# Patient Record
Sex: MALE | Race: WHITE | NOT HISPANIC OR LATINO | Employment: OTHER | URBAN - METROPOLITAN AREA
[De-identification: names, ages, dates, MRNs, and addresses within clinical notes are randomized per-mention and may not be internally consistent; named-entity substitution may affect disease eponyms.]

---

## 2019-01-08 ENCOUNTER — HOSPITAL ENCOUNTER (OUTPATIENT)
Facility: HOSPITAL | Age: 59
Discharge: HOME | End: 2019-01-09
Attending: EMERGENCY MEDICINE | Admitting: INTERNAL MEDICINE
Payer: COMMERCIAL

## 2019-01-08 DIAGNOSIS — R07.9 CHEST PAIN: ICD-10-CM

## 2019-01-08 DIAGNOSIS — E11.65 UNCONTROLLED TYPE 2 DIABETES MELLITUS WITH HYPERGLYCEMIA: Primary | ICD-10-CM

## 2019-01-08 PROBLEM — I20.89 ANGINA EFFORT: Status: ACTIVE | Noted: 2019-01-08

## 2019-01-08 PROBLEM — I25.10 CORONARY ARTERY DISEASE: Status: ACTIVE | Noted: 2019-01-08

## 2019-01-08 PROBLEM — Z95.5 HISTORY OF HEART ARTERY STENT: Status: ACTIVE | Noted: 2019-01-08

## 2019-01-08 PROBLEM — E87.1 HYPONATREMIA: Status: ACTIVE | Noted: 2019-01-08

## 2019-01-08 PROBLEM — I16.0 HYPERTENSIVE URGENCY: Status: ACTIVE | Noted: 2019-01-08

## 2019-01-08 PROBLEM — I15.2 HYPERTENSION ASSOCIATED WITH DIABETES: Status: ACTIVE | Noted: 2019-01-08

## 2019-01-08 PROBLEM — E11.59 HYPERTENSION ASSOCIATED WITH DIABETES: Status: ACTIVE | Noted: 2019-01-08

## 2019-01-08 LAB
ALBUMIN SERPL BCP-MCNC: 4.2 G/DL
ALP SERPL-CCNC: 97 U/L
ALT SERPL W/O P-5'-P-CCNC: 28 U/L
ANION GAP SERPL CALC-SCNC: 12 MMOL/L
AST SERPL-CCNC: 23 U/L
BASOPHILS # BLD AUTO: 0 K/UL
BASOPHILS NFR BLD: 0.5 %
BILIRUB SERPL-MCNC: 0.7 MG/DL
BNP SERPL-MCNC: 15 PG/ML
BUN SERPL-MCNC: 11 MG/DL
CALCIUM SERPL-MCNC: 10 MG/DL
CHLORIDE SERPL-SCNC: 98 MMOL/L
CO2 SERPL-SCNC: 24 MMOL/L
CREAT SERPL-MCNC: 1 MG/DL
D DIMER PPP IA.FEU-MCNC: <0.19 MG/L FEU
DIFFERENTIAL METHOD: ABNORMAL
EOSINOPHIL # BLD AUTO: 0.1 K/UL
EOSINOPHIL NFR BLD: 1.6 %
ERYTHROCYTE [DISTWIDTH] IN BLOOD BY AUTOMATED COUNT: 13 %
EST. GFR  (AFRICAN AMERICAN): >60 ML/MIN/1.73 M^2
EST. GFR  (NON AFRICAN AMERICAN): >60 ML/MIN/1.73 M^2
GLUCOSE SERPL-MCNC: 311 MG/DL
HCT VFR BLD AUTO: 41.4 %
HGB BLD-MCNC: 14.1 G/DL
LYMPHOCYTES # BLD AUTO: 2.3 K/UL
LYMPHOCYTES NFR BLD: 28.1 %
MAGNESIUM SERPL-MCNC: 1.9 MG/DL
MCH RBC QN AUTO: 29.1 PG
MCHC RBC AUTO-ENTMCNC: 34.2 G/DL
MCV RBC AUTO: 85 FL
MONOCYTES # BLD AUTO: 0.7 K/UL
MONOCYTES NFR BLD: 8.9 %
NEUTROPHILS # BLD AUTO: 5 K/UL
NEUTROPHILS NFR BLD: 60.9 %
PLATELET # BLD AUTO: 302 K/UL
PMV BLD AUTO: 8.4 FL
POCT GLUCOSE: 258 MG/DL (ref 70–110)
POCT GLUCOSE: 260 MG/DL (ref 70–110)
POTASSIUM SERPL-SCNC: 4.2 MMOL/L
PROT SERPL-MCNC: 7.6 G/DL
RBC # BLD AUTO: 4.86 M/UL
SODIUM SERPL-SCNC: 134 MMOL/L
TROPONIN I SERPL DL<=0.01 NG/ML-MCNC: 0.01 NG/ML
TROPONIN I SERPL DL<=0.01 NG/ML-MCNC: <0.006 NG/ML
TSH SERPL DL<=0.005 MIU/L-ACNC: 0.76 UIU/ML
WBC # BLD AUTO: 8.1 K/UL

## 2019-01-08 PROCEDURE — 94761 N-INVAS EAR/PLS OXIMETRY MLT: CPT

## 2019-01-08 PROCEDURE — G0378 HOSPITAL OBSERVATION PER HR: HCPCS

## 2019-01-08 PROCEDURE — 85379 FIBRIN DEGRADATION QUANT: CPT

## 2019-01-08 PROCEDURE — 63600175 PHARM REV CODE 636 W HCPCS: Performed by: NURSE PRACTITIONER

## 2019-01-08 PROCEDURE — 83880 ASSAY OF NATRIURETIC PEPTIDE: CPT

## 2019-01-08 PROCEDURE — 96372 THER/PROPH/DIAG INJ SC/IM: CPT | Performed by: EMERGENCY MEDICINE

## 2019-01-08 PROCEDURE — 85025 COMPLETE CBC W/AUTO DIFF WBC: CPT

## 2019-01-08 PROCEDURE — 25000003 PHARM REV CODE 250: Performed by: NURSE PRACTITIONER

## 2019-01-08 PROCEDURE — 83036 HEMOGLOBIN GLYCOSYLATED A1C: CPT

## 2019-01-08 PROCEDURE — 36415 COLL VENOUS BLD VENIPUNCTURE: CPT

## 2019-01-08 PROCEDURE — 84484 ASSAY OF TROPONIN QUANT: CPT | Mod: 91

## 2019-01-08 PROCEDURE — 83735 ASSAY OF MAGNESIUM: CPT

## 2019-01-08 PROCEDURE — 84443 ASSAY THYROID STIM HORMONE: CPT

## 2019-01-08 PROCEDURE — 80053 COMPREHEN METABOLIC PANEL: CPT

## 2019-01-08 PROCEDURE — 99285 EMERGENCY DEPT VISIT HI MDM: CPT | Mod: 25

## 2019-01-08 PROCEDURE — 93005 ELECTROCARDIOGRAM TRACING: CPT

## 2019-01-08 PROCEDURE — A4216 STERILE WATER/SALINE, 10 ML: HCPCS | Performed by: EMERGENCY MEDICINE

## 2019-01-08 PROCEDURE — 25000003 PHARM REV CODE 250: Performed by: EMERGENCY MEDICINE

## 2019-01-08 RX ORDER — ASPIRIN 81 MG/1
81 TABLET ORAL DAILY
Status: DISCONTINUED | OUTPATIENT
Start: 2019-01-09 | End: 2019-01-09 | Stop reason: HOSPADM

## 2019-01-08 RX ORDER — ONDANSETRON 2 MG/ML
4 INJECTION INTRAMUSCULAR; INTRAVENOUS EVERY 4 HOURS PRN
Status: DISCONTINUED | OUTPATIENT
Start: 2019-01-08 | End: 2019-01-09 | Stop reason: HOSPADM

## 2019-01-08 RX ORDER — GLUCAGON 1 MG
1 KIT INJECTION
Status: DISCONTINUED | OUTPATIENT
Start: 2019-01-08 | End: 2019-01-09 | Stop reason: HOSPADM

## 2019-01-08 RX ORDER — SODIUM CHLORIDE 0.9 % (FLUSH) 0.9 %
3 SYRINGE (ML) INJECTION EVERY 8 HOURS
Status: DISCONTINUED | OUTPATIENT
Start: 2019-01-08 | End: 2019-01-09 | Stop reason: HOSPADM

## 2019-01-08 RX ORDER — ASPIRIN 325 MG
325 TABLET ORAL
Status: DISPENSED | OUTPATIENT
Start: 2019-01-08 | End: 2019-01-08

## 2019-01-08 RX ORDER — ACETAMINOPHEN 500 MG
1000 TABLET ORAL EVERY 8 HOURS PRN
Status: DISCONTINUED | OUTPATIENT
Start: 2019-01-08 | End: 2019-01-09 | Stop reason: HOSPADM

## 2019-01-08 RX ORDER — LISINOPRIL 20 MG/1
20 TABLET ORAL DAILY
Status: ON HOLD | COMMUNITY
End: 2019-01-09 | Stop reason: SDUPTHER

## 2019-01-08 RX ORDER — IBUPROFEN 200 MG
16 TABLET ORAL
Status: DISCONTINUED | OUTPATIENT
Start: 2019-01-08 | End: 2019-01-09 | Stop reason: HOSPADM

## 2019-01-08 RX ORDER — SODIUM CHLORIDE 0.9 % (FLUSH) 0.9 %
5 SYRINGE (ML) INJECTION
Status: DISCONTINUED | OUTPATIENT
Start: 2019-01-08 | End: 2019-01-09 | Stop reason: HOSPADM

## 2019-01-08 RX ORDER — NITROGLYCERIN 0.4 MG/1
0.4 TABLET SUBLINGUAL EVERY 5 MIN PRN
Status: DISCONTINUED | OUTPATIENT
Start: 2019-01-08 | End: 2019-01-09 | Stop reason: HOSPADM

## 2019-01-08 RX ORDER — RAMELTEON 8 MG/1
8 TABLET ORAL NIGHTLY PRN
Status: DISCONTINUED | OUTPATIENT
Start: 2019-01-08 | End: 2019-01-09 | Stop reason: HOSPADM

## 2019-01-08 RX ORDER — PANTOPRAZOLE SODIUM 40 MG/1
40 TABLET, DELAYED RELEASE ORAL DAILY
Status: DISCONTINUED | OUTPATIENT
Start: 2019-01-09 | End: 2019-01-09 | Stop reason: HOSPADM

## 2019-01-08 RX ORDER — LISINOPRIL 10 MG/1
20 TABLET ORAL DAILY
Status: DISCONTINUED | OUTPATIENT
Start: 2019-01-08 | End: 2019-01-09 | Stop reason: HOSPADM

## 2019-01-08 RX ORDER — INSULIN ASPART 100 [IU]/ML
0-5 INJECTION, SOLUTION INTRAVENOUS; SUBCUTANEOUS
Status: DISCONTINUED | OUTPATIENT
Start: 2019-01-08 | End: 2019-01-09 | Stop reason: HOSPADM

## 2019-01-08 RX ORDER — IBUPROFEN 200 MG
24 TABLET ORAL
Status: DISCONTINUED | OUTPATIENT
Start: 2019-01-08 | End: 2019-01-09 | Stop reason: HOSPADM

## 2019-01-08 RX ORDER — KETOROLAC TROMETHAMINE 30 MG/ML
15 INJECTION, SOLUTION INTRAMUSCULAR; INTRAVENOUS ONCE
Status: DISCONTINUED | OUTPATIENT
Start: 2019-01-08 | End: 2019-01-09 | Stop reason: HOSPADM

## 2019-01-08 RX ADMIN — LISINOPRIL 20 MG: 10 TABLET ORAL at 05:01

## 2019-01-08 RX ADMIN — INSULIN ASPART 2 UNITS: 100 INJECTION, SOLUTION INTRAVENOUS; SUBCUTANEOUS at 05:01

## 2019-01-08 RX ADMIN — INSULIN ASPART 1 UNITS: 100 INJECTION, SOLUTION INTRAVENOUS; SUBCUTANEOUS at 08:01

## 2019-01-08 RX ADMIN — Medication 3 ML: at 08:01

## 2019-01-08 RX ADMIN — Medication 3 ML: at 03:01

## 2019-01-08 NOTE — ASSESSMENT & PLAN NOTE
With prior coronary stent 2012  Telemetry  Patient reports his only home medication is lisinopril- No asa, statin, or DM medications noted  Cardiology consulted  Add daily baby asa in am  Continue home lisinopril  Check lipid panel

## 2019-01-08 NOTE — HPI
"This  is a 59 yo  male with PMHx of CAD with prior coronary stent placement of the  LAD in 2012, HTN, and NIDDM who presents to the ED with complaints of chest pain onset 2 months. Patient states that when he tries to "do anything physical" he has mid sternal chest pain/ pressure, weakness, and must sit down in order not to pass out for the past couple of months. He complains of shortness of breath with activity. He describes his chest discomfort as pressure right now. He states he felt these symptoms previously prior to his stent placement 7 years ago. Patient states that he does not have any money or job and unable to afford medication for diabetes. Denies the use of blood thinners secondary to side effects that he describes as a right sided headache. Patient denies smoking, drinking, or using any illicit drugs. He complains of chronic thirst and urinary frequency secondary to diabetes and prostate issues. Patient denies any nausea, vomiting, diarrhea, abdominal pain, pain or swelling of the legs, abnormal sweating, dizziness, numbness, cough, congestion, blood in his urine, blood in his stool, or fever. Patient was placed in the hospital for further evaluation and treatment.       "

## 2019-01-08 NOTE — ASSESSMENT & PLAN NOTE
Telemetry  Full dose asa x 1  Cardiology consulted  Trend troponins  Check lipid, HGA1C,  and TSH levels also

## 2019-01-08 NOTE — ASSESSMENT & PLAN NOTE
Dm diet  Accuchecks with correctional SSI  Check HGA1c  Consider metformin if does not need angiogram

## 2019-01-08 NOTE — H&P
"[facility] Medicine  History & Physical    Patient Name: Price Noguera  MRN: [id number]  Admission Date: 1/8/2019  Attending Physician: Shira Linares MD   Primary Care Provider: Primary Doctor No    Patient information was obtained from patient and ER records.     Subjective:     Principal Problem:Chest pain    Chief Complaint:   Chief Complaint   Patient presents with    Chest Pain     C/o left sided chest pressure x 1 week. States pain increases with activity. + SOB.         HPI: This  is a 59 yo  male with PMHx of CAD with prior coronary stent placement of the  LAD in 2012, HTN, and NIDDM who presents to the ED with complaints of chest pain onset 2 months. Patient states that when he tries to "do anything physical" he has mid sternal chest pain/ pressure, weakness, and must sit down in order not to pass out for the past couple of months. He complains of shortness of breath with activity. He describes his chest discomfort as pressure right now. He states he felt these symptoms previously prior to his stent placement 7 years ago. Patient states that he does not have any money or job and unable to afford medication for diabetes. Denies the use of blood thinners secondary to side effects that he describes as a right sided headache. Patient denies smoking, drinking, or using any illicit drugs. He complains of chronic thirst and urinary frequency secondary to diabetes and prostate issues. Patient denies any nausea, vomiting, diarrhea, abdominal pain, pain or swelling of the legs, abnormal sweating, dizziness, numbness, cough, congestion, blood in his urine, blood in his stool, or fever.  Patient was placed in the hospital for further evaluation and treatment.   Patient reports prior "Head injury 3 years ago " with memory issues every since and no work up. Will check Ct scan of head.     Past Medical History:   Diagnosis Date    Coronary artery disease     Diabetes mellitus     History " of heart artery stent     Hypertension        Past Surgical History:   Procedure Laterality Date    BACK SURGERY      COLONOSCOPY  04/07/2014    TUMOR REMOVAL  05/16/2016       Review of patient's allergies indicates:  No Known Allergies    No current facility-administered medications on file prior to encounter.      Current Outpatient Medications on File Prior to Encounter   Medication Sig    lisinopril (PRINIVIL,ZESTRIL) 20 MG tablet Take 20 mg by mouth once daily.     Family History     Problem Relation (Age of Onset)    Cancer Father, Paternal Grandmother        Tobacco Use    Smoking status: Never Smoker    Smokeless tobacco: Never Used   Substance and Sexual Activity    Alcohol use: No     Frequency: Never    Drug use: No    Sexual activity: Not Currently     Review of Systems   Constitutional: Positive for activity change and fatigue. Negative for appetite change, chills, diaphoresis and fever.   HENT: Negative for ear discharge, ear pain and facial swelling.    Eyes: Negative for pain and redness.   Respiratory: Positive for shortness of breath. Negative for cough.         SOB with exertion   Cardiovascular: Positive for chest pain. Negative for palpitations and leg swelling.   Gastrointestinal: Negative for abdominal distention, abdominal pain, blood in stool, constipation, diarrhea, nausea and vomiting.   Endocrine: Negative for polydipsia and polyphagia.   Genitourinary: Negative for difficulty urinating, dysuria, flank pain and hematuria.   Musculoskeletal: Negative for neck pain and neck stiffness.   Skin: Negative for color change.   Allergic/Immunologic: Negative for food allergies.   Neurological: Positive for weakness. Negative for seizures, syncope, facial asymmetry and speech difficulty.        Reports period of weakness at times during CP occurrences    Hematological: Does not bruise/bleed easily.   Psychiatric/Behavioral: Negative for agitation, behavioral problems, confusion and  "suicidal ideas. The patient is nervous/anxious.         Reports memory issues since a prior "head injury about 3 years ago"     Objective:     Vital Signs (Most Recent):  Temp: 97.2 °F (36.2 °C) (01/08/19 1443)  Pulse: 66 (01/08/19 1443)  Resp: 18 (01/08/19 1443)  BP: (!) 150/87 (01/08/19 1443)  SpO2: 100 % (01/08/19 1559) Vital Signs (24h Range):  Temp:  [97.2 °F (36.2 °C)-98.2 °F (36.8 °C)] 97.2 °F (36.2 °C)  Pulse:  [59-89] 66  Resp:  [18] 18  SpO2:  [98 %-100 %] 100 %  BP: (135-178)/() 150/87     Weight: 88.5 kg (195 lb)  Body mass index is 28.8 kg/m².    Physical Exam   Constitutional: He is oriented to person, place, and time. He appears well-developed and well-nourished. No distress.   HENT:   Head: Normocephalic and atraumatic.   Eyes: Conjunctivae and EOM are normal. Pupils are equal, round, and reactive to light. Right eye exhibits no discharge. Left eye exhibits no discharge.   Neck: Normal range of motion. Neck supple. No JVD present.   Cardiovascular: Normal rate, regular rhythm, normal heart sounds and intact distal pulses.   No murmur heard.  Pulmonary/Chest: Effort normal and breath sounds normal. No respiratory distress. He has no wheezes.   Abdominal: Soft. Bowel sounds are normal. He exhibits no distension. There is no tenderness. There is no guarding.   Genitourinary:   Genitourinary Comments: Not examined   Musculoskeletal: Normal range of motion. He exhibits no edema.   Neurological: He is alert and oriented to person, place, and time. No cranial nerve deficit.   Skin: Skin is warm and dry. Capillary refill takes less than 2 seconds. He is not diaphoretic.   Psychiatric: He has a normal mood and affect. His behavior is normal. Judgment and thought content normal.         CRANIAL NERVES     CN III, IV, VI   Pupils are equal, round, and reactive to light.  Extraocular motions are normal.        Significant Labs: Reviewed    Significant Imaging: Reviewed    Assessment/Plan:     * Chest pain "    As above  Also add PPI and one dose IV toradol       Hyponatremia    Monitor       Hypertensive urgency    Resolved       Angina effort    Telemetry  Full dose asa x 1  Cardiology consulted  Trend troponins  Check lipid, HGA1C,  and TSH levels also       Hypertension associated with diabetes    Continue home lisinopril       Coronary artery disease    With prior coronary stent 2012  Telemetry  Patient reports his only home medication is lisinopril- No asa, statin, or DM medications noted  Cardiology consulted  Add daily baby asa in am  Continue home lisinopril  Check lipid panel       Uncontrolled type 2 diabetes mellitus with hyperglycemia    Dm diet  Accuchecks with correctional SSI  Check HGA1c  Consider metformin if does not need angiogram         VTE Risk Mitigation (From admission, onward)        Ordered     IP VTE LOW RISK PATIENT  Once      01/08/19 1457     Place STEPHANY hose  Until discontinued      01/08/19 1457     Place sequential compression device  Until discontinued      01/08/19 1457          SEAN Grant  Department of Hospital Medicine   [facility]    Time spent seeing patient( greater than 1/2 spent in direct contact) : 74 minutes

## 2019-01-08 NOTE — PLAN OF CARE
Problem: Adult Inpatient Plan of Care  Goal: Plan of Care Review  Outcome: Ongoing (interventions implemented as appropriate)   01/08/19 7452   Plan of Care Review   Plan of Care Reviewed With patient   Pt denies pain at this time. NAD noted. POC reviewed w pt and they verbalized understanding. Tele-SR     Pt free from falls, Pt ambulates to the bathroom. Bed in low position, side rails up x 2. Call light in reach, bed alarm on and wheels locked. Will continue to monitor.

## 2019-01-08 NOTE — ED PROVIDER NOTES
"Encounter Date: 1/8/2019    SCRIBE #1 NOTE: I, Janelle Miller, am scribing for, and in the presence of, Dr. Adame.       History     Chief Complaint   Patient presents with    Chest Pain     C/o left sided chest pressure x 1 week. States pain increases with activity. + SOB.        Time seen by provider: 10:56 AM on 01/08/2019    Price Noguera is a 58 y.o. male with CAD s/p stent in LAD, HTN, NIDDM who presents to the ED with chest pain onset 2 months. Patient states that when he tries to "do anything physical" he has chest pain, weakness, and must sit in order to not pass out for the past couple of months. Complains of shortness of breath with activity. He describes his chest discomfort as pressure right now. He states he felt these symptoms previously prior to his stent placement 7 years ago. Patient states that he does not have any money or job and unable to afford medication for diabetes. Denies the use of blood thinners secondary to side effects that he describes as a right sided headache. Patient denies smoking, drinking, or using any illicit drugs. He complains of chronic thirst and urinary frequency secondary to diabetes and prostate issues. Patient denies any nausea, vomiting, diarrhea, abdominal pain, pain or swelling of the legs, abnormal sweating, dizziness, numbness, cough, congestion, blood in his urine, blood in his stool, or fever.       The history is provided by the patient. No  was used.     Review of patient's allergies indicates:  No Known Allergies  Past Medical History:   Diagnosis Date    Coronary artery disease     Diabetes mellitus     History of heart artery stent     Hypertension      Past Surgical History:   Procedure Laterality Date    BACK SURGERY       No family history on file.  Social History     Tobacco Use    Smoking status: Never Smoker   Substance Use Topics    Alcohol use: No     Frequency: Never    Drug use: Not on file     Review of Systems "   Constitutional: Negative for chills and fever.   HENT: Negative for congestion, drooling, sore throat and trouble swallowing.    Eyes: Negative for photophobia and visual disturbance.   Respiratory: Positive for shortness of breath (on exertion). Negative for cough and wheezing.    Cardiovascular: Positive for chest pain. Negative for palpitations and leg swelling.   Gastrointestinal: Negative for abdominal pain, diarrhea, nausea and vomiting.   Endocrine: Positive for polydipsia and polyuria.   Genitourinary: Negative for difficulty urinating, dysuria and hematuria.   Musculoskeletal: Negative for back pain and neck pain.   Skin: Negative for rash.   Neurological: Positive for weakness (generalized during episodes). Negative for dizziness, syncope and numbness.        +near syncope   Hematological: Does not bruise/bleed easily.   Psychiatric/Behavioral: Negative for confusion.       Physical Exam     Initial Vitals [01/08/19 1034]   BP Pulse Resp Temp SpO2   135/73 89 18 98.2 °F (36.8 °C) 99 %      MAP       --         Physical Exam    Nursing note and vitals reviewed.  Constitutional: He appears well-developed and well-nourished. No distress.   HENT:   Head: Normocephalic and atraumatic.   Mouth/Throat: Mucous membranes are normal.   Eyes: Conjunctivae and EOM are normal. Pupils are equal, round, and reactive to light.   Neck: Normal range of motion. Neck supple. No thyromegaly present.   Cardiovascular: Normal rate, regular rhythm, normal heart sounds and intact distal pulses. Exam reveals no gallop and no friction rub.    No murmur heard.  Pulmonary/Chest: Breath sounds normal. No respiratory distress. He has no wheezes. He has no rhonchi. He has no rales.   Abdominal: Soft. Bowel sounds are normal. He exhibits no distension. There is no tenderness. There is no rebound and no guarding.   Musculoskeletal: Normal range of motion. He exhibits no edema or tenderness.   No peripheral edema.    Neurological: He is  alert and oriented to person, place, and time.   Skin: Skin is warm and dry.   Psychiatric: He has a normal mood and affect.         ED Course   Procedures  Labs Reviewed   CBC W/ AUTO DIFFERENTIAL   COMPREHENSIVE METABOLIC PANEL   TROPONIN I   B-TYPE NATRIURETIC PEPTIDE     EKG Readings: (Independently Interpreted)   Rhythm: Normal Sinus Rhythm. Heart Rate: 77. Conduction: RBBB. ST Segments: Normal ST Segments.   No evidence of ST segment elevation or depression.        Imaging Results    None          Medical Decision Making:   History:   Old Medical Records: I decided to obtain old medical records.  Clinical Tests:   Lab Tests: Ordered and Reviewed  Radiological Study: Ordered and Reviewed  Medical Tests: Ordered and Reviewed  Patient will be admitted given his risk factors, inability to afford his meds, and recent history of exertional chest discomfort.  He may have anxiety causing some of his symptoms however I am unable to get follow-up and feel that he needs further evaluation with cardiac markers and potential stress testing to rule out recurrent ischemia            Scribe Attestation:   Scribe #1: I performed the above scribed service and the documentation accurately describes the services I performed. I attest to the accuracy of the note.    I, Dr. Jacoby Adame personally performed the services described in this documentation. All medical record entries made by the scribe were at my direction and in my presence.  I have reviewed the chart and agree that the record reflects my personal performance and is accurate and complete. Jacoby Adame MD.  6:37 AM 01/13/2019    DISCLAIMER: This note was prepared with Dragon NaturallySpeaking voice recognition transcription software. Garbled syntax, mangled pronouns, and other bizarre constructions may be attributed to that software system            Clinical Impression:   The encounter diagnosis was Chest pain.                             Jacoby Adame  MD  01/13/19 0637

## 2019-01-08 NOTE — SUBJECTIVE & OBJECTIVE
Past Medical History:   Diagnosis Date    Coronary artery disease     Diabetes mellitus     History of heart artery stent     Hypertension        Past Surgical History:   Procedure Laterality Date    BACK SURGERY      COLONOSCOPY  04/07/2014    TUMOR REMOVAL  05/16/2016       Review of patient's allergies indicates:  No Known Allergies    No current facility-administered medications on file prior to encounter.      Current Outpatient Medications on File Prior to Encounter   Medication Sig    lisinopril (PRINIVIL,ZESTRIL) 20 MG tablet Take 20 mg by mouth once daily.     Family History     Problem Relation (Age of Onset)    Cancer Father, Paternal Grandmother        Tobacco Use    Smoking status: Never Smoker    Smokeless tobacco: Never Used   Substance and Sexual Activity    Alcohol use: No     Frequency: Never    Drug use: No    Sexual activity: Not Currently     Review of Systems   Constitutional: Positive for activity change and fatigue. Negative for appetite change, chills, diaphoresis and fever.   HENT: Negative for ear discharge, ear pain and facial swelling.    Eyes: Negative for pain and redness.   Respiratory: Positive for shortness of breath. Negative for cough.         SOB with exertion   Cardiovascular: Positive for chest pain. Negative for palpitations and leg swelling.   Gastrointestinal: Negative for abdominal distention, abdominal pain, blood in stool, constipation, diarrhea, nausea and vomiting.   Endocrine: Negative for polydipsia and polyphagia.   Genitourinary: Negative for difficulty urinating, dysuria, flank pain and hematuria.   Musculoskeletal: Negative for neck pain and neck stiffness.   Skin: Negative for color change.   Allergic/Immunologic: Negative for food allergies.   Neurological: Positive for weakness. Negative for seizures, syncope, facial asymmetry and speech difficulty.        Reports period of weakness at times during CP occurrences    Hematological: Does not  "bruise/bleed easily.   Psychiatric/Behavioral: Negative for agitation, behavioral problems, confusion and suicidal ideas. The patient is nervous/anxious.         Reports memory issues since a prior "head injury about 3 years ago"     Objective:     Vital Signs (Most Recent):  Temp: 97.2 °F (36.2 °C) (01/08/19 1443)  Pulse: 66 (01/08/19 1443)  Resp: 18 (01/08/19 1443)  BP: (!) 150/87 (01/08/19 1443)  SpO2: 100 % (01/08/19 1559) Vital Signs (24h Range):  Temp:  [97.2 °F (36.2 °C)-98.2 °F (36.8 °C)] 97.2 °F (36.2 °C)  Pulse:  [59-89] 66  Resp:  [18] 18  SpO2:  [98 %-100 %] 100 %  BP: (135-178)/() 150/87     Weight: 88.5 kg (195 lb)  Body mass index is 28.8 kg/m².    Physical Exam   Constitutional: He is oriented to person, place, and time. He appears well-developed and well-nourished. No distress.   HENT:   Head: Normocephalic and atraumatic.   Eyes: Conjunctivae and EOM are normal. Pupils are equal, round, and reactive to light. Right eye exhibits no discharge. Left eye exhibits no discharge.   Neck: Normal range of motion. Neck supple. No JVD present.   Cardiovascular: Normal rate, regular rhythm, normal heart sounds and intact distal pulses.   No murmur heard.  Pulmonary/Chest: Effort normal and breath sounds normal. No respiratory distress. He has no wheezes.   Abdominal: Soft. Bowel sounds are normal. He exhibits no distension. There is no tenderness. There is no guarding.   Genitourinary:   Genitourinary Comments: Not examined   Musculoskeletal: Normal range of motion. He exhibits no edema.   Neurological: He is alert and oriented to person, place, and time. No cranial nerve deficit.   Skin: Skin is warm and dry. Capillary refill takes less than 2 seconds. He is not diaphoretic.   Psychiatric: He has a normal mood and affect. His behavior is normal. Judgment and thought content normal.         CRANIAL NERVES     CN III, IV, VI   Pupils are equal, round, and reactive to light.  Extraocular motions are " normal.        Significant Labs: Reviewed    Significant Imaging: Reviewed

## 2019-01-08 NOTE — ED NOTES
"Denies pain c/o @ present - "feels better with the O2 on". Will continue to monitor (currently no SOB/N&V or diaphoresis)  "

## 2019-01-09 VITALS
DIASTOLIC BLOOD PRESSURE: 79 MMHG | SYSTOLIC BLOOD PRESSURE: 129 MMHG | HEIGHT: 69 IN | WEIGHT: 195 LBS | BODY MASS INDEX: 28.88 KG/M2 | HEART RATE: 62 BPM | TEMPERATURE: 97 F | RESPIRATION RATE: 16 BRPM | OXYGEN SATURATION: 96 %

## 2019-01-09 PROBLEM — E78.5 DYSLIPIDEMIA: Status: ACTIVE | Noted: 2019-01-09

## 2019-01-09 PROBLEM — E78.1 HYPERTRIGLYCERIDEMIA: Status: ACTIVE | Noted: 2019-01-09

## 2019-01-09 PROBLEM — I16.0 HYPERTENSIVE URGENCY: Status: RESOLVED | Noted: 2019-01-08 | Resolved: 2019-01-09

## 2019-01-09 LAB
CHOLEST SERPL-MCNC: 286 MG/DL
CHOLEST/HDLC SERPL: 7.2 {RATIO}
ESTIMATED AVG GLUCOSE: 272 MG/DL
HBA1C MFR BLD HPLC: 11.1 %
HDLC SERPL-MCNC: 40 MG/DL
HDLC SERPL: 14 %
LDLC SERPL CALC-MCNC: 192.8 MG/DL
NONHDLC SERPL-MCNC: 246 MG/DL
POCT GLUCOSE: 202 MG/DL (ref 70–110)
POCT GLUCOSE: 228 MG/DL (ref 70–110)
POCT GLUCOSE: 271 MG/DL (ref 70–110)
TRIGL SERPL-MCNC: 266 MG/DL
TROPONIN I SERPL DL<=0.01 NG/ML-MCNC: <0.006 NG/ML

## 2019-01-09 PROCEDURE — G0378 HOSPITAL OBSERVATION PER HR: HCPCS

## 2019-01-09 PROCEDURE — 25000003 PHARM REV CODE 250: Performed by: EMERGENCY MEDICINE

## 2019-01-09 PROCEDURE — 96372 THER/PROPH/DIAG INJ SC/IM: CPT | Performed by: EMERGENCY MEDICINE

## 2019-01-09 PROCEDURE — A4216 STERILE WATER/SALINE, 10 ML: HCPCS | Performed by: EMERGENCY MEDICINE

## 2019-01-09 PROCEDURE — 90471 IMMUNIZATION ADMIN: CPT | Performed by: INTERNAL MEDICINE

## 2019-01-09 PROCEDURE — 94761 N-INVAS EAR/PLS OXIMETRY MLT: CPT

## 2019-01-09 PROCEDURE — 80061 LIPID PANEL: CPT

## 2019-01-09 PROCEDURE — 90670 PCV13 VACCINE IM: CPT | Performed by: INTERNAL MEDICINE

## 2019-01-09 PROCEDURE — 25000003 PHARM REV CODE 250: Performed by: NURSE PRACTITIONER

## 2019-01-09 PROCEDURE — 63600175 PHARM REV CODE 636 W HCPCS: Performed by: INTERNAL MEDICINE

## 2019-01-09 PROCEDURE — 36415 COLL VENOUS BLD VENIPUNCTURE: CPT

## 2019-01-09 PROCEDURE — 84484 ASSAY OF TROPONIN QUANT: CPT

## 2019-01-09 RX ORDER — ASPIRIN 81 MG/1
81 TABLET ORAL DAILY
Refills: 0 | COMMUNITY
Start: 2019-01-10 | End: 2020-02-05

## 2019-01-09 RX ORDER — LISINOPRIL 20 MG/1
10 TABLET ORAL DAILY
Qty: 15 TABLET | Refills: 0 | Status: SHIPPED | OUTPATIENT
Start: 2019-01-09 | End: 2019-01-18 | Stop reason: SDUPTHER

## 2019-01-09 RX ORDER — METFORMIN HYDROCHLORIDE 850 MG/1
850 TABLET ORAL 2 TIMES DAILY WITH MEALS
Qty: 60 TABLET | Refills: 0 | Status: SHIPPED | OUTPATIENT
Start: 2019-01-09 | End: 2019-01-18 | Stop reason: SDUPTHER

## 2019-01-09 RX ORDER — ATORVASTATIN CALCIUM 20 MG/1
20 TABLET, FILM COATED ORAL DAILY
Qty: 30 TABLET | Refills: 0 | Status: SHIPPED | OUTPATIENT
Start: 2019-01-09 | End: 2019-01-18 | Stop reason: SDUPTHER

## 2019-01-09 RX ORDER — GLUCOSAM/CHONDRO/HERB 149/HYAL 750-100 MG
1 TABLET ORAL 2 TIMES DAILY WITH MEALS
Qty: 180 CAPSULE | Refills: 3 | Status: ON HOLD | COMMUNITY
Start: 2019-01-09 | End: 2020-12-13

## 2019-01-09 RX ORDER — LANCETS 28 GAUGE
1 EACH MISCELLANEOUS
Qty: 125 EACH | Refills: 0 | Status: SHIPPED | OUTPATIENT
Start: 2019-01-09 | End: 2019-01-18 | Stop reason: SDUPTHER

## 2019-01-09 RX ORDER — METFORMIN HYDROCHLORIDE 850 MG/1
850 TABLET ORAL 2 TIMES DAILY WITH MEALS
Status: DISCONTINUED | OUTPATIENT
Start: 2019-01-09 | End: 2019-01-09 | Stop reason: HOSPADM

## 2019-01-09 RX ORDER — PANTOPRAZOLE SODIUM 40 MG/1
40 TABLET, DELAYED RELEASE ORAL DAILY
Qty: 14 TABLET | Refills: 0 | Status: SHIPPED | OUTPATIENT
Start: 2019-01-10 | End: 2019-01-18 | Stop reason: SDUPTHER

## 2019-01-09 RX ORDER — GLUCOSAM/CHONDRO/HERB 149/HYAL 750-100 MG
1 TABLET ORAL 2 TIMES DAILY WITH MEALS
Status: DISCONTINUED | OUTPATIENT
Start: 2019-01-09 | End: 2019-01-09 | Stop reason: HOSPADM

## 2019-01-09 RX ORDER — ATORVASTATIN CALCIUM 20 MG/1
20 TABLET, FILM COATED ORAL DAILY
Status: DISCONTINUED | OUTPATIENT
Start: 2019-01-09 | End: 2019-01-09 | Stop reason: HOSPADM

## 2019-01-09 RX ORDER — ACETAMINOPHEN 500 MG
1000 TABLET ORAL EVERY 8 HOURS PRN
Refills: 0 | COMMUNITY
Start: 2019-01-09 | End: 2023-07-05

## 2019-01-09 RX ADMIN — ATORVASTATIN CALCIUM 20 MG: 20 TABLET, FILM COATED ORAL at 12:01

## 2019-01-09 RX ADMIN — METFORMIN HYDROCHLORIDE 850 MG: 850 TABLET ORAL at 05:01

## 2019-01-09 RX ADMIN — PANTOPRAZOLE SODIUM 40 MG: 40 TABLET, DELAYED RELEASE ORAL at 08:01

## 2019-01-09 RX ADMIN — OMEGA-3 FATTY ACIDS CAP 1000 MG 1 CAPSULE: 1000 CAP at 05:01

## 2019-01-09 RX ADMIN — INSULIN ASPART 3 UNITS: 100 INJECTION, SOLUTION INTRAVENOUS; SUBCUTANEOUS at 06:01

## 2019-01-09 RX ADMIN — ASPIRIN 81 MG: 81 TABLET, COATED ORAL at 08:01

## 2019-01-09 RX ADMIN — Medication 3 ML: at 05:01

## 2019-01-09 RX ADMIN — PNEUMOCOCCAL 13-VALENT CONJUGATE VACCINE 0.5 ML: 2.2; 2.2; 2.2; 2.2; 2.2; 4.4; 2.2; 2.2; 2.2; 2.2; 2.2; 2.2; 2.2 INJECTION, SUSPENSION INTRAMUSCULAR at 05:01

## 2019-01-09 RX ADMIN — ACETAMINOPHEN 1000 MG: 500 TABLET ORAL at 05:01

## 2019-01-09 NOTE — PLAN OF CARE
Notified JAI Serna of stress test results. Patient is cleared for discharge from cardiology standpoint.

## 2019-01-09 NOTE — PROGRESS NOTES
Walking nuclear treadmill stress test completed.  Vss no problems noted.  Pt transported to [facility] via wheelchair.

## 2019-01-09 NOTE — HOSPITAL COURSE
The patient was monitored closely during hospitalization and kept on continuous telemetry monitoring. Cardiology was consulted. The patient  remained in SR without any signs or symptoms of arrhythmias. The patient's troponin remained negative. The patient  was ordered for an Exercise NM  stress test.  He was also noted to have dyslipidemia and was placed on fish oils and statin. His HGA1C was 11.1. He was started on metformin and ordered for a consult with the DM educator and the dietician. He was educated on the importance of medication compliance. He also reported a history of memory issues since a reported prior head injury 3 years ago. A Ct scan of head without contrast was done and showed no acute intracranial abnormality.  There was no hemorrhage, mass/mass effect, acute edema or ischemia. There was minimal nonspecific white matter disease. The patient's exercise NM stress test was ccintigraphically negative for ischemia or infarct. The patient's overall condition remained stable with no further complaints of chest pain. The patient was discharged to home after cleared by Cardiology. The patient was to follow up with PCP for further DM medication titration.

## 2019-01-09 NOTE — PLAN OF CARE
Hospital follow ups scheduled and added to the AVS.  Updated pt's nurse Radha.     01/09/19 1242   Discharge Reassessment   Assessment Type Discharge Planning Reassessment   Discharge Plan A Home

## 2019-01-09 NOTE — CONSULTS
"[facility]  Cardiology  Consult Note    Patient Name: Price Noguera  MRN: [id number]  Admission Date: 1/8/2019  Hospital Length of Stay: 0 days  Code Status: Full Code   Attending Provider: Dr Linares  Consulting Provider: SEAN Hill  Primary Care Physician: Primary Doctor No  Principal Problem:Chest pain    Patient information was obtained from patient, past medical records and ER records.     Inpatient consult to Cardiology  Consult performed by: SEAN Orellana  Consult ordered by: SEAN Rust        For cardiology to render an opinion in regard to CP in a pt with known CAD.    Subjective:     Chief Complaint:  CP    HPI:  Mr Price Noguera is a 57 y/o  male pt who presented to the ED with CP. He states that he had an MI in [place], [place] in 2012, he was told it was "a  maker that supplied the back bottom portion of his heart". After this event he took Plavix and ASA for only 3 months. He says that it gave him severe headaches.  He also reports a head injury that prevented him from working so he had no money for any of his medications accept his ACE. 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   Also had bad insurance until January but his family is now helping him and he has better insurance now.  The chest pain onset was over the past 2 months. Patient states that when he tries to "do anything physical" he has mid sternal chest pain/ pressure, weakness, and must sit down in order not to pass out. He has a garden and must take frequent breaks because of the chest discomfort for the past couple of months. He complains of shortness of breath with activity. He describes his chest discomfort as pressure right now. He states he felt these symptoms previously prior to his stent placement 7 years ago.  Risk factors include: CAD, Hyperlipidemia, HTN, DM.  He has had very little work up or follow up with cardiology since that time.    ASCVD score 21.8%    Past Medical History: "   Diagnosis Date    Coronary artery disease     Diabetes mellitus     History of heart artery stent     Hypertension        Past Surgical History:   Procedure Laterality Date    BACK SURGERY      COLONOSCOPY  04/07/2014    TUMOR REMOVAL  05/16/2016       Review of patient's allergies indicates:  No Known Allergies    No current facility-administered medications on file prior to encounter.      Current Outpatient Medications on File Prior to Encounter   Medication Sig    lisinopril (PRINIVIL,ZESTRIL) 20 MG tablet Take 20 mg by mouth once daily.     Family History     Problem Relation (Age of Onset)    Cancer Father, Paternal Grandmother        Tobacco Use    Smoking status: Never Smoker    Smokeless tobacco: Never Used   Substance and Sexual Activity    Alcohol use: No     Frequency: Never    Drug use: No    Sexual activity: Not Currently     Review of Systems   Constitution: Positive for weakness.   HENT: Negative.    Eyes: Negative.    Cardiovascular: Positive for chest pain.   Respiratory: Positive for shortness of breath.    Skin: Negative.    Musculoskeletal: Negative.    Gastrointestinal: Negative.    Genitourinary: Negative.    Psychiatric/Behavioral: Negative.      Objective:     Vital Signs (Most Recent):  Temp: 97.6 °F (36.4 °C) (01/09/19 0800)  Pulse: 64 (01/09/19 0805)  Resp: 18 (01/09/19 0805)  BP: 111/73 (01/09/19 0800)  SpO2: 97 % (01/09/19 0805) Vital Signs (24h Range):  Temp:  [97.2 °F (36.2 °C)-98.1 °F (36.7 °C)] 97.6 °F (36.4 °C)  Pulse:  [54-76] 64  Resp:  [16-20] 18  SpO2:  [95 %-100 %] 97 %  BP: ()/() 111/73     Weight: 88.5 kg (195 lb)  Body mass index is 28.8 kg/m².    SpO2: 97 %  O2 Device (Oxygen Therapy): room air      Intake/Output Summary (Last 24 hours) at 1/9/2019 1051  Last data filed at 1/9/2019 0600  Gross per 24 hour   Intake 360 ml   Output --   Net 360 ml       Lines/Drains/Airways     Peripheral Intravenous Line                 Peripheral IV - Single  Lumen 01/08/19 1110 Left Forearm less than 1 day              Physical Exam   Constitutional: He is oriented to person, place, and time. He appears well-developed and well-nourished.   HENT:   Head: Normocephalic.   Eyes: Conjunctivae are normal. Pupils are equal, round, and reactive to light.   Neck: Normal range of motion. Neck supple. No JVD present. No thyromegaly present.   Cardiovascular: Normal rate, regular rhythm, normal heart sounds and intact distal pulses.   No murmur heard.  Pulmonary/Chest: Effort normal and breath sounds normal.   Abdominal: Soft. Bowel sounds are normal.   Musculoskeletal: Normal range of motion. He exhibits no edema.   Neurological: He is alert and oriented to person, place, and time.   Skin: Skin is warm and dry.   Psychiatric: He has a normal mood and affect. His behavior is normal. Thought content normal.     Significant Labs:   BMP:   Recent Labs   Lab 01/08/19  1118 01/08/19  1904   *  --    *  --    K 4.2  --    CL 98  --    CO2 24  --    BUN 11  --    CREATININE 1.0  --    CALCIUM 10.0  --    MG  --  1.9   , CMP   Recent Labs   Lab 01/08/19  1118   *   K 4.2   CL 98   CO2 24   *   BUN 11   CREATININE 1.0   CALCIUM 10.0   PROT 7.6   ALBUMIN 4.2   BILITOT 0.7   ALKPHOS 97   AST 23   ALT 28   ANIONGAP 12   ESTGFRAFRICA >60   EGFRNONAA >60   , CBC   Recent Labs   Lab 01/08/19  1118   WBC 8.10   HGB 14.1   HCT 41.4      , Lipid Panel   Recent Labs   Lab 01/09/19  0513   CHOL 286*   HDL 40   LDLCALC 192.8*   TRIG 266*   CHOLHDL 14.0*    and Troponin   Recent Labs   Lab 01/08/19  1512 01/08/19  1904 01/09/19  0001   TROPONINI 0.008 <0.006 <0.006   BNP0 - 99 pg/mL15   TSH0.400 - 4.000 uIU/mL0.755   D-Dimer<0.50 mg/L FEU<0.19   Hemoglobin A1C4.0 - 5.6 %11.1 Abnormally high    Magnesium1.6 - 2.6 mg/dL1.9     Significant Imaging:    Stress test - pending    EKG  NSR, RBBB    Cxray  Lungs are clear.Normal cardiomediastinal silhouette.Normal pulmonary  vascular distribution.No pleural effusion or pneumothorax.No acute osseous abnormality.     CT head  Intracranial contents: There is no acute intracranial abnormality.  Brain volume, ventricular size and position are normal.  There is no hemorrhage or mass/mass effect.  There is no acute edema or ischemia.  The gray-white interface is preserved without obvious acute infarction.  There is very mild nonspecific and age indeterminate periventricular white matter decreased attenuation.  These findings likely reflect sequelae of minimal chronic small vessel disease.  There is no dense vessel.  There is no abnormal extra-axial fluid collection.  The basilar cisterns are open.  The cerebellar tonsils are in normal position at the level of the foramen magnum.  The sellar structures are normal.  Extracranial contents, calvarium, soft tissues: The calvarium is normal.  The included paranasal sinuses and mastoid air cells are clear.   1.  There is no acute intracranial abnormality.  There is no hemorrhage, mass/mass effect, acute edema or ischemia. There is minimal nonspecific white matter disease.       Assessment and Plan:     Chest pain     Rule out ACS, EKG's unremarkable and troponin's are negative.  Telemtry   Monitor for pain  NTG PRN  Lexiscan stress test  May require evidence based medication after stress test.     Hyponatremia     Monitor           Angina effort     Telemetry  Full dose asa x 1  Troponins negative      Hypertension associated with diabetes     Continue home lisinopril       Coronary artery disease     Prior coronary stent 2012  Telemetry  Patient reports his only home medication is lisinopril- No asa, statin, or DM medications noted  Asa in am  Continue home lisinopril       Uncontrolled type 2 diabetes mellitus with hyperglycemia     Dm cardiac diet  Accuchecks with correctional SSI  Check HGA1c  Consider metformin if does not need angiogram   Non compliant    Active Diagnoses:    Diagnosis Date  Noted POA    PRINCIPAL PROBLEM:  Chest pain [R07.9] 01/08/2019 Yes    Hypertriglyceridemia [E78.1] 01/09/2019 Yes    Dyslipidemia [E78.5] 01/09/2019 Yes    Uncontrolled type 2 diabetes mellitus with hyperglycemia [E11.65] 01/08/2019 Yes    Coronary artery disease [I25.10] 01/08/2019 Yes    History of heart artery stent [Z95.5] 01/08/2019 Not Applicable    Hypertension associated with diabetes [E11.59, I10] 01/08/2019 Yes    Angina effort [I20.8] 01/08/2019 Yes    Hypertensive urgency [I16.0] 01/08/2019 Yes    Hyponatremia [E87.1] 01/08/2019 Yes      Problems Resolved During this Admission:       VTE Risk Mitigation (From admission, onward)        Ordered     IP VTE LOW RISK PATIENT  Once      01/08/19 1457     Place STEPHANY hose  Until discontinued      01/08/19 1457     Place sequential compression device  Until discontinued      01/08/19 1457        Thank you for your consult.     MD Daphne Gonzalez, ZACHARYP  Cardiology   [facility]

## 2019-01-09 NOTE — PLAN OF CARE
Met with pt to complete his assessment.  Provided pt with the blue discharge folder and left the folder in there room.  Pt, who lives alone, has a straight cane, is independent with his self care and denies having any home health services.  Pt has a home in [place] (address is on his face sheet) and also at [address] [place] [address] [place] [address].  Pt denies having a PCP and states he would like one in this area.  He verified his insurance as BC/BS.  Pt's discharge disposition is home.  Pt is requesting a PCP in the area.       01/09/19 1005   Discharge Assessment   Assessment Type Discharge Planning Assessment   Confirmed/corrected address and phone number on facesheet? Yes  (Pt's correct address is [address] [place] [address].   Admissions was updated. )   Assessment information obtained from? Patient   Communicated expected length of stay with patient/caregiver no   Prior to hospitilization cognitive status: Alert/Oriented   Prior to hospitalization functional status: Independent;Assistive Equipment   Current cognitive status: Alert/Oriented   Current Functional Status: Independent;Assistive Equipment   Lives With alone   Able to Return to Prior Arrangements yes   Who are your caregiver(s) and their phone number(s)? (mother Breanna Noguera, [phone])   Patient's perception of discharge disposition home or selfcare   Readmission Within the Last 30 Days no previous admission in last 30 days   Patient currently being followed by outpatient case management? No   Patient currently receives any other outside agency services? No   Equipment Currently Used at Home cane, straight   Do you have any problems affording any of your prescribed medications? No  (pharmacy is [facility] )   Is the patient taking medications as prescribed? yes   Does the patient have transportation home? Yes   Transportation Anticipated family or friend will provide   Does the patient receive services at the [facility]mad[facility] Clinic? No   Discharge Plan A Home    Patient/Family in Agreement with Plan yes

## 2019-01-09 NOTE — PLAN OF CARE
01/09/19 0805   PRE-TX-O2-ETCO2   O2 Device (Oxygen Therapy) room air   SpO2 97 %   Pulse Oximetry Type Intermittent   $ Pulse Oximetry - Multiple Charge Pulse Oximetry - Multiple   Pulse 64   Resp 18

## 2019-01-09 NOTE — PLAN OF CARE
Problem: Adult Inpatient Plan of Care  Goal: Plan of Care Review  Outcome: Ongoing (interventions implemented as appropriate)  POC reviewed with pt with verbalized understanding. Free from falls, safety maintained, VSS. Blood sugars being Call light in reach, bed locked/lowest position. Srx2. Will continue to monitor.

## 2019-01-09 NOTE — ASSESSMENT & PLAN NOTE
Dm diet  Accuchecks with correctional SSI  HGA1c 11.1  Consider metformin if does not need angiogram

## 2019-01-10 PROBLEM — I20.89 ANGINA EFFORT: Status: RESOLVED | Noted: 2019-01-08 | Resolved: 2019-01-10

## 2019-01-10 PROBLEM — R07.9 CHEST PAIN: Status: RESOLVED | Noted: 2019-01-08 | Resolved: 2019-01-10

## 2019-01-10 LAB
CV STRESS BASE HR: 72
DIASTOLIC BLOOD PRESSURE: 83
OHS CV CPX 1 MINUTE RECOVERY HEART RATE: 130 BPM
OHS CV CPX 85 PERCENT MAX PREDICTED HEART RATE MALE: 137
OHS CV CPX ESTIMATED METS: 6
OHS CV CPX MAX PREDICTED HEART RATE: 161
OHS CV CPX PATIENT IS FEMALE: 0
OHS CV CPX PATIENT IS MALE: 1
OHS CV CPX PEAK DIASTOLIC BLOOD PRESSURE: 87 MMHG
OHS CV CPX PEAK HEAR RATE: 144
OHS CV CPX PEAK RATE PRESSURE PRODUCT: NORMAL
OHS CV CPX PEAK SYSTOLIC BLOOD PRESSURE: 164
OHS CV CPX PERCENT MAX PREDICTED HEART RATE ACHIEVED: 89
OHS CV CPX PERCENT TARGET HEART RATE ACHIEVED: 105.11
OHS CV CPX RATE PRESSURE PRODUCT PRESENTING: 9360
OHS CV CPX TARGET HEART RATE: 137
STRESS ANGINA INDEX: 0
STRESS ECHO POST EXERCISE DUR MIN: 3 MIN
STRESS ECHO POST EXERCISE DUR SEC: 30
SYSTOLIC BLOOD PRESSURE: 130

## 2019-01-10 NOTE — DISCHARGE SUMMARY
"[facility] Medicine  Discharge Summary    Patient Name: Price Noguera  MRN: [id number]  Admission Date: 1/8/2019  Hospital Length of Stay: 0 days  Discharge Date and Time:  01/10/2019 10:12 AM  Attending Physician: Tatum att. providers found   Discharging Provider: SEAN Grant  Primary Care Provider: Primary Doctor Tatum    HPI:   This  is a 57 yo  male with PMHx of CAD with prior coronary stent placement of the  LAD in 2012, HTN, and NIDDM who presents to the ED with complaints of chest pain onset 2 months. Patient states that when he tries to "do anything physical" he has mid sternal chest pain/ pressure, weakness, and must sit down in order not to pass out for the past couple of months. He complains of shortness of breath with activity. He describes his chest discomfort as pressure right now. He states he felt these symptoms previously prior to his stent placement 7 years ago. Patient states that he does not have any money or job and unable to afford medication for diabetes. Denies the use of blood thinners secondary to side effects that he describes as a right sided headache. Patient denies smoking, drinking, or using any illicit drugs. He complains of chronic thirst and urinary frequency secondary to diabetes and prostate issues. Patient denies any nausea, vomiting, diarrhea, abdominal pain, pain or swelling of the legs, abnormal sweating, dizziness, numbness, cough, congestion, blood in his urine, blood in his stool, or fever.  Patient was placed in the hospital for further evaluation and treatment.       * No surgery found *      Hospital Course:   The patient was monitored closely during hospitalization and kept on continuous telemetry monitoring. Cardiology was consulted. The patient  remained in SR without any signs or symptoms of arrhythmias. The patient's troponin remained negative. The patient  was ordered for an Exercise NM  stress test. His Ddimer, TSH, and BNP were all " Wnl.  He was also noted to have dyslipidemia and was placed on fish oils and statin. His HGA1C was 11.1. He was started on metformin and ordered for a consult with the DM educator and the dietician. He was educated on the importance of medication compliance. He also reported a history of memory issues since a reported prior head injury 3 years ago. A Ct scan of head without contrast was done and showed no acute intracranial abnormality.  There was no hemorrhage, mass/mass effect, acute edema or ischemia. There was minimal nonspecific white matter disease. The patient's exercise NM stress test was ccintigraphically negative for ischemia or infarct. The EF was 66%. The patient's overall condition remained stable with no further complaints of chest pain. The patient was discharged to home after cleared by Cardiology. The patient was to follow up with PCP for further DM medication titration.      Consults:   Consults (From admission, onward)        Status Ordering Provider     Inpatient consult to Cardiology  Once     Provider:  SEAN Orellana    Completed ANTHONY ROSAS     Inpatient consult to Social Work/Case Management         Completed DHAVAL RICE     Inpatient consult to Social Work/Case Management         Completed ANTHONY ROSAS        Final Active Diagnoses:    Diagnosis Date Noted POA    Hypertriglyceridemia [E78.1] 01/09/2019 Yes    Dyslipidemia [E78.5] 01/09/2019 Yes    Uncontrolled type 2 diabetes mellitus with hyperglycemia [E11.65] 01/08/2019 Yes    Coronary artery disease [I25.10] 01/08/2019 Yes    History of heart artery stent [Z95.5] 01/08/2019 Not Applicable    Hypertension associated with diabetes [E11.59, I10] 01/08/2019 Yes    Hyponatremia [E87.1] 01/08/2019 Yes      Problems Resolved During this Admission:    Diagnosis Date Noted Date Resolved POA    PRINCIPAL PROBLEM:  Chest pain [R07.9] 01/08/2019 01/10/2019 Yes    Angina effort [I20.8] 01/08/2019 01/10/2019 Yes     "Hypertensive urgency [I16.0] 01/08/2019 01/09/2019 Yes     Discharged Condition: stable    Disposition: Home or Self Care    Follow Up:  Follow-up Information     Nino Ross MD On 1/23/2019.    Specialty:  Gastroenterology  Why:  Dysphagia - 1-23-19 @ 3:30 p.m.   Contact information:  [address] [facility]  [address]  [place] [address]  [phone]             Johnny Souza MD On 1/22/2019.    Specialties:  Cardiovascular Disease, Cardiology  Why:  1-22-19 @ 10:30 a.m. - bring ID and all medications to the appointment.  Take blood pressure and pulse and keep log for follow up   Contact information:  [address]  [address]  [facility]  [place] [address]  [phone]             Minal Gong NP On 1/18/2019.    Specialty:  Family Medicine  Why:  1-18-19 @ 10 a.m. Take blood sugar before meals and at bedtime and keep log for follow up for further DM medication titration  Contact information:  [address] [facility] [address]  [phone]             Chanel Roberson MD On 2/12/2019.    Specialty:  Family Medicine  Why:  NEW PCP appointment 2-12-19 @ 1:20 p.m.   Contact information:  [address] [facility] [place] [facility] [address]  [phone]                 Patient Instructions:      BLOOD GLUCOSE MONITOR FOR HOME USE   Order Comments: DM 2 uncontrolled  HGA1C 11.1     Order Specific Question Answer Comments   Height: 5' 9" (1.753 m)    Weight: 88.5 kg (195 lb)    Does patient have medical equipment at home? cane, straight    Length of need (1-99 months): 99      Diet diabetic   Order Comments: 1800 ada diet     Notify your health care provider if you experience any of the following:  severe uncontrolled pain     Notify your health care provider if you experience any of the following:  difficulty breathing or increased cough     Notify your health care provider if you experience any of the following:   Order Comments: Any decline in condition     Activity as tolerated     Significant Diagnostic Studies: " "    Ddimer < 0.19    BNP 15    TSH 0.755    CT Head Without Contrast- 1.  There is no acute intracranial abnormality.  There is no hemorrhage, mass/mass effect, acute edema or ischemia. There is minimal nonspecific white matter disease.    X-Ray Chest AP Portable- Lungs are clear.Normal cardiomediastinal silhouette.Normal pulmonary vascular distribution.No pleural effusion or pneumothorax.No acute osseous abnormality.     STRESS TEST ONLY, EXERCISE   Conclusion     · The EKG portion of this study is abnormal but not diagnostic due to baseline changes.  · There were no arrhythmias during stress.  · Gilbert score 3.5, indicates intermediate risk.  · Overall, the patient's exercise capacity was below average.  · Please refer to perfusion study for overall conclusion.      Vitals     Height Weight BMI (Calculated) BSA (Calculated - sq m) BP   5' 9" (1.753 m) 88.5 kg (195 lb) 28.9 2.07 sq meters 129/79     Stress Findings     ECG Baseline electrocardiogram reveals normal sinus rhythm and complete RBBB at a rate of 72 bpm. Comments:   ST Segments comment: Anterior ST T change indicates ischemia..   Anterior ST T change indicates ischemia.   Stress Findings The patient exercised for 3 minutes and 30 seconds on a Robert protocol, corresponding to a functional capacity of 6 METS, achieving a peak heart rate of 144 bpm, which is 89% of the age predicted maximum heart rate. The patient achieved 105.11% of the 137 bpm target heart rate. The test was stopped secondary to shortness of breath.   The patient reported SOB (non-anginal) during the stress test. Onset of symptoms occurred during stressThe patient experienced no angina during the stress test.   Blood pressure demonstrated a normal response to stressor. Overall, the patient's exercise capacity was below average.   ECG Peak ECG was abnormal but not diagnostic. ST depression with down slope in the anterior leads was noted during stress (V2, V3 and V4). Difficult to determine " ST depression degree due to no significant ST segment.     The electrocardiogram revealed sinus tachycardia at peak stress.There were no arrhythmias during stress.   There were no arrhythmias during recovery.   Stress Measurements     Exercise Data   Exercise duration (min) 3 min      Exercise duration (sec) 30           Reviewed By SEAN Rodas on 1/10/2019 09:08   Signed     Electronically signed by Johnny Souza MD on 1/10/19 at 0827 CST       NM Myocardial Perfusion Spect Multi Exer     Details     Reading Physician Reading Date Result Priority   Rodolfo Urban MD 1/9/2019       Narrative     EXAMINATION:  NM MYOCARDIAL PERFUSION SPECT MULTI STUDY    CLINICAL HISTORY:  Chest pain, acute coronary syndrome suspect;    TECHNIQUE:  SPECT images were acquired after the injection of 12.6 mCi of Tc-99m Myoview at rest and 26.8 mCi during a cardiac stress. The clinical stress and ECG portion of the study is to be read separately.    COMPARISON:  None.    FINDINGS:  The quality of the study is good.    Stress SPECT images demonstrate homogenous distribution of the tracer throughout the left ventricle. On the resting images, there is matched homogenous distribution of the tracer throughout the left ventricle.    The gated post-stress images reveal normal wall motion and normal wall thickening with an estimated LVEF of 66% %. The LV cavity is not dilated with an end-diastolic volume of 69 ml and an end-systolic volume of 27 ml.  Normal TID ratio.      Impression       1.  Scintigraphically negative for ischemia or infarct.  2. Normal left ventricular function with ejection fraction 66%.      Electronically signed by: Rodolfo Urban  Date: 01/09/2019  Time: 17:10           Labs:      Recent Labs   Lab 01/08/19  1118   *   K 4.2   CL 98   CO2 24   *   BUN 11   CREATININE 1.0   CALCIUM 10.0   PROT 7.6   ALBUMIN 4.2   BILITOT 0.7   ALKPHOS 97   AST 23   ALT 28   ANIONGAP 12   ESTGFRAFRICA >60    EGFRNONAA >60      Recent Labs   Lab 01/08/19  1118   WBC 8.10   HGB 14.1   HCT 41.4         Lab Results   Component Value Date    CHOL 286 (H) 01/09/2019    HDL 40 01/09/2019    LDLCALC 192.8 (H) 01/09/2019    TRIG 266 (H) 01/09/2019    CHOLHDL 14.0 (L) 01/09/2019      Recent Labs   Lab 01/09/19  0001   TROPONINI <0.006      Recent Labs   Lab 01/08/19  1904   HGBA1C 11.1*       Pending Diagnostic Studies:     None         Medications:  Reconciled Home Medications:      Medication List      START taking these medications    acetaminophen 500 MG tablet  Commonly known as:  TYLENOL  Take 2 tablets (1,000 mg total) by mouth every 8 (eight) hours as needed.     aspirin 81 MG EC tablet  Commonly known as:  ECOTRIN  Take 1 tablet (81 mg total) by mouth once daily.     atorvastatin 20 MG tablet  Commonly known as:  LIPITOR  Take 1 tablet (20 mg total) by mouth once daily.     blood sugar diagnostic Strp  1 each by Misc.(Non-Drug; Combo Route) route 4 (four) times daily before meals and nightly.     lancets 28 gauge Misc  1 lancet by Misc.(Non-Drug; Combo Route) route 4 (four) times daily before meals and nightly.     metFORMIN 850 MG tablet  Commonly known as:  GLUCOPHAGE  Take 1 tablet (850 mg total) by mouth 2 (two) times daily with meals.     omega 3-dha-epa-fish oil 1,000 mg (120 mg-180 mg) Cap  Take 1 capsule by mouth 2 (two) times daily with meals.     pantoprazole 40 MG tablet  Commonly known as:  PROTONIX  Take 1 tablet (40 mg total) by mouth once daily. for 14 days        CHANGE how you take these medications    lisinopril 20 MG tablet  Commonly known as:  PRINIVIL,ZESTRIL  Take 0.5 tablets (10 mg total) by mouth once daily.  What changed:  how much to take            Indwelling Lines/Drains at time of discharge:   Lines/Drains/Airways          None        Time spent on the discharge of patient: 59 minutes  Patient was seen and examined on the date of discharge and determined to be suitable for  discharge.       Romina Regalado, ZACHARYP  Department of Hospital Medicine  [facility]

## 2019-01-18 ENCOUNTER — TELEPHONE (OUTPATIENT)
Dept: FAMILY MEDICINE | Facility: CLINIC | Age: 59
End: 2019-01-18

## 2019-01-18 ENCOUNTER — CLINICAL SUPPORT (OUTPATIENT)
Dept: DIABETES | Facility: CLINIC | Age: 59
End: 2019-01-18
Payer: COMMERCIAL

## 2019-01-18 ENCOUNTER — OFFICE VISIT (OUTPATIENT)
Dept: FAMILY MEDICINE | Facility: CLINIC | Age: 59
End: 2019-01-18
Payer: COMMERCIAL

## 2019-01-18 VITALS
SYSTOLIC BLOOD PRESSURE: 142 MMHG | OXYGEN SATURATION: 95 % | HEART RATE: 64 BPM | WEIGHT: 186.06 LBS | DIASTOLIC BLOOD PRESSURE: 84 MMHG | BODY MASS INDEX: 27.56 KG/M2 | HEIGHT: 69 IN | TEMPERATURE: 98 F | RESPIRATION RATE: 12 BRPM

## 2019-01-18 DIAGNOSIS — E11.65 UNCONTROLLED TYPE 2 DIABETES MELLITUS WITH HYPERGLYCEMIA: ICD-10-CM

## 2019-01-18 DIAGNOSIS — K21.9 GASTROESOPHAGEAL REFLUX DISEASE WITHOUT ESOPHAGITIS: ICD-10-CM

## 2019-01-18 DIAGNOSIS — E11.69 HYPERLIPIDEMIA ASSOCIATED WITH TYPE 2 DIABETES MELLITUS: ICD-10-CM

## 2019-01-18 DIAGNOSIS — I25.10 CORONARY ARTERY DISEASE, ANGINA PRESENCE UNSPECIFIED, UNSPECIFIED VESSEL OR LESION TYPE, UNSPECIFIED WHETHER NATIVE OR TRANSPLANTED HEART: ICD-10-CM

## 2019-01-18 DIAGNOSIS — E11.65 UNCONTROLLED TYPE 2 DIABETES MELLITUS WITH HYPERGLYCEMIA: Primary | ICD-10-CM

## 2019-01-18 DIAGNOSIS — I15.2 HYPERTENSION ASSOCIATED WITH DIABETES: ICD-10-CM

## 2019-01-18 DIAGNOSIS — K58.0 IRRITABLE BOWEL SYNDROME WITH DIARRHEA: ICD-10-CM

## 2019-01-18 DIAGNOSIS — E11.59 HYPERTENSION ASSOCIATED WITH DIABETES: ICD-10-CM

## 2019-01-18 DIAGNOSIS — E78.5 HYPERLIPIDEMIA ASSOCIATED WITH TYPE 2 DIABETES MELLITUS: ICD-10-CM

## 2019-01-18 PROCEDURE — 99999 PR PBB SHADOW E&M-EST. PATIENT-LVL V: ICD-10-PCS | Mod: PBBFAC,,, | Performed by: NURSE PRACTITIONER

## 2019-01-18 PROCEDURE — 3077F SYST BP >= 140 MM HG: CPT | Mod: CPTII,S$GLB,, | Performed by: NURSE PRACTITIONER

## 2019-01-18 PROCEDURE — 3008F PR BODY MASS INDEX (BMI) DOCUMENTED: ICD-10-PCS | Mod: CPTII,S$GLB,, | Performed by: NURSE PRACTITIONER

## 2019-01-18 PROCEDURE — 3008F BODY MASS INDEX DOCD: CPT | Mod: CPTII,S$GLB,, | Performed by: NURSE PRACTITIONER

## 2019-01-18 PROCEDURE — 99999 PR PBB SHADOW E&M-EST. PATIENT-LVL II: ICD-10-PCS | Mod: PBBFAC,,,

## 2019-01-18 PROCEDURE — 99999 PR PBB SHADOW E&M-EST. PATIENT-LVL V: CPT | Mod: PBBFAC,,, | Performed by: NURSE PRACTITIONER

## 2019-01-18 PROCEDURE — 3079F DIAST BP 80-89 MM HG: CPT | Mod: CPTII,S$GLB,, | Performed by: NURSE PRACTITIONER

## 2019-01-18 PROCEDURE — G0108 DIAB MANAGE TRN  PER INDIV: HCPCS | Mod: S$GLB,,, | Performed by: DIETITIAN, REGISTERED

## 2019-01-18 PROCEDURE — 3077F PR MOST RECENT SYSTOLIC BLOOD PRESSURE >= 140 MM HG: ICD-10-PCS | Mod: CPTII,S$GLB,, | Performed by: NURSE PRACTITIONER

## 2019-01-18 PROCEDURE — 99204 PR OFFICE/OUTPT VISIT, NEW, LEVL IV, 45-59 MIN: ICD-10-PCS | Mod: S$GLB,,, | Performed by: NURSE PRACTITIONER

## 2019-01-18 PROCEDURE — 3079F PR MOST RECENT DIASTOLIC BLOOD PRESSURE 80-89 MM HG: ICD-10-PCS | Mod: CPTII,S$GLB,, | Performed by: NURSE PRACTITIONER

## 2019-01-18 PROCEDURE — 99999 PR PBB SHADOW E&M-EST. PATIENT-LVL II: CPT | Mod: PBBFAC,,,

## 2019-01-18 PROCEDURE — 3046F HEMOGLOBIN A1C LEVEL >9.0%: CPT | Mod: CPTII,S$GLB,, | Performed by: NURSE PRACTITIONER

## 2019-01-18 PROCEDURE — G0108 PR DIAB MANAGE TRN  PER INDIV: ICD-10-PCS | Mod: S$GLB,,, | Performed by: DIETITIAN, REGISTERED

## 2019-01-18 PROCEDURE — 99204 OFFICE O/P NEW MOD 45 MIN: CPT | Mod: S$GLB,,, | Performed by: NURSE PRACTITIONER

## 2019-01-18 PROCEDURE — 3046F PR MOST RECENT HEMOGLOBIN A1C LEVEL > 9.0%: ICD-10-PCS | Mod: CPTII,S$GLB,, | Performed by: NURSE PRACTITIONER

## 2019-01-18 RX ORDER — GABAPENTIN 300 MG/1
300 CAPSULE ORAL 3 TIMES DAILY
Qty: 90 CAPSULE | Refills: 11 | Status: SHIPPED | OUTPATIENT
Start: 2019-01-18 | End: 2020-01-18

## 2019-01-18 RX ORDER — PEN NEEDLE, DIABETIC 30 GX3/16"
1 NEEDLE, DISPOSABLE MISCELLANEOUS DAILY
Qty: 200 EACH | Refills: 3 | Status: SHIPPED | OUTPATIENT
Start: 2019-01-18 | End: 2019-05-20 | Stop reason: SDUPTHER

## 2019-01-18 RX ORDER — LANCETS 28 GAUGE
1 EACH MISCELLANEOUS
Qty: 400 EACH | Refills: 5 | Status: SHIPPED | OUTPATIENT
Start: 2019-01-18 | End: 2021-06-17 | Stop reason: SDUPTHER

## 2019-01-18 RX ORDER — ATORVASTATIN CALCIUM 20 MG/1
20 TABLET, FILM COATED ORAL DAILY
Qty: 90 TABLET | Refills: 3 | Status: SHIPPED | OUTPATIENT
Start: 2019-01-18 | End: 2019-01-18

## 2019-01-18 RX ORDER — PANTOPRAZOLE SODIUM 40 MG/1
40 TABLET, DELAYED RELEASE ORAL DAILY
Qty: 90 TABLET | Refills: 0 | Status: SHIPPED | OUTPATIENT
Start: 2019-01-18 | End: 2019-01-18

## 2019-01-18 RX ORDER — INSULIN PUMP SYRINGE, 3 ML
EACH MISCELLANEOUS
Qty: 1 EACH | Refills: 0 | Status: SHIPPED | OUTPATIENT
Start: 2019-01-18 | End: 2019-01-18

## 2019-01-18 RX ORDER — METFORMIN HYDROCHLORIDE 850 MG/1
850 TABLET ORAL 2 TIMES DAILY WITH MEALS
Qty: 180 TABLET | Refills: 3 | Status: SHIPPED | OUTPATIENT
Start: 2019-01-18 | End: 2020-01-16

## 2019-01-18 RX ORDER — LANCETS
1 EACH MISCELLANEOUS 2 TIMES DAILY
Qty: 200 EACH | Refills: 4 | Status: ON HOLD | OUTPATIENT
Start: 2019-01-18 | End: 2020-12-15 | Stop reason: HOSPADM

## 2019-01-18 RX ORDER — ATORVASTATIN CALCIUM 20 MG/1
20 TABLET, FILM COATED ORAL DAILY
Qty: 90 TABLET | Refills: 3 | Status: SHIPPED | OUTPATIENT
Start: 2019-01-18 | End: 2020-01-31 | Stop reason: SDUPTHER

## 2019-01-18 RX ORDER — GABAPENTIN 300 MG/1
300 CAPSULE ORAL 3 TIMES DAILY
Qty: 90 CAPSULE | Refills: 11 | Status: SHIPPED | OUTPATIENT
Start: 2019-01-18 | End: 2019-01-18

## 2019-01-18 RX ORDER — LANCETS 28 GAUGE
1 EACH MISCELLANEOUS
Qty: 125 EACH | Refills: 0 | Status: SHIPPED | OUTPATIENT
Start: 2019-01-18 | End: 2019-01-18

## 2019-01-18 RX ORDER — METFORMIN HYDROCHLORIDE 850 MG/1
850 TABLET ORAL 2 TIMES DAILY WITH MEALS
Qty: 180 TABLET | Refills: 3 | Status: SHIPPED | OUTPATIENT
Start: 2019-01-18 | End: 2019-01-18

## 2019-01-18 RX ORDER — INSULIN DEGLUDEC 100 U/ML
10 INJECTION, SOLUTION SUBCUTANEOUS DAILY
Qty: 1 SYRINGE | Refills: 11 | Status: SHIPPED | OUTPATIENT
Start: 2019-01-18 | End: 2019-02-04

## 2019-01-18 RX ORDER — PANTOPRAZOLE SODIUM 40 MG/1
40 TABLET, DELAYED RELEASE ORAL DAILY
Qty: 90 TABLET | Refills: 0 | Status: SHIPPED | OUTPATIENT
Start: 2019-01-18 | End: 2019-05-02 | Stop reason: SDUPTHER

## 2019-01-18 RX ORDER — LISINOPRIL 20 MG/1
10 TABLET ORAL DAILY
Qty: 45 TABLET | Refills: 3 | Status: SHIPPED | OUTPATIENT
Start: 2019-01-18 | End: 2019-02-04 | Stop reason: SDUPTHER

## 2019-01-18 RX ORDER — LISINOPRIL 20 MG/1
10 TABLET ORAL DAILY
Qty: 45 TABLET | Refills: 3 | Status: SHIPPED | OUTPATIENT
Start: 2019-01-18 | End: 2019-01-18

## 2019-01-18 NOTE — PATIENT INSTRUCTIONS
Understanding Carbohydrates, Fats, and Protein  Food is a source of fuel and nourishment for your body. Its also a source of pleasure. Having diabetes doesnt mean you have to eat special foods or give up desserts. Instead, your dietitian can show you how to plan meals to suit your body. To start, learn how different foods affect blood sugar.  Carbohydrates  Carbohydrates are the main source of fuel for the body. Carbohydrates raise blood sugar. Many people think carbohydrates are only found in pasta or bread. But carbohydrates are actually in many kinds of foods:  · Sugars occur naturally in foods such as fruit, milk, honey, and molasses. Sugars can also be added to many foods, from cereals and yogurt to candy and desserts. Sugars raise blood sugar.  · Starches are found in bread, cereals, pasta, and dried beans. Theyre also found in corn, peas, potatoes, yam, acorn squash, and butternut squash. Starches also raise blood sugar.   · Fiber is found in foods such as vegetables, fruits, beans, and whole grains. Unlike other carbs, fiber isnt digested or absorbed. So it doesnt raise blood sugar. In fact, fiber can help keep blood sugar from rising too fast. It also helps keep blood cholesterol at a healthy level.  Did you know?  Even though carbohydrates raise blood sugar, its best to have some in every meal. They are an important part of a healthy diet.   Fat  Fat is an energy source that can be stored until needed. Fat does not raise blood sugar. However, it can raise blood cholesterol, increasing the risk of heart disease. Fat is also high in calories, which can cause weight gain. Not all types of fat are the same.  More Healthy:  · Monounsaturated fats are mostly found in vegetable oils, such as olive, canola, and peanut oils. They are also found in avocados and some nuts. Monounsaturated fats are healthy for your heart. Thats because they lower LDL (unhealthy) cholesterol.  · Polyunsaturated fats are mostly  found in vegetable oils, such as corn, safflower, and soybean oils. They are also found in some seeds, nuts, and fish. Polyunsaturated fats lower LDL (unhealthy) cholesterol. So, choosing them instead of saturated fats is healthy for your heart. Certain unsaturated fats can help lower triglycerides.   Less Healthy:  · Saturated fats are found in animal products, such as meat, poultry, whole milk, lard, and butter. Saturated fats raise LDL cholesterol and are not healthy for your heart.  · Hydrogenated oils and trans fats are formed when vegetable oils are processed into solid fats. They are found in many processed foods. Hydrogenated oils and trans fats raise LDL cholesterol and lower HDL (healthy) cholesterol. They are not healthy for your heart.  Protein  Protein helps the body build and repair muscle and other tissue. Protein has little or no effect on blood sugar. However, many foods that contain protein also contain saturated fat. By choosing low-fat protein sources, you can get the benefits of protein without the extra fat:  · Plant protein is found in dry beans and peas, nuts, and soy products, such as tofu and soymilk. These sources tend to be cholesterol-free and low in saturated fat.  · Animal protein is found in fish, poultry, meat, cheese, milk, and eggs. These contain cholesterol and can be high in saturated fat. Aim for lean, lower-fat choices.  Date Last Reviewed: 3/1/2016  © 5296-0968 [employer]. [address] [place] [address]. All rights reserved. This information is not intended as a substitute for professional medical care. Always follow your healthcare professional's instructions.        Diet: Diabetes  Food is an important tool that you can use to control diabetes and stay healthy. Eating well-balanced meals in the correct amounts will help you control your blood glucose levels and prevent low blood sugar reactions. It will also help you reduce the health risks of  diabetes. There is no one specific diet that is right for everyone with diabetes. But there are general guidelines to follow. A registered dietitian (RD) will create a tailored diet approach thats just right for you. He or she will also help you plan healthy meals and snacks. If you have any questions, call your dietitian for advice.     Guidelines for success  Talk with your healthcare provider before starting a diabetes diet or weight loss program. If you haven't talked with a dietitian yet, ask your provider for a referral. The following guidelines can help you succeed:  · Select foods from the 6 food groups below. Your dietitian will help you find food choices within each group. He or she will also show you serving sizes and how many servings you can have at each meal.  ¨ Grains, beans, and starchy vegetables  ¨ Vegetables  ¨ Fruit  ¨ Milk or yogurt  ¨ Meat, poultry, fish, or tofu  ¨ Healthy fats  · Check your blood sugar levels as directed by your provider. Take any medicine as prescribed by your provider.  · Learn to read food labels and pick the right portion sizes.  · Eat only the amount of food in your meal plan. Eat about the same amount of food at regular times each day. Dont skip meals. Eat meals 4 to 5 hours apart, with snacks in between.  · Limit alcohol. It raises blood sugar levels. Drink water or calorie-free diet drinks that use safe sweeteners.  · Eat less fat to help lower your risk of heart disease. Use nonfat or low-fat dairy products and lean meats. Avoid fried foods. Use cooking oils that are unsaturated, such as olive, canola, or peanut oil.  · Talk with your dietitian about safe sugar substitutes.  · Avoid added salt. It can contribute to high blood pressure, which can cause heart disease. People with diabetes already have a risk of high blood pressure and heart disease.  · Stay at a healthy weight. If you need to lose weight, cut down on your portion sizes. But dont skip meals. Exercise  is an important part of any weight management program. Talk with your provider about an exercise program thats right for you.  · For more information about the best diet plan for you, talk with a registered dietitian (RD). To find an RD in your area, contact:  ¨ Academy of Nutrition and Dietetics www.eatright.org  ¨ The American Diabetes Association [phone] www.diabetes.org  Date Last Reviewed: 8/1/2016 © 2000-2017 [employer]. [address] [place] [address]. All rights reserved. This information is not intended as a substitute for professional medical care. Always follow your healthcare professional's instructions.        Eating Out When You Have Diabetes  Eating right is an important part of keeping your blood sugar in your target range. You just need to make healthy choices.    Tips for restaurant meals  When you eat away from home try these tips:  · Try to schedule your dining-out meal at your normal meal time. Make a reservation if possible, so you don't have to wait to eat. If you can't make a reservation, try to arrive at the restaurant at a less-busy time to cut down your wait time. Eat a small fruit or starch snack at your regular mealtime if your restaurant meal is going to be later than usual.   · Call ahead to see if the restaurant can meet your dietary needs if you've never been there before. Or you can go online to see the menu ahead of time.  · Carry some crackers with you in case the restaurant needs you to wait until you can be served.  · Ask how foods are prepared before you order.  · Instead of fried, sautéed, or breaded foods, choose ones that are broiled, steamed, grilled, or baked.  · Ask for sauces, gravies, and dressings on the side.  · Only eat an amount that fits your meal plan. Remember: You can take home the leftovers.  · Save dessert for special occasions. Then choose a small dessert or share one with a friend or family member.  Make healthy choices  Fast  food  · Garden salad with light dressing on the side  · Baked potato with vegetables or herbs  · Broiled, roasted, or grilled chicken sandwich  · Sliced turkey or lean roast beef sandwich  Mexican  · Chicken enchilada, without cheese or sour cream   · Small burrito with whole beans and chicken  · Whole beans (not refried) and rice  · Chicken or fish fajitas  Steakhouse  · Grilled or broiled lean cuts of beef  · Baked potato with vegetables or herbs  · Broiled or baked chicken. Dont eat the skin.  · Steamed vegetables  Asian  · Steamed dumplings or potstickers  · Broiled, boiled, or steamed meats or fish  · Sushi or sashimi  · Steamed rice or boiled noodles. One serving is equal to 1/3 cup.  Date Last Reviewed: 6/1/2016  © 1135-7962 [employer]. [address] [place] [address]. All rights reserved. This information is not intended as a substitute for professional medical care. Always follow your healthcare professional's instructions.        Healthy Meals for Diabetes     A healthcare provider will help you develop a meal plan that fits your needs.   Ask your healthcare team to help you make a meal plan that fits your needs. Your meal plan tells you when to eat your meals and snacks, what kinds of foods to eat, and how much of each food to eat. You dont have to give up all the foods you like. But you do need to follow some guidelines.  Choose healthy carbohydrates  Starches, sugars, and fiber are all types of carbohydrates. Fiber can help lower your cholesterol and triglycerides. Fiber is also healthy for your heart. You should have 20 to 35 grams of total fiber each day. Fiber-rich foods include:  · Whole-grain breads and cereals  · Bulgur wheat  · Brown rice     · Whole-wheat pasta  · Fruits and vegetables  · Dry beans, and peas   Keep track of the amount of carbohydrates you eat. This can help you keep the right balance of physical activity and medicine. The amount of carbohydrates needed  will vary for each person. It depends on many things such as your health, the medicines you take, and how active you are. Your healthcare team will help you figure out the right amount of carbohydrates for you. You may start with around 45 to 60 grams of carbohydrates per meal, depending on your situation.   Here are some examples of foods containing about 15 grams of carbohydrates (1 serving of carbohydrates):  · 1/2 cup of canned or frozen fruit  · A small piece of fresh fruit (4 ounces)  · 1 slice of bread  · 1/2 cup of oatmeal  · 1/3 cup of rice  · 4 to 6 crackers  · 1/2 English muffin  · 1/2 cup of black beans  · 1/4 of a large baked potato (3 ounces)  · 2/3 cup of plain fat-free yogurt  · 1 cup of soup  · 1/2 cup of casserole  · 6 chicken nuggets  · 2-inch-square brownie or cake without frosting  · 2 small cookies  · 1/2 cup of ice cream or sherbet  Choose healthy protein foods  Eating protein that is low in fat can help you control your weight. It also helps keep your heart healthy. Low-fat protein foods include:  · Fish  · Plant proteins, such as dry beans and peas, nuts, and soy products like tofu and soymilk  · Lean meat with all visible fat removed  · Poultry with the skin removed  · Low-fat or nonfat milk, cheese, and yogurt  Limit unhealthy fats and sugar  Saturated and trans fats are unhealthy for your heart. They raise LDL (bad) cholesterol. Fat is also high in calories, so it can make you gain weight. To cut down on unhealthy fats and sugar, limit these foods:  · Butter or margarine  · Palm and palm kernel oils and coconut oil  · Cream  · Cheese  · Miller  · Lunch meats     · Ice cream  · Sweet bakery goods such as pies, muffins, and donuts  · Jams and jellies  · Candy bars  · Regular sodas   How much to eat  The amount of food you eat affects your blood sugar. It also affects your weight. Your healthcare team will tell you how much of each type of food you should eat.  · Use measuring cups and spoons  and a food scale to measure serving sizes.  · Learn what a correct serving size looks like on your plate. This will help when you are away from home and cant measure your servings.  · Eat only the number of servings given on your meal plan for each food. Dont take seconds.  · Learn to read food labels. Be sure to look at serving size, total carbohydrates, fiber, calories, sugar, and saturated and trans fats. Look for healthier alternatives to foods that have added sugar.  · Plan ahead for parties so you can still have a good time without going overboard with unhealthy food choices. Set a good example yourself by bringing a healthy dish to pot lucks.   Choose healthy snacks  When it comes to snacks, we usually think about foods with added sugar and fats. But there are many other options for healthier snack choices. Here are a few snack ideas to choose from:  Snacks with less than 5 grams of carbohydrates  · 1 piece of string cheese  · 3 celery sticks plus 1 tablespoon of peanut butter  · 5 cherry tomatoes plus 1 tablespoon of ranch dressing  · 1 hard-boiled egg  · 1/4 cup of fresh blueberries  ·  5 baby carrots  · 1 cup of light popcorn  · 1/2 cup of sugar-free gelatin  · 15 almonds  Snacks with about 10 to 20 grams of carbohydrates  · 1/3 cup of hummus plus 1 cup of fresh cut nonstarchy vegetables (carrots, green peppers, broccoli, celery, or a combination)  · 1/2 cup of fresh or canned fruit plus 1/4 cup of cottage cheese  · 1/2 cup of tuna salad with 4 crackers  · 2 rice cakes and a tablespoon of peanut butter  · 1 small apple or orange  · 3 cups light popcorn  · 1/2 of a turkey sandwich (1 slice of whole-wheat bread, 2 ounces of turkey, and mustard)  Portion sizes are important to controlling your blood sugar and staying at a healthy weight. Stock up on healthy snack items so you always have them on hand.  When to eat  Your meal plan will likely include breakfast, lunch, dinner, and some snacks.  · Try to eat  your meals and snacks at about the same times each day.  · Eat all your meals and snacks. Skipping a meal or snack can make your blood sugar drop too low. It can also cause you to eat too much at the next meal or snack. Then your blood sugar could get too high.  Date Last Reviewed: 7/1/2016  © 2576-0317 [employer]. [address] [place] [address], [place] [address]. All rights reserved. This information is not intended as a substitute for professional medical care. Always follow your healthcare professional's instructions.               increase Tresiba by 3 units every two-three days until fasting AM blood glucose is below 150

## 2019-01-18 NOTE — PROGRESS NOTES
Subjective:       Patient ID: Price Noguera is a 58 y.o. male.    Chief Complaint: Establish Care    Mr. Noguera presents today to establish care after being admitted to the hospital for hyperglycemia, uncontrolled diabetes and chest pain on 1/8/2019. Prior to admission, was not taking medication for diabetes control. All labs completed at hospital. Has history of cardiac stent in 2012. Troponins in hospital stable. Chest pain is with exertion only. It makes him feel like he is going to pass out. Has appointment with cardiology 1/22. GI follow up for GERD 1/23.     Discharged from the hospital but did not receive prescription for glucose monitor, strips of lancets. Has been taking lisinopril as prescribed, but did not take this morning. He is experiencing diarrhea, could be SE of metformin, but he also has IBS.       Patient Active Problem List   Diagnosis    Uncontrolled type 2 diabetes mellitus with hyperglycemia    Coronary artery disease    History of heart artery stent    Hypertension associated with diabetes    Hyponatremia    Hypertriglyceridemia    Hyperlipidemia associated with type 2 diabetes mellitus    Irritable bowel syndrome with diarrhea    Gastroesophageal reflux disease without esophagitis     Transitional Care Note    Family and/or Caretaker present at visit?  No.  Diagnostic tests reviewed/disposition: No diagnosic tests pending after this hospitalization.  Disease/illness education: diabetes, hypertension  Home health/community services discussion/referrals: Patient does not have home health established from hospital visit.  They do not need home health.  If needed, we will set up home health for the patient.   Establishment or re-establishment of referral orders for community resources: referred to case management.   Discussion with other health care providers: No discussion with other health care providers necessary.       Review of Systems   Constitutional: Negative for appetite change,  chills, fatigue and fever.   Respiratory: Negative for wheezing.    Cardiovascular: Negative for chest pain.   Gastrointestinal: Positive for diarrhea. Negative for abdominal pain, constipation, nausea and vomiting.   Endocrine: Negative for polydipsia, polyphagia and polyuria.   Musculoskeletal: Positive for arthralgias.   Neurological: Positive for numbness (neuropathy BLE). Negative for dizziness, syncope, weakness and light-headedness.       Objective:      Physical Exam   Constitutional: He is oriented to person, place, and time. Vital signs are normal.   Neck: No thyromegaly present.   Cardiovascular: Normal rate, regular rhythm and normal heart sounds.   No murmur heard.  Pulmonary/Chest: Effort normal and breath sounds normal. He has no wheezes.   Abdominal: Soft. There is no tenderness.   Musculoskeletal:   Ambulating with cane     Lymphadenopathy:     He has no cervical adenopathy.   Neurological: He is alert and oriented to person, place, and time.   Skin: Skin is warm and dry.   Psychiatric: He has a normal mood and affect.   Vitals reviewed.      Assessment:       1. Uncontrolled type 2 diabetes mellitus with hyperglycemia    2. Coronary artery disease, angina presence unspecified, unspecified vessel or lesion type, unspecified whether native or transplanted heart    3. Hyperlipidemia associated with type 2 diabetes mellitus    4. Hypertension associated with diabetes    5. Gastroesophageal reflux disease without esophagitis    6. BMI 27.0-27.9,adult    7. Irritable bowel syndrome with diarrhea        Plan:         Price was seen today for establish care.    Diagnoses and all orders for this visit:    Uncontrolled type 2 diabetes mellitus with hyperglycemia  -     Ambulatory consult to Diabetic Education  -     Ambulatory referral to Endocrinology  -     Ambulatory referral to Outpatient Case Management  -     blood sugar diagnostic Strp; 1 each by Misc.(Non-Drug; Combo Route) route 4 (four) times daily  "before meals and nightly.  -     lancets 28 gauge Misc; 1 lancet by Misc.(Non-Drug; Combo Route) route 4 (four) times daily before meals and nightly.  -     metFORMIN (GLUCOPHAGE) 850 MG tablet; Take 1 tablet (850 mg total) by mouth 2 (two) times daily with meals.  -     insulin degludec (TRESIBA FLEXTOUCH U-100) 100 unit/mL (3 mL) InPn; Inject 10 Units into the skin once daily.  -     pen needle, diabetic (PEN NEEDLE) 32 gauge x 5/32" Ndle; Inject 1 each into the skin once daily. Generic  -     gabapentin (NEURONTIN) 300 MG capsule; Take 1 capsule (300 mg total) by mouth 3 (three) times daily. 1st week take 1 tab bedtime; 2nd week twice daily; 3rd week three times daily  -Provided with home glucose log  -detailed instructions on blood glucose monitoring; provided with kit   -nurse appointment Monday for instruction on how to use supplies and insulin pen  -Patient to increase Tresiba by 3 units every 2-3 days until fasting AM blood glucose is below 150   -continue metformin at current dose, see if GI S/E improve   - 1 week F/U    Coronary artery disease, angina presence unspecified, unspecified vessel or lesion type, unspecified whether native or transplanted heart  Continue current regimen  F/U with cardiology as scheduled  See emergency treatment for chest pain    Hyperlipidemia associated with type 2 diabetes mellitus  -     atorvastatin (LIPITOR) 20 MG tablet; Take 1 tablet (20 mg total) by mouth once daily.  Continue current regimen  I recommend a heart healthy diet rich in fiber, fresh vegetables and fruit and low in saturated fats (fried foods, red meat, etc.).  I also recommend regular exercise including a minimum of 150 minutes of cardio exercise per week and 2-30 minute workouts of strength training like light weights, yoga, pilates, etc.  You should work toward a body mass index of < 25.      Hypertension associated with diabetes  -     lisinopril (PRINIVIL,ZESTRIL) 20 MG tablet; Take 0.5 tablets (10 mg " "total) by mouth once daily.  -elevated today, but did not take medications due to GI upset  -provided with BP log, has monitor at home, encouraged daily monitoring and medication compliance   -notify clinic if consistently about 140/90  -F/U 1 week  I counseled the patient on HTN education, management and recommendations.  I recommended weight loss toward a BMI < 25, avoidance of salt and the DASH diet, regular cardio exercise a minimum of 150 minutes per week and medications if indicated. The goal is < 140/90 unless otherwise specified.    Gastroesophageal reflux disease without esophagitis  -     pantoprazole (PROTONIX) 40 MG tablet; Take 1 tablet (40 mg total) by mouth once daily.]  -F/U GI as scheduled   Counseled patient on prevention of reflux with changes in diet and behavior.  I recommended avoidance of greasy and spicy foods, caffeine and eating within 3 hours of bedtime.  I counseled the patient to avoid eating large meals and instead eating more frequent small meals.  I also recommended weight loss and elevation of the head of the bed by 6 inches.  If symptoms persist after these changes medication may be needed to control GERD.    BMI 27.0-27.9,adult  Counseled patient on his ideal body weight, health consequences of being obese and current recommendations including weekly exercise and a heart healthy diet.  Current BMI is:Estimated body mass index is 27.48 kg/m² as calculated from the following:    Height as of this encounter: 5' 9" (1.753 m).    Weight as of this encounter: 84.4 kg (186 lb 1.1 oz)..  Patient is aware that ideal BMI < 25 or Weight in (lb) to have BMI = 25: 168.9.            Patient readiness: acceptance and barriers:economic    During the course of the visit the patient was educated and counseled about the following:     Diabetes:  Discussed general issues about diabetes pathophysiology and management.  Educational material distributed.  Addressed ADA diet.  Started insulin: " tresiba.  Continued metformin; see  medication orders.  Hypertension:   Medication: continue lisinopril.  Dietary sodium restriction.  Regular aerobic exercise.    Goals: Diabetes: Maintain Hemoglobin A1C below 7 and Hypertension: Reduce Blood Pressure    Did patient meet goals/outcomes: No    The following self management tools provided: blood pressure log  blood glucose log    Patient Instructions (the written plan) was given to the patient/family.     Time spent with patient: 55 minutes    Barriers to medications present (no )    Adverse reactions to current medications (no)    Over the counter medications reviewed (Yes)

## 2019-01-23 ENCOUNTER — OFFICE VISIT (OUTPATIENT)
Dept: GASTROENTEROLOGY | Facility: CLINIC | Age: 59
End: 2019-01-23
Payer: COMMERCIAL

## 2019-01-23 VITALS
BODY MASS INDEX: 28.05 KG/M2 | DIASTOLIC BLOOD PRESSURE: 85 MMHG | WEIGHT: 189.38 LBS | HEART RATE: 68 BPM | SYSTOLIC BLOOD PRESSURE: 146 MMHG | HEIGHT: 69 IN

## 2019-01-23 VITALS — WEIGHT: 186 LBS | BODY MASS INDEX: 27.55 KG/M2 | HEIGHT: 69 IN

## 2019-01-23 DIAGNOSIS — E11.59 HYPERTENSION ASSOCIATED WITH DIABETES: ICD-10-CM

## 2019-01-23 DIAGNOSIS — I25.10 CORONARY ARTERY DISEASE, ANGINA PRESENCE UNSPECIFIED, UNSPECIFIED VESSEL OR LESION TYPE, UNSPECIFIED WHETHER NATIVE OR TRANSPLANTED HEART: Primary | ICD-10-CM

## 2019-01-23 DIAGNOSIS — Z86.010 HISTORY OF COLON POLYPS: ICD-10-CM

## 2019-01-23 DIAGNOSIS — R13.10 DYSPHAGIA, UNSPECIFIED TYPE: ICD-10-CM

## 2019-01-23 DIAGNOSIS — K21.9 GASTROESOPHAGEAL REFLUX DISEASE WITHOUT ESOPHAGITIS: ICD-10-CM

## 2019-01-23 DIAGNOSIS — I15.2 HYPERTENSION ASSOCIATED WITH DIABETES: ICD-10-CM

## 2019-01-23 DIAGNOSIS — Z95.5 HISTORY OF HEART ARTERY STENT: ICD-10-CM

## 2019-01-23 PROCEDURE — 99245 OFF/OP CONSLTJ NEW/EST HI 55: CPT | Mod: S$GLB,,, | Performed by: INTERNAL MEDICINE

## 2019-01-23 PROCEDURE — 99245 PR OFFICE CONSULTATION,LEVEL V: ICD-10-PCS | Mod: S$GLB,,, | Performed by: INTERNAL MEDICINE

## 2019-01-23 PROCEDURE — 99999 PR PBB SHADOW E&M-EST. PATIENT-LVL III: ICD-10-PCS | Mod: PBBFAC,,, | Performed by: INTERNAL MEDICINE

## 2019-01-23 PROCEDURE — 99999 PR PBB SHADOW E&M-EST. PATIENT-LVL III: CPT | Mod: PBBFAC,,, | Performed by: INTERNAL MEDICINE

## 2019-01-23 NOTE — PROGRESS NOTES
Subjective:       Patient ID: Price Noguera is a 58 y.o. male.    This patient is new to me.  Referring provider:  Deep for dysphagia.      Chief Complaint: Dysphagia and Gastroesophageal Reflux    Patient seen for dysphagia, occurring with solid foods, frequency often, alleviated/exacerbated by nothing in particular, associated signs/symptoms including chest pain which prompted and ER visit, as well as choking and GERD/heartburn symptoms, onset about one year ago but recently worsening.  He has never had EGD.  He states that he had colonoscopy in [place] about 4 years ago and that he had polyps removed.  No GI bleeding or change in bowel habits.  No abdominal pain.  No unintended weight loss.  Denies tobacco or EtOH.  No IVDU.  He has CAD with stent placement in 2012 and is no longer on Plavix.  He has poorly controlled DM2.  He had cardiac workup at the time of ER visit per independently reviewed notes from Dr. Adame with negative ECG for acute ischemic changes.  He also had exercise perfusion stress test which showed no evidence of ischemia or infarct and good LV function.  He does admit to some nighttime reflux which is intermittent.          Review of Systems   Constitutional: Negative for chills, fatigue and fever.   HENT: Positive for trouble swallowing.    Respiratory: Negative for cough, shortness of breath and wheezing.    Cardiovascular: Positive for chest pain. Negative for palpitations.   Gastrointestinal: Positive for abdominal pain. Negative for constipation, diarrhea, nausea and vomiting.   Musculoskeletal: Negative for arthralgias and myalgias.   Skin: Negative for color change and rash.   Neurological: Negative for dizziness, weakness and numbness.   Psychiatric/Behavioral: Negative for confusion. The patient is not nervous/anxious.    All other systems reviewed and are negative.      Objective:         Vitals:    01/23/19 1448   BP: (!) 146/85   Pulse: 68   Weight: 85.9 kg (189 lb 6 oz)  "  Height: 5' 9" (1.753 m)         Physical Exam   Constitutional: He is oriented to person, place, and time. He appears well-developed and well-nourished.   HENT:   Head: Normocephalic and atraumatic.   Mouth/Throat: Oropharynx is clear and moist.   Eyes: Conjunctivae are normal. Pupils are equal, round, and reactive to light. No scleral icterus.   Neck: Normal range of motion. Neck supple. No thyromegaly present.   Cardiovascular: Normal rate and regular rhythm.   No murmur heard.  Pulmonary/Chest: Effort normal and breath sounds normal. He has no wheezes.   Abdominal: Soft. Bowel sounds are normal. He exhibits no distension. There is no tenderness.   Musculoskeletal: He exhibits no edema.   Lymphadenopathy:     He has no cervical adenopathy.   Neurological: He is alert and oriented to person, place, and time.   Skin: Skin is warm and dry. No rash noted. No erythema.   Psychiatric: He has a normal mood and affect. His behavior is normal.   Vitals reviewed.        Lab Results   Component Value Date    WBC 8.10 01/08/2019    HGB 14.1 01/08/2019    HCT 41.4 01/08/2019    MCV 85 01/08/2019     01/08/2019       CMP  Sodium   Date Value Ref [place] Status   01/08/2019 134 (L) 136 - 145 mmol/L Final     Potassium   Date Value Ref [place] Status   01/08/2019 4.2 3.5 - 5.1 mmol/L Final     Chloride   Date Value Ref [place] Status   01/08/2019 98 95 - 110 mmol/L Final     CO2   Date Value Ref [place] Status   01/08/2019 24 23 - 29 mmol/L Final     Glucose   Date Value Ref [place] Status   01/08/2019 311 (H) 70 - 110 mg/dL Final     BUN, Bld   Date Value Ref [place] Status   01/08/2019 11 6 - 20 mg/dL Final     Creatinine   Date Value Ref [place] Status   01/08/2019 1.0 0.5 - 1.4 mg/dL Final     Calcium   Date Value Ref [place] Status   01/08/2019 10.0 8.7 - 10.5 mg/dL Final     Total Protein   Date Value Ref [place] Status   01/08/2019 7.6 6.0 - 8.4 g/dL Final     Albumin   Date Value Ref [place] Status   01/08/2019 4.2 3.5 - 5.2 g/dL " Final     Total Bilirubin   Date Value Ref [place] Status   01/08/2019 0.7 0.1 - 1.0 mg/dL Final     Comment:     For infants and newborns, interpretation of results should be based  on gestational age, weight and in agreement with clinical  observations.  Premature Infant recommended reference ranges:  Up to 24 hours.............<8.0 mg/dL  Up to 48 hours............<12.0 mg/dL  3-5 days..................<15.0 mg/dL  6-29 days.................<15.0 mg/dL       Alkaline Phosphatase   Date Value Ref [place] Status   01/08/2019 97 55 - 135 U/L Final     AST   Date Value Ref [place] Status   01/08/2019 23 10 - 40 U/L Final     ALT   Date Value Ref [place] Status   01/08/2019 28 10 - 44 U/L Final     Anion Gap   Date Value Ref [place] Status   01/08/2019 12 8 - 16 mmol/L Final     eGFR if    Date Value Ref [place] Status   01/08/2019 >60 >60 mL/min/1.73 m^2 Final     eGFR if non    Date Value Ref [place] Status   01/08/2019 >60 >60 mL/min/1.73 m^2 Final     Comment:     Calculation used to obtain the estimated glomerular filtration  rate (eGFR) is the CKD-EPI equation.          Old records from [place] reviewed and are as summarized above in the HPI.      Cardiac perfusion study and ECG were independently visualized and reviewed by me and showed no ischemia or infarct.    Patient is high risk for procedure and sedation given history of coronary disease and poorly controlled DM2.    MDM:  New problem, further workup.  Data as above.  High risk.  Endoscopy planned.        Assessment:       1. Coronary artery disease, angina presence unspecified, unspecified vessel or lesion type, unspecified whether native or transplanted heart    2. History of heart artery stent    3. Hypertension associated with diabetes    4. Gastroesophageal reflux disease without esophagitis    5. Dysphagia, unspecified type    6. History of colon polyps        Plan:       1.  Antireflux precautions including avoidance of late night  eating and lying down soon after eating.     2.  Avoid NSAIDs  3.  Keep follow up with cardiology  4.  EGD to further evaluate  5.  Obtain and review past colonoscopy records  6.  Further recommendations to follow after above.  7.  Communication will be sent to the referring provider, Deep regarding my assessment and plan on this patient via EPIC.

## 2019-01-23 NOTE — LETTER
January 23, 2019        Jacoby Adame MD  [address] [place] [address] [address] [place] [address]             [facility] G[facility]enter[facility]  [address]  [place] [address]  Phone: [phone]   Patient: Price Noguera   MR Number: [id number]   YOB: 1960   Date of Visit: 1/23/2019       Dear Dr. Adame:    Thank you for referring Price Noguera to me for evaluation. Below are the relevant portions of my assessment and plan of care.        1. Coronary artery disease, angina presence unspecified, unspecified vessel or lesion type, unspecified whether native or transplanted heart    2. History of heart artery stent    3. Hypertension associated with diabetes    4. Gastroesophageal reflux disease without esophagitis    5. Dysphagia, unspecified type    6. History of colon polyps          1.  Antireflux precautions including avoidance of late night eating and lying down soon after eating.    If you have questions, please do not hesitate to call me. I look forward to following Price along with you.    Sincerely,      Nino Ross MD           CC  No Recipients

## 2019-01-23 NOTE — H&P (VIEW-ONLY)
Subjective:       Patient ID: Price Noguera is a 58 y.o. male.    This patient is new to me.  Referring provider:  Deep for dysphagia.      Chief Complaint: Dysphagia and Gastroesophageal Reflux    Patient seen for dysphagia, occurring with solid foods, frequency often, alleviated/exacerbated by nothing in particular, associated signs/symptoms including chest pain which prompted and ER visit, as well as choking and GERD/heartburn symptoms, onset about one year ago but recently worsening.  He has never had EGD.  He states that he had colonoscopy in [place] about 4 years ago and that he had polyps removed.  No GI bleeding or change in bowel habits.  No abdominal pain.  No unintended weight loss.  Denies tobacco or EtOH.  No IVDU.  He has CAD with stent placement in 2012 and is no longer on Plavix.  He has poorly controlled DM2.  He had cardiac workup at the time of ER visit per independently reviewed notes from Dr. Adame with negative ECG for acute ischemic changes.  He also had exercise perfusion stress test which showed no evidence of ischemia or infarct and good LV function.  He does admit to some nighttime reflux which is intermittent.          Review of Systems   Constitutional: Negative for chills, fatigue and fever.   HENT: Positive for trouble swallowing.    Respiratory: Negative for cough, shortness of breath and wheezing.    Cardiovascular: Positive for chest pain. Negative for palpitations.   Gastrointestinal: Positive for abdominal pain. Negative for constipation, diarrhea, nausea and vomiting.   Musculoskeletal: Negative for arthralgias and myalgias.   Skin: Negative for color change and rash.   Neurological: Negative for dizziness, weakness and numbness.   Psychiatric/Behavioral: Negative for confusion. The patient is not nervous/anxious.    All other systems reviewed and are negative.      Objective:         Vitals:    01/23/19 1448   BP: (!) 146/85   Pulse: 68   Weight: 85.9 kg (189 lb 6 oz)  "  Height: 5' 9" (1.753 m)         Physical Exam   Constitutional: He is oriented to person, place, and time. He appears well-developed and well-nourished.   HENT:   Head: Normocephalic and atraumatic.   Mouth/Throat: Oropharynx is clear and moist.   Eyes: Conjunctivae are normal. Pupils are equal, round, and reactive to light. No scleral icterus.   Neck: Normal range of motion. Neck supple. No thyromegaly present.   Cardiovascular: Normal rate and regular rhythm.   No murmur heard.  Pulmonary/Chest: Effort normal and breath sounds normal. He has no wheezes.   Abdominal: Soft. Bowel sounds are normal. He exhibits no distension. There is no tenderness.   Musculoskeletal: He exhibits no edema.   Lymphadenopathy:     He has no cervical adenopathy.   Neurological: He is alert and oriented to person, place, and time.   Skin: Skin is warm and dry. No rash noted. No erythema.   Psychiatric: He has a normal mood and affect. His behavior is normal.   Vitals reviewed.        Lab Results   Component Value Date    WBC 8.10 01/08/2019    HGB 14.1 01/08/2019    HCT 41.4 01/08/2019    MCV 85 01/08/2019     01/08/2019       CMP  Sodium   Date Value Ref [place] Status   01/08/2019 134 (L) 136 - 145 mmol/L Final     Potassium   Date Value Ref [place] Status   01/08/2019 4.2 3.5 - 5.1 mmol/L Final     Chloride   Date Value Ref [place] Status   01/08/2019 98 95 - 110 mmol/L Final     CO2   Date Value Ref [place] Status   01/08/2019 24 23 - 29 mmol/L Final     Glucose   Date Value Ref [place] Status   01/08/2019 311 (H) 70 - 110 mg/dL Final     BUN, Bld   Date Value Ref [place] Status   01/08/2019 11 6 - 20 mg/dL Final     Creatinine   Date Value Ref [place] Status   01/08/2019 1.0 0.5 - 1.4 mg/dL Final     Calcium   Date Value Ref [place] Status   01/08/2019 10.0 8.7 - 10.5 mg/dL Final     Total Protein   Date Value Ref [place] Status   01/08/2019 7.6 6.0 - 8.4 g/dL Final     Albumin   Date Value Ref [place] Status   01/08/2019 4.2 3.5 - 5.2 g/dL " Final     Total Bilirubin   Date Value Ref [place] Status   01/08/2019 0.7 0.1 - 1.0 mg/dL Final     Comment:     For infants and newborns, interpretation of results should be based  on gestational age, weight and in agreement with clinical  observations.  Premature Infant recommended reference ranges:  Up to 24 hours.............<8.0 mg/dL  Up to 48 hours............<12.0 mg/dL  3-5 days..................<15.0 mg/dL  6-29 days.................<15.0 mg/dL       Alkaline Phosphatase   Date Value Ref [place] Status   01/08/2019 97 55 - 135 U/L Final     AST   Date Value Ref [place] Status   01/08/2019 23 10 - 40 U/L Final     ALT   Date Value Ref [place] Status   01/08/2019 28 10 - 44 U/L Final     Anion Gap   Date Value Ref [place] Status   01/08/2019 12 8 - 16 mmol/L Final     eGFR if    Date Value Ref [place] Status   01/08/2019 >60 >60 mL/min/1.73 m^2 Final     eGFR if non    Date Value Ref [place] Status   01/08/2019 >60 >60 mL/min/1.73 m^2 Final     Comment:     Calculation used to obtain the estimated glomerular filtration  rate (eGFR) is the CKD-EPI equation.          Old records from [place] reviewed and are as summarized above in the HPI.      Cardiac perfusion study and ECG were independently visualized and reviewed by me and showed no ischemia or infarct.    Patient is high risk for procedure and sedation given history of coronary disease and poorly controlled DM2.    MDM:  New problem, further workup.  Data as above.  High risk.  Endoscopy planned.        Assessment:       1. Coronary artery disease, angina presence unspecified, unspecified vessel or lesion type, unspecified whether native or transplanted heart    2. History of heart artery stent    3. Hypertension associated with diabetes    4. Gastroesophageal reflux disease without esophagitis    5. Dysphagia, unspecified type    6. History of colon polyps        Plan:       1.  Antireflux precautions including avoidance of late night  eating and lying down soon after eating.     2.  Avoid NSAIDs  3.  Keep follow up with cardiology  4.  EGD to further evaluate  5.  Obtain and review past colonoscopy records  6.  Further recommendations to follow after above.  7.  Communication will be sent to the referring provider, Deep regarding my assessment and plan on this patient via EPIC.

## 2019-01-24 NOTE — PROGRESS NOTES
Diabetes Education  Author: Harriet Freitas RD, CDE  Date: 1/24/2019    Diabetes Care Management Summary  Diabetes Education Record Assessment/Progress: Initial  Current Diabetes Risk Level: Moderate     Last A1c:   Lab Results   Component Value Date    HGBA1C 11.1 (H) 01/08/2019     Last visit with Diabetes Educator: : 01/18/2019      Diabetes Type  Diabetes Type : Type II    Diabetes History  Diabetes Diagnosis: 0-1 year(Newly dx)  Current Treatment: Insulin, Oral Medication(850mg Metformin with meals  Patient starting 10 units Tresiba)  Reviewed Problem List with Patient: No    Health Maintenance was reviewed today with patient. Discussed with patient importance of routine eye exams, foot exams/foot care, blood work (i.e.: A1c, microalbumin, and lipid), dental visits, yearly flu vaccine, and pneumonia vaccine as indicated by PCP. Patient verbalized understanding.     Health Maintenance Topics with due status: Not Due       Topic Last Completion Date    Hemoglobin A1c 01/08/2019    Lipid Panel 01/09/2019    High Dose Statin 01/23/2019     Health Maintenance Due   Topic Date Due    Hepatitis C Screening  1960    Foot Exam  09/28/1969    Eye Exam  09/28/1969    TETANUS VACCINE  09/28/1977    Pneumococcal Vaccine (Medium Risk) (1 of 1 - PPSV23) 09/28/1978    Colonoscopy  09/28/2009    Influenza Vaccine  08/01/2018       Nutrition  Meal Planning: 3 meals per day, water  What type of sweetener do you use?: Sweet N Low  What type of beverages do you drink?: diet soda/tea, water  Meal Plan 24 Hour Recall - Breakfast: (Toast with egg and water or tea with sweet n low)  Meal Plan 24 Hour Recall - Lunch: (Ham [place] with Veggies and water )  Meal Plan 24 Hour Recall - Dinner: (Sausage oma with egg and wheat toast with water)  Meal Plan 24 Hour Recall - Snack: (Small bag of potato chips)    Monitoring   Monitoring: (Not monitoring at present.  Will send script to pharmacy for meter and supplies)  Blood  Glucose Logs: No  Do you use a personal continuous glucose monitor?: No  In the last month, how often have you had a low blood sugar reaction?: never  Can you tell when your blood sugar is too high?: no    Exercise   Exercise Type: none(Has neuropathy in legs and has difficulty walking)    Current Diabetes Treatment   Current Treatment: Insulin, Oral Medication(850mg Metformin with meals  Patient starting 10 units Tresiba)    Social History  Preferred Learning Method: Face to Face  Primary Support: Self  Educational Level: Some College  Smoking Status: Never a Smoker  Alcohol Use: Never      Barriers to Change  Barriers to Change: None  Learning Challenges : None    Readiness to Learn   Readiness to Learn : Eager    Cultural Influences  Cultural Influences: None    Diabetes Education Assessment/Progress  Diabetes Disease Process (diabetes disease process and treatment options): Discussion  Nutrition (Incorporating nutritional management into one's lifestyle): Discussion, Instructed, Demonstrates Understanding/Competency (verbalizes/demonstrates), Comprehends Key Points, Written Materials Provided  Physical Activity (incorporating physical activity into one's lifestyle): Discussion  Medications (states correct name, dose, onset, peak, duration, side effects & timing of meds): Discussion, Instructed, Demonstrates Understanding/Competency(verbalizes/demonstrates), Comprehends Key Points, Written Materials Provided  Monitoring (monitoring blood glucose/other parameters & using results): Discussion, Instructed, Demonstrates Understanding/Competency (verbalizes/demonstrates), Comprehends Key Points, Written Materials Provided(Sent script to pharmacy for meter and supplies)  Acute Complications (preventing, detecting, and treating acute complications): Discussion  Chronic Complications (preventing, detecting, and treating chronic complications): Discussion  Clinical (diabetes, other pertinent medical history, and relevant  comorbidities reviewed during visit): Discussion  Cognitive (knowledge of self-management skills, functional health literacy): Discussion  Psychosocial (emotional response to diabetes): Discussion  Diabetes Distress and Support Systems: Discussion  Behavioral (readiness for change, lifestyle practices, self-care behaviors): Discussion    Goals  Patient has selected/evaluated goals during today's session: Yes, selected  Healthy Eating: Set  Start Date: 01/18/19  Target Date: 04/18/19  Physical Activity: In Progress  Monitoring: Set  Start Date: 01/18/19  Target Date: 04/18/19  Medications: In Progress  Problem Solving: In Progress  Healthy Coping: In Progress  Reducing Risks: In Progress    Diabetes Care Plan/Intervention  Education Plan/Intervention: Individual Follow-Up DSMT    Diabetes Meal Plan  Restrictions: Restricted Carbohydrate, Low Fat  Calories: 1600  Carbohydrate Per Meal: 45-60g  Carbohydrate Per Snack : 7-15g    Today's Self-Management Care Plan was developed with the patient's input and is based on barriers identified during today's assessment.    The long and short-term goals in the care plan were written with the patient/caregiver's input. The patient has agreed to work toward these goals to improve his overall diabetes control.      The patient received a copy of today's self-management plan and verbalized understanding of the care plan, goals, and all of today's instructions.      The patient was encouraged to communicate with his physician and care team regarding his condition(s) and treatment.  I provided the patient with my contact information today and encouraged him to contact me via phone or patient portal as needed.     Education Units of Time   Time Spent: 60 min

## 2019-01-29 ENCOUNTER — PATIENT OUTREACH (OUTPATIENT)
Dept: ADMINISTRATIVE | Facility: HOSPITAL | Age: 59
End: 2019-01-29

## 2019-01-29 NOTE — LETTER
"January 29, 2019    Price Noguera  [address] [place] [id number]             [facility]  [address]  [facility] [address]  Phone: [phone] Dear Enoch Sallas, [facility] is committed to your overall health.  To help you get the most out of each of your visits, we will review your information to make sure you are up to date on all of your recommended tests and/or procedures.      As a new patient to Dr. ARY DURAN, we may not have your complete medical records.  She has found that your chart shows you may be due for a:    YEARLY DIABETIC EYE EXAM & FOOT EXAM  HEP. C SCREENING  COLORECTAL CANCER SCREENING    If you have already had your screening, or you have made an appointment for your screening, congratulations!  You're on the road to good health. If you haven't signed up for a colorectal screening please accept this invitation to be screened.      According to the American Cancer Society, colon cancer is the third most common cancer for people in the [place].  A simple screening test "Fit Kit" - done in your own home - can help find colon cancer at an early stage when it can be treated, even before any signs or symptoms develop. THIS IS A YEARLY TEST.    Testing for blood in your stool (feces or bowel movement) is the first step. If you have an upcoming visit with your Primary Care Physician you can  a Fit Kit during your visit or you can  a Fit Kit at your Primary Care Clinic prior to your visit.    The Fit Kit includes:    · Instructions on how to perform the test  · (1) Sheet of tissue paper  · (1) Small Absorption pad  · (1) Bottle to hold the sample and a small probe to help you take the sample  · (1) Small plastic bio-hazard bag  · (1) Postage-paid return envelope    Please do your test (the instructions will tell you how) and then return your sample in the postage-paid return envelope within 24 hours of collection.     If your test results are negative, " "you won't need testing again for another year.  If results show you need more testing, we will call you with next steps.    Regular colorectal cancer screening is one of the most powerful weapons for preventing colon cancer.  The website https://www.cancer.org/cancer/colon-rectal-cancer.html can answer many of your questions about this cancer and its prevention.  Just search for "colorectal cancer".  If you have any questions please call [facility] [phone] for assistance.    If you have had any of the above done at another facility, please bring the records or information with you so that your record at [facility] will be complete.      If you are currently taking medication, please bring it with you to your appointment for review.    Also, if you have any type of Advanced Directives, please bring them with you to your office visit so we may scan them into your chart.        Vidya Fam LPN Clinical Care Coordinator  [facility] [address] [place] [address]  Phone [phone]  Fax [phone]         "

## 2019-02-04 ENCOUNTER — OFFICE VISIT (OUTPATIENT)
Dept: FAMILY MEDICINE | Facility: CLINIC | Age: 59
End: 2019-02-04
Payer: COMMERCIAL

## 2019-02-04 ENCOUNTER — LAB VISIT (OUTPATIENT)
Dept: LAB | Facility: HOSPITAL | Age: 59
End: 2019-02-04
Attending: NURSE PRACTITIONER
Payer: COMMERCIAL

## 2019-02-04 VITALS
WEIGHT: 188.06 LBS | HEART RATE: 72 BPM | OXYGEN SATURATION: 96 % | BODY MASS INDEX: 27.77 KG/M2 | TEMPERATURE: 98 F | DIASTOLIC BLOOD PRESSURE: 85 MMHG | SYSTOLIC BLOOD PRESSURE: 140 MMHG

## 2019-02-04 DIAGNOSIS — Z11.59 NEED FOR HEPATITIS C SCREENING TEST: ICD-10-CM

## 2019-02-04 DIAGNOSIS — E11.65 UNCONTROLLED TYPE 2 DIABETES MELLITUS WITH HYPERGLYCEMIA: Primary | ICD-10-CM

## 2019-02-04 DIAGNOSIS — I15.2 HYPERTENSION ASSOCIATED WITH DIABETES: ICD-10-CM

## 2019-02-04 DIAGNOSIS — Z95.5 HISTORY OF HEART ARTERY STENT: ICD-10-CM

## 2019-02-04 DIAGNOSIS — R07.9 CHEST PAIN, UNSPECIFIED TYPE: ICD-10-CM

## 2019-02-04 DIAGNOSIS — Z23 NEEDS FLU SHOT: ICD-10-CM

## 2019-02-04 DIAGNOSIS — E11.42 DIABETIC PERIPHERAL NEUROPATHY ASSOCIATED WITH TYPE 2 DIABETES MELLITUS: ICD-10-CM

## 2019-02-04 DIAGNOSIS — I25.10 CORONARY ARTERY DISEASE, ANGINA PRESENCE UNSPECIFIED, UNSPECIFIED VESSEL OR LESION TYPE, UNSPECIFIED WHETHER NATIVE OR TRANSPLANTED HEART: ICD-10-CM

## 2019-02-04 DIAGNOSIS — E11.59 HYPERTENSION ASSOCIATED WITH DIABETES: ICD-10-CM

## 2019-02-04 PROCEDURE — 90471 FLU VACCINE (QUAD) GREATER THAN OR EQUAL TO 3YO PRESERVATIVE FREE IM: ICD-10-PCS | Mod: S$GLB,,, | Performed by: NURSE PRACTITIONER

## 2019-02-04 PROCEDURE — 90686 FLU VACCINE (QUAD) GREATER THAN OR EQUAL TO 3YO PRESERVATIVE FREE IM: ICD-10-PCS | Mod: S$GLB,,, | Performed by: NURSE PRACTITIONER

## 2019-02-04 PROCEDURE — 3077F PR MOST RECENT SYSTOLIC BLOOD PRESSURE >= 140 MM HG: ICD-10-PCS | Mod: CPTII,S$GLB,, | Performed by: NURSE PRACTITIONER

## 2019-02-04 PROCEDURE — 99999 PR PBB SHADOW E&M-EST. PATIENT-LVL IV: ICD-10-PCS | Mod: PBBFAC,,, | Performed by: NURSE PRACTITIONER

## 2019-02-04 PROCEDURE — 3008F PR BODY MASS INDEX (BMI) DOCUMENTED: ICD-10-PCS | Mod: CPTII,S$GLB,, | Performed by: NURSE PRACTITIONER

## 2019-02-04 PROCEDURE — 3079F PR MOST RECENT DIASTOLIC BLOOD PRESSURE 80-89 MM HG: ICD-10-PCS | Mod: CPTII,S$GLB,, | Performed by: NURSE PRACTITIONER

## 2019-02-04 PROCEDURE — 93000 EKG 12-LEAD: ICD-10-PCS | Mod: S$GLB,,, | Performed by: INTERNAL MEDICINE

## 2019-02-04 PROCEDURE — 86803 HEPATITIS C AB TEST: CPT

## 2019-02-04 PROCEDURE — 99214 PR OFFICE/OUTPT VISIT, EST, LEVL IV, 30-39 MIN: ICD-10-PCS | Mod: 25,S$GLB,, | Performed by: NURSE PRACTITIONER

## 2019-02-04 PROCEDURE — 3008F BODY MASS INDEX DOCD: CPT | Mod: CPTII,S$GLB,, | Performed by: NURSE PRACTITIONER

## 2019-02-04 PROCEDURE — 99999 PR PBB SHADOW E&M-EST. PATIENT-LVL IV: CPT | Mod: PBBFAC,,, | Performed by: NURSE PRACTITIONER

## 2019-02-04 PROCEDURE — 3046F PR MOST RECENT HEMOGLOBIN A1C LEVEL > 9.0%: ICD-10-PCS | Mod: CPTII,S$GLB,, | Performed by: NURSE PRACTITIONER

## 2019-02-04 PROCEDURE — 3046F HEMOGLOBIN A1C LEVEL >9.0%: CPT | Mod: CPTII,S$GLB,, | Performed by: NURSE PRACTITIONER

## 2019-02-04 PROCEDURE — 36415 COLL VENOUS BLD VENIPUNCTURE: CPT | Mod: PO

## 2019-02-04 PROCEDURE — 99214 OFFICE O/P EST MOD 30 MIN: CPT | Mod: 25,S$GLB,, | Performed by: NURSE PRACTITIONER

## 2019-02-04 PROCEDURE — 90686 IIV4 VACC NO PRSV 0.5 ML IM: CPT | Mod: S$GLB,,, | Performed by: NURSE PRACTITIONER

## 2019-02-04 PROCEDURE — 93000 ELECTROCARDIOGRAM COMPLETE: CPT | Mod: S$GLB,,, | Performed by: INTERNAL MEDICINE

## 2019-02-04 PROCEDURE — 3077F SYST BP >= 140 MM HG: CPT | Mod: CPTII,S$GLB,, | Performed by: NURSE PRACTITIONER

## 2019-02-04 PROCEDURE — 3079F DIAST BP 80-89 MM HG: CPT | Mod: CPTII,S$GLB,, | Performed by: NURSE PRACTITIONER

## 2019-02-04 PROCEDURE — 90471 IMMUNIZATION ADMIN: CPT | Mod: S$GLB,,, | Performed by: NURSE PRACTITIONER

## 2019-02-04 RX ORDER — INSULIN PUMP SYRINGE, 3 ML
EACH MISCELLANEOUS
Qty: 1 EACH | Refills: 0 | Status: SHIPPED | OUTPATIENT
Start: 2019-02-04 | End: 2021-06-17 | Stop reason: SDUPTHER

## 2019-02-04 RX ORDER — LISINOPRIL 20 MG/1
20 TABLET ORAL DAILY
Qty: 90 TABLET | Refills: 3 | Status: SHIPPED | OUTPATIENT
Start: 2019-02-04 | End: 2020-01-13

## 2019-02-04 RX ORDER — INSULIN DEGLUDEC 100 U/ML
13 INJECTION, SOLUTION SUBCUTANEOUS DAILY
Qty: 1 SYRINGE | Refills: 11
Start: 2019-02-04 | End: 2020-05-01 | Stop reason: SDUPTHER

## 2019-02-04 NOTE — PATIENT INSTRUCTIONS
-Patient to increase Tresiba to 13 units until seen by endocrine   Take 1 lisinopril tablet daily (20 mg)   Take diabetic medications in AM; check blood glucose in the morning, before eating (fasting blood sugar).

## 2019-02-04 NOTE — PROGRESS NOTES
"Subjective:       Patient ID: Price Noguera is a 58 y.o. male.    Chief Complaint: Follow-up (diabetes , pt need machine to make lancets , and also requesting flu shot )    Mr. Noguera presents today for a F/U for diabetes and HTN. At his last visit, he was started on Tresiba 10 units. Instructed to increase 3 units every 2-3 days until BG less than 150. Has been taking 10 units daily; he was afraid to give himself too much. GI upset has improved with metformin. Has been monitoring BG periodically, mostly in the morning before breakfast. Lowest 157, highest 222 after breakfast. Saw diabetic education, has been keeping food log. Neuropathy improved with gabapentin, tolerating well. Pain is greater after days when he is more active. Has been able to ambulate without his cane some. Appointment with endocrinology 2/6. Has not taken BP medications today; does monitor at home. Saw GI; they would like to schedule an EGD, but the patient will have to wait until he has family assistance. He did not see cardiology as scheduled, was unaware of the appointment. He is slowly doing more activities each day; often gets chest pain with exertion, stress or anxiety. It is intermittent. He enjoys gardening, but has been unable to do so because the chest pain becomes so severe he will "black out". 2/10 chest pain at appointment today due to stress over the cost of dental exam. No associated symptoms. Due for foot exam, eye exam, flu shot and hep C screening.       Patient Active Problem List   Diagnosis    Uncontrolled type 2 diabetes mellitus with hyperglycemia    Coronary artery disease    History of heart artery stent    Hypertension associated with diabetes    Hyponatremia    Hypertriglyceridemia    Hyperlipidemia associated with type 2 diabetes mellitus    Irritable bowel syndrome with diarrhea    Gastroesophageal reflux disease without esophagitis     Review of Systems   Constitutional: Negative for activity change, " chills, fatigue and fever.   Respiratory: Positive for chest tightness (with anxiety). Negative for shortness of breath and wheezing.    Cardiovascular: Positive for chest pain (associated with anxiety ). Negative for palpitations.   Gastrointestinal: Negative for diarrhea, nausea and vomiting.   Neurological: Negative for dizziness, weakness and light-headedness.        Peripheral neuropathy     Psychiatric/Behavioral: The patient is not nervous/anxious.            Objective:      Physical Exam   Constitutional: He is oriented to person, place, and time. Vital signs are normal.   Neck: No thyromegaly present.   Cardiovascular: Normal rate, regular rhythm and normal heart sounds.   No murmur heard.  Pulses:       Dorsalis pedis pulses are 2+ on the right side, and 2+ on the left side.        Posterior tibial pulses are 2+ on the right side, and 2+ on the left side.   Pulmonary/Chest: Effort normal and breath sounds normal. He has no wheezes.   Abdominal: Soft. There is no tenderness.   Musculoskeletal:   Ambulating with cane     Feet:   Right Foot:   Protective Sensation: 10 sites tested. 9 sites sensed.   Skin Integrity: Negative for skin breakdown, erythema or dry skin.   Left Foot:   Protective Sensation: 10 sites tested. 10 sites sensed.   Skin Integrity: Negative for skin breakdown, erythema or dry skin.   Lymphadenopathy:     He has no cervical adenopathy.   Neurological: He is alert and oriented to person, place, and time.   Skin: Skin is warm and dry.   Psychiatric: He has a normal mood and affect.   Vitals reviewed.      Assessment:       1. Uncontrolled type 2 diabetes mellitus with hyperglycemia    2. Hypertension associated with diabetes    3. Coronary artery disease, angina presence unspecified, unspecified vessel or lesion type, unspecified whether native or transplanted heart    4. History of heart artery stent    5. Chest pain, unspecified type    6. Diabetic peripheral neuropathy associated with  type 2 diabetes mellitus    7. Need for hepatitis C screening test    8. Needs flu shot        Plan:       Price was seen today for follow-up.    Diagnoses and all orders for this visit:    Uncontrolled type 2 diabetes mellitus with hyperglycemia  -     blood-glucose meter kit; Use as instructed.  -     blood sugar diagnostic Strp; 1 each by Misc.(Non-Drug; Combo Route) route 4 (four) times daily before meals and nightly.  -     Ambulatory referral to Optometry  -     insulin degludec (TRESIBA FLEXTOUCH U-100) 100 unit/mL (3 mL) InPn; Inject 13 Units into the skin once daily.  Increase Tresiba to 13 units daily until seen by endocrinology   Continue metformin   Encouraged monitoring 4x daily before meals     Hypertension associated with diabetes  -     lisinopril (PRINIVIL,ZESTRIL) 20 MG tablet; Take 1 tablet (20 mg total) by mouth once daily.  -     Ambulatory referral to Cardiology  Increase lisinopril to 20 mg daily  Continue home monitoring  Recheck BP at 1 month F/U    Coronary artery disease, angina presence unspecified, unspecified vessel or lesion type, unspecified whether native or transplanted heart  -     Ambulatory referral to Cardiology    History of heart artery stent  -     Ambulatory referral to Cardiology    Chest pain, unspecified type  -     Ambulatory referral to Cardiology  -     IN OFFICE EKG 12-LEAD (to [place])  RBBB on EKG    Diabetic peripheral neuropathy associated with type 2 diabetes mellitus  Improved  Continue current regimen    Need for hepatitis C screening test  -     Hepatitis C antibody; Future    Needs flu shot  -     Influenza - Quadrivalent (3 years & older) (PF)        Patient readiness: acceptance and barriers:none    During the course of the visit the patient was educated and counseled about the following:     Diabetes:  Discussed general issues about diabetes pathophysiology and management.  Increased dose of insulin: Tresiba to 13 units.  Hypertension:   Medication: increase  to lisinopril 20mg.  Dietary sodium restriction.  Regular aerobic exercise.    Goals: Diabetes: Maintain Hemoglobin A1C below 7 and Hypertension: Reduce Blood Pressure    Did patient meet goals/outcomes: No    The following self management tools provided: blood pressure log  blood glucose log    Patient Instructions (the written plan) was given to the patient/family.     Time spent with patient: 45 minutes    Barriers to medications present (no )    Adverse reactions to current medications (no)    Over the counter medications reviewed (Yes)      F/U 1 month with Minal Gong NP

## 2019-02-05 ENCOUNTER — TELEPHONE (OUTPATIENT)
Dept: GASTROENTEROLOGY | Facility: CLINIC | Age: 59
End: 2019-02-05

## 2019-02-05 ENCOUNTER — TELEPHONE (OUTPATIENT)
Dept: FAMILY MEDICINE | Facility: CLINIC | Age: 59
End: 2019-02-05

## 2019-02-05 LAB — HCV AB SERPL QL IA: NEGATIVE

## 2019-02-05 NOTE — TELEPHONE ENCOUNTER
Attempted to make an appointment for patient with [facility] [place] per Minal duval. Was told by the  tat the patient no showed an appointment with Dr. Warner on 1/22/19. Tried to reach patient regarding his no show (calls are restricted) so I couldn't get through, waiting on call back from patient.

## 2019-02-05 NOTE — TELEPHONE ENCOUNTER
----- Message from Ragini Garcia sent at 2/5/2019  2:05 PM CST -----  Contact: self  Patient [phone] is scheduled for a procedure this Thursday 02 07 19 that he needs to reschedule as he does not have a ride/wants to reschedule for about two weeks out/please call

## 2019-02-06 ENCOUNTER — OFFICE VISIT (OUTPATIENT)
Dept: ENDOCRINOLOGY | Facility: CLINIC | Age: 59
End: 2019-02-06
Payer: COMMERCIAL

## 2019-02-06 ENCOUNTER — TELEPHONE (OUTPATIENT)
Dept: FAMILY MEDICINE | Facility: CLINIC | Age: 59
End: 2019-02-06

## 2019-02-06 ENCOUNTER — LAB VISIT (OUTPATIENT)
Dept: LAB | Facility: HOSPITAL | Age: 59
End: 2019-02-06
Attending: PHYSICIAN ASSISTANT
Payer: COMMERCIAL

## 2019-02-06 VITALS
TEMPERATURE: 98 F | DIASTOLIC BLOOD PRESSURE: 80 MMHG | RESPIRATION RATE: 17 BRPM | HEART RATE: 71 BPM | SYSTOLIC BLOOD PRESSURE: 135 MMHG | BODY MASS INDEX: 27.9 KG/M2 | WEIGHT: 188.38 LBS | HEIGHT: 69 IN

## 2019-02-06 DIAGNOSIS — E78.5 HYPERLIPIDEMIA ASSOCIATED WITH TYPE 2 DIABETES MELLITUS: ICD-10-CM

## 2019-02-06 DIAGNOSIS — B35.1 ONYCHOMYCOSIS: ICD-10-CM

## 2019-02-06 DIAGNOSIS — I15.2 HYPERTENSION ASSOCIATED WITH DIABETES: ICD-10-CM

## 2019-02-06 DIAGNOSIS — E11.59 HYPERTENSION ASSOCIATED WITH DIABETES: ICD-10-CM

## 2019-02-06 DIAGNOSIS — E11.65 UNCONTROLLED TYPE 2 DIABETES MELLITUS WITH HYPERGLYCEMIA: ICD-10-CM

## 2019-02-06 DIAGNOSIS — E11.65 UNCONTROLLED TYPE 2 DIABETES MELLITUS WITH HYPERGLYCEMIA: Primary | ICD-10-CM

## 2019-02-06 DIAGNOSIS — E11.69 HYPERLIPIDEMIA ASSOCIATED WITH TYPE 2 DIABETES MELLITUS: ICD-10-CM

## 2019-02-06 LAB
ALBUMIN/CREAT UR: 5 UG/MG
CREAT UR-MCNC: 60 MG/DL
MICROALBUMIN UR DL<=1MG/L-MCNC: 3 UG/ML

## 2019-02-06 PROCEDURE — 3079F DIAST BP 80-89 MM HG: CPT | Mod: CPTII,S$GLB,, | Performed by: PHYSICIAN ASSISTANT

## 2019-02-06 PROCEDURE — 3046F HEMOGLOBIN A1C LEVEL >9.0%: CPT | Mod: CPTII,S$GLB,, | Performed by: PHYSICIAN ASSISTANT

## 2019-02-06 PROCEDURE — 99204 OFFICE O/P NEW MOD 45 MIN: CPT | Mod: S$GLB,,, | Performed by: PHYSICIAN ASSISTANT

## 2019-02-06 PROCEDURE — 82043 UR ALBUMIN QUANTITATIVE: CPT

## 2019-02-06 PROCEDURE — 3008F PR BODY MASS INDEX (BMI) DOCUMENTED: ICD-10-PCS | Mod: CPTII,S$GLB,, | Performed by: PHYSICIAN ASSISTANT

## 2019-02-06 PROCEDURE — 99204 PR OFFICE/OUTPT VISIT, NEW, LEVL IV, 45-59 MIN: ICD-10-PCS | Mod: S$GLB,,, | Performed by: PHYSICIAN ASSISTANT

## 2019-02-06 PROCEDURE — 3075F SYST BP GE 130 - 139MM HG: CPT | Mod: CPTII,S$GLB,, | Performed by: PHYSICIAN ASSISTANT

## 2019-02-06 PROCEDURE — 99999 PR PBB SHADOW E&M-EST. PATIENT-LVL III: ICD-10-PCS | Mod: PBBFAC,,, | Performed by: PHYSICIAN ASSISTANT

## 2019-02-06 PROCEDURE — 3079F PR MOST RECENT DIASTOLIC BLOOD PRESSURE 80-89 MM HG: ICD-10-PCS | Mod: CPTII,S$GLB,, | Performed by: PHYSICIAN ASSISTANT

## 2019-02-06 PROCEDURE — 99999 PR PBB SHADOW E&M-EST. PATIENT-LVL III: CPT | Mod: PBBFAC,,, | Performed by: PHYSICIAN ASSISTANT

## 2019-02-06 PROCEDURE — 3046F PR MOST RECENT HEMOGLOBIN A1C LEVEL > 9.0%: ICD-10-PCS | Mod: CPTII,S$GLB,, | Performed by: PHYSICIAN ASSISTANT

## 2019-02-06 PROCEDURE — 3008F BODY MASS INDEX DOCD: CPT | Mod: CPTII,S$GLB,, | Performed by: PHYSICIAN ASSISTANT

## 2019-02-06 PROCEDURE — 3075F PR MOST RECENT SYSTOLIC BLOOD PRESS GE 130-139MM HG: ICD-10-PCS | Mod: CPTII,S$GLB,, | Performed by: PHYSICIAN ASSISTANT

## 2019-02-06 RX ORDER — CICLOPIROX OLAMINE 7.7 MG/100ML
SUSPENSION TOPICAL 2 TIMES DAILY
Qty: 6.6 ML | Refills: 1 | Status: ON HOLD | OUTPATIENT
Start: 2019-02-06 | End: 2020-12-13

## 2019-02-06 NOTE — TELEPHONE ENCOUNTER
----- Message from Minal Gong NP sent at 2/5/2019  5:23 PM CST -----  Please let patient know hepatitis C screening is negative

## 2019-02-06 NOTE — PROGRESS NOTES
"CC: DM    HPI: Price Noguera was diagnosed with Type 2 DM in 1/19. His dad's family has DM. No hospitalizations for DM. No fhx of thyroid disease.     New to endocrine.    CURRENT DIABETIC MEDS: Tresiba 13 units q am, Metformin 850 mg BID    BG readings are checked 1x/day and readings are below:        Hypoglycemia: none.   Type of Glucose Meter: Accucheck Avia    Physical Activity: He works in his garden.     Dietary Habits:   BF-sausage, egg, wheat toast  LH-meat, salad  DN-hot dog  Snacks on carrot sticks and pb. Avoids sugary beverages.     Last Eye Exam: Apt on March 8, 2019  Last Podiatry Exam: n/a    Last DM Education Attended: 1/19    REVIEW OF SYSTEMS  General: no weakness or fatigue.   Eyes: no intermittent blurry vision or visual disturbances.   Cardiac: + occasional chest pain with activity (seeing cardiology), no palpitations.   Respiratory: no cough or dyspnea.   GI: no abdominal pain or nausea.   Skin: + onychomycosis on fingernails, no rashes or itching.   Neuro: no numbness or tingling.   Endocrine: no polyuria, polydipsia, polyphagia.   Remainder ROS negative    Vital Signs  /80 (BP Location: Left arm, Patient Position: Sitting, BP Method: Medium (Automatic))   Pulse 71   Temp 98.2 °F (36.8 °C) (Oral)   Resp 17   Ht 5' 9" (1.753 m)   Wt 85.5 kg (188 lb 6.1 oz)   BMI 27.82 kg/m²     Personally reviewed labs below:   Hemoglobin A1C   Date Value Ref Range Status   01/08/2019 11.1 (H) 4.0 - 5.6 % Final     Comment:     ADA Screening Guidelines:  5.7-6.4%  Consistent with prediabetes  >or=6.5%  Consistent with diabetes  High levels of fetal hemoglobin interfere with the HbA1C  assay. Heterozygous hemoglobin variants (HbS, HgC, etc)do  not significantly interfere with this assay.   However, presence of multiple variants may affect accuracy.         Chemistry        Component Value Date/Time     (L) 01/08/2019 1118    K 4.2 01/08/2019 1118    CL 98 01/08/2019 1118    CO2 24 01/08/2019 " 1118    BUN 11 01/08/2019 1118    CREATININE 1.0 01/08/2019 1118     (H) 01/08/2019 1118        Component Value Date/Time    CALCIUM 10.0 01/08/2019 1118    ALKPHOS 97 01/08/2019 1118    AST 23 01/08/2019 1118    ALT 28 01/08/2019 1118    BILITOT 0.7 01/08/2019 1118    ESTGFRAFRICA >60 01/08/2019 1118    EGFRNONAA >60 01/08/2019 1118        Lab Results   Component Value Date    CHOL 286 (H) 01/09/2019     Lab Results   Component Value Date    HDL 40 01/09/2019     Lab Results   Component Value Date    LDLCALC 192.8 (H) 01/09/2019     Lab Results   Component Value Date    TRIG 266 (H) 01/09/2019     Lab Results   Component Value Date    CHOLHDL 14.0 (L) 01/09/2019     Lab Results   Component Value Date    TSH 0.755 01/08/2019       No results found for: MICALBCREAT    No results found for: [id number]    PHYSICAL EXAMINATION  Constitutional: middle aged male, appears well, no distress  Neck: Supple, trachea midline.   Respiratory: even and unlabored, CTA without wheezes.  Cardiovascular: RRR; no carotid bruits or murmurs.   Lymph: DP pulses  2+ bilaterally; no edema.   Skin: + onychomycosis on bl thumb nails, warm and dry; no injection site reactions, no acanthosis nigracans observed.  Neuro: patient alert and cooperative; CN 2-12 grossly intact  Foot Exam: no sores or macerations noted.     Protective Sensation (w/ 10 gram monofilament):  Right: Intact  Left: Intact    Visual Inspection:  Normal -  Bilateral and Nails Intact - without Evidence of Foot Deformity- Bilateral    Pedal Pulses:   Right: Present  Left: Present    Posterior tibialis:   Right:Present  Left: Present     Vibratory Sensation  Right:Decreased  Left:Decreased    Assessment/Plan    1. Uncontrolled type 2 diabetes mellitus with hyperglycemia  ciclopirox (LOPROX) 0.77 % Susp    empagliflozin (JARDIANCE) 10 mg Tab    Microalbumin/creatinine urine ratio   2. Hypertension associated with diabetes     3. Hyperlipidemia associated with type 2  diabetes mellitus     4. Onychomycosis  ciclopirox (LOPROX) 0.77 % Susp     S4HJ-MRn are elevated. Start jardiance 10 mg daily. Discussed side effects. Increase dose to 25 mg daily in one month. Continue Tresiba and Metformin.   XCB-xeqbqr-bmwhjnzw ACEi  ENI-gsxjjk-mwlstxjh statin and ASA  Onychomycosis-start penlac twice daily to affected areas    FOLLOW UP  1 mth

## 2019-02-06 NOTE — LETTER
February 6, 2019      Minal Gong, NP  [address] [address]           [facility] - Endo/Diabetes  [address]  [facility] [address]  Phone: [phone]          Patient: Price Noguera   MR Number: [id number]   YOB: 1960   Date of Visit: 2/6/2019       Dear Minal Gong:    Thank you for referring Price Noguera to me for evaluation. Attached you will find relevant portions of my assessment and plan of care.    If you have questions, please do not hesitate to call me. I look forward to following Price Noguera along with you.    Sincerely,    MARBIN [place]osure  CC:  No Recipients    If you would like to receive this communication electronically, please contact [email or web address] or [phone] to request more information on [employer] access.    For providers and/or their staff who would like to refer a patient to [facility], please contact us through our one-stop-shop provider referral line, [facility], at [phone].    If you feel you have received this communication in error or would no longer like to receive these types of communications, please e-mail [email or web address]

## 2019-02-12 ENCOUNTER — OFFICE VISIT (OUTPATIENT)
Dept: FAMILY MEDICINE | Facility: CLINIC | Age: 59
End: 2019-02-12
Payer: COMMERCIAL

## 2019-02-12 VITALS
HEIGHT: 69 IN | BODY MASS INDEX: 27.92 KG/M2 | WEIGHT: 188.5 LBS | OXYGEN SATURATION: 97 % | TEMPERATURE: 98 F | RESPIRATION RATE: 14 BRPM

## 2019-02-12 DIAGNOSIS — M25.511 CHRONIC RIGHT SHOULDER PAIN: ICD-10-CM

## 2019-02-12 DIAGNOSIS — E11.69 HYPERLIPIDEMIA ASSOCIATED WITH TYPE 2 DIABETES MELLITUS: ICD-10-CM

## 2019-02-12 DIAGNOSIS — E11.9 DIABETIC EYE EXAM: ICD-10-CM

## 2019-02-12 DIAGNOSIS — I25.10 CORONARY ARTERY DISEASE, ANGINA PRESENCE UNSPECIFIED, UNSPECIFIED VESSEL OR LESION TYPE, UNSPECIFIED WHETHER NATIVE OR TRANSPLANTED HEART: ICD-10-CM

## 2019-02-12 DIAGNOSIS — G89.29 CHRONIC RIGHT SHOULDER PAIN: ICD-10-CM

## 2019-02-12 DIAGNOSIS — Z01.00 DIABETIC EYE EXAM: ICD-10-CM

## 2019-02-12 DIAGNOSIS — E78.5 HYPERLIPIDEMIA ASSOCIATED WITH TYPE 2 DIABETES MELLITUS: ICD-10-CM

## 2019-02-12 DIAGNOSIS — I15.2 HYPERTENSION ASSOCIATED WITH DIABETES: Primary | ICD-10-CM

## 2019-02-12 DIAGNOSIS — G25.0 BENIGN ESSENTIAL TREMOR: ICD-10-CM

## 2019-02-12 DIAGNOSIS — Z11.59 NEED FOR HEPATITIS C SCREENING TEST: ICD-10-CM

## 2019-02-12 DIAGNOSIS — E11.59 HYPERTENSION ASSOCIATED WITH DIABETES: Primary | ICD-10-CM

## 2019-02-12 DIAGNOSIS — R07.9 CHEST PAIN, UNSPECIFIED TYPE: ICD-10-CM

## 2019-02-12 DIAGNOSIS — E11.65 UNCONTROLLED TYPE 2 DIABETES MELLITUS WITH HYPERGLYCEMIA: ICD-10-CM

## 2019-02-12 PROBLEM — E78.1 HYPERTRIGLYCERIDEMIA: Status: RESOLVED | Noted: 2019-01-09 | Resolved: 2019-02-12

## 2019-02-12 PROCEDURE — 99999 PR PBB SHADOW E&M-EST. PATIENT-LVL IV: CPT | Mod: PBBFAC,,, | Performed by: FAMILY MEDICINE

## 2019-02-12 PROCEDURE — 99214 OFFICE O/P EST MOD 30 MIN: CPT | Mod: S$GLB,,, | Performed by: FAMILY MEDICINE

## 2019-02-12 PROCEDURE — 99214 PR OFFICE/OUTPT VISIT, EST, LEVL IV, 30-39 MIN: ICD-10-PCS | Mod: S$GLB,,, | Performed by: FAMILY MEDICINE

## 2019-02-12 PROCEDURE — 99999 PR PBB SHADOW E&M-EST. PATIENT-LVL IV: ICD-10-PCS | Mod: PBBFAC,,, | Performed by: FAMILY MEDICINE

## 2019-02-12 PROCEDURE — 3046F HEMOGLOBIN A1C LEVEL >9.0%: CPT | Mod: CPTII,S$GLB,, | Performed by: FAMILY MEDICINE

## 2019-02-12 PROCEDURE — 3008F PR BODY MASS INDEX (BMI) DOCUMENTED: ICD-10-PCS | Mod: CPTII,S$GLB,, | Performed by: FAMILY MEDICINE

## 2019-02-12 PROCEDURE — 3046F PR MOST RECENT HEMOGLOBIN A1C LEVEL > 9.0%: ICD-10-PCS | Mod: CPTII,S$GLB,, | Performed by: FAMILY MEDICINE

## 2019-02-12 PROCEDURE — 3008F BODY MASS INDEX DOCD: CPT | Mod: CPTII,S$GLB,, | Performed by: FAMILY MEDICINE

## 2019-02-12 NOTE — PATIENT INSTRUCTIONS
Diabetes (General Information)  Diabetes is a long-term health problem. It means your body does not make enough insulin. Or it may mean that your body cannot use the insulin it makes. Insulin is a hormone in your body. It lets blood sugar (glucose) reach the cells in your body. All of your cells need glucose for fuel.  When you have diabetes, the glucose in your blood builds up because it cannot get into the cells. This buildup is called high blood sugar (hyperglycemia).  Your blood sugar level depends on several things. It depends on what kind of food you eat and how much of it you eat. It also depends on how much exercise you get, and how much insulin you have in your body. Eating too much of the wrong kinds of food or not taking diabetes medicine on time can cause high blood sugar. Infections can cause high blood sugar even if you are taking medicines correctly.  These things can also cause low blood sugar:  · Missing meals  · Not eating enough food  · Taking too much diabetes medicine  Diabetes can cause serious problems over time if you do not get treated. These problems include heart disease, stroke, kidney failure, and blindness. They also include nerve pain or loss of feeling in your legs and feet, and gangrene of the feet. By keeping your blood sugar under control you can prevent or delay these problems.  Normal blood sugar levels are 80 to 100 before a meal and less than 180 in the 1 to 2 hours after a meal.  Home care  Follow these guidelines when caring for yourself at home:  · Follow the diet your healthcare provider gives you. Take insulin or other diabetes medicine exactly as told to.  · Watch your blood sugar as you are told to. Keep a log of your results. This will help your provider change your medicines to keep your blood sugar under control.  · Try to reach your ideal weight. You may be able to cut back on or not have to take diabetes medicine if you eat the right foods and get exercise.  · Do  not smoke. Smoking worsens the effects of diabetes on your circulation. You are much more likely to have a heart attack if you have diabetes and you smoke.  · Take good care of your feet. If you have lost feeling in your feet, you may not see an injury or infection. Check your feet and between your toes at least once a week.  · Wear a medical alert bracelet or necklace, or carry a card in your wallet that says you have diabetes. This will help healthcare providers give you the right care if you get very ill and cannot tell them that you have diabetes.  Sick day plan  If you get a cold, the flu, or a bacterial or viral infection, take these steps:  · Look at your diabetes sick plan and call your healthcare provider as you were told to. You may need to call your provider right away if:  ¨ Your blood sugar is above 240 while taking your diabetes medicine  ¨ Your urine ketone levels are above normal or high  ¨ You have been vomiting more than 6 hours  ¨ You have trouble breathing or your breath ha s a fruity smell  ¨ You have a high fever  ¨ You have a fever for several days and you are not getting better  ¨ You get light-headed and are sleepier than usual  · Keep taking your diabetes pills (oral medicine) even if you have been vomiting and are feeling sick. Call your provider right away because you may need insulin to lower your blood sugar until you recover from your illness.  · Keep taking your insulin even if you have been vomiting and are feeling sick. Call your provider right away to ask if you need to change your insulin dose. This will depend on your blood sugar results.  · Check your blood sugar every 2 to 4 hours, or at least 4 times a day.  · Check your ketones often. If you are vomiting and having diarrhea, watch them more often.  · Do not skip meals. Try to eat small meals on a regular schedule. Do this even if you do not feel like eating.  · Drink water or other liquids that do not have caffeine or  calories. This will keep you from getting dehydrated. If you are nauseated or vomiting, takes small sips every 5 minutes. To prevent dehydration try to drink a cup (8 ounces) of fluids every hour while you are awake.  General care  Always bring a source of fast-acting sugar with you in case you have symptoms of low blood sugar (below 70). At the first sign of low blood sugar, eat or drink 15 to 20 grams of fast-acting sugar to raise your blood sugar. Examples are:  · 3 to 4 glucose tablets. You can buy these at most [facility].  · 4 ounces (1/2 cup) of regular (not diet) soft drinks  · 4 ounces (1/2 cup) of any fruit juice  · 8 ounces (1 cup) of milk  · 5 to 6 pieces of hard candy  · 1 tablespoon of honey  Check your blood sugar 15 minutes after treating yourself. If it is still below 70, take 15 to 20 more grams of fast-acting sugar. Test again in 15 minutes. If it returns to normal (70 or above), eat a snack or meal to keep your blood sugar in a safe range. If it stays low, call your doctor or go to an emergency room.  Follow-up care  Follow-up with your healthcare provider, or as advised. For more information about diabetes, visit the American Diabetes Association website at www.diabetes.org or call 203-664-0496.  When to seek medical advice  Call your healthcare provider right away if you have any of these symptoms of high blood sugar:  · Frequent urination  · Dizziness  · Drowsiness  · Thirst  · Headache  · Nausea or vomiting  · Abdominal pain  · Eyesight changes  · Fast breathing  · Confusion or loss of consciousness  Also call your provider right away if you have any of these signs of low blood sugar:  · Fatigue  · Headache  · Shakes  · Excess sweating  · Hunger  · Feeling anxious or restless  · Eyesight changes  · Drowsiness  · Weakness  · Confusion or loss of consciousness  Call 911  Call for emergency help right away if any of these occur:  · Chest pain or shortness of breath  · Dizziness or  fainting  · Weakness of an arm or leg or one side of the face  · Trouble speaking or seeing   Date Last Reviewed: 6/1/2016 © 2000-2017 [employer], [employer]. [address] [place] [address], [place] [address]. All rights reserved. This information is not intended as a substitute for professional medical care. Always follow your healthcare professional's instructions.          Established High Blood Pressure    High blood pressure (hypertension) is a chronic disease. Often, healthcare providers dont know what causes it. But it can be caused by certain health conditions and medicines.  If you have high blood pressure, you may not have any symptoms. If you do have symptoms, they may include headache, dizziness, changes in your vision, chest pain, and shortness of breath. But even without symptoms, high blood pressure thats not treated raises your risk for heart attack and stroke. High blood pressure is a serious health risk and shouldnt be ignored.  A blood pressure reading is made up of two numbers: a higher number over a lower number. The top number is the systolic pressure. The bottom number is the diastolic pressure. A normal blood pressure is a systolic pressure of  less than 120 over a diastolic pressure of less than 80. You will see your blood pressure readings written together. For example, a person with a systolic pressure of 188 and a diastolic pressure of 78 will have 118/78 written in the medical record.  High blood pressure is when either the top number is 140 or higher, or the bottom number is 90 or higher. This must be the result when taking your blood pressure a number of times. The blood pressures between normal and high are called prehypertension.  Home care  If you have high blood pressure, you should do what is listed below to lower your blood pressure. If you are taking medicines for high blood pressure, these methods may reduce or end your need for medicines in the future.  · Begin a weight-loss  program if you are overweight.  · Cut back on how much salt you get in your diet. Heres how to do this:  ¨ Dont eat foods that have a lot of salt. These include olives, pickles, smoked meats, and salted potato chips.  ¨ Dont add salt to your food at the table.  ¨ Use only small amounts of salt when cooking.  · Start an exercise program. Talk with your healthcare provider about the type of exercise program that would be best for you. It doesn't have to be hard. Even brisk walking for 20 minutes 3 times a week is a good form of exercise.  · Dont take medicines that stimulate the heart. This includes many over-the-counter cold and sinus decongestant pills and sprays, as well as diet pills. Check the warnings about hypertension on the label. Before buying any over-the-counter medicines or supplements, always ask the pharmacist about the product's potential interaction with your high blood pressure and your high blood pressure medicines.  · Stimulants such as amphetamine or cocaine could be deadly for someone with high blood pressure. Never take these.  · Limit how much caffeine you get in your diet. Switch to caffeine-free products.  · Stop smoking. If you are a long-time smoker, this can be hard. Talk to your healthcare provider about medicines and nicotine replacement options to help you. Also, enroll in a stop-smoking program to make it more likely that you will quit for good.  · Learn how to handle stress. This is an important part of any program to lower blood pressure. Learn about relaxation methods like meditation, yoga, or biofeedback.  · If your provider prescribed medicines, take them exactly as directed. Missing doses may cause your blood pressure get out of control.  · If you miss a dose or doses, check with your healthcare provider or pharmacist about what to do.  · Consider buying an automatic blood pressure machine. Ask your provider for a recommendation. You can get one of these at most  pharmacies.     The American Heart Association recommends the following guidelines for home blood pressure monitoring:  · Don't smoke or drink coffee for 30 minutes before taking your blood pressure.  · Go to the bathroom before the test.  · Relax for 5 minutes before taking the measurement.  · Sit with your back supported (don't sit on a couch or soft chair); keep your feet on the floor uncrossed. Place your arm on a solid flat surface (like a table) with the upper part of the arm at heart level. Place the middle of the cuff directly above the eye of the elbow. Check the monitor's instruction manual for an illustration.  · Take multiple readings. When you measure, take 2 to 3 readings one minute apart and record all of the results.  · Take your blood pressure at the same time every day, or as your healthcare provider recommends.  · Record the date, time, and blood pressure reading.  · Take the record with you to your next medical appointment. If your blood pressure monitor has a built-in memory, simply take the monitor with you to your next appointment.  · Call your provider if you have several high readings. Don't be frightened by a single high blood pressure reading, but if you get several high readings, check in with your healthcare provider.  · Note: When blood pressure reaches a systolic (top number) of 180 or higher OR diastolic (bottom number) of 110 or higher, seek emergency medical treatment.  Follow-up care  You will need to see your healthcare provider regularly. This is to check your blood pressure and to make changes to your medicines. Make a follow-up appointment as directed. Bring the record of your home blood pressure readings to the appointment.  When to seek medical advice  Call your healthcare provider right away if any of these occur:  · Blood pressure reaches a systolic (upper number) of 180 or higher OR a diastolic (bottom number) of 110 or higher  · Chest pain or shortness of breath  · Severe  headache  · Throbbing or rushing sound in the ears  · Nosebleed  · Sudden severe pain in your belly (abdomen)  · Extreme drowsiness, confusion, or fainting  · Dizziness or spinning sensation (vertigo)  · Weakness of an arm or leg or one side of the face  · You have problems speaking or seeing   Date Last Reviewed: 12/1/2016  © 9106-3699 [employer]. [address] [place] [address], [place] [address]. All rights reserved. This information is not intended as a substitute for professional medical care. Always follow your healthcare professional's instructions.

## 2019-02-12 NOTE — PROGRESS NOTES
"Subjective:       Patient ID: Price Noguera is a 58 y.o. male.    Chief Complaint: Establish Care    HPI  Review of Systems   Constitutional: Negative for fatigue and unexpected weight change.   Respiratory: Negative for chest tightness and shortness of breath.    Cardiovascular: Positive for chest pain (with exertion-stable). Negative for palpitations and leg swelling.   Gastrointestinal: Negative for abdominal pain.   Musculoskeletal: Negative for arthralgias.   Neurological: Negative for dizziness, syncope, light-headedness and headaches.       Patient Active Problem List   Diagnosis    Uncontrolled type 2 diabetes mellitus with hyperglycemia    Coronary artery disease    History of heart artery stent    Hypertension associated with diabetes    Hyponatremia    Hyperlipidemia associated with type 2 diabetes mellitus    Irritable bowel syndrome with diarrhea    Gastroesophageal reflux disease without esophagitis    Onychomycosis     Patient is here for a chronic conditions follow up.    Cad s/p stent in  maker 2012. [facility] group upcoming appt next week  1/8/19 admitted for CP and felt like he is going to black out whenever he does anything physical. Harrison neg, exercise NM test neg for ischemia, ef 66%.  A1c 11.1 . CP is predictable with minimal exertion    GI Dr. Ross EGD scheduled 2/21/19 to complete CP work up. On PPI daily.  LAst colonoscopy 5 years ago-h/o colon polyps    ENdocrine RON Crockett . BS < 130 fasting am.  Last eye exam 3 years ago    C/o tremor in right hand primarily with using pen or eating-annoying and embarrassing but not enough to need meds    C/o right shoulder pain x 1 year since he felt a "pop" while using a 6 lb hammer.  Has pain with lifting anything. Does not hurt at rest or pressure. Denies UE radiation, weakness or paresthesias    Objective:      Physical Exam   Constitutional: He is oriented to person, place, and time. He appears well-developed and well-nourished. "   Cardiovascular: Normal rate, regular rhythm and normal heart sounds.   Pulmonary/Chest: Effort normal and breath sounds normal.   Musculoskeletal: He exhibits no edema.        Right shoulder: He exhibits pain. He exhibits normal range of motion, no tenderness, no bony tenderness, no swelling, no effusion, no crepitus, no deformity, no laceration, no spasm, normal pulse and normal strength.        Arms:  Neurological: He is alert and oriented to person, place, and time.   Skin: Skin is warm and dry.   Psychiatric: He has a normal mood and affect.   Nursing note and vitals reviewed.      Assessment:       1. Hypertension associated with diabetes    2. Need for hepatitis C screening test    3. Diabetic eye exam    4. Hyperlipidemia associated with type 2 diabetes mellitus    5. Uncontrolled type 2 diabetes mellitus with hyperglycemia    6. Chronic right shoulder pain    7. Coronary artery disease, angina presence unspecified, unspecified vessel or lesion type, unspecified whether native or transplanted heart    8. Chest pain, unspecified type        Plan:       1. Hypertension associated with diabetes  Controlled on current medications.  Continue current medications.      2. Need for hepatitis C screening test  Screen and treat as indicated:    - Hepatitis C antibody; Future    3. Diabetic eye exam  Refer   - Ambulatory referral to Optometry; Future    4. Hyperlipidemia associated with type 2 diabetes mellitus  Stable condition.  Continue current medications.  Will adjust based on lab findings or if condition changes.    - Lipid panel; Future    5. Uncontrolled type 2 diabetes mellitus with hyperglycemia  Improving control.  Cont current meds and f/u endocrine  - CBC auto differential; Future  - Comprehensive metabolic panel; Future  - Hemoglobin A1c; Future    6. Chronic right shoulder pain  Refer for eval and treat  - Ambulatory referral to Orthopedics    7. Coronary artery disease, angina presence unspecified,  unspecified vessel or lesion type, unspecified whether native or transplanted heart  Cont card monitoring    8. Chest pain, unspecified type  Typical features with normal stress test NM.  F/u card and GI for full work up.  CP precautions. Cont asa 81 mg a day    9. Benign essential tremor  Reassurance. Does not want meds at this time        Time spent with patient: 20 minutes    Patient with be reevaluated in 3 months or sooner prn    Greater than 50% of this visit was spent counseling as described in above documentation:Yes

## 2019-02-19 DIAGNOSIS — M25.511 RIGHT SHOULDER PAIN, UNSPECIFIED CHRONICITY: Primary | ICD-10-CM

## 2019-02-20 ENCOUNTER — OFFICE VISIT (OUTPATIENT)
Dept: ORTHOPEDICS | Facility: CLINIC | Age: 59
End: 2019-02-20
Payer: COMMERCIAL

## 2019-02-20 ENCOUNTER — HOSPITAL ENCOUNTER (OUTPATIENT)
Dept: RADIOLOGY | Facility: HOSPITAL | Age: 59
Discharge: HOME | End: 2019-02-20
Attending: ORTHOPAEDIC SURGERY
Payer: COMMERCIAL

## 2019-02-20 VITALS
BODY MASS INDEX: 27.85 KG/M2 | WEIGHT: 188 LBS | DIASTOLIC BLOOD PRESSURE: 70 MMHG | HEART RATE: 71 BPM | SYSTOLIC BLOOD PRESSURE: 136 MMHG | HEIGHT: 69 IN

## 2019-02-20 DIAGNOSIS — M25.511 RIGHT SHOULDER PAIN, UNSPECIFIED CHRONICITY: ICD-10-CM

## 2019-02-20 DIAGNOSIS — G89.29 CHRONIC RIGHT SHOULDER PAIN: Primary | ICD-10-CM

## 2019-02-20 DIAGNOSIS — M25.511 CHRONIC RIGHT SHOULDER PAIN: Primary | ICD-10-CM

## 2019-02-20 PROCEDURE — 73030 X-RAY EXAM OF SHOULDER: CPT | Mod: 26,RT,, | Performed by: RADIOLOGY

## 2019-02-20 PROCEDURE — 3078F PR MOST RECENT DIASTOLIC BLOOD PRESSURE < 80 MM HG: ICD-10-PCS | Mod: CPTII,S$GLB,, | Performed by: ORTHOPAEDIC SURGERY

## 2019-02-20 PROCEDURE — 3008F BODY MASS INDEX DOCD: CPT | Mod: CPTII,S$GLB,, | Performed by: ORTHOPAEDIC SURGERY

## 2019-02-20 PROCEDURE — 3075F PR MOST RECENT SYSTOLIC BLOOD PRESS GE 130-139MM HG: ICD-10-PCS | Mod: CPTII,S$GLB,, | Performed by: ORTHOPAEDIC SURGERY

## 2019-02-20 PROCEDURE — 3008F PR BODY MASS INDEX (BMI) DOCUMENTED: ICD-10-PCS | Mod: CPTII,S$GLB,, | Performed by: ORTHOPAEDIC SURGERY

## 2019-02-20 PROCEDURE — 99999 PR PBB SHADOW E&M-EST. PATIENT-LVL III: ICD-10-PCS | Mod: PBBFAC,,, | Performed by: ORTHOPAEDIC SURGERY

## 2019-02-20 PROCEDURE — 99203 OFFICE O/P NEW LOW 30 MIN: CPT | Mod: S$GLB,,, | Performed by: ORTHOPAEDIC SURGERY

## 2019-02-20 PROCEDURE — 3078F DIAST BP <80 MM HG: CPT | Mod: CPTII,S$GLB,, | Performed by: ORTHOPAEDIC SURGERY

## 2019-02-20 PROCEDURE — 99203 PR OFFICE/OUTPT VISIT, NEW, LEVL III, 30-44 MIN: ICD-10-PCS | Mod: S$GLB,,, | Performed by: ORTHOPAEDIC SURGERY

## 2019-02-20 PROCEDURE — 3075F SYST BP GE 130 - 139MM HG: CPT | Mod: CPTII,S$GLB,, | Performed by: ORTHOPAEDIC SURGERY

## 2019-02-20 PROCEDURE — 73030 X-RAY EXAM OF SHOULDER: CPT | Mod: TC,PN,RT

## 2019-02-20 PROCEDURE — 99999 PR PBB SHADOW E&M-EST. PATIENT-LVL III: CPT | Mod: PBBFAC,,, | Performed by: ORTHOPAEDIC SURGERY

## 2019-02-20 PROCEDURE — 73030 XR SHOULDER TRAUMA 3 VIEW RIGHT: ICD-10-PCS | Mod: 26,RT,, | Performed by: RADIOLOGY

## 2019-02-20 NOTE — PROGRESS NOTES
Past Medical History:   Diagnosis Date    Coronary artery disease     Diabetes mellitus     History of heart artery stent     Hypertension     Myocardial infarction        Past Surgical History:   Procedure Laterality Date    BACK SURGERY      COLONOSCOPY  04/07/2014    CORONARY ANGIOPLASTY WITH STENT PLACEMENT      TUMOR REMOVAL  05/16/2016    Back in skin       Current Outpatient Medications   Medication Sig    acetaminophen (TYLENOL) 500 MG tablet Take 2 tablets (1,000 mg total) by mouth every 8 (eight) hours as needed.    aspirin (ECOTRIN) 81 MG EC tablet Take 1 tablet (81 mg total) by mouth once daily.    atorvastatin (LIPITOR) 20 MG tablet Take 1 tablet (20 mg total) by mouth once daily.    blood sugar diagnostic Strp 2 strips by Misc.(Non-Drug; Combo Route) route once daily.    blood sugar diagnostic Strp 1 each by Misc.(Non-Drug; Combo Route) route 4 (four) times daily before meals and nightly.    blood-glucose meter kit Use as instructed.    ciclopirox (LOPROX) 0.77 % Susp Apply topically 2 (two) times daily.    empagliflozin (JARDIANCE) 10 mg Tab Take one tablet in the morning.    gabapentin (NEURONTIN) 300 MG capsule Take 1 capsule (300 mg total) by mouth 3 (three) times daily. 1st week take 1 tab bedtime; 2nd week twice daily; 3rd week three times daily (Patient taking differently: Take 300 mg by mouth 2 (two) times daily. 1st week take 1 tab bedtime; 2nd week twice daily; 3rd week three times daily)    insulin degludec (TRESIBA FLEXTOUCH U-100) 100 unit/mL (3 mL) InPn Inject 13 Units into the skin once daily.    lancets (ACCU-CHEK FASTCLIX LANCET DRUM) Misc 1 Device by Misc.(Non-Drug; Combo Route) route 2 (two) times daily.    lancets 28 gauge Misc 1 lancet by Misc.(Non-Drug; Combo Route) route 4 (four) times daily before meals and nightly.    lisinopril (PRINIVIL,ZESTRIL) 20 MG tablet Take 1 tablet (20 mg total) by mouth once daily.    metFORMIN (GLUCOPHAGE) 850 MG tablet Take 1  "tablet (850 mg total) by mouth 2 (two) times daily with meals.    omega 3-dha-epa-fish oil 1,000 mg (120 mg-180 mg) Cap Take 1 capsule by mouth 2 (two) times daily with meals.    pantoprazole (PROTONIX) 40 MG tablet Take 1 tablet (40 mg total) by mouth once daily.    pen needle, diabetic (PEN NEEDLE) 32 gauge x 5/32" Ndle Inject 1 each into the skin once daily. Generic     No current facility-administered medications for this visit.        Review of patient's allergies indicates:  No Known Allergies    Family History   Problem Relation Age of Onset    Arthritis Mother     Pacemaker/defibrilator Mother     Bursitis Mother     Chronic back pain Mother     Cancer Father     Hypertension Father     No Known Problems Sister     Congenital heart disease Brother     Cancer Paternal Grandmother     Alcohol abuse Brother     No Known Problems Brother        Social History     Socioeconomic History    Marital status: Single     Spouse name: Not on file    Number of children: Not on file    Years of education: Not on file    Highest education level: Not on file   Social Needs    Financial resource strain: Not on file    Food insecurity - worry: Not on file    Food insecurity - inability: Not on file    Transportation needs - medical: Not on file    Transportation needs - non-medical: Not on file   Occupational History    Not on file   Tobacco Use    Smoking status: Never Smoker    Smokeless tobacco: Never Used   Substance and Sexual Activity    Alcohol use: No     Frequency: Never    Drug use: No    Sexual activity: Not Currently   Other Topics Concern    Not on file   Social History Narrative    Not on file       Chief Complaint:   Chief Complaint   Patient presents with    Right Shoulder - Pain       Consulting Physician: Chanel Roberson MD    History of present illness:    This is a 58 y.o. male who complains of right shoulder pain for over a year after using a hammer extensively. Pain 3/10 " "and worse with use and position. No injury.    Review of Systems:    Constitution: Denies chills, fever, and sweats.  HENT: Denies headaches or blurry vision.  Cardiovascular: Denies chest pain or irregular heart beat.  Respiratory: Denies cough or shortness of breath.  Gastrointestinal: Denies abdominal pain, nausea, or vomiting.  Musculoskeletal:  Denies muscle cramps.  Neurological: Denies dizziness or focal weakness.  Psychiatric/Behavioral: Normal mental status.  Hematologic/Lymphatic: Denies bleeding problem or easy bruising/bleeding.  Skin: Denies rash or suspicious lesions.    Examination:    Vital Signs:    Vitals:    02/20/19 1312   BP: 136/70   Pulse: 71   Weight: 85.3 kg (188 lb)   Height: 5' 9" (1.753 m)   PainSc:   3       Body mass index is 27.76 kg/m².    This a well-developed, well nourished patient in no acute distress.    Alert and oriented x 3 and cooperative to examination.       Physical Exam: Right Shoulder Exam     Skin  Rash:   None  Scars:   None    Inspection/Observation   Swelling:   none  Erythema:   none  Bruising:   none  Wounds:   none  Scapular Winging:  none  Scapular Dyskinesia:  mild  Atrophy:   none  Masses:   none  Lymphadenopathy: None    Tenderness   AC Joint:   Mild  Bicep groove:  Mild    Range of Motion   Active Forward Flexion:  180   Active External Rotation:  >45  Active Internal Rotation:  Low Back    Passive Forward Flexion:  180  Passive External Rotation:  >45  Passive Internal Rotation at 90 degrees: Limited    Muscle Strength   Forward Flexion:  5/5  External Rotation:  5/5  Internal Rotation:  5/5    Tests & Signs   Cross Arm:   negative  Hawkin's test:   positive  Impingement:   positive    Other   Sensation:  normal  Pulse:    2+ radial    SLAP tests  positive      Imaging: X-rays ordered and images interpreted today personally by me of right shoulder show AC DJD.        Assessment: Chronic right shoulder pain        Plan:  Discussed likely labral pathology. Pt " declined MRI arthrogram pending seeing his cardiologist. Will call us back to schedule.      DISCLAIMER: This note may have been dictated using voice recognition software and may contain grammatical errors.     NOTE: Consult report sent to referring provider via [employer] EMR.

## 2019-02-20 NOTE — LETTER
February 20, 2019      Chaenl Roberson MD  [address]  [facility] [address]           [address] [facility] [address]  [facility] [address]  Phone: [phone]          Patient: Price Noguera   MR Number: [id number]   YOB: 1960   Date of Visit: 2/20/2019       Dear Dr. Chanel Roberson:    Thank you for referring Price Noguera to me for evaluation. Attached you will find relevant portions of my assessment and plan of care.    If you have questions, please do not hesitate to call me. I look forward to following Price Noguera along with you.    Sincerely,    Zay Dubose MD    Enclosure  CC:  No Recipients    If you would like to receive this communication electronically, please contact [email or web address] or [phone] to request more information on [employer] Link access.    For providers and/or their staff who would like to refer a patient to [facility], please contact us through our one-stop-shop provider referral line, [facility], at [phone].    If you feel you have received this communication in error or would no longer like to receive these types of communications, please e-mail [email or web address]

## 2019-02-21 ENCOUNTER — ANESTHESIA EVENT (OUTPATIENT)
Dept: ENDOSCOPY | Facility: HOSPITAL | Age: 59
End: 2019-02-21
Payer: COMMERCIAL

## 2019-02-21 ENCOUNTER — ANESTHESIA (OUTPATIENT)
Dept: ENDOSCOPY | Facility: HOSPITAL | Age: 59
End: 2019-02-21
Payer: COMMERCIAL

## 2019-02-21 ENCOUNTER — HOSPITAL ENCOUNTER (OUTPATIENT)
Facility: HOSPITAL | Age: 59
Discharge: HOME | End: 2019-02-21
Attending: INTERNAL MEDICINE | Admitting: INTERNAL MEDICINE
Payer: COMMERCIAL

## 2019-02-21 DIAGNOSIS — R13.10 DYSPHAGIA: ICD-10-CM

## 2019-02-21 DIAGNOSIS — K31.7 GASTRIC POLYPS: ICD-10-CM

## 2019-02-21 DIAGNOSIS — K22.2 SCHATZKI'S RING: Primary | ICD-10-CM

## 2019-02-21 DIAGNOSIS — K29.70 GASTRITIS, PRESENCE OF BLEEDING UNSPECIFIED, UNSPECIFIED CHRONICITY, UNSPECIFIED GASTRITIS TYPE: ICD-10-CM

## 2019-02-21 PROCEDURE — 43248 EGD GUIDE WIRE INSERTION: CPT | Mod: 51,,, | Performed by: INTERNAL MEDICINE

## 2019-02-21 PROCEDURE — 43251 PR EGD, FLEX, W/REMOVAL, TUMOR/POLYP/LESION(S), SNARE: ICD-10-PCS | Mod: ,,, | Performed by: INTERNAL MEDICINE

## 2019-02-21 PROCEDURE — 27201089 HC SNARE, DISP (ANY): Performed by: INTERNAL MEDICINE

## 2019-02-21 PROCEDURE — 25000003 PHARM REV CODE 250: Performed by: INTERNAL MEDICINE

## 2019-02-21 PROCEDURE — D9220A PRA ANESTHESIA: Mod: CRNA,,, | Performed by: NURSE ANESTHETIST, CERTIFIED REGISTERED

## 2019-02-21 PROCEDURE — 37000009 HC ANESTHESIA EA ADD 15 MINS: Performed by: INTERNAL MEDICINE

## 2019-02-21 PROCEDURE — 43251 EGD REMOVE LESION SNARE: CPT | Performed by: INTERNAL MEDICINE

## 2019-02-21 PROCEDURE — 88305 TISSUE EXAM BY PATHOLOGIST: CPT | Performed by: PATHOLOGY

## 2019-02-21 PROCEDURE — 43248 PR EGD, FLEX, W/DILATION OVER GUIDEWIRE: ICD-10-PCS | Mod: 51,,, | Performed by: INTERNAL MEDICINE

## 2019-02-21 PROCEDURE — 43239 EGD BIOPSY SINGLE/MULTIPLE: CPT | Mod: 59,,, | Performed by: INTERNAL MEDICINE

## 2019-02-21 PROCEDURE — 43239 EGD BIOPSY SINGLE/MULTIPLE: CPT | Performed by: INTERNAL MEDICINE

## 2019-02-21 PROCEDURE — 43251 EGD REMOVE LESION SNARE: CPT | Mod: ,,, | Performed by: INTERNAL MEDICINE

## 2019-02-21 PROCEDURE — D9220A PRA ANESTHESIA: ICD-10-PCS | Mod: ANES,,, | Performed by: ANESTHESIOLOGY

## 2019-02-21 PROCEDURE — 43239 PR EGD, FLEX, W/BIOPSY, SGL/MULTI: ICD-10-PCS | Mod: 59,,, | Performed by: INTERNAL MEDICINE

## 2019-02-21 PROCEDURE — 43248 EGD GUIDE WIRE INSERTION: CPT | Performed by: INTERNAL MEDICINE

## 2019-02-21 PROCEDURE — 27201012 HC FORCEPS, HOT/COLD, DISP: Performed by: INTERNAL MEDICINE

## 2019-02-21 PROCEDURE — 63600175 PHARM REV CODE 636 W HCPCS: Performed by: NURSE ANESTHETIST, CERTIFIED REGISTERED

## 2019-02-21 PROCEDURE — 88305 TISSUE SPECIMEN TO PATHOLOGY - SURGERY: ICD-10-PCS | Mod: 26,,, | Performed by: PATHOLOGY

## 2019-02-21 PROCEDURE — D9220A PRA ANESTHESIA: ICD-10-PCS | Mod: CRNA,,, | Performed by: NURSE ANESTHETIST, CERTIFIED REGISTERED

## 2019-02-21 PROCEDURE — 37000008 HC ANESTHESIA 1ST 15 MINUTES: Performed by: INTERNAL MEDICINE

## 2019-02-21 PROCEDURE — D9220A PRA ANESTHESIA: Mod: ANES,,, | Performed by: ANESTHESIOLOGY

## 2019-02-21 RX ORDER — PROPOFOL 10 MG/ML
VIAL (ML) INTRAVENOUS
Status: DISCONTINUED | OUTPATIENT
Start: 2019-02-21 | End: 2019-02-21

## 2019-02-21 RX ORDER — SODIUM CHLORIDE 9 MG/ML
INJECTION, SOLUTION INTRAVENOUS CONTINUOUS
Status: DISCONTINUED | OUTPATIENT
Start: 2019-02-21 | End: 2019-02-21 | Stop reason: HOSPADM

## 2019-02-21 RX ORDER — LIDOCAINE HCL/PF 100 MG/5ML
SYRINGE (ML) INTRAVENOUS
Status: DISCONTINUED | OUTPATIENT
Start: 2019-02-21 | End: 2019-02-21

## 2019-02-21 RX ADMIN — SODIUM CHLORIDE 1000 ML: 0.9 INJECTION, SOLUTION INTRAVENOUS at 07:02

## 2019-02-21 RX ADMIN — PROPOFOL 50 MG: 10 INJECTION, EMULSION INTRAVENOUS at 09:02

## 2019-02-21 RX ADMIN — LIDOCAINE HYDROCHLORIDE 100 MG: 20 INJECTION, SOLUTION INTRAVENOUS at 09:02

## 2019-02-21 RX ADMIN — PROPOFOL 100 MG: 10 INJECTION, EMULSION INTRAVENOUS at 09:02

## 2019-02-21 RX ADMIN — PROPOFOL 30 MG: 10 INJECTION, EMULSION INTRAVENOUS at 09:02

## 2019-02-21 NOTE — ANESTHESIA POSTPROCEDURE EVALUATION
"Anesthesia Post Evaluation    Patient: Price Noguera    Procedure(s) Performed: Procedure(s) (LRB):  EGD (ESOPHAGOGASTRODUODENOSCOPY) (N/A)    Final Anesthesia Type: general  Patient location during evaluation: PACU  Patient participation: Yes- Able to Participate  Level of consciousness: awake and alert  Post-procedure vital signs: reviewed and stable  Pain management: adequate  Airway patency: patent  PONV status at discharge: No PONV  Anesthetic complications: no      Cardiovascular status: blood pressure returned to baseline and hemodynamically stable  Respiratory status: unassisted  Hydration status: euvolemic  Follow-up not needed.        Visit Vitals  /79   Pulse 61   Temp 36.7 °C (98.1 °F) (Temporal)   Resp 20   Ht 5' 9" (1.753 m)   Wt 83.9 kg (185 lb)   SpO2 (!) 94%   BMI 27.32 kg/m²       Pain/Janine Score: Janine Score: 9 (2/21/2019  9:45 AM)        "

## 2019-02-21 NOTE — ANESTHESIA PREPROCEDURE EVALUATION
02/21/2019  Price Noguera is a 58 y.o., male.    Anesthesia Evaluation    I have reviewed the Patient Summary Reports.    I have reviewed the Nursing Notes.      Review of Systems  Anesthesia Hx:  No problems with previous Anesthesia    Cardiovascular:   Hypertension, well controlled Past MI CAD asymptomatic CABG/stent  ECG has been reviewed.    Hepatic/GI:   GERD, well controlled    Endocrine:   Diabetes, well controlled, type 2, using insulin        Physical Exam  General:  Well nourished    Airway/Jaw/Neck:  Airway Findings: Mallampati: II                Anesthesia Plan  Type of Anesthesia, risks & benefits discussed:  Anesthesia Type:  general  Patient's Preference:   Intra-op Monitoring Plan:   Intra-op Monitoring Plan Comments:   Post Op Pain Control Plan:   Post Op Pain Control Plan Comments:   Induction:    Beta Blocker:  Patient is not currently on a Beta-Blocker (No further documentation required).       Informed Consent: Patient understands risks and agrees with Anesthesia plan.  Questions answered. Anesthesia consent signed with patient.  ASA Score: 3     Day of Surgery Review of History & Physical:    H&P update referred to the surgeon.         Ready For Surgery From Anesthesia Perspective.

## 2019-02-21 NOTE — DISCHARGE INSTRUCTIONS
"Discharge Instructions: After Your Surgery/Procedure  Youve just had surgery. During surgery you were given medicine called anesthesia to keep you relaxed and free of pain. After surgery you may have some pain or nausea. This is common. Here are some tips for feeling better and getting well after surgery.     Stay on schedule with your medication.   Going home  Your doctor or nurse will show you how to take care of yourself when you go home. He or she will also answer your questions. Have an adult family member or friend drive you home.      For your safety we recommend these precaution for the first 24 hours after your procedure:  · Do not drive or use heavy equipment.  · Do not make important decisions or sign legal papers.  · Do not drink alcohol.  · Have someone stay with you, if needed. He or she can watch for problems and help keep you safe.  · Your concentration, balance, coordination, and judgement may be impaired for many hours after anesthesia.  Use caution when ambulating or standing up.     · You may feel weak and "washed out" after anesthesia and surgery.      Subtle residual effects of general anesthesia or sedation with regional / local anesthesia can last more than 24 hours.  Rest for the remainder of the day or longer if your Doctor/Surgeon has advised you to do so.  Although you may feel normal within the first 24 hours, your reflexes and mental ability may be impaired without you realizing it.  You may feel dizzy, lightheaded or sleepy for 24 hours or longer.      Be sure to go to all follow-up visits with your doctor. And rest after your surgery for as long as your doctor tells you to.  Coping with pain  If you have pain after surgery, pain medicine will help you feel better. Take it as told, before pain becomes severe. Also, ask your doctor or pharmacist about other ways to control pain. This might be with heat, ice, or relaxation. And follow any other instructions your surgeon or nurse gives " you.  Tips for taking pain medicine  To get the best relief possible, remember these points:  · Pain medicines can upset your stomach. Taking them with a little food may help.  · Most pain relievers taken by mouth need at least 20 to 30 minutes to start to work.  · Taking medicine on a schedule can help you remember to take it. Try to time your medicine so that you can take it before starting an activity. This might be before you get dressed, go for a walk, or sit down for dinner.  · Constipation is a common side effect of pain medicines. Call your doctor before taking any medicines such as laxatives or stool softeners to help ease constipation. Also ask if you should skip any foods. Drinking lots of fluids and eating foods such as fruits and vegetables that are high in fiber can also help. Remember, do not take laxatives unless your surgeon has prescribed them.  · Drinking alcohol and taking pain medicine can cause dizziness and slow your breathing. It can even be deadly. Do not drink alcohol while taking pain medicine.  · Pain medicine can make you react more slowly to things. Do not drive or run machinery while taking pain medicine.  Your health care provider may tell you to take acetaminophen to help ease your pain. Ask him or her how much you are supposed to take each day. Acetaminophen or other pain relievers may interact with your prescription medicines or other over-the-counter (OTC) drugs. Some prescription medicines have acetaminophen and other ingredients. Using both prescription and OTC acetaminophen for pain can cause you to overdose. Read the labels on your OTC medicines with care. This will help you to clearly know the list of ingredients, how much to take, and any warnings. It may also help you not take too much acetaminophen. If you have questions or do not understand the information, ask your pharmacist or health care provider to explain it to you before you take the OTC medicine.  Managing  nausea  Some people have an upset stomach after surgery. This is often because of anesthesia, pain, or pain medicine, or the stress of surgery. These tips will help you handle nausea and eat healthy foods as you get better. If you were on a special food plan before surgery, ask your doctor if you should follow it while you get better. These tips may help:  · Do not push yourself to eat. Your body will tell you when to eat and how much.  · Start off with clear liquids and soup. They are easier to digest.  · Next try semi-solid foods, such as mashed potatoes, applesauce, and gelatin, as you feel ready.  · Slowly move to solid foods. Dont eat fatty, rich, or spicy foods at first.  · Do not force yourself to have 3 large meals a day. Instead eat smaller amounts more often.  · Take pain medicines with a small amount of solid food, such as crackers or toast, to avoid nausea.     Call your surgeon if  · You still have pain an hour after taking medicine. The medicine may not be strong enough.  · You feel too sleepy, dizzy, or groggy. The medicine may be too strong.  · You have side effects like nausea, vomiting, or skin changes, such as rash, itching, or hives.       If you have obstructive sleep apnea  You were given anesthesia medicine during surgery to keep you comfortable and free of pain. After surgery, you may have more apnea spells because of this medicine and other medicines you were given. The spells may last longer than usual.   At home:  · Keep using the continuous positive airway pressure (CPAP) device when you sleep. Unless your health care provider tells you not to, use it when you sleep, day or night. CPAP is a common device used to treat obstructive sleep apnea.  · Talk with your provider before taking any pain medicine, muscle relaxants, or sedatives. Your provider will tell you about the possible dangers of taking these medicines.  © 6540-5917 [employer]. [address] [place] [address]  [place] [address]. All rights reserved. This information is not intended as a substitute for professional medical care. Always follow your healthcare professional's instructions.    Esophageal Dilation     A balloon dilator may be used to widen a stricture in the esophagus.   An esophageal dilation is a procedure used to widen a narrowed section of your esophagus. This is the tube that leads from your throat to your stomach. Narrowing (stricture) of the esophagus can cause problems. These include trouble swallowing. This sheet explains what to expect with esophageal dilation.  Why esophageal dilation is needed  Several problems can be treated with esophageal dilation. They include:  · Peptic stricture. This is caused by reflux esophagitis. With this problem, the esophagus is irritated by acid reflux (heartburn). This occurs when acid from your stomach flows back up into the esophagus. Stomach acid damages the lining of the esophagus. This leads to a buildup of scar tissue. As a result, the esophagus is narrowed.  · Schatzkis ring. This is an abnormal ring of tissue. It forms where the esophagus meets the stomach. It can cause trouble swallowing. It can also cause food to get stuck in the esophagus. The cause of this condition is not known.  · Achalasia. This condition stops food and liquids from moving into your stomach from the esophagus. It affects the lower esophageal sphincter (LES). The LES is a muscular ring that opens (relaxes) when you swallow. With achalasia, the LES does not relax. This condition can also cause problems with peristalsis. This is the normal muscular action of the esophagus that moves food into the stomach.  · Eosinophilic esophagitis. This is a redness and swelling (inflammation) in the esophagus. It is caused by an environmental trigger such as a food allergy. It can lead to pain, trouble swallowing, and strictures.  · Less common causes of stricture. Other causes of stricture include radiation  treatment and cancer.  Before you have esophageal dilation  · Tell your provider about any medicines you take. This includes prescription medicines, over-the-counter medicines, herbs, vitamins, and other supplements. Be sure to mention aspirin or any blood thinners youre taking.  · Let your provider know if you need to take antibiotics before dental procedures. You may need to take them before esophageal dilation as well.  · Tell your provider about any health conditions you have, such as heart or lung disease. Also mention any allergies to medicines.  · Youll need to have an empty stomach for the procedure. Follow your providers instructions for not eating and drinking before the procedure.  · Arrange to have a family member or friend drive you home after the procedure.  During the procedure  · You may be given local anesthesia to numb your throat. Youll also likely be given medicine to relax you. The procedure takes about 15 minutes. It does not cause trouble breathing.  · A tube called an endoscope (scope) is used. This is a narrow tube with a tiny light and camera at the end. The scope is inserted through your mouth and into your esophagus. It lets your provider see inside your esophagus. To help guide your provider, an imaging method called fluoroscopy may also be used. This creates a moving X-ray image on a computer screen.   · Next, special tiny tools are carefully guided through your mouth and down into the esophagus. They widen the stricture and are then removed. Different types of instruments are used. The type used depends on the size and cause of the stricture. Types include:  ¨ Balloon dilator. A tiny empty balloon is put into the stricture using an endoscope. The balloon is slowly filled with air. The air is removed from the balloon when the stricture is widened to the right size. Balloon dilators are used to treat many types of strictures.  ¨ Guided wire dilator. A thin wire is eased down the  esophagus. A small tube thats wider on one end is guided down the wire. It is put into the stricture to stretch it. These dilators are used to treat all kinds of strictures.  ¨ Bougies. These are weighted, cone-shaped tubes. Starting with smaller cones, your provider uses increasingly larger cones until the stricture is stretched the right amount. Bougies are often used to treat strictures that are simple (short, straight, and not very narrow).  After the procedure  · Youll be watched closely until your provider says youre ready to go home. Youll need to have a friend or family member drive you home.  · You may have a sore throat for the rest of the day.  · You may have pain behind your breastbone for a short time afterwards.  · You can start drinking fluids again after the numbness in your throat goes away. You can resume eating the same day or the next day.  Risks and possible complications  Risks and possible complications for esophageal dilation include:  · Infection  · A tear or hole in the esophagus lining, causing bleeding and possibly needing surgery to fix  · Risks of anesthesia  Follow-up  You may need to have the procedure repeated one or more times. This depends on the cause and extent of the narrowing. Repeat procedures can allow the dilation to take place more slowly. This reduces the risks of the procedure.  If your stricture was caused by reflux esophagitis, youll likely need to take medicine to treat that condition. Your provider will tell you more.  When to call your provider  Call your healthcare provider right away if you have any of the following after the procedure:  · Fever of 100.4°F (38.0°C)  · Chest pain  · Trouble swallowing  · Vomiting blood or material that looks like coffee grounds  · Bleeding  · Black, tarry, or bloody stools   Date Last Reviewed: 7/1/2016  © 9694-6467 [employer]. [address] [place] [address], [place] [address]. All rights reserved. This information is  not intended as a substitute for professional medical care. Always follow your healthcare professional's instructions.        Gastritis (Adult)    Gastritis is inflammation and irritation of the stomach lining. It can be present for a short time (acute) or be long lasting (chronic). Gastritis is often caused by infection with bacteria called H pylori. More than a third of people in the [place] have this bacteria in their bodies. In many cases, H pylori causes no problems or symptoms. In some people, though, the infection irritates the stomach lining and causes gastritis. Other causes of stomach irritation include drinking alcohol or taking pain-relieving medicines called NSAIDs (such as aspirin or ibuprofen).   Symptoms of gastritis can include:  · Abdominal pain or bloating  · Loss of appetite  · Nausea or vomiting  · Vomiting blood or having black stools  · Feeling more tired than usual  An inflamed and irritated stomach lining is more likely to develop a sore called an ulcer. To help prevent this, gastritis should be treated.  Home care  If needed, medicines may be prescribed. If you have H pylori infection, treating it will likely relieve your symptoms. Other changes can help reduce stomach irritation and help it heal.  · If you have been prescribed medicines for H pylori infection, take them as directed. Take all of the medicine until it is finished or your healthcare provider tells you to stop, even if you feel better.  · Your healthcare provider may recommend avoiding NSAIDs. If you take daily aspirin for your heart or other medical reasons, do not stop without talking to your healthcare provider first.  · Avoid drinking alcohol.  · Stop smoking. Smoking can irritate the stomach and delay healing. As much as possible, stay away from second hand smoke.  Follow-up care  Follow up with your healthcare provider, or as advised by our staff. Testing may be needed to check for inflammation or an ulcer.  When to seek  medical advice  Call your healthcare provider for any of the following:  · Stomach pain that gets worse or moves to the lower right abdomen (appendix area)  · Chest pain that appears or gets worse, or spreads to the back, neck, shoulder, or arm  · Frequent vomiting (cant keep down liquids)  · Blood in the stool or vomit (red or black in color)  · Feeling weak or dizzy  · Fever of 100.4ºF (38ºC) or higher, or as directed by your healthcare provider  Date Last Reviewed: 6/22/2015 © 2000-2017 [employer]. [address] [place] [address]. All rights reserved. This information is not intended as a substitute for professional medical care. Always follow your healthcare professional's instructions.

## 2019-02-21 NOTE — [employer] PATIENT INSTRUCTIONS
Discharge Summary/Instructions after an Endoscopic Procedure  Patient Name: Price Noguera  Patient MRN: [id number]  Patient YOB: 1960 Thursday, February 21, 2019  Nino Aguiar MD  RESTRICTIONS:  During your procedure today, you received medications for sedation.  These   medications may affect your judgment, balance and coordination.  Therefore,   for 24 hours, you have the following restrictions:   - DO NOT drive a car, operate machinery, make legal/financial decisions,   sign important papers or drink alcohol.    ACTIVITY:  Today: no heavy lifting, straining or running due to procedural   sedation/anesthesia.  The following day: return to full activity including work.  DIET:  Eat and drink normally unless instructed otherwise.     TREATMENT FOR COMMON SIDE EFFECTS:  - Mild abdominal pain, nausea, belching, bloating or excessive gas:  rest,   eat lightly and use a heating pad.  - Sore Throat: treat with throat lozenges and/or gargle with warm salt   water.  - Because air was used during the procedure, expelling large amounts of air   from your rectum or belching is normal.  - If a bowel prep was taken, you may not have a bowel movement for 1-3 days.    This is normal.  SYMPTOMS TO WATCH FOR AND REPORT TO YOUR PHYSICIAN:  1. Abdominal pain or bloating, other than gas cramps.  2. Chest pain.  3. Back pain.  4. Signs of infection such as: chills or fever occurring within 24 hours   after the procedure.  5. Rectal bleeding, which would show as bright red, maroon, or black stools.   (A tablespoon of blood from the rectum is not serious, especially if   hemorrhoids are present.)  6. Vomiting.  7. Weakness or dizziness.  GO DIRECTLY TO THE NEAREST EMERGENCY ROOM IF YOU HAVE ANY OF THE FOLLOWING:      Difficulty breathing              Chills and/or fever over 101 F   Persistent vomiting and/or vomiting blood   Severe abdominal pain   Severe chest pain   Black, tarry stools   Bleeding- more than one  tablespoon   Any other symptom or condition that you feel may need urgent attention  Your doctor recommends these additional instructions:  If any biopsies were taken, your doctors clinic will contact you in 1 to 2   weeks with any results.  - Patient has a contact number available for emergencies.  The signs and   symptoms of potential delayed complications were discussed with the   patient.  Return to normal activities tomorrow.  Written discharge   instructions were provided to the patient.   - Resume previous diet.   - Continue present medications.   - No aspirin, ibuprofen, naproxen, or other non-steroidal anti-inflammatory   drugs.   - Await pathology results.   - Discharge patient to home (ambulatory).   - Follow an antireflux regimen.   - Return to my office after studies are complete.  For questions, problems or results please call your physician - Nino Aguiar MD at Work:  [phone].  [facility], EMERGENCY ROOM PHONE NUMBER: [phone]  IF A COMPLICATION OR EMERGENCY SITUATION ARISES AND YOU ARE UNABLE TO REACH   YOUR PHYSICIAN - GO DIRECTLY TO THE EMERGENCY ROOM.  Nino Aguiar MD  2/21/2019 9:36:35 AM  This report has been verified and signed electronically.  [employer]

## 2019-02-21 NOTE — TRANSFER OF CARE
"Anesthesia Transfer of Care Note    Patient: Price Noguera    Procedure(s) Performed: Procedure(s) (LRB):  EGD (ESOPHAGOGASTRODUODENOSCOPY) (N/A)    Patient location: GI    Anesthesia Type: general    Transport from OR: Transported from OR on room air with adequate spontaneous ventilation    Post pain: adequate analgesia    Post assessment: no apparent anesthetic complications    Post vital signs: stable    Level of consciousness: awake    Nausea/Vomiting: no nausea/vomiting    Complications: none    Transfer of care protocol was followed      Last vitals:   Visit Vitals  /75   Pulse 63   Temp 36.7 °C (98.1 °F) (Temporal)   Resp 16   Ht 5' 9" (1.753 m)   Wt 83.9 kg (185 lb)   SpO2 95%   BMI 27.32 kg/m²     "

## 2019-02-22 VITALS
DIASTOLIC BLOOD PRESSURE: 85 MMHG | RESPIRATION RATE: 20 BRPM | WEIGHT: 185 LBS | TEMPERATURE: 98 F | SYSTOLIC BLOOD PRESSURE: 134 MMHG | OXYGEN SATURATION: 97 % | BODY MASS INDEX: 27.4 KG/M2 | HEIGHT: 69 IN | HEART RATE: 65 BPM

## 2019-03-06 DIAGNOSIS — E11.65 UNCONTROLLED TYPE 2 DIABETES MELLITUS WITH HYPERGLYCEMIA: ICD-10-CM

## 2019-03-06 RX ORDER — EMPAGLIFLOZIN 10 MG/1
TABLET, FILM COATED ORAL
Qty: 30 TABLET | Refills: 0 | OUTPATIENT
Start: 2019-03-06

## 2019-03-08 ENCOUNTER — OFFICE VISIT (OUTPATIENT)
Dept: OPTOMETRY | Facility: CLINIC | Age: 59
End: 2019-03-08
Payer: COMMERCIAL

## 2019-03-08 DIAGNOSIS — H04.123 DRY EYE SYNDROME, BILATERAL: ICD-10-CM

## 2019-03-08 DIAGNOSIS — E11.9 DIABETES MELLITUS WITHOUT OPHTHALMIC MANIFESTATIONS: Primary | ICD-10-CM

## 2019-03-08 DIAGNOSIS — H52.7 REFRACTIVE ERROR: ICD-10-CM

## 2019-03-08 DIAGNOSIS — H25.13 NUCLEAR SCLEROSIS, BILATERAL: ICD-10-CM

## 2019-03-08 PROCEDURE — 99999 PR PBB SHADOW E&M-EST. PATIENT-LVL III: CPT | Mod: PBBFAC,,, | Performed by: OPTOMETRIST

## 2019-03-08 PROCEDURE — 92004 PR EYE EXAM, NEW PATIENT,COMPREHESV: ICD-10-PCS | Mod: S$GLB,,, | Performed by: OPTOMETRIST

## 2019-03-08 PROCEDURE — 92015 PR REFRACTION: ICD-10-PCS | Mod: S$GLB,,, | Performed by: OPTOMETRIST

## 2019-03-08 PROCEDURE — 92004 COMPRE OPH EXAM NEW PT 1/>: CPT | Mod: S$GLB,,, | Performed by: OPTOMETRIST

## 2019-03-08 PROCEDURE — 92015 DETERMINE REFRACTIVE STATE: CPT | Mod: S$GLB,,, | Performed by: OPTOMETRIST

## 2019-03-08 PROCEDURE — 99999 PR PBB SHADOW E&M-EST. PATIENT-LVL III: ICD-10-PCS | Mod: PBBFAC,,, | Performed by: OPTOMETRIST

## 2019-03-08 NOTE — LETTER
March 8, 2019      Minal Gong, NP  [address] [facility] [address]           [facility] - Optometry  [address] [place] [address] Drive [address]  [facility] [address]  Phone: [phone]          Patient: Price Noguera   MR Number: [id number]   YOB: 1960   Date of Visit: 3/8/2019       Dear Minal Gong:    Thank you for referring Price Noguera to me for evaluation. Attached you will find relevant portions of my assessment and plan of care.    If you have questions, please do not hesitate to call me. I look forward to following Price Noguera along with you.    Sincerely,    Morales Mcnair, OD    Enclosure  CC:  No Recipients    If you would like to receive this communication electronically, please contact [email or web address] or [phone] to request more information on [employer] Link access.    For providers and/or their staff who would like to refer a patient to [facility], please contact us through our one-stop-shop provider referral line, [facility], at [phone].    If you feel you have received this communication in error or would no longer like to receive these types of communications, please e-mail [email or web address]

## 2019-03-08 NOTE — PROGRESS NOTES
HPI     Presenting Complaint: Pt here today for yearly diabetic eye exam. DLE: 2   years    Pt states sugar levels are now controlled now that he is on diabetic meds.   Fasting BS in the AM ~120.          Component                Value               Date                       HGBA1C                   11.1 (H)            01/08/2019              Pt states vision has been blurry with current glasses.     Ophthalmic medication / drops: None    (-) No hx of eye disease or eye surgery     (-) headaches  (-) diplopia   (-) flashes / (-) floaters      Last edited by Morales Mcnair, OD on 3/8/2019  2:47 PM. (History)            Assessment /Plan     For exam results, see Encounter Report.    Diabetes mellitus without ophthalmic manifestations  -     Ambulatory referral to Optometry    Nuclear sclerosis, bilateral    Refractive error    Dry eye syndrome, bilateral      DM type 2 w/o ocular retinopathy OU. Discussed possible ocular affects of uncontrolled blood sugar with patient. Recommended continued strong blood sugar control and continued care with PCP. Monitor yearly.     Mild NS OU. Not yet significant. Discussed possible ocular affects of cataracts. Acceptable BCVA OU. Discussed treatment options. Surgery not recommended at this time. Monitor yearly.     Dispensed updated spectacle Rx. Discussed various spectacle lens options, pt would like to try lined bifocal. Discussed adaptation period to new specs.     Mild KEITH OU. Discussed ocular affects of dry eyes. Recommend OTC systane artificial tears two to four times a day in OU. Discussed chronicity of KEITH. RTC if symptoms not alleviated by continued use of artificial tears.       RTC in 1 year for comprehensive eye exam, or sooner prn.

## 2019-03-14 ENCOUNTER — LAB VISIT (OUTPATIENT)
Dept: LAB | Facility: HOSPITAL | Age: 59
End: 2019-03-14
Attending: PHYSICIAN ASSISTANT
Payer: COMMERCIAL

## 2019-03-14 ENCOUNTER — OFFICE VISIT (OUTPATIENT)
Dept: ENDOCRINOLOGY | Facility: CLINIC | Age: 59
End: 2019-03-14
Payer: COMMERCIAL

## 2019-03-14 VITALS
BODY MASS INDEX: 27.41 KG/M2 | HEIGHT: 69 IN | DIASTOLIC BLOOD PRESSURE: 77 MMHG | RESPIRATION RATE: 16 BRPM | WEIGHT: 185.06 LBS | SYSTOLIC BLOOD PRESSURE: 127 MMHG | HEART RATE: 69 BPM

## 2019-03-14 DIAGNOSIS — E11.65 UNCONTROLLED TYPE 2 DIABETES MELLITUS WITH HYPERGLYCEMIA: Primary | ICD-10-CM

## 2019-03-14 DIAGNOSIS — R30.0 DYSURIA: ICD-10-CM

## 2019-03-14 DIAGNOSIS — G62.9 NEUROPATHY: ICD-10-CM

## 2019-03-14 DIAGNOSIS — B35.1 ONYCHOMYCOSIS: ICD-10-CM

## 2019-03-14 DIAGNOSIS — N48.1 BALANITIS: ICD-10-CM

## 2019-03-14 LAB
BACTERIA #/AREA URNS AUTO: NORMAL /HPF
BILIRUB UR QL STRIP: NEGATIVE
CLARITY UR REFRACT.AUTO: CLEAR
COLOR UR AUTO: YELLOW
GLUCOSE UR QL STRIP: ABNORMAL
HGB UR QL STRIP: NEGATIVE
KETONES UR QL STRIP: NEGATIVE
LEUKOCYTE ESTERASE UR QL STRIP: NEGATIVE
MICROSCOPIC COMMENT: NORMAL
NITRITE UR QL STRIP: NEGATIVE
PH UR STRIP: 5 [PH] (ref 5–8)
PROT UR QL STRIP: NEGATIVE
RBC #/AREA URNS AUTO: 0 /HPF (ref 0–4)
SP GR UR STRIP: 1.02 (ref 1–1.03)
URN SPEC COLLECT METH UR: ABNORMAL
WBC #/AREA URNS AUTO: 0 /HPF (ref 0–5)
YEAST UR QL AUTO: NORMAL

## 2019-03-14 PROCEDURE — 99999 PR PBB SHADOW E&M-EST. PATIENT-LVL IV: CPT | Mod: PBBFAC,,, | Performed by: PHYSICIAN ASSISTANT

## 2019-03-14 PROCEDURE — 3078F DIAST BP <80 MM HG: CPT | Mod: CPTII,S$GLB,, | Performed by: PHYSICIAN ASSISTANT

## 2019-03-14 PROCEDURE — 81001 URINALYSIS AUTO W/SCOPE: CPT

## 2019-03-14 PROCEDURE — 3008F PR BODY MASS INDEX (BMI) DOCUMENTED: ICD-10-PCS | Mod: CPTII,S$GLB,, | Performed by: PHYSICIAN ASSISTANT

## 2019-03-14 PROCEDURE — 3046F HEMOGLOBIN A1C LEVEL >9.0%: CPT | Mod: CPTII,S$GLB,, | Performed by: PHYSICIAN ASSISTANT

## 2019-03-14 PROCEDURE — 3008F BODY MASS INDEX DOCD: CPT | Mod: CPTII,S$GLB,, | Performed by: PHYSICIAN ASSISTANT

## 2019-03-14 PROCEDURE — 3078F PR MOST RECENT DIASTOLIC BLOOD PRESSURE < 80 MM HG: ICD-10-PCS | Mod: CPTII,S$GLB,, | Performed by: PHYSICIAN ASSISTANT

## 2019-03-14 PROCEDURE — 3074F PR MOST RECENT SYSTOLIC BLOOD PRESSURE < 130 MM HG: ICD-10-PCS | Mod: CPTII,S$GLB,, | Performed by: PHYSICIAN ASSISTANT

## 2019-03-14 PROCEDURE — 3046F PR MOST RECENT HEMOGLOBIN A1C LEVEL > 9.0%: ICD-10-PCS | Mod: CPTII,S$GLB,, | Performed by: PHYSICIAN ASSISTANT

## 2019-03-14 PROCEDURE — 3074F SYST BP LT 130 MM HG: CPT | Mod: CPTII,S$GLB,, | Performed by: PHYSICIAN ASSISTANT

## 2019-03-14 PROCEDURE — 99214 PR OFFICE/OUTPT VISIT, EST, LEVL IV, 30-39 MIN: ICD-10-PCS | Mod: S$GLB,,, | Performed by: PHYSICIAN ASSISTANT

## 2019-03-14 PROCEDURE — 99999 PR PBB SHADOW E&M-EST. PATIENT-LVL IV: ICD-10-PCS | Mod: PBBFAC,,, | Performed by: PHYSICIAN ASSISTANT

## 2019-03-14 PROCEDURE — 99214 OFFICE O/P EST MOD 30 MIN: CPT | Mod: S$GLB,,, | Performed by: PHYSICIAN ASSISTANT

## 2019-03-14 RX ORDER — FLUCONAZOLE 200 MG/1
TABLET ORAL
Qty: 1 TABLET | Refills: 1 | Status: SHIPPED | OUTPATIENT
Start: 2019-03-14 | End: 2020-12-13

## 2019-03-14 NOTE — PROGRESS NOTES
"CC: DM    HPI: Price Noguera was diagnosed with Type 2 DM in 1/19. His dad's family has DM. No hospitalizations for DM. No fhx of thyroid disease.     Last visit 2/19 jardiance was added to his regimen. Reports burning with urination and itching on the end of his penis since starting jardiance. Last a1c 1/19 was 11% which is when he started metformin and Tresiba. Stating he is having difficulty standing due to neuropathy. Not having any back pain. Did not start penlac yet due to problem with prescription.    CURRENT DIABETIC MEDS: Tresiba 13 units q am, Metformin 850 mg BID, Jardiance 10 mg     No previous medications    BG readings are checked 1x/day and readings are below:    Hypoglycemia: none.   Type of Glucose Meter: Accucheck Avia    Physical Activity: He works in his garden.     Dietary Habits:   BF-sausage, egg, wheat toast  LH-meat, salad  DN-hot dog  Snacks on carrot sticks and pb. Avoids sugary beverages.     Last Eye Exam: Apt on March 8, 2019  Last Podiatry Exam: n/a    Last DM Education Attended: 1/19    REVIEW OF SYSTEMS  General: no weakness or fatigue.   Eyes: no intermittent blurry vision or visual disturbances.   Cardiac: + occasional chest pain with activity (seeing cardiology), no palpitations.   Respiratory: no cough or dyspnea.   GI: + dysphagia, no abdominal pain or nausea.   Skin: + onychomycosis on fingernails, no rashes or itching.   Neuro: + numbness or tingling in legs.   Endocrine: no polyuria, polydipsia, polyphagia.   Remainder ROS negative    Vital Signs  /77 (BP Location: Right arm, Patient Position: Sitting, BP Method: Medium (Automatic))   Pulse 69   Resp 16   Ht 5' 9" (1.753 m)   Wt 84 kg (185 lb 1.2 oz)   BMI 27.33 kg/m²     Personally reviewed labs below:   Hemoglobin A1C   Date Value Ref Range Status   01/08/2019 11.1 (H) 4.0 - 5.6 % Final     Comment:     ADA Screening Guidelines:  5.7-6.4%  Consistent with prediabetes  >or=6.5%  Consistent with diabetes  High " levels of fetal hemoglobin interfere with the HbA1C  assay. Heterozygous hemoglobin variants (HbS, HgC, etc)do  not significantly interfere with this assay.   However, presence of multiple variants may affect accuracy.         Chemistry        Component Value Date/Time     (L) 01/08/2019 1118    K 4.2 01/08/2019 1118    CL 98 01/08/2019 1118    CO2 24 01/08/2019 1118    BUN 11 01/08/2019 1118    CREATININE 1.0 01/08/2019 1118     (H) 01/08/2019 1118        Component Value Date/Time    CALCIUM 10.0 01/08/2019 1118    ALKPHOS 97 01/08/2019 1118    AST 23 01/08/2019 1118    ALT 28 01/08/2019 1118    BILITOT 0.7 01/08/2019 1118    ESTGFRAFRICA >60 01/08/2019 1118    EGFRNONAA >60 01/08/2019 1118        Lab Results   Component Value Date    CHOL 286 (H) 01/09/2019     Lab Results   Component Value Date    HDL 40 01/09/2019     Lab Results   Component Value Date    LDLCALC 192.8 (H) 01/09/2019     Lab Results   Component Value Date    TRIG 266 (H) 01/09/2019     Lab Results   Component Value Date    CHOLHDL 14.0 (L) 01/09/2019     Lab Results   Component Value Date    TSH 0.755 01/08/2019       Lab Results   Component Value Date    MICALBCREAT 5.0 02/06/2019       No results found for: [id number]    Previous exam 2/19  PHYSICAL EXAMINATION  Constitutional: middle aged male, appears well, no distress  Neck: Supple, trachea midline.   Respiratory: even and unlabored, CTA without wheezes.  Cardiovascular: RRR; no carotid bruits or murmurs.   Lymph: DP pulses  2+ bilaterally; no edema.   Skin: + onychomycosis on bl thumb nails, warm and dry; no injection site reactions, no acanthosis nigracans observed.  Neuro: patient alert and cooperative; CN 2-12 grossly intact  Foot Exam: no sores or macerations noted.     Protective Sensation (w/ 10 gram monofilament):  Right: Intact  Left: Intact    Visual Inspection:  Normal -  Bilateral and Nails Intact - without Evidence of Foot Deformity- Bilateral    Pedal Pulses:    Right: Present  Left: Present    Posterior tibialis:   Right:Present  Left: Present     Vibratory Sensation  Right:Decreased  Left:Decreased    Assessment/Plan    1. Uncontrolled type 2 diabetes mellitus with hyperglycemia  Hemoglobin A1c    GlycoMark (TM)    Fructosamine    Renal function panel   2. Dysuria  Urinalysis    Ambulatory Referral to Podiatry    fluconazole (DIFLUCAN) 200 MG Tab   3. Neuropathy  Ambulatory Referral to Podiatry   4. Onychomycosis     5. Balanitis       F7NE-[id number] have decreased from the 180s to ~140. Increase jardiance to 25 mg daily. Will discontinue if pt has another yeast infection. Continue Tresiba and Metformin. BG checks 2x daily.   Neuropathy-referral to podiatry  TJW-mxcrdn-bndyzysu ACEi  OYX-glbzxj-qzoefcwi statin and ASA  Onychomycosis-start penlac twice daily to affected areas  Balanitis-start diflucan 200 mg once. Repeat in seven days if symptoms persist.     FOLLOW UP  3 mths

## 2019-05-02 ENCOUNTER — OFFICE VISIT (OUTPATIENT)
Dept: PODIATRY | Facility: CLINIC | Age: 59
End: 2019-05-02
Payer: COMMERCIAL

## 2019-05-02 VITALS
SYSTOLIC BLOOD PRESSURE: 149 MMHG | HEIGHT: 69 IN | BODY MASS INDEX: 27.43 KG/M2 | HEART RATE: 72 BPM | WEIGHT: 185.19 LBS | DIASTOLIC BLOOD PRESSURE: 82 MMHG

## 2019-05-02 DIAGNOSIS — K21.9 GASTROESOPHAGEAL REFLUX DISEASE WITHOUT ESOPHAGITIS: ICD-10-CM

## 2019-05-02 DIAGNOSIS — M21.6X1 ACQUIRED EQUINUS DEFORMITY OF BOTH FEET: ICD-10-CM

## 2019-05-02 DIAGNOSIS — M21.6X2 ACQUIRED EQUINUS DEFORMITY OF BOTH FEET: ICD-10-CM

## 2019-05-02 DIAGNOSIS — E11.49 TYPE II DIABETES MELLITUS WITH NEUROLOGICAL MANIFESTATIONS: Primary | ICD-10-CM

## 2019-05-02 DIAGNOSIS — M21.42 ACQUIRED PES PLANOVALGUS OF LEFT FOOT: ICD-10-CM

## 2019-05-02 DIAGNOSIS — M21.41 ACQUIRED PES PLANOVALGUS OF RIGHT FOOT: ICD-10-CM

## 2019-05-02 PROCEDURE — 3008F BODY MASS INDEX DOCD: CPT | Mod: CPTII,S$GLB,, | Performed by: PODIATRIST

## 2019-05-02 PROCEDURE — 3046F HEMOGLOBIN A1C LEVEL >9.0%: CPT | Mod: CPTII,S$GLB,, | Performed by: PODIATRIST

## 2019-05-02 PROCEDURE — 3077F PR MOST RECENT SYSTOLIC BLOOD PRESSURE >= 140 MM HG: ICD-10-PCS | Mod: CPTII,S$GLB,, | Performed by: PODIATRIST

## 2019-05-02 PROCEDURE — 3008F PR BODY MASS INDEX (BMI) DOCUMENTED: ICD-10-PCS | Mod: CPTII,S$GLB,, | Performed by: PODIATRIST

## 2019-05-02 PROCEDURE — 3079F DIAST BP 80-89 MM HG: CPT | Mod: CPTII,S$GLB,, | Performed by: PODIATRIST

## 2019-05-02 PROCEDURE — 3046F PR MOST RECENT HEMOGLOBIN A1C LEVEL > 9.0%: ICD-10-PCS | Mod: CPTII,S$GLB,, | Performed by: PODIATRIST

## 2019-05-02 PROCEDURE — 99999 PR PBB SHADOW E&M-EST. PATIENT-LVL III: ICD-10-PCS | Mod: PBBFAC,,, | Performed by: PODIATRIST

## 2019-05-02 PROCEDURE — 99203 OFFICE O/P NEW LOW 30 MIN: CPT | Mod: S$GLB,,, | Performed by: PODIATRIST

## 2019-05-02 PROCEDURE — 3077F SYST BP >= 140 MM HG: CPT | Mod: CPTII,S$GLB,, | Performed by: PODIATRIST

## 2019-05-02 PROCEDURE — 99203 PR OFFICE/OUTPT VISIT, NEW, LEVL III, 30-44 MIN: ICD-10-PCS | Mod: S$GLB,,, | Performed by: PODIATRIST

## 2019-05-02 PROCEDURE — 3079F PR MOST RECENT DIASTOLIC BLOOD PRESSURE 80-89 MM HG: ICD-10-PCS | Mod: CPTII,S$GLB,, | Performed by: PODIATRIST

## 2019-05-02 PROCEDURE — 99999 PR PBB SHADOW E&M-EST. PATIENT-LVL III: CPT | Mod: PBBFAC,,, | Performed by: PODIATRIST

## 2019-05-02 RX ORDER — PANTOPRAZOLE SODIUM 40 MG/1
TABLET, DELAYED RELEASE ORAL
Qty: 90 TABLET | Refills: 0 | Status: SHIPPED | OUTPATIENT
Start: 2019-05-02 | End: 2019-07-29 | Stop reason: SDUPTHER

## 2019-05-02 NOTE — LETTER
May 12, 2019      RADHA Crockett PA-C  [address]  [place] [address]           [place] [facility]  [address] [address]  Phone: [phone]          Patient: Price Noguera   MR Number: [id number]   YOB: 1960   Date of Visit: 5/2/2019       Dear RADHA Crockett:    Thank you for referring Price Noguera to me for evaluation. Attached you will find relevant portions of my assessment and plan of care.    If you have questions, please do not hesitate to call me. I look forward to following Price Noguera along with you.    Sincerely,    Ken Islas, DPANDREW    Enclosure  CC:  No Recipients    If you would like to receive this communication electronically, please contact [email or web address] or [phone] to request more information on [facility] Link access.    For providers and/or their staff who would like to refer a patient to [facility], please contact us through our one-stop-shop provider referral line, [facility] , at [phone].    If you feel you have received this communication in error or would no longer like to receive these types of communications, please e-mail [email or web address]

## 2019-05-02 NOTE — PATIENT INSTRUCTIONS
3. Orthotic (recommend the following brands: Superfeet, Spenco, Powerstep, Sof Sole Fit Series)

## 2019-05-02 NOTE — PROGRESS NOTES
Subjective:      Patient ID: Price Noguera is a 58 y.o. male.    Chief Complaint: Diabetic Foot Exam (PCP-Chanel Roberson 3/14/19)    Price is a 58 y.o. male who presents to the clinic upon referral from Dr. Crockett  for evaluation and treatment of diabetic feet with severe neuropathy. Price has a past medical history of Coronary artery disease, Diabetes mellitus, History of heart artery stent, Hypertension, and Myocardial infarction. Complaints today consist of pain in the bilateral feet with weight bearing, worse the more he is on his feet although also bothers him at times when he is non weight bearing. The pain is burning and occasionally sharp. Has been on 300 mg BID for a few months with minimal improvement.   PCP: Chanel Roberson MD      Date Last Seen by PCP: 3/14/19    Current shoe gear: Casual shoes    Hemoglobin A1C   Date Value Ref Range Status   01/08/2019 11.1 (H) 4.0 - 5.6 % Final     Comment:     ADA Screening Guidelines:  5.7-6.4%  Consistent with prediabetes  >or=6.5%  Consistent with diabetes  High levels of fetal hemoglobin interfere with the HbA1C  assay. Heterozygous hemoglobin variants (HbS, HgC, etc)do  not significantly interfere with this assay.   However, presence of multiple variants may affect accuracy.             Review of Systems   Constitution: Negative for chills and fever.   Cardiovascular: Negative for claudication and leg swelling.   Respiratory: Negative for shortness of breath.    Skin: Positive for nail changes. Negative for itching and rash.   Musculoskeletal: Positive for arthritis. Negative for muscle cramps, muscle weakness and myalgias.   Gastrointestinal: Negative for nausea and vomiting.   Neurological: Positive for paresthesias. Negative for focal weakness, loss of balance and numbness.           Objective:      Physical Exam   Constitutional: He is oriented to person, place, and time. He appears well-developed and well-nourished. No distress.   Cardiovascular:   Pulses:        Dorsalis pedis pulses are 2+ on the right side, and 2+ on the left side.        Posterior tibial pulses are 2+ on the right side, and 2+ on the left side.   < 3 sec capillary refill time to toes 1-5 bilateral. Toes and feet are warm to touch proximally with normal distal cooling b/l. There is some hair growth on the feet and toes b/l. There is no edema b/l. No spider veins or varicosities present b/l.      Musculoskeletal:   Bilateral medial arch collapse with weight bearing and abduction of the forefoot. No pain with palpation at the PT insertion bilateral    Equinus noted b/l ankles with < 10 deg DF noted. MMT 5/5 in DF/PF/Inv/Ev resistance with no reproduction of pain in any direction. Passive range of motion of ankle and pedal joints is painless b/l.     Neurological: He is alert and oriented to person, place, and time. He has normal strength. He displays no atrophy and no tremor. No sensory deficit. He exhibits normal muscle tone.   Negative tinel sign bilateral. Decreased vibratory and protective sensation bilateral   Skin: Skin is warm, dry and intact. No abrasion, no bruising, no burn, no ecchymosis, no laceration, no lesion, no petechiae and no rash noted. He is not diaphoretic. No cyanosis or erythema. No pallor. Nails show no clubbing.   Skin temperature, texture and turgor within normal limits.   Psychiatric: He has a normal mood and affect. His behavior is normal.             Assessment:       Encounter Diagnoses   Name Primary?    Type II diabetes mellitus with neurological manifestations Yes    Acquired pes planovalgus of left foot     Acquired pes planovalgus of right foot     Acquired equinus deformity of both feet          Plan:       Price was seen today for diabetic foot exam.    Diagnoses and all orders for this visit:    Type II diabetes mellitus with neurological manifestations    Acquired pes planovalgus of left foot    Acquired pes planovalgus of right foot    Acquired equinus  deformity of both feet      I counseled the patient on his conditions, their implications and medical management.    Shoe inspection. Diabetic Foot Education. Patient reminded of the importance of good nutrition and blood sugar control to help prevent podiatric complications of diabetes. Patient instructed on proper foot hygeine. We discussed wearing proper shoe gear, daily foot inspections, never walking without protective shoe gear, never putting sharp instruments to feet    Patient will obtain over the counter arch supports and wear them in shoes whenever possible.  Athletic shoes intended for walking or running are usually best.    Patient will stretch the tendo achilles complex three times daily as demonstrated in the office.  Literature was dispensed illustrating proper stretching technique.    As to the neuropathy I recommend taking 600 mg alpha lipoic acid daily with a b-complex vitamin    Follow up with his prescribing doctor about increasing the gabapentin    Return 6 weeks follow up bilateral pain    Ken Islas DPM

## 2019-05-20 DIAGNOSIS — E11.65 UNCONTROLLED TYPE 2 DIABETES MELLITUS WITH HYPERGLYCEMIA: ICD-10-CM

## 2019-05-20 RX ORDER — PEN NEEDLE, DIABETIC 30 GX3/16"
1 NEEDLE, DISPOSABLE MISCELLANEOUS DAILY
Qty: 200 EACH | Refills: 3 | Status: SHIPPED | OUTPATIENT
Start: 2019-05-20 | End: 2020-12-07 | Stop reason: SDUPTHER

## 2019-05-20 NOTE — TELEPHONE ENCOUNTER
----- Message from Frank Cadena sent at 2019  9:03 AM CDT -----  Contact: pt  Type:  RX Refill Request    Who Called:  pt  Refill or New Rx:  refill  RX Name and Strength:  Needles/pins  How is the patient currently taking it? (ex. 1XDay):    Is this a 30 day or 90 day RX:    Preferred Pharmacy with phone number:    [facility]/pharmacy #[id number] - [place], [place] - [address] [facility]  [address]  [place] [address]  Phone: [phone] Fax: [phone]      Local or Mail Order:    Ordering Provider:    Best Call Back Number:  [phone]  Additional Information:  The prescription that the pharmacy has is  and he needs a new prescription in order for the pharmacy to fill it.

## 2019-06-13 ENCOUNTER — OFFICE VISIT (OUTPATIENT)
Dept: PODIATRY | Facility: CLINIC | Age: 59
End: 2019-06-13
Payer: COMMERCIAL

## 2019-06-13 ENCOUNTER — TELEPHONE (OUTPATIENT)
Dept: FAMILY MEDICINE | Facility: CLINIC | Age: 59
End: 2019-06-13

## 2019-06-13 VITALS
WEIGHT: 181.44 LBS | DIASTOLIC BLOOD PRESSURE: 75 MMHG | HEART RATE: 70 BPM | SYSTOLIC BLOOD PRESSURE: 115 MMHG | HEIGHT: 69 IN | BODY MASS INDEX: 26.87 KG/M2

## 2019-06-13 DIAGNOSIS — M54.31 SCIATICA, RIGHT SIDE: ICD-10-CM

## 2019-06-13 DIAGNOSIS — E11.49 TYPE II DIABETES MELLITUS WITH NEUROLOGICAL MANIFESTATIONS: Primary | ICD-10-CM

## 2019-06-13 PROCEDURE — 3008F BODY MASS INDEX DOCD: CPT | Mod: CPTII,S$GLB,, | Performed by: PODIATRIST

## 2019-06-13 PROCEDURE — 99212 OFFICE O/P EST SF 10 MIN: CPT | Mod: S$GLB,,, | Performed by: PODIATRIST

## 2019-06-13 PROCEDURE — 99999 PR PBB SHADOW E&M-EST. PATIENT-LVL III: ICD-10-PCS | Mod: PBBFAC,,, | Performed by: PODIATRIST

## 2019-06-13 PROCEDURE — 3078F DIAST BP <80 MM HG: CPT | Mod: CPTII,S$GLB,, | Performed by: PODIATRIST

## 2019-06-13 PROCEDURE — 3008F PR BODY MASS INDEX (BMI) DOCUMENTED: ICD-10-PCS | Mod: CPTII,S$GLB,, | Performed by: PODIATRIST

## 2019-06-13 PROCEDURE — 99212 PR OFFICE/OUTPT VISIT, EST, LEVL II, 10-19 MIN: ICD-10-PCS | Mod: S$GLB,,, | Performed by: PODIATRIST

## 2019-06-13 PROCEDURE — 3078F PR MOST RECENT DIASTOLIC BLOOD PRESSURE < 80 MM HG: ICD-10-PCS | Mod: CPTII,S$GLB,, | Performed by: PODIATRIST

## 2019-06-13 PROCEDURE — 3074F PR MOST RECENT SYSTOLIC BLOOD PRESSURE < 130 MM HG: ICD-10-PCS | Mod: CPTII,S$GLB,, | Performed by: PODIATRIST

## 2019-06-13 PROCEDURE — 99999 PR PBB SHADOW E&M-EST. PATIENT-LVL III: CPT | Mod: PBBFAC,,, | Performed by: PODIATRIST

## 2019-06-13 PROCEDURE — 3046F PR MOST RECENT HEMOGLOBIN A1C LEVEL > 9.0%: ICD-10-PCS | Mod: CPTII,S$GLB,, | Performed by: PODIATRIST

## 2019-06-13 PROCEDURE — 3074F SYST BP LT 130 MM HG: CPT | Mod: CPTII,S$GLB,, | Performed by: PODIATRIST

## 2019-06-13 PROCEDURE — 3046F HEMOGLOBIN A1C LEVEL >9.0%: CPT | Mod: CPTII,S$GLB,, | Performed by: PODIATRIST

## 2019-06-13 NOTE — TELEPHONE ENCOUNTER
----- Message from Leta Shane sent at 6/13/2019 11:46 AM CDT -----  Good Morning,     Patient would like to know if he can get a script for Lamisil P.O.  For a nail fungus. He advised that he visit had visit with Benja today and he recommended this medication.     He can be contacted [phone].

## 2019-06-13 NOTE — PROGRESS NOTES
Subjective:      Patient ID: Price Noguera is a 58 y.o. male.    Chief Complaint: Follow-up    Price is a 58 y.o. male who presents to the clinic upon referral from Dr. Tatum hendrickson. provider found  for evaluation and treatment of diabetic feet with severe neuropathy. Price has a past medical history of Coronary artery disease, Diabetes mellitus, History of heart artery stent, Hypertension, and Myocardial infarction. Complaints today consist of pain in the bilateral feet with weight bearing, worse the more he is on his feet although also bothers him at times when he is non weight bearing. The pain is burning and occasionally sharp. Has been on 300 mg BID for a few months with minimal improvement.      6/13/19: Patient returns for follow up bilateral foot pain, has gone up on the gabapentin and started on alpha lipoic acid with minimal relief.    PCP: Chanel Roberson MD      Date Last Seen by PCP: 3/14/19    Current shoe gear: Casual shoes    Hemoglobin A1C   Date Value Ref Range Status   01/08/2019 11.1 (H) 4.0 - 5.6 % Final     Comment:     ADA Screening Guidelines:  5.7-6.4%  Consistent with prediabetes  >or=6.5%  Consistent with diabetes  High levels of fetal hemoglobin interfere with the HbA1C  assay. Heterozygous hemoglobin variants (HbS, HgC, etc)do  not significantly interfere with this assay.   However, presence of multiple variants may affect accuracy.             Review of Systems   Constitution: Negative for chills and fever.   Cardiovascular: Negative for claudication and leg swelling.   Respiratory: Negative for shortness of breath.    Skin: Positive for nail changes. Negative for itching and rash.   Musculoskeletal: Positive for arthritis. Negative for muscle cramps, muscle weakness and myalgias.   Gastrointestinal: Negative for nausea and vomiting.   Neurological: Positive for paresthesias. Negative for focal weakness, loss of balance and numbness.           Objective:      Physical Exam   Constitutional:  He is oriented to person, place, and time. He appears well-developed and well-nourished. No distress.   Cardiovascular:   Pulses:       Dorsalis pedis pulses are 2+ on the right side, and 2+ on the left side.        Posterior tibial pulses are 2+ on the right side, and 2+ on the left side.   < 3 sec capillary refill time to toes 1-5 bilateral. Toes and feet are warm to touch proximally with normal distal cooling b/l. There is some hair growth on the feet and toes b/l. There is no edema b/l. No spider veins or varicosities present b/l.      Musculoskeletal:   Bilateral medial arch collapse with weight bearing and abduction of the forefoot. No pain with palpation at the PT insertion bilateral    Equinus noted b/l ankles with < 10 deg DF noted. MMT 5/5 in DF/PF/Inv/Ev resistance with no reproduction of pain in any direction. Passive range of motion of ankle and pedal joints is painless b/l.     Neurological: He is alert and oriented to person, place, and time. He has normal strength. He displays no atrophy and no tremor. No sensory deficit. He exhibits normal muscle tone.   Negative tinel sign bilateral. Decreased vibratory and protective sensation bilateral   Skin: Skin is warm, dry and intact. No abrasion, no bruising, no burn, no ecchymosis, no laceration, no lesion, no petechiae and no rash noted. He is not diaphoretic. No cyanosis or erythema. No pallor. Nails show no clubbing.   Skin temperature, texture and turgor within normal limits.   Psychiatric: He has a normal mood and affect. His behavior is normal.             Assessment:       Encounter Diagnoses   Name Primary?    Type II diabetes mellitus with neurological manifestations Yes    Sciatica, right side          Plan:       Price was seen today for follow-up.    Diagnoses and all orders for this visit:    Type II diabetes mellitus with neurological manifestations  -     Ambulatory referral to Pediatric Pain Management    Sciatica, right side  -      Ambulatory referral to Pediatric Pain Management      I counseled the patient on his conditions, their implications and medical management.    Shoe inspection. Diabetic Foot Education. Patient reminded of the importance of good nutrition and blood sugar control to help prevent podiatric complications of diabetes. Patient instructed on proper foot hygeine. We discussed wearing proper shoe gear, daily foot inspections, never walking without protective shoe gear, never putting sharp instruments to feet    Patient will wear over the counter arch supports and wear them in shoes whenever possible.  Athletic shoes intended for walking or running are usually best.    Patient will stretch the tendo achilles complex three times daily as demonstrated in the office.  Literature was dispensed illustrating proper stretching technique.    Follow up with his prescribing doctor about increasing the gabapentin    I recommend he follow with pain management for more options as the pain is likely multi factorial and not responding to the gabapentin.    Ken Islas DPM

## 2019-06-17 NOTE — TELEPHONE ENCOUNTER
Needs appt with JAI Gong or myself to discuss risks and benefits or he can contact Dr. Yousif for the Rx

## 2019-06-24 ENCOUNTER — PATIENT OUTREACH (OUTPATIENT)
Dept: ADMINISTRATIVE | Facility: HOSPITAL | Age: 59
End: 2019-06-24

## 2019-06-25 ENCOUNTER — TELEPHONE (OUTPATIENT)
Dept: PODIATRY | Facility: CLINIC | Age: 59
End: 2019-06-25

## 2019-06-25 NOTE — TELEPHONE ENCOUNTER
Patient stated that Dr Roberson would not write a prescription for Lamisil to treat the patient's fungus on his fingernails.  Stated that he wants Dr Islas to write one since he recommended that the patient take it.    Please advise.

## 2019-06-25 NOTE — TELEPHONE ENCOUNTER
----- Message from Maureen Shepherd sent at 6/25/2019  1:16 PM CDT -----  Type:  Patient Returning Call    Who Called:  Patient  Who Left Message for Patient:  Sherry  Does the patient know what this is regarding?:  Finger nail infection  Best Call Back Number:  [phone] (home)

## 2019-06-25 NOTE — TELEPHONE ENCOUNTER
----- Message from Rekha Saeed sent at 6/25/2019 11:55 AM CDT -----  Contact: pt  Type:  Needs Medical Advice    Who Called: Pt  Symptoms (please be specific): finger nail infection  How long has patient had these symptoms:   A year  Pharmacy name and phone #:  [phone]  Would the patient rather a call back or a response via [facility]?  Call back  Best Call Back Number:[phone]  Additional Information:  Pt  Stated Dr Roberson would not  write Rx /lamisil po

## 2019-06-26 ENCOUNTER — TELEPHONE (OUTPATIENT)
Dept: FAMILY MEDICINE | Facility: CLINIC | Age: 59
End: 2019-06-26

## 2019-06-26 NOTE — TELEPHONE ENCOUNTER
No answer.  Voice mail box not set up.  Unable to leave a message.  Will try again.        Ken Islas, LATASHA Ayala, CYNDI   Caller: Unspecified (Yesterday,  1:03 PM)             Treating fingernails is out of my scope of practice and if his PCP feels it is not safe for him to take the lamisil I would not write the medication either way, I'm sorry but I cannot write it for him.   Dr. Islas

## 2019-06-26 NOTE — TELEPHONE ENCOUNTER
"Called patient again.  Again explained that Dr Islas cannot legally prescribe treatment for the patient's fingernails. That per Dr Islas he told the patient this when he was seen, and recommended that the patient discuss treatment - possibly Lamisil or something else for the fingernails.  Patient stated that he would like a message sent to Dr Roberson staff asking them to contact him as he has not heard from them.  Message sent to Dr Roberson staff.        Patient verbalized understanding but was extremely angry because "he paid good money to get treatment and is not getting it for what he needs, that he is sick and wants what he deserves".  "

## 2019-06-26 NOTE — TELEPHONE ENCOUNTER
Patient informed of Dr Islas's response below.  Patient verbalized understanding then got very abusive on phone.  Got disconnected.  Will try calling him back shortly.

## 2019-06-26 NOTE — TELEPHONE ENCOUNTER
I spoke to the patient about the lamisil po. I advised him that Dr. Roberson stated she may write it if she deems it appropriate, but she states it must be associated with a visit so she can discuss the risks of the medication. The patient expressed frustration about not being able to get the medication called in by Dr. Yousif's office and I tried to explain to him that Dr. Yousif legally cannot write the medication and it needs to come from Dr. Roberson, but again must be associated with a visit. Patient has a visit scheduled with Dr. Roberson on 7/8/19 and he states he will discuss the medication with her at this visit.

## 2019-06-30 NOTE — TELEPHONE ENCOUNTER
If treatment is for fingernails then podiatry cannot prescribe.  We will need to see him and discuss risk and benefits since this has not been documented in our visit.  Can see SHAUNNA.

## 2019-07-01 NOTE — TELEPHONE ENCOUNTER
Patient scheduled 7/8/19 with Dr. Roberson already so he decided to wait and discuss this issue with her at the appt instead of making another appt with JAI Gong.

## 2019-07-08 ENCOUNTER — OFFICE VISIT (OUTPATIENT)
Dept: FAMILY MEDICINE | Facility: CLINIC | Age: 59
End: 2019-07-08
Payer: COMMERCIAL

## 2019-07-08 VITALS
HEIGHT: 69 IN | TEMPERATURE: 98 F | RESPIRATION RATE: 16 BRPM | OXYGEN SATURATION: 98 % | SYSTOLIC BLOOD PRESSURE: 137 MMHG | BODY MASS INDEX: 27.13 KG/M2 | WEIGHT: 183.19 LBS | DIASTOLIC BLOOD PRESSURE: 80 MMHG | HEART RATE: 65 BPM

## 2019-07-08 DIAGNOSIS — R35.0 FREQUENCY OF URINATION: ICD-10-CM

## 2019-07-08 DIAGNOSIS — E11.69 HYPERLIPIDEMIA ASSOCIATED WITH TYPE 2 DIABETES MELLITUS: ICD-10-CM

## 2019-07-08 DIAGNOSIS — B35.1 ONYCHOMYCOSIS: ICD-10-CM

## 2019-07-08 DIAGNOSIS — E78.5 HYPERLIPIDEMIA ASSOCIATED WITH TYPE 2 DIABETES MELLITUS: ICD-10-CM

## 2019-07-08 DIAGNOSIS — Z12.5 PROSTATE CANCER SCREENING: ICD-10-CM

## 2019-07-08 DIAGNOSIS — I15.2 HYPERTENSION ASSOCIATED WITH DIABETES: ICD-10-CM

## 2019-07-08 DIAGNOSIS — E11.59 HYPERTENSION ASSOCIATED WITH DIABETES: ICD-10-CM

## 2019-07-08 DIAGNOSIS — R20.2 PARESTHESIA OF BOTH HANDS: ICD-10-CM

## 2019-07-08 DIAGNOSIS — Z23 NEED FOR SHINGLES VACCINE: ICD-10-CM

## 2019-07-08 DIAGNOSIS — E11.65 UNCONTROLLED TYPE 2 DIABETES MELLITUS WITH HYPERGLYCEMIA: Primary | ICD-10-CM

## 2019-07-08 DIAGNOSIS — N52.9 ERECTILE DYSFUNCTION, UNSPECIFIED ERECTILE DYSFUNCTION TYPE: ICD-10-CM

## 2019-07-08 DIAGNOSIS — Z23 NEED FOR TDAP VACCINATION: ICD-10-CM

## 2019-07-08 DIAGNOSIS — K21.9 GASTROESOPHAGEAL REFLUX DISEASE WITHOUT ESOPHAGITIS: ICD-10-CM

## 2019-07-08 DIAGNOSIS — Z11.59 NEED FOR HEPATITIS C SCREENING TEST: ICD-10-CM

## 2019-07-08 DIAGNOSIS — I25.10 CORONARY ARTERY DISEASE, ANGINA PRESENCE UNSPECIFIED, UNSPECIFIED VESSEL OR LESION TYPE, UNSPECIFIED WHETHER NATIVE OR TRANSPLANTED HEART: ICD-10-CM

## 2019-07-08 DIAGNOSIS — G25.0 BENIGN ESSENTIAL TREMOR: ICD-10-CM

## 2019-07-08 DIAGNOSIS — R20.2 PARESTHESIAS IN LEFT HAND: ICD-10-CM

## 2019-07-08 PROCEDURE — 90471 IMMUNIZATION ADMIN: CPT | Mod: S$GLB,,, | Performed by: FAMILY MEDICINE

## 2019-07-08 PROCEDURE — 3075F SYST BP GE 130 - 139MM HG: CPT | Mod: CPTII,S$GLB,, | Performed by: FAMILY MEDICINE

## 2019-07-08 PROCEDURE — 90715 TDAP VACCINE 7 YRS/> IM: CPT | Mod: S$GLB,,, | Performed by: FAMILY MEDICINE

## 2019-07-08 PROCEDURE — 99999 PR PBB SHADOW E&M-EST. PATIENT-LVL IV: ICD-10-PCS | Mod: PBBFAC,,, | Performed by: FAMILY MEDICINE

## 2019-07-08 PROCEDURE — 3079F PR MOST RECENT DIASTOLIC BLOOD PRESSURE 80-89 MM HG: ICD-10-PCS | Mod: CPTII,S$GLB,, | Performed by: FAMILY MEDICINE

## 2019-07-08 PROCEDURE — 3008F PR BODY MASS INDEX (BMI) DOCUMENTED: ICD-10-PCS | Mod: CPTII,S$GLB,, | Performed by: FAMILY MEDICINE

## 2019-07-08 PROCEDURE — 99214 PR OFFICE/OUTPT VISIT, EST, LEVL IV, 30-39 MIN: ICD-10-PCS | Mod: 25,S$GLB,, | Performed by: FAMILY MEDICINE

## 2019-07-08 PROCEDURE — 90750 ZOSTER RECOMBINANT VACCINE: ICD-10-PCS | Mod: S$GLB,,, | Performed by: FAMILY MEDICINE

## 2019-07-08 PROCEDURE — 3075F PR MOST RECENT SYSTOLIC BLOOD PRESS GE 130-139MM HG: ICD-10-PCS | Mod: CPTII,S$GLB,, | Performed by: FAMILY MEDICINE

## 2019-07-08 PROCEDURE — 90471 ZOSTER RECOMBINANT VACCINE: ICD-10-PCS | Mod: S$GLB,,, | Performed by: FAMILY MEDICINE

## 2019-07-08 PROCEDURE — 99999 PR PBB SHADOW E&M-EST. PATIENT-LVL IV: CPT | Mod: PBBFAC,,, | Performed by: FAMILY MEDICINE

## 2019-07-08 PROCEDURE — 99214 OFFICE O/P EST MOD 30 MIN: CPT | Mod: 25,S$GLB,, | Performed by: FAMILY MEDICINE

## 2019-07-08 PROCEDURE — 90750 HZV VACC RECOMBINANT IM: CPT | Mod: S$GLB,,, | Performed by: FAMILY MEDICINE

## 2019-07-08 PROCEDURE — 90472 IMMUNIZATION ADMIN EACH ADD: CPT | Mod: S$GLB,,, | Performed by: FAMILY MEDICINE

## 2019-07-08 PROCEDURE — 90715 TDAP VACCINE GREATER THAN OR EQUAL TO 7YO IM: ICD-10-PCS | Mod: S$GLB,,, | Performed by: FAMILY MEDICINE

## 2019-07-08 PROCEDURE — 3079F DIAST BP 80-89 MM HG: CPT | Mod: CPTII,S$GLB,, | Performed by: FAMILY MEDICINE

## 2019-07-08 PROCEDURE — 3008F BODY MASS INDEX DOCD: CPT | Mod: CPTII,S$GLB,, | Performed by: FAMILY MEDICINE

## 2019-07-08 PROCEDURE — 3046F PR MOST RECENT HEMOGLOBIN A1C LEVEL > 9.0%: ICD-10-PCS | Mod: CPTII,S$GLB,, | Performed by: FAMILY MEDICINE

## 2019-07-08 PROCEDURE — 3046F HEMOGLOBIN A1C LEVEL >9.0%: CPT | Mod: CPTII,S$GLB,, | Performed by: FAMILY MEDICINE

## 2019-07-08 PROCEDURE — 90472 TDAP VACCINE GREATER THAN OR EQUAL TO 7YO IM: ICD-10-PCS | Mod: S$GLB,,, | Performed by: FAMILY MEDICINE

## 2019-07-08 RX ORDER — SILDENAFIL 100 MG/1
TABLET, FILM COATED ORAL
Qty: 6 TABLET | Status: SHIPPED | OUTPATIENT
Start: 2019-07-08 | End: 2019-10-22 | Stop reason: ALTCHOICE

## 2019-07-08 RX ORDER — TERBINAFINE HYDROCHLORIDE 250 MG/1
250 TABLET ORAL DAILY
Qty: 30 TABLET | Refills: 1 | Status: SHIPPED | OUTPATIENT
Start: 2019-07-08 | End: 2019-09-17 | Stop reason: SDUPTHER

## 2019-07-08 NOTE — PROGRESS NOTES
Patient verified by name and . Patient received Shingrix 1 of 2 in right deltoid and tdap in left deltoid. Patient tolerated injection well. Patient advised to wait in clinic for 15 minutes in case of adverse reactions. Patient demonstrated understanding.

## 2019-07-08 NOTE — PROGRESS NOTES
Subjective:       Patient ID: Price Noguera is a 58 y.o. male.    Chief Complaint: Follow-up (3mth f/u hypertension)    HPI  Review of Systems   Constitutional: Negative for fatigue and unexpected weight change.   Respiratory: Negative for chest tightness and shortness of breath.    Cardiovascular: Negative for chest pain, palpitations and leg swelling.   Gastrointestinal: Negative for abdominal pain.   Genitourinary: Positive for difficulty urinating.   Musculoskeletal: Positive for arthralgias.   Neurological: Positive for numbness. Negative for dizziness, syncope, light-headedness and headaches.       Patient Active Problem List   Diagnosis    Uncontrolled type 2 diabetes mellitus with hyperglycemia    Coronary artery disease    History of heart artery stent    Hypertension associated with diabetes    Hyponatremia    Hyperlipidemia associated with type 2 diabetes mellitus    Irritable bowel syndrome with diarrhea    Gastroesophageal reflux disease without esophagitis    Onychomycosis    Benign essential tremor    Dysphagia     Patient is here for a chronic conditions follow up.    C/o thumbnail fungal infection laterally.  C/o numbness last 2 digits of both hands intermittently.  Urinating several times a night.  occ hesitancy. ED.      Endocrine Dr. Ruiz  Podiatry Dr. Islas    Cad s/p stent in  maker 2012. [facility] upcoming appt next week. No recent sx CP  1/8/19 admitted for CP and felt like he is going to black out whenever he does anything physical. Harrison neg, exercise NM test neg for ischemia, ef 66%.  A1c 11.1 . CP is predictable with minimal exertion     GI Dr. Ross EGD done 2/21/19 to complete CP work up-had mild schatzki ring -dilated, gastritis and 4 gastric polyps-bening. h pylori neg.  On PPI daily.  LAst colonoscopy 5 years ago-h/o colon polyps     ENdocrine RON Crockett . BS < 130 fasting am.  Last eye exam 3 years ago. Last appt 3/19. Next appt next week.  Last a1c 11.1  "1/19     C/o tremor in right hand primarily with using pen or eating-annoying and embarrassing but not enough to need meds-essential     C/o right shoulder pain x 1 year since he felt a "pop" while using a 6 lb hammer.  Has pain with lifting anything. Does not hurt at rest or pressure. Denies UE radiation, weakness or paresthesias. Has seen Dr. Dubose since lat visit  Objective:      Physical Exam   Constitutional: He is oriented to person, place, and time. He appears well-developed and well-nourished.   Cardiovascular: Normal rate, regular rhythm and normal heart sounds.   Pulmonary/Chest: Effort normal and breath sounds normal.   Musculoskeletal: He exhibits no edema.   Neurological: He is alert and oriented to person, place, and time.   Skin: Skin is warm and dry.   Psychiatric: He has a normal mood and affect.   Nursing note and vitals reviewed.      Assessment:       1. Uncontrolled type 2 diabetes mellitus with hyperglycemia    2. Hypertension associated with diabetes    3. Hyperlipidemia associated with type 2 diabetes mellitus    4. Gastroesophageal reflux disease without esophagitis    5. Coronary artery disease, angina presence unspecified, unspecified vessel or lesion type, unspecified whether native or transplanted heart    6. Need for hepatitis C screening test    7. Benign essential tremor    8. Onychomycosis    9. Paresthesias in left hand    10. Paresthesia of both hands    11. Prostate cancer screening    12. Frequency of urination    13. Erectile dysfunction, unspecified erectile dysfunction type    14. Need for shingles vaccine    15. Need for Tdap vaccination        Plan:       1. Uncontrolled type 2 diabetes mellitus with hyperglycemia  Stable condition.  Continue current medications.  Will adjust based on lab findings or if condition changes.      2. Hypertension associated with diabetes  Controlled on current medications.  Continue current medications.      3. Hyperlipidemia associated with " type 2 diabetes mellitus  Stable condition.  Continue current medications.  Will adjust based on lab findings or if condition changes.    - CBC auto differential; Future  - Comprehensive metabolic panel; Future  - Lipid panel; Future    4. Gastroesophageal reflux disease without esophagitis  Counseled patient on prevention of reflux with changes in diet and behavior.  I recommended avoidance of greasy and spicy foods, caffeine and eating within 3 hours of bedtime.  I counseled the patient to avoid eating large meals and instead eating more frequent small meals.  I also recommended weight loss and elevation of the head of the bed by 6 inches.  If symptoms persist after these changes medication may be needed to control GERD.    Cont current regimen    5. Coronary artery disease, angina presence unspecified, unspecified vessel or lesion type, unspecified whether native or transplanted heart  Cont card monitoring    6. Need for hepatitis C screening test  Screen and treat as indicated:    - Hepatitis C antibody; Future    7. Benign essential tremor  Stable, chronic     8. Onychomycosis  Treat   - terbinafine HCl (LAMISIL) 250 mg tablet; Take 1 tablet (250 mg total) by mouth once daily.  Dispense: 30 tablet; Refill: 1    9. Paresthesias in left hand  See below    10. Paresthesia of both hands  Suspect ulnar neuropathy unclear where impingement. Consider [email or web address]  - Ambulatory referral to Physical Medicine Rehab    11. Prostate cancer screening  Discussed benefits, risks and limitations of PSA testing and current USPSTF guidelines.  Patient expressed verbal understanding and wished to pursue screening.    - PSA, Screening; Future    12. Frequency of urination  Possible enlarged prostate with GARCIA sx. Refer for eval and treat  - Ambulatory referral to Urology    13. Erectile dysfunction, unspecified erectile dysfunction type  Discussed with patient the side effects, benefits and risks of treatment.  Patient elected to  proceed with treatment.  Discussed warnings regarding not combining ED meds with nitroglycerin or nitrates as well as concerns for priapism.  Patient told to stop the medication if any serious side effects occur and notify me if severe lightheadedness, chest pain or shortness of breath occur.  Discussed potential secondary causes of ED such as hypogonadism.    - Ambulatory referral to Urology  - sildenafil (VIAGRA) 100 MG tablet; 1/2 -1 po 1 h prior to intercourse prn ED  Dispense: 6 tablet; Refill: prn    14. Need for shingles ccine  Immunize today.  Counseled patient on risks, benefits and side effects.  Patient elected to proceed with vaccination.    - (In Office Administered) Zoster Recombinant Vaccine  - (In Office Administered) Zoster Recombinant Vaccine; Future    15. Need for Tdap vaccination  Immunize today.  Counseled patient on risks, benefits and side effects.  Patient elected to proceed with vaccination.    - (In Office Administered) Tdap Vaccine        Time spent with patient: 20 minutes    Patient with be reevaluated in 3 months or sooner prn    Greater than 50% of this visit was spent counseling as described in above documentation:Yes

## 2019-07-09 ENCOUNTER — OFFICE VISIT (OUTPATIENT)
Dept: PAIN MEDICINE | Facility: CLINIC | Age: 59
End: 2019-07-09
Payer: COMMERCIAL

## 2019-07-09 ENCOUNTER — LAB VISIT (OUTPATIENT)
Dept: LAB | Facility: HOSPITAL | Age: 59
End: 2019-07-09
Attending: PHYSICIAN ASSISTANT
Payer: COMMERCIAL

## 2019-07-09 ENCOUNTER — OFFICE VISIT (OUTPATIENT)
Dept: ENDOCRINOLOGY | Facility: CLINIC | Age: 59
End: 2019-07-09
Payer: COMMERCIAL

## 2019-07-09 VITALS
BODY MASS INDEX: 27.11 KG/M2 | HEIGHT: 69 IN | WEIGHT: 183 LBS | SYSTOLIC BLOOD PRESSURE: 121 MMHG | DIASTOLIC BLOOD PRESSURE: 77 MMHG | HEART RATE: 71 BPM

## 2019-07-09 VITALS
BODY MASS INDEX: 26.91 KG/M2 | DIASTOLIC BLOOD PRESSURE: 80 MMHG | TEMPERATURE: 98 F | WEIGHT: 181.69 LBS | HEART RATE: 60 BPM | HEIGHT: 69 IN | SYSTOLIC BLOOD PRESSURE: 106 MMHG

## 2019-07-09 DIAGNOSIS — E78.5 HYPERLIPIDEMIA ASSOCIATED WITH TYPE 2 DIABETES MELLITUS: ICD-10-CM

## 2019-07-09 DIAGNOSIS — I15.2 HYPERTENSION ASSOCIATED WITH DIABETES: ICD-10-CM

## 2019-07-09 DIAGNOSIS — E11.65 UNCONTROLLED TYPE 2 DIABETES MELLITUS WITH HYPERGLYCEMIA: ICD-10-CM

## 2019-07-09 DIAGNOSIS — E11.69 HYPERLIPIDEMIA ASSOCIATED WITH TYPE 2 DIABETES MELLITUS: ICD-10-CM

## 2019-07-09 DIAGNOSIS — M79.2 NEUROPATHIC PAIN: ICD-10-CM

## 2019-07-09 DIAGNOSIS — E11.40 CHRONIC PAINFUL DIABETIC NEUROPATHY: Primary | ICD-10-CM

## 2019-07-09 DIAGNOSIS — N52.9 ERECTILE DYSFUNCTION, UNSPECIFIED ERECTILE DYSFUNCTION TYPE: ICD-10-CM

## 2019-07-09 DIAGNOSIS — E11.65 UNCONTROLLED TYPE 2 DIABETES MELLITUS WITH HYPERGLYCEMIA: Primary | ICD-10-CM

## 2019-07-09 DIAGNOSIS — B35.1 ONYCHOMYCOSIS: ICD-10-CM

## 2019-07-09 DIAGNOSIS — Z11.59 NEED FOR HEPATITIS C SCREENING TEST: ICD-10-CM

## 2019-07-09 DIAGNOSIS — G62.9 NEUROPATHY: ICD-10-CM

## 2019-07-09 DIAGNOSIS — E11.59 HYPERTENSION ASSOCIATED WITH DIABETES: ICD-10-CM

## 2019-07-09 DIAGNOSIS — Z12.5 PROSTATE CANCER SCREENING: ICD-10-CM

## 2019-07-09 LAB
ALBUMIN SERPL BCP-MCNC: 4.4 G/DL (ref 3.5–5.2)
ALBUMIN SERPL BCP-MCNC: 4.4 G/DL (ref 3.5–5.2)
ALP SERPL-CCNC: 87 U/L (ref 55–135)
ALT SERPL W/O P-5'-P-CCNC: 17 U/L (ref 10–44)
ANION GAP SERPL CALC-SCNC: 10 MMOL/L (ref 8–16)
ANION GAP SERPL CALC-SCNC: 10 MMOL/L (ref 8–16)
AST SERPL-CCNC: 17 U/L (ref 10–40)
BASOPHILS # BLD AUTO: 0.1 K/UL (ref 0–0.2)
BASOPHILS NFR BLD: 0.9 % (ref 0–1.9)
BILIRUB SERPL-MCNC: 0.6 MG/DL (ref 0.1–1)
BUN SERPL-MCNC: 9 MG/DL (ref 6–20)
BUN SERPL-MCNC: 9 MG/DL (ref 6–20)
CALCIUM SERPL-MCNC: 10.5 MG/DL (ref 8.7–10.5)
CALCIUM SERPL-MCNC: 10.5 MG/DL (ref 8.7–10.5)
CHLORIDE SERPL-SCNC: 104 MMOL/L (ref 95–110)
CHLORIDE SERPL-SCNC: 104 MMOL/L (ref 95–110)
CHOLEST SERPL-MCNC: 172 MG/DL (ref 120–199)
CHOLEST/HDLC SERPL: 3.5 {RATIO} (ref 2–5)
CO2 SERPL-SCNC: 26 MMOL/L (ref 23–29)
CO2 SERPL-SCNC: 26 MMOL/L (ref 23–29)
COMPLEXED PSA SERPL-MCNC: 0.95 NG/ML (ref 0–4)
CREAT SERPL-MCNC: 0.9 MG/DL (ref 0.5–1.4)
CREAT SERPL-MCNC: 0.9 MG/DL (ref 0.5–1.4)
DIFFERENTIAL METHOD: ABNORMAL
EOSINOPHIL # BLD AUTO: 0.3 K/UL (ref 0–0.5)
EOSINOPHIL NFR BLD: 2.7 % (ref 0–8)
ERYTHROCYTE [DISTWIDTH] IN BLOOD BY AUTOMATED COUNT: 13.8 % (ref 11.5–14.5)
EST. GFR  (AFRICAN AMERICAN): >60 ML/MIN/1.73 M^2
EST. GFR  (AFRICAN AMERICAN): >60 ML/MIN/1.73 M^2
EST. GFR  (NON AFRICAN AMERICAN): >60 ML/MIN/1.73 M^2
EST. GFR  (NON AFRICAN AMERICAN): >60 ML/MIN/1.73 M^2
ESTIMATED AVG GLUCOSE: 140 MG/DL (ref 68–131)
GLUCOSE SERPL-MCNC: 123 MG/DL (ref 70–110)
GLUCOSE SERPL-MCNC: 123 MG/DL (ref 70–110)
HBA1C MFR BLD HPLC: 6.5 % (ref 4–5.6)
HCT VFR BLD AUTO: 42.2 % (ref 40–54)
HDLC SERPL-MCNC: 49 MG/DL (ref 40–75)
HDLC SERPL: 28.5 % (ref 20–50)
HGB BLD-MCNC: 14.1 G/DL (ref 14–18)
IMM GRANULOCYTES # BLD AUTO: 0.03 K/UL (ref 0–0.04)
IMM GRANULOCYTES NFR BLD AUTO: 0.3 % (ref 0–0.5)
LDLC SERPL CALC-MCNC: 108.6 MG/DL (ref 63–159)
LYMPHOCYTES # BLD AUTO: 1.5 K/UL (ref 1–4.8)
LYMPHOCYTES NFR BLD: 13.1 % (ref 18–48)
MCH RBC QN AUTO: 29.4 PG (ref 27–31)
MCHC RBC AUTO-ENTMCNC: 33.4 G/DL (ref 32–36)
MCV RBC AUTO: 88 FL (ref 82–98)
MONOCYTES # BLD AUTO: 1 K/UL (ref 0.3–1)
MONOCYTES NFR BLD: 8.3 % (ref 4–15)
NEUTROPHILS # BLD AUTO: 8.6 K/UL (ref 1.8–7.7)
NEUTROPHILS NFR BLD: 74.7 % (ref 38–73)
NONHDLC SERPL-MCNC: 123 MG/DL
NRBC BLD-RTO: 0 /100 WBC
PHOSPHATE SERPL-MCNC: 3.6 MG/DL (ref 2.7–4.5)
PLATELET # BLD AUTO: 331 K/UL (ref 150–350)
PMV BLD AUTO: 9.9 FL (ref 9.2–12.9)
POTASSIUM SERPL-SCNC: 4.3 MMOL/L (ref 3.5–5.1)
POTASSIUM SERPL-SCNC: 4.3 MMOL/L (ref 3.5–5.1)
PROT SERPL-MCNC: 7.8 G/DL (ref 6–8.4)
RBC # BLD AUTO: 4.8 M/UL (ref 4.6–6.2)
SODIUM SERPL-SCNC: 140 MMOL/L (ref 136–145)
SODIUM SERPL-SCNC: 140 MMOL/L (ref 136–145)
TRIGL SERPL-MCNC: 72 MG/DL (ref 30–150)
WBC # BLD AUTO: 11.46 K/UL (ref 3.9–12.7)

## 2019-07-09 PROCEDURE — 99999 PR PBB SHADOW E&M-EST. PATIENT-LVL III: CPT | Mod: PBBFAC,,, | Performed by: PHYSICIAN ASSISTANT

## 2019-07-09 PROCEDURE — 84153 ASSAY OF PSA TOTAL: CPT

## 2019-07-09 PROCEDURE — 3079F DIAST BP 80-89 MM HG: CPT | Mod: CPTII,S$GLB,, | Performed by: PHYSICIAN ASSISTANT

## 2019-07-09 PROCEDURE — 99213 PR OFFICE/OUTPT VISIT, EST, LEVL III, 20-29 MIN: ICD-10-PCS | Mod: S$GLB,,, | Performed by: PHYSICIAN ASSISTANT

## 2019-07-09 PROCEDURE — 85025 COMPLETE CBC W/AUTO DIFF WBC: CPT

## 2019-07-09 PROCEDURE — 80061 LIPID PANEL: CPT

## 2019-07-09 PROCEDURE — 3074F PR MOST RECENT SYSTOLIC BLOOD PRESSURE < 130 MM HG: ICD-10-PCS | Mod: CPTII,S$GLB,, | Performed by: PHYSICIAN ASSISTANT

## 2019-07-09 PROCEDURE — 80069 RENAL FUNCTION PANEL: CPT

## 2019-07-09 PROCEDURE — 3046F PR MOST RECENT HEMOGLOBIN A1C LEVEL > 9.0%: ICD-10-PCS | Mod: CPTII,S$GLB,, | Performed by: PHYSICIAN ASSISTANT

## 2019-07-09 PROCEDURE — 3008F PR BODY MASS INDEX (BMI) DOCUMENTED: ICD-10-PCS | Mod: CPTII,S$GLB,, | Performed by: PHYSICIAN ASSISTANT

## 2019-07-09 PROCEDURE — 3074F SYST BP LT 130 MM HG: CPT | Mod: CPTII,S$GLB,, | Performed by: PHYSICIAN ASSISTANT

## 2019-07-09 PROCEDURE — 99244 OFF/OP CNSLTJ NEW/EST MOD 40: CPT | Mod: S$GLB,,, | Performed by: ANESTHESIOLOGY

## 2019-07-09 PROCEDURE — 99999 PR PBB SHADOW E&M-EST. PATIENT-LVL IV: ICD-10-PCS | Mod: PBBFAC,,, | Performed by: ANESTHESIOLOGY

## 2019-07-09 PROCEDURE — 82985 ASSAY OF GLYCATED PROTEIN: CPT

## 2019-07-09 PROCEDURE — 3079F PR MOST RECENT DIASTOLIC BLOOD PRESSURE 80-89 MM HG: ICD-10-PCS | Mod: CPTII,S$GLB,, | Performed by: PHYSICIAN ASSISTANT

## 2019-07-09 PROCEDURE — 99244 PR OFFICE CONSULTATION,LEVEL IV: ICD-10-PCS | Mod: S$GLB,,, | Performed by: ANESTHESIOLOGY

## 2019-07-09 PROCEDURE — 3046F HEMOGLOBIN A1C LEVEL >9.0%: CPT | Mod: CPTII,S$GLB,, | Performed by: PHYSICIAN ASSISTANT

## 2019-07-09 PROCEDURE — 86803 HEPATITIS C AB TEST: CPT

## 2019-07-09 PROCEDURE — 3008F BODY MASS INDEX DOCD: CPT | Mod: CPTII,S$GLB,, | Performed by: PHYSICIAN ASSISTANT

## 2019-07-09 PROCEDURE — 99999 PR PBB SHADOW E&M-EST. PATIENT-LVL III: ICD-10-PCS | Mod: PBBFAC,,, | Performed by: PHYSICIAN ASSISTANT

## 2019-07-09 PROCEDURE — 99999 PR PBB SHADOW E&M-EST. PATIENT-LVL IV: CPT | Mod: PBBFAC,,, | Performed by: ANESTHESIOLOGY

## 2019-07-09 PROCEDURE — 83036 HEMOGLOBIN GLYCOSYLATED A1C: CPT

## 2019-07-09 PROCEDURE — 99213 OFFICE O/P EST LOW 20 MIN: CPT | Mod: S$GLB,,, | Performed by: PHYSICIAN ASSISTANT

## 2019-07-09 PROCEDURE — 84378 SUGARS SINGLE QUANT: CPT

## 2019-07-09 PROCEDURE — 80053 COMPREHEN METABOLIC PANEL: CPT

## 2019-07-09 RX ORDER — NORTRIPTYLINE HYDROCHLORIDE 25 MG/1
25 CAPSULE ORAL NIGHTLY
Qty: 30 CAPSULE | Refills: 2 | Status: SHIPPED | OUTPATIENT
Start: 2019-07-09 | End: 2019-08-06 | Stop reason: SDUPTHER

## 2019-07-09 NOTE — PROGRESS NOTES
This note was completed with dictation software and grammatical errors may exist.    Referring Physician: Ken Islas DPM    PCP: Chanel Roberson MD      CC:  Bilateral foot and leg pain    HPI:   Price Noguera is a 58 y.o. male referred to us for bilateral leg pain. Pain has gradually worsened over past 2 years.  He has history of diabetic neuropathy.  Presents to us with constant aching, burning, shooting and deep pain in his bilateral calves and feet.  Pain worsens standing, walking, lifting.  Pain improves with rest.  He recently has gotten his blood glucoses under better control.  He is here for further help with his diabetic nerve pain he currently takes gabapentin 300 mg t.i.d. with mild benefits.  He denies any worsening weakness.  No bowel or bladder changes.    ROS:  CONSTITUTIONAL: No fevers, chills, night sweats, wt. loss, appetite changes  SKIN: no rashes or itching  ENT: No headaches, head trauma, vision changes, or eye pain  LYMPH NODES: None noticed   CV: No chest pain, palpitations.   RESP: No shortness of breath, dyspnea on exertion, cough, wheezing, or hemoptysis  GI: No nausea, emesis, diarrhea, constipation, melena, hematochezia, pain.    : No dysuria, hematuria, urgency, or frequency   HEME: No easy bruising, bleeding problems  PSYCHIATRIC: No depression, anxiety, psychosis, hallucinations.  NEURO: No seizures, memory loss, dizziness or difficulty sleeping  MSK:  Positive HPI      Past Medical History:   Diagnosis Date    Coronary artery disease     Diabetes mellitus     History of heart artery stent     Hypertension     Myocardial infarction      Past Surgical History:   Procedure Laterality Date    BACK SURGERY      COLONOSCOPY  04/07/2014    CORONARY ANGIOPLASTY WITH STENT PLACEMENT      EGD (ESOPHAGOGASTRODUODENOSCOPY) N/A 2/21/2019    Performed by Nino Ross MD at [facility]    TUMOR REMOVAL  05/16/2016    Back in skin     Family History   Problem Relation Age of  "Onset    Arthritis Mother     Pacemaker/defibrilator Mother     Bursitis Mother     Chronic back pain Mother     Cancer Father     Hypertension Father     No Known Problems Sister     Congenital heart disease Brother     Cancer Paternal Grandmother     Alcohol abuse Brother     No Known Problems Brother     Glaucoma Neg Hx     Macular degeneration Neg Hx     Retinal detachment Neg Hx      Social History     Socioeconomic History    Marital status: Single     Spouse name: Not on file    Number of children: Not on file    Years of education: Not on file    Highest education level: Not on file   Occupational History    Not on file   Social Needs    Financial resource strain: Not on file    Food insecurity:     Worry: Not on file     Inability: Not on file    Transportation needs:     Medical: Not on file     Non-medical: Not on file   Tobacco Use    Smoking status: Never Smoker    Smokeless tobacco: Never Used   Substance and Sexual Activity    Alcohol use: No     Frequency: Never    Drug use: No    Sexual activity: Not Currently   Lifestyle    Physical activity:     Days per week: Not on file     Minutes per session: Not on file    Stress: Not on file   Relationships    Social connections:     Talks on phone: Not on file     Gets together: Not on file     Attends Faith service: Not on file     Active member of club or organization: Not on file     Attends meetings of clubs or organizations: Not on file     Relationship status: Not on file   Other Topics Concern    Not on file   Social History Narrative    Not on file         Medications/Allergies: See med card    Vitals:    07/09/19 0915   BP: 121/77   Pulse: 71   Weight: 83 kg (183 lb)   Height: 5' 9" (1.753 m)   PainSc:   4   PainLoc: Back         Physical exam:    GENERAL: A and O x3, the patient appears well groomed and is in no acute distress.  Skin: No rashes or obvious lesions  HEENT: normocephalic, " atraumatic  CARDIOVASCULAR:  Palpable peripheral pulses  LUNGS: easy work of breathing  ABDOMEN: soft, nontender   UPPER EXTREMITIES: Normal alignment, normal range of motion, no atrophy, no skin changes,  hair growth and nail growth normal and equal bilaterally. No swelling, no tenderness.    LOWER EXTREMITIES:  Normal alignment, normal range of motion, no atrophy, no skin changes,  hair growth and nail growth normal and equal bilaterally. No swelling, no tenderness.  LUMBAR SPINE  Lumbar spine: ROM is full with flexion extension and oblique extension with no increased pain.    Chang's test causes no increased pain on either side.    Supine straight leg raise is negative bilaterally.    Internal and external rotation of the hip causes no increased pain on either side.  Myofascial exam: No tenderness to palpation across lumbar paraspinous muscles.      MENTAL STATUS: normal orientation, speech, language, and fund of knowledge for social situation.  Emotional state appropriate.    CRANIAL NERVES:  II:  PERRL bilaterally,   III,IV,VI: EOMI.    V:  Facial sensation equal bilaterally  VII:  Facial motor function normal.  VIII:  Hearing equal to finger rub bilaterally  IX/X: Gag normal, palate symmetric  XI:  Shoulder shrug equal, head turn equal  XII:  Tongue midline without fasciculations      MOTOR: Tone and bulk: normal bilateral upper and lower Strength: normal   Delt Bi Tri WE WF     R 5 5 5 5 5 5   L 5 5 5 5 5 5     IP ADD ABD Quad TA Gas HAM  R 5 5 5 5 5 5 5  L 5 5 5 5 5 5 5    SENSATION:  Decreased globally bilateral feet  REFLEXES: normal, symmetric, nonbrisk.  Toes down, no clonus. No hoffmans.  GAIT: normal rise, base, steps, and arm swing.        Imaging:  None    Assessment:  Patient referred for bilateral foot pain  1. Chronic painful diabetic neuropathy    2. Neuropathic pain          Plan:  1. I have stressed the importance of physical activity and exercise to improve overall health  2.  Patient  bilateral painful diabetic neuropathy affecting his lower extremities. Encourage him to continue better glucose control.  3.  Increase gabapentin 600 mg t.i.d. for anti neuropathic adjunct.  4.  Add nortriptyline 25 mg Q hs for further anti neuropathic adjunct.  5.  May consider lumbar sympathetic nerve blocks in the future.  6.  Follow-up in 1 month  Thank you for referring this interesting patient, and I look forward to continuing to collaborate in his care.

## 2019-07-09 NOTE — PROGRESS NOTES
"CC: DM    HPI: Price Noguera was diagnosed with Type 2 DM in . His dad's family has DM. No hospitalizations for DM. No fhx of thyroid disease.      He has been having increased pain in his legs since last visit and is now seeing PMR. He has been placed on oral lamisil since penlac did not work on his thumb. He has not had another yeast infection since the prior visit. 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 He will be seeing urology soon.    CURRENT DIABETIC MEDS: Tresiba 13 units q am, Metformin 850 mg BID, Jardiance 25 mg     No previous medications    BG readings are checked 1x/day and readings are below:  Fastins    Hypoglycemia: none.   Type of Glucose Meter: Accucheck Avia    Physical Activity: He has been active in his house.     Dietary Habits:   BF-sausage, one egg, wheat toast  LH-meat, salad, fruit  DN-pork chop, chicken, green beans, potatoes, squash, peas  Snacks on carrot sticks and pb. Avoids sugary beverages.     Last Eye Exam: Apt on 2019  Last Podiatry Exam: n/a    Last DM Education Attended:     REVIEW OF SYSTEMS  General: no weakness or fatigue.   Eyes: no intermittent blurry vision or visual disturbances.   Cardiac: + occasional chest pain with activity (seeing cardiology), no palpitations.   Respiratory: no cough or dyspnea.   GI: + dysphagia, no abdominal pain or nausea.   Skin: + onychomycosis on fingernails, no rashes or itching.   Neuro: + numbness or tingling in legs.   Endocrine: + polyuria (seeing urology), no polydipsia or polyphagia.   Remainder ROS negative    Vital Signs  /80 (BP Location: Left arm, Patient Position: Sitting, BP Method: Large (Manual))   Pulse 60   Temp 98.1 °F (36.7 °C) (Oral)   Ht 5' 9" (1.753 m)   Wt 82.4 kg (181 lb 10.5 oz)   BMI 26.83 kg/m²     Personally reviewed labs below:   Hemoglobin A1C   Date Value Ref Range Status   2019 11.1 (H) 4.0 - 5.6 % Final     Comment:     ADA Screening " Guidelines:  5.7-6.4%  Consistent with prediabetes  >or=6.5%  Consistent with diabetes  High levels of fetal hemoglobin interfere with the HbA1C  assay. Heterozygous hemoglobin variants (HbS, HgC, etc)do  not significantly interfere with this assay.   However, presence of multiple variants may affect accuracy.         Chemistry        Component Value Date/Time     (L) 01/08/2019 1118    K 4.2 01/08/2019 1118    CL 98 01/08/2019 1118    CO2 24 01/08/2019 1118    BUN 11 01/08/2019 1118    CREATININE 1.0 01/08/2019 1118     (H) 01/08/2019 1118        Component Value Date/Time    CALCIUM 10.0 01/08/2019 1118    ALKPHOS 97 01/08/2019 1118    AST 23 01/08/2019 1118    ALT 28 01/08/2019 1118    BILITOT 0.7 01/08/2019 1118    ESTGFRAFRICA >60 01/08/2019 1118    EGFRNONAA >60 01/08/2019 1118        Lab Results   Component Value Date    CHOL 286 (H) 01/09/2019     Lab Results   Component Value Date    HDL 40 01/09/2019     Lab Results   Component Value Date    LDLCALC 192.8 (H) 01/09/2019     Lab Results   Component Value Date    TRIG 266 (H) 01/09/2019     Lab Results   Component Value Date    CHOLHDL 14.0 (L) 01/09/2019     Lab Results   Component Value Date    TSH 0.755 01/08/2019       Lab Results   Component Value Date    MICALBCREAT 5.0 02/06/2019       No results found for: [id number]      PHYSICAL EXAMINATION  Constitutional: middle aged male, appears well, no distress  Neck: Supple, trachea midline.   Respiratory: even and unlabored, CTA without wheezes.  Cardiovascular: RRR; no carotid bruits or murmurs.   Lymph: DP pulses  2+ bilaterally; no edema.   Skin: + onychomycosis on bl thumb nails, warm and dry; no injection site reactions, no acanthosis nigracans observed.  Neuro: patient alert and cooperative; CN 2-12 grossly intact  Previous exam 2/19  Foot Exam: no sores or macerations noted.     Protective Sensation (w/ 10 gram monofilament):  Right: Intact  Left: Intact    Visual Inspection:  Normal -   Bilateral and Nails Intact - without Evidence of Foot Deformity- Bilateral    Pedal Pulses:   Right: Present  Left: Present    Posterior tibialis:   Right:Present  Left: Present     Vibratory Sensation  Right:Decreased  Left:Decreased    Assessment/Plan    1. Uncontrolled type 2 diabetes mellitus with hyperglycemia  Hemoglobin A1c    Renal function panel   2. Neuropathy     3. Hypertension associated with diabetes     4. Hyperlipidemia associated with type 2 diabetes mellitus     5. Onychomycosis     6. Erectile dysfunction, unspecified erectile dysfunction type       [id number] today. Continue current regimen. BG checks 2x daily.   Neuropathy-referral to podiatry  CFN-mdwsuc-xepnnxsa ACEi  GCS-khajys-fjygcqam statin and ASA  Onychomycosis-continue lamisil. F/u w/ PCP  ED-continue viagra-f/u w/ urology    FOLLOW UP  3 mths

## 2019-07-11 LAB
FRUCTOSAMINE SERPL-SCNC: 327 UMOL /L (ref 151–300)
HCV AB SERPL QL IA: NEGATIVE

## 2019-07-17 LAB — GLYCOMARK (TM): 1.8 UG/ML

## 2019-07-18 ENCOUNTER — OFFICE VISIT (OUTPATIENT)
Dept: UROLOGY | Facility: CLINIC | Age: 59
End: 2019-07-18
Payer: COMMERCIAL

## 2019-07-18 VITALS
WEIGHT: 182 LBS | SYSTOLIC BLOOD PRESSURE: 112 MMHG | HEIGHT: 69 IN | DIASTOLIC BLOOD PRESSURE: 73 MMHG | BODY MASS INDEX: 26.96 KG/M2 | HEART RATE: 81 BPM

## 2019-07-18 DIAGNOSIS — N40.0 BENIGN PROSTATIC HYPERPLASIA, UNSPECIFIED WHETHER LOWER URINARY TRACT SYMPTOMS PRESENT: ICD-10-CM

## 2019-07-18 DIAGNOSIS — R35.0 URINARY FREQUENCY: Primary | ICD-10-CM

## 2019-07-18 DIAGNOSIS — N52.9 ERECTILE DYSFUNCTION, UNSPECIFIED ERECTILE DYSFUNCTION TYPE: ICD-10-CM

## 2019-07-18 LAB
BILIRUB SERPL-MCNC: ABNORMAL MG/DL
BLOOD URINE, POC: ABNORMAL
COLOR, POC UA: YELLOW
GLUCOSE UR QL STRIP: 500
KETONES UR QL STRIP: ABNORMAL
LEUKOCYTE ESTERASE URINE, POC: ABNORMAL
NITRITE, POC UA: ABNORMAL
PH, POC UA: 5.5
PROTEIN, POC: ABNORMAL
SPECIFIC GRAVITY, POC UA: 1.01
UROBILINOGEN, POC UA: ABNORMAL

## 2019-07-18 PROCEDURE — 3078F DIAST BP <80 MM HG: CPT | Mod: CPTII,S$GLB,, | Performed by: UROLOGY

## 2019-07-18 PROCEDURE — 3008F BODY MASS INDEX DOCD: CPT | Mod: CPTII,S$GLB,, | Performed by: UROLOGY

## 2019-07-18 PROCEDURE — 99999 PR PBB SHADOW E&M-EST. PATIENT-LVL IV: ICD-10-PCS | Mod: PBBFAC,,, | Performed by: UROLOGY

## 2019-07-18 PROCEDURE — 99203 PR OFFICE/OUTPT VISIT, NEW, LEVL III, 30-44 MIN: ICD-10-PCS | Mod: 25,S$GLB,, | Performed by: UROLOGY

## 2019-07-18 PROCEDURE — 3074F SYST BP LT 130 MM HG: CPT | Mod: CPTII,S$GLB,, | Performed by: UROLOGY

## 2019-07-18 PROCEDURE — 3078F PR MOST RECENT DIASTOLIC BLOOD PRESSURE < 80 MM HG: ICD-10-PCS | Mod: CPTII,S$GLB,, | Performed by: UROLOGY

## 2019-07-18 PROCEDURE — 99203 OFFICE O/P NEW LOW 30 MIN: CPT | Mod: 25,S$GLB,, | Performed by: UROLOGY

## 2019-07-18 PROCEDURE — 3074F PR MOST RECENT SYSTOLIC BLOOD PRESSURE < 130 MM HG: ICD-10-PCS | Mod: CPTII,S$GLB,, | Performed by: UROLOGY

## 2019-07-18 PROCEDURE — 81002 URINALYSIS NONAUTO W/O SCOPE: CPT | Mod: S$GLB,,, | Performed by: UROLOGY

## 2019-07-18 PROCEDURE — 3008F PR BODY MASS INDEX (BMI) DOCUMENTED: ICD-10-PCS | Mod: CPTII,S$GLB,, | Performed by: UROLOGY

## 2019-07-18 PROCEDURE — 99999 PR PBB SHADOW E&M-EST. PATIENT-LVL IV: CPT | Mod: PBBFAC,,, | Performed by: UROLOGY

## 2019-07-18 PROCEDURE — 81002 POCT URINE DIPSTICK WITHOUT MICROSCOPE: ICD-10-PCS | Mod: S$GLB,,, | Performed by: UROLOGY

## 2019-07-18 RX ORDER — TAMSULOSIN HYDROCHLORIDE 0.4 MG/1
0.4 CAPSULE ORAL
Qty: 90 CAPSULE | Refills: 3 | Status: SHIPPED | OUTPATIENT
Start: 2019-07-18 | End: 2019-12-23 | Stop reason: SDUPTHER

## 2019-07-18 NOTE — LETTER
July 18, 2019      Chanel Roberson MD  [address]  [place] [address]           [place] [facility]  [address] [place] [address] [address]  [place] [address]  Phone: [phone]  Fax: [phone]          Patient: Price Noguera   MR Number: [id number]   YOB: 1960   Date of Visit: 7/18/2019       Dear Dr. Chanel Roberson:    Thank you for referring Price Noguera to me for evaluation. Attached you will find relevant portions of my assessment and plan of care.    If you have questions, please do not hesitate to call me. I look forward to following Price Noguera along with you.    Sincerely,    Cecilia Reyes MD    Enclosure  CC:  No Recipients    If you would like to receive this communication electronically, please contact [email or web address] or [phone] to request more information on [facility] Link access.    For providers and/or their staff who would like to refer a patient to [facility], please contact us through our one-stop-shop provider referral line, [facility], at [phone].    If you feel you have received this communication in error or would no longer like to receive these types of communications, please e-mail [email or web address]

## 2019-07-18 NOTE — PROGRESS NOTES
[facility] Note - [place]  Staff: MD Eric    Referring provider and please cc:   Chanel Roberson MD  [address] [facility] [place] [address]     PCP: Chanel Roberson MD    [facility] Utilization: active    Chief Complaint:   Chief Complaint   Patient presents with    Advice Only    Urinary Frequency    Erectile Dysfunction     has never had a real solid erection    Nocturia     all during the night         Subjective:        HPI: Price Noguera is a 58 y.o. male     He presents with urinary frequency, hesitancy and nocturia that is been present for the past 5 years and has progressively worsened.  He has never seen a urologist.  He has never tried a urologic medication.  He voids about 5 to 7 times a day and 5-10 times at night.  He denies any significant caffeine use.  He denies any lower extremity edema, no diuretics, he no caffeine before bedtime.  He does drink 1 cup of water to take his medication prior to bedtime.  Does snore.  Never had a sleep evaluation.  Appears to have normal BMI.     He also has erectile dysfunction.  He has soft erections but has had this issue his whole life.  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  He denies any difficulty urinating as a child.  He denies any UTIs as a child.  He says his testicles are normal in size.  He has no children is never tried have any children.  He was written for Viagra but has not tried.  He has never had his testosterone checked.    Urine history  Urinalysis void: negative   Urine history:   3/14/19 3+glucose    PSA history: no family hx of prostate cancer  Lab Results   Component Value Date    PSA 0.95 07/09/2019       AUA ssx:(3 incomplete emptying, 3 frequency, 3 intermittency, 2 urgency, 4 weak stream, 3 straining, 4 sleeping). 20. QOL: unhappy      History of Kidney stones:  none      REVIEW OF SYSTEMS:  General ROS: no fevers, no chills  Psychological ROS: no depression  Endocrine ROS: no heat or cold  Respiratory ROS: no SOB  Cardiovascular ROS: no CP  Gastrointestinal ROS: no abdominal pain, no constipation, no diarrhea, noBRBPR  Musculoskeletal ROS: no muscle pain  Neurological ROS: no headaches  Dermatological ROS: no rashes  HEENT: +glasses, no sinus   ROS: per HPI     PMHx:  Past Medical History:   Diagnosis Date    Coronary artery disease     Diabetes mellitus     History of heart artery stent     Hypertension     Myocardial infarction    MI s/p Cardiac stent in 2012   Diabetic ketoacidosis - dx this year       PSHx:  Past Surgical History:   Procedure Laterality Date    BACK SURGERY      COLONOSCOPY  2014    CORONARY ANGIOPLASTY WITH STENT PLACEMENT      EGD (ESOPHAGOGASTRODUODENOSCOPY) N/A 2019    [facility]    TUMOR REMOVAL  2016    Back in skin   myelomeningocele closure as a child    Health Maintenance:  Health Maintenance Topics with due status: Not Due       Topic Last Completion Date    Influenza Vaccine 2019    Foot Exam 2019    Eye Exam 2019    TETANUS VACCINE 2019    Shingles Vaccine 2019    Lipid Panel 2019    Hemoglobin A1c 2019    High Dose Statin 2019    Aspirin/Antiplatelet Therapy 2019      Gastroenterologist: - due for one 2019- 7 polyps 5 years ago   Stents/Valves/Foreign Bodies/Cardiac Evaluation/Cardiologist:   Pain medicine:  for neuropathy  In LE   [facility] Hx:   malignancies: none.   kidney stones: none  Parental history:  Father had glioblastoma and  a 72. Mother alive a 90s    Soc Hx:  Social History     Tobacco Use    Smoking status: Never Smoker    Smokeless tobacco: Never Used   Substance Use Topics    Alcohol use: No     Frequency: Never    Drug use: No       Lives:  [place]  :unmarried  Patient's occupation: sells [employer]  Children: none  Hobby:     Allergies:  Patient has no known allergies.    Current Urologic Medications: none  Aspirin: 81mg daily   Anticoagulation: none    Objective:     Vitals:    07/18/19 0857   BP: 112/73   Pulse: 81       General:WDWN in NAD  Eyes: PERRLA, normal conjunctiva  Respiratory: no increased work on breathing. No wheezing.   Cardiovascular: No obvious extremity edema. Warm and well perfused.   GI: no palpation of masses. No tenderness. No hepatosplenomegaly to palpation.  Musculoskeletal: normal range of motion of bilateral upper extremities. Normal muscle strength and tone.  Skin: no obvious rashes or lesions. No tightening of skin noted.  Neurologic: CN grossly normal. Normal sensation.   Psychiatric: awake, alert and oriented x 3. Mood and affect normal. Cooperative.     exam (07/18/2019):   Inspection of anus normal  No scrotal rashes, cysts or lesions  Epididymis normal in size, no tenderness  Testes normal and size, equal size bilaterally, no masses  Urethral meatus normal without discharge  Penis is circumcised   EMANUEL: 30g gland without masses, tenderness. SV not palpable. Normal sphincter tone. No hemhorroids.  No bilateral inguinal hernias noted     Midline back- small opening where he had infected sebaceous cyst  Above gluteus small incision        LABS REVIEW:  Recent Labs   Lab 01/08/19  1118 07/09/19  0809   WBC 8.10 11.46   Hemoglobin 14.1 14.1   Hematocrit 41.4 42.2   Platelets 302 331   ]  Recent Labs   Lab 01/08/19  1118 01/08/19  1904 07/09/19  0809   Sodium 134 L  --  140  140   Potassium 4.2  --  4.3  4.3   Chloride 98  --  104  104   CO2 24  --  26  26   BUN, Bld 11  --  9  9   Creatinine 1.0  --  0.9  0.9   Glucose 311 H  --  123 H  123 H   Calcium 10.0  --  10.5  10.5   Magnesium  --  1.9  --    Phosphorus  --   --  3.6   Alkaline Phosphatase 97  --  87   Total Protein 7.6  --  7.8   Albumin 4.2   --  4.4  4.4   Total Bilirubin 0.7  --  0.6   AST 23  --  17   ALT 28  --  17   ]    Lab Results   Component Value Date    HGBA1C 6.5 (H) 07/09/2019           Pertinent urologic PATHOLOGY REVIEW:  FINAL PATHOLOGIC DIAGNOSIS 2/21/19  1. GASTRIC ANTRUM AND BODY BIOPSIES:  - Chemical gastritis/reactive gastropathy with mild-to-moderate chronic inflammation.  - Helicobacter pylori are not identified on routine staining.  - No evidence of intestinal metaplasia, dysplasia or malignancy.  2. GASTRIC POLYPS, BIOPSIES:  - Fundic gland polyp.  - Helicobacter pylori are not identified on routine staining.  - No evidence of intestinal metaplasia, dysplasia or malignancy.       Pertinent Urologic RADIOGRAPHIC REVIEW:  none        Assessment:       1. Urinary frequency    2. Erectile dysfunction, unspecified erectile dysfunction type    3. Benign prostatic hyperplasia, unspecified whether lower urinary tract symptoms present          Plan:     Urinary frequency  -     POCT URINE DIPSTICK WITHOUT MICROSCOPE      BPH and nocturia  -although he has a small prostate, he has symptomatic BPH.  -will start him on Flomax 0.4 mg nightly.   This will help relax the prostate to allow urine to drain better.  Side effects include retrograde ejaculation and orthostatic hypotension  -we will see how he does on this  -we went over reasons for nocturia and he understands that it is multifactorial and that this may only improve his nocturia some and he may need further workup including a sleep study for obstructive sleep apnea with his history of snoring      Erectile dysfunction  -he has had erectile dysfunction his whole life.  Could have something to do with the condition he had a birth.  He is going to find out more information from his mother and I will see if I can and discuss this with the pediatrician to see what kind of condition he had an if it could affect ED.  -in the meantime I am going to check a morning testosterone which needs to  be done before 9:00 a.m.  -he also has by a great he is going to try and has not yet had the ability to do so  -he does understand that his cardiac meds do make his situation worse  -at some point we could try other treatment options like a vacuum erection device, penile injections, and last case scenario would be penile prostheses    PSA reviewed and normal  He will follow up with  to be note to reschedule colonoscopy.  Last 1 was 5 years ago [place] and they found 7 polyps.  He will follow up with his cardiologist     Follow-up in 3 months    Cecilia Reyes MD

## 2019-07-18 NOTE — PATIENT INSTRUCTIONS
BPH and nocturia  -although he has a small prostate, he has symptomatic BPH.  -will start him on Flomax 0.4 mg nightly.   This will help relax the prostate to allow urine to drain better.  Side effects include retrograde ejaculation and orthostatic hypotension  -we will see how he does on this  -we went over reasons for nocturia and he understands that it is multifactorial and that this may only improve his nocturia some and he may need further workup including a sleep study for obstructive sleep apnea with his history of snoring      Erectile dysfunction  -he has had erectile dysfunction his whole life.  Could have something to do with the condition he had a birth.  He is going to find out more information from his mother and I will see if I can and discuss this with the pediatrician to see what kind of condition he had an if it could affect ED.  -in the meantime I am going to check a morning testosterone which needs to be done before 9:00 a.m.  -he also has by a great he is going to try and has not yet had the ability to do so  -he does understand that his cardiac meds do make his situation worse  -at some point we could try other treatment options like a vacuum erection device, penile injections, and last case scenario would be penile prostheses    PSA reviewed and normal  He will follow up with  to be note to reschedule colonoscopy.  Last 1 was 5 years ago [place] and they found 7 polyps.  He will follow up with Dr. Vernon.

## 2019-07-29 DIAGNOSIS — K21.9 GASTROESOPHAGEAL REFLUX DISEASE WITHOUT ESOPHAGITIS: ICD-10-CM

## 2019-07-29 RX ORDER — PANTOPRAZOLE SODIUM 40 MG/1
TABLET, DELAYED RELEASE ORAL
Qty: 90 TABLET | Refills: 1 | Status: SHIPPED | OUTPATIENT
Start: 2019-07-29 | End: 2019-09-30 | Stop reason: SDUPTHER

## 2019-08-06 ENCOUNTER — INITIAL CONSULT (OUTPATIENT)
Dept: PHYSICAL MEDICINE AND REHAB | Facility: CLINIC | Age: 59
End: 2019-08-06
Payer: COMMERCIAL

## 2019-08-06 ENCOUNTER — OFFICE VISIT (OUTPATIENT)
Dept: PAIN MEDICINE | Facility: CLINIC | Age: 59
End: 2019-08-06
Payer: COMMERCIAL

## 2019-08-06 VITALS
SYSTOLIC BLOOD PRESSURE: 113 MMHG | BODY MASS INDEX: 26.81 KG/M2 | DIASTOLIC BLOOD PRESSURE: 72 MMHG | WEIGHT: 181 LBS | HEIGHT: 69 IN | HEART RATE: 78 BPM

## 2019-08-06 VITALS
SYSTOLIC BLOOD PRESSURE: 106 MMHG | DIASTOLIC BLOOD PRESSURE: 70 MMHG | HEIGHT: 69 IN | WEIGHT: 181 LBS | HEART RATE: 87 BPM | BODY MASS INDEX: 26.81 KG/M2

## 2019-08-06 DIAGNOSIS — M79.2 NEUROPATHIC PAIN: ICD-10-CM

## 2019-08-06 DIAGNOSIS — R20.2 PARESTHESIAS: Primary | ICD-10-CM

## 2019-08-06 DIAGNOSIS — R29.898 WEAKNESS OF RIGHT LOWER EXTREMITY: ICD-10-CM

## 2019-08-06 DIAGNOSIS — E11.40 CHRONIC PAINFUL DIABETIC NEUROPATHY: Primary | ICD-10-CM

## 2019-08-06 PROCEDURE — 3074F PR MOST RECENT SYSTOLIC BLOOD PRESSURE < 130 MM HG: ICD-10-PCS | Mod: CPTII,S$GLB,, | Performed by: PHYSICIAN ASSISTANT

## 2019-08-06 PROCEDURE — 99204 PR OFFICE/OUTPT VISIT, NEW, LEVL IV, 45-59 MIN: ICD-10-PCS | Mod: S$GLB,,, | Performed by: PHYSICAL MEDICINE & REHABILITATION

## 2019-08-06 PROCEDURE — 99999 PR PBB SHADOW E&M-EST. PATIENT-LVL IV: CPT | Mod: PBBFAC,,, | Performed by: PHYSICIAN ASSISTANT

## 2019-08-06 PROCEDURE — 99999 PR PBB SHADOW E&M-EST. PATIENT-LVL IV: ICD-10-PCS | Mod: PBBFAC,,, | Performed by: PHYSICAL MEDICINE & REHABILITATION

## 2019-08-06 PROCEDURE — 3008F PR BODY MASS INDEX (BMI) DOCUMENTED: ICD-10-PCS | Mod: CPTII,S$GLB,, | Performed by: PHYSICIAN ASSISTANT

## 2019-08-06 PROCEDURE — 3008F PR BODY MASS INDEX (BMI) DOCUMENTED: ICD-10-PCS | Mod: CPTII,S$GLB,, | Performed by: PHYSICAL MEDICINE & REHABILITATION

## 2019-08-06 PROCEDURE — 3074F SYST BP LT 130 MM HG: CPT | Mod: CPTII,S$GLB,, | Performed by: PHYSICIAN ASSISTANT

## 2019-08-06 PROCEDURE — 3044F HG A1C LEVEL LT 7.0%: CPT | Mod: CPTII,S$GLB,, | Performed by: PHYSICIAN ASSISTANT

## 2019-08-06 PROCEDURE — 3008F BODY MASS INDEX DOCD: CPT | Mod: CPTII,S$GLB,, | Performed by: PHYSICAL MEDICINE & REHABILITATION

## 2019-08-06 PROCEDURE — 99999 PR PBB SHADOW E&M-EST. PATIENT-LVL IV: ICD-10-PCS | Mod: PBBFAC,,, | Performed by: PHYSICIAN ASSISTANT

## 2019-08-06 PROCEDURE — 3078F PR MOST RECENT DIASTOLIC BLOOD PRESSURE < 80 MM HG: ICD-10-PCS | Mod: CPTII,S$GLB,, | Performed by: PHYSICIAN ASSISTANT

## 2019-08-06 PROCEDURE — 3074F PR MOST RECENT SYSTOLIC BLOOD PRESSURE < 130 MM HG: ICD-10-PCS | Mod: CPTII,S$GLB,, | Performed by: PHYSICAL MEDICINE & REHABILITATION

## 2019-08-06 PROCEDURE — 99214 PR OFFICE/OUTPT VISIT, EST, LEVL IV, 30-39 MIN: ICD-10-PCS | Mod: S$GLB,,, | Performed by: PHYSICIAN ASSISTANT

## 2019-08-06 PROCEDURE — 3008F BODY MASS INDEX DOCD: CPT | Mod: CPTII,S$GLB,, | Performed by: PHYSICIAN ASSISTANT

## 2019-08-06 PROCEDURE — 3044F PR MOST RECENT HEMOGLOBIN A1C LEVEL <7.0%: ICD-10-PCS | Mod: CPTII,S$GLB,, | Performed by: PHYSICIAN ASSISTANT

## 2019-08-06 PROCEDURE — 99214 OFFICE O/P EST MOD 30 MIN: CPT | Mod: S$GLB,,, | Performed by: PHYSICIAN ASSISTANT

## 2019-08-06 PROCEDURE — 99999 PR PBB SHADOW E&M-EST. PATIENT-LVL IV: CPT | Mod: PBBFAC,,, | Performed by: PHYSICAL MEDICINE & REHABILITATION

## 2019-08-06 PROCEDURE — 3074F SYST BP LT 130 MM HG: CPT | Mod: CPTII,S$GLB,, | Performed by: PHYSICAL MEDICINE & REHABILITATION

## 2019-08-06 PROCEDURE — 99204 OFFICE O/P NEW MOD 45 MIN: CPT | Mod: S$GLB,,, | Performed by: PHYSICAL MEDICINE & REHABILITATION

## 2019-08-06 PROCEDURE — 3078F DIAST BP <80 MM HG: CPT | Mod: CPTII,S$GLB,, | Performed by: PHYSICIAN ASSISTANT

## 2019-08-06 PROCEDURE — 3078F PR MOST RECENT DIASTOLIC BLOOD PRESSURE < 80 MM HG: ICD-10-PCS | Mod: CPTII,S$GLB,, | Performed by: PHYSICAL MEDICINE & REHABILITATION

## 2019-08-06 PROCEDURE — 3078F DIAST BP <80 MM HG: CPT | Mod: CPTII,S$GLB,, | Performed by: PHYSICAL MEDICINE & REHABILITATION

## 2019-08-06 RX ORDER — GABAPENTIN 600 MG/1
600 TABLET ORAL 3 TIMES DAILY
Qty: 90 TABLET | Refills: 5 | Status: SHIPPED | OUTPATIENT
Start: 2019-08-06 | End: 2019-09-05

## 2019-08-06 RX ORDER — NORTRIPTYLINE HYDROCHLORIDE 25 MG/1
25 CAPSULE ORAL NIGHTLY
Qty: 30 CAPSULE | Refills: 5 | Status: SHIPPED | OUTPATIENT
Start: 2019-08-06 | End: 2019-09-05

## 2019-08-06 NOTE — PROGRESS NOTES
HPI:  Patient is a 58 y.o. year old male w. Paresthesias of hands. He is also complaining of hand weakness. He has difficulty holding objects at times. He has weakness of his legs R>L and follows pain management. He was started on neurontin and his right leg pain has improved >80%. He also complains of dragging his right foot. This has been going on for several months.    Imaging  None    Labs  Gluc elev 123  egfr cr lfts nl      Past Medical History:   Diagnosis Date    Coronary artery disease     Diabetes mellitus     History of heart artery stent     Hypertension     Myocardial infarction      Past Surgical History:   Procedure Laterality Date    BACK SURGERY      COLONOSCOPY  04/07/2014    CORONARY ANGIOPLASTY WITH STENT PLACEMENT      EGD (ESOPHAGOGASTRODUODENOSCOPY) N/A 2/21/2019    [facility]    TUMOR REMOVAL  05/16/2016    Back in skin     Family History   Problem Relation Age of Onset    Arthritis Mother     Pacemaker/defibrilator Mother     Bursitis Mother     Chronic back pain Mother     Cancer Father     Hypertension Father     No Known Problems Sister     Congenital heart disease Brother     Cancer Paternal Grandmother     Alcohol abuse Brother     No Known Problems Brother     Glaucoma Neg Hx     Macular degeneration Neg Hx     Retinal detachment Neg Hx      Social History     Socioeconomic History    Marital status: Single     Spouse name: Not on file    Number of children: Not on file    Years of education: Not on file    Highest education level: Not on file   Occupational History    Not on file   Social Needs    Financial resource strain: Not on file    Food insecurity:     Worry: Not on file     Inability: Not on file    Transportation needs:     Medical: Not on file     Non-medical: Not on file   Tobacco Use    Smoking status: Never Smoker    Smokeless tobacco: Never Used   Substance and Sexual Activity    Alcohol use: No      Frequency: Never    Drug use: No    Sexual activity: Not Currently   Lifestyle    Physical activity:     Days per week: Not on file     Minutes per session: Not on file    Stress: Not on file   Relationships    Social connections:     Talks on phone: Not on file     Gets together: Not on file     Attends [employer] service: Not on file     Active member of club or organization: Not on file     Attends meetings of clubs or organizations: Not on file     Relationship status: Not on file   Other Topics Concern    Not on file   Social History Narrative    Not on file       Review of patient's allergies indicates:  No Known Allergies    Current Outpatient Medications:     acetaminophen (TYLENOL) 500 MG tablet, Take 2 tablets (1,000 mg total) by mouth every 8 (eight) hours as needed., Disp: , Rfl: 0    aspirin (ECOTRIN) 81 MG EC tablet, Take 1 tablet (81 mg total) by mouth once daily., Disp: , Rfl: 0    atorvastatin (LIPITOR) 20 MG tablet, Take 1 tablet (20 mg total) by mouth once daily., Disp: 90 tablet, Rfl: 3    blood sugar diagnostic Strp, 2 strips by Misc.(Non-Drug; Combo Route) route once daily., Disp: 200 strip, Rfl: 4    blood sugar diagnostic Strp, 1 each by Misc.(Non-Drug; Combo Route) route 4 (four) times daily before meals and nightly., Disp: 200 each, Rfl: 5    blood-glucose meter kit, Use as instructed., Disp: 1 each, Rfl: 0    ciclopirox (LOPROX) 0.77 % Susp, Apply topically 2 (two) times daily., Disp: 6.6 mL, Rfl: 1    empagliflozin (JARDIANCE) 25 mg Tab, Take one tablet every morning., Disp: 90 tablet, Rfl: 3    fluconazole (DIFLUCAN) 200 MG Tab, Take one tablet once. Repeat in seven days if infection is not cleared., Disp: 1 tablet, Rfl: 1    gabapentin (NEURONTIN) 300 MG capsule, Take 1 capsule (300 mg total) by mouth 3 (three) times daily. 1st week take 1 tab bedtime; 2nd week twice daily; 3rd week three times daily (Patient taking differently: Take 300 mg by mouth 2 (two) times daily.  "1st week take 1 tab bedtime; 2nd week twice daily; 3rd week three times daily), Disp: 90 capsule, Rfl: 11    gabapentin (NEURONTIN) 600 MG tablet, Take 1 tablet (600 mg total) by mouth 3 (three) times daily., Disp: 90 tablet, Rfl: 5    insulin degludec (TRESIBA FLEXTOUCH U-100) 100 unit/mL (3 mL) InPn, Inject 13 Units into the skin once daily., Disp: 1 Syringe, Rfl: 11    lancets (ACCU-CHEK FASTCLIX LANCET DRUM) Misc, 1 Device by Misc.(Non-Drug; Combo Route) route 2 (two) times daily., Disp: 200 each, Rfl: 4    lancets 28 gauge Misc, 1 lancet by Misc.(Non-Drug; Combo Route) route 4 (four) times daily before meals and nightly., Disp: 400 each, Rfl: 5    lisinopril (PRINIVIL,ZESTRIL) 20 MG tablet, Take 1 tablet (20 mg total) by mouth once daily., Disp: 90 tablet, Rfl: 3    metFORMIN (GLUCOPHAGE) 850 MG tablet, Take 1 tablet (850 mg total) by mouth 2 (two) times daily with meals., Disp: 180 tablet, Rfl: 3    nortriptyline (PAMELOR) 25 MG capsule, Take 1 capsule (25 mg total) by mouth every evening., Disp: 30 capsule, Rfl: 5    omega 3-dha-epa-fish oil 1,000 mg (120 mg-180 mg) Cap, Take 1 capsule by mouth 2 (two) times daily with meals., Disp: 180 capsule, Rfl: 3    pantoprazole (PROTONIX) 40 MG tablet, TAKE 1 TABLET BY MOUTH EVERY DAY, Disp: 90 tablet, Rfl: 1    pen needle, diabetic (PEN NEEDLE) 32 gauge x 5/32" Ndle, Inject 1 each into the skin once daily. Generic, Disp: 200 each, Rfl: 3    sildenafil (VIAGRA) 100 MG tablet, 1/2 -1 po 1 h prior to intercourse prn ED, Disp: 6 tablet, Rfl: prn    tamsulosin (FLOMAX) 0.4 mg Cap, Take 1 capsule (0.4 mg total) by mouth after dinner., Disp: 90 capsule, Rfl: 3    terbinafine HCl (LAMISIL) 250 mg tablet, Take 1 tablet (250 mg total) by mouth once daily., Disp: 30 tablet, Rfl: 1      Review of Systems:    No nausea, vomiting, fevers, chills , contipation, diarrhea or sweats,no weight change, occ neck stiffness, occ chest pain, occ. sob, no change of bowel , inc " urination, + coordination issues      Physical Exam:      Vitals:    08/06/19 1011   BP: 113/72   Pulse: 78     alert and oriented ×4 follows commands answers all questions appropriately,affect wnl  Manual muscle test 5 out of 5 of bue, and ble 3/5 right ADF and 4/5 KE, hf ble 5/5, sensation to light touch dec digits 4 and 5   Mild foot drag right  Fair dynamic balance, ambulates w. A cane  Resting tremors b/l hands  -ward's  No musc atrophy  -spurling's  Full cervical ROM  babinsky down  No clonus  No C/C/E  Masked fascies      Assessment:  Paresthesias of hands  Right foot drag likely due to lumbar radiculopathy  Resting tremor of hands- benign vs parkinson's  DM2    Plan:  EMG/NCS *4 extremities  His numbness of digits 4 and 5 are either peripheral nerve entrapment or cervical radiculopathy  The weakness of his right leg is likely due to a lumbar radiculopathy  Will wait to obtain further imaging of cervical and lumbar spine until emg results are back  Recommended referral to movement disorder specialist to further assess tremor but pt. Declined    Thank you for this interesting referral

## 2019-08-06 NOTE — LETTER
August 6, 2019      Chanel Roberson MD  [address]  [place] [address]           [place] [facility]  [address] [place] [address]  [address]  [place] [address]  Phone: [phone]  Fax: [phone]          Patient: Price Noguera   MR Number: [id number]   YOB: 1960   Date of Visit: 8/6/2019       Dear Dr. Chanel Roberson:    Thank you for referring Price Noguera to me for evaluation. Attached you will find relevant portions of my assessment and plan of care.    If you have questions, please do not hesitate to call me. I look forward to following Price Noguera along with you.    Sincerely,    Reina Griffiths,     Enclosure  CC:  No Recipients    If you would like to receive this communication electronically, please contact [email or web address] or [phone] to request more information on Telemedicine Clinic Link access.    For providers and/or their staff who would like to refer a patient to [facility], please contact us through our one-stop-shop provider referral line, [facility], at [phone].    If you feel you have received this communication in error or would no longer like to receive these types of communications, please e-mail [email or web address]

## 2019-08-06 NOTE — PROGRESS NOTES
Referring Physician: No ref. provider found    PCP: Chanel Roberson MD      CC:  Bilateral foot and leg pain    Interval History:  Price Noguera is a 58 y.o. male with chronic bilateral LE peripheral neuropathy who presents today for f/u. LCV Gabapentin was increased to 600 mg TID. Nortriptyline 25 mg was added nightly. Reports this has been helpful with minimal SEs. He does report some early morning grogginess and some occasional dizziness which is tolerable. He has a new c/o numbness and tingling in digits 4 and 5 bilaterally. He denies any neck pain. Reports difficulty writing intermittently. Pain today is rated 8/10.  Pt has been seen in the clinic before, however pt is new to me.     History below per Dr. Naranjo    HPI:   Price Noguera is a 58 y.o. male referred to us for bilateral leg pain. Pain has gradually worsened over past 2 years.  He has history of diabetic neuropathy.  Presents to us with constant aching, burning, shooting and deep pain in his bilateral calves and feet.  Pain worsens standing, walking, lifting.  Pain improves with rest.  He recently has gotten his blood glucoses under better control.  He is here for further help with his diabetic nerve pain he currently takes gabapentin 300 mg t.i.d. with mild benefits.  He denies any worsening weakness.  No bowel or bladder changes.    ROS:  CONSTITUTIONAL: No fevers, chills, night sweats, wt. loss, appetite changes  SKIN: no rashes or itching  ENT: No headaches, head trauma, vision changes, or eye pain  LYMPH NODES: None noticed   CV: No chest pain, palpitations.   RESP: No shortness of breath, dyspnea on exertion, cough, wheezing, or hemoptysis  GI: No nausea, emesis, diarrhea, constipation, melena, hematochezia, pain.    : No dysuria, hematuria, urgency, or frequency   HEME: No easy bruising, bleeding problems  PSYCHIATRIC: No depression, anxiety, psychosis, hallucinations.  NEURO: No seizures, memory loss, dizziness or difficulty sleeping  MSK:   Positive HPI      Past Medical History:   Diagnosis Date    Coronary artery disease     Diabetes mellitus     History of heart artery stent     Hypertension     Myocardial infarction      Past Surgical History:   Procedure Laterality Date    BACK SURGERY      COLONOSCOPY  04/07/2014    CORONARY ANGIOPLASTY WITH STENT PLACEMENT      EGD (ESOPHAGOGASTRODUODENOSCOPY) N/A 2/21/2019    Performed by Nino Ross MD at [facility]    TUMOR REMOVAL  05/16/2016    Back in skin     Family History   Problem Relation Age of Onset    Arthritis Mother     Pacemaker/defibrilator Mother     Bursitis Mother     Chronic back pain Mother     Cancer Father     Hypertension Father     No Known Problems Sister     Congenital heart disease Brother     Cancer Paternal Grandmother     Alcohol abuse Brother     No Known Problems Brother     Glaucoma Neg Hx     Macular degeneration Neg Hx     Retinal detachment Neg Hx      Social History     Socioeconomic History    Marital status: Single     Spouse name: Not on file    Number of children: Not on file    Years of education: Not on file    Highest education level: Not on file   Occupational History    Not on file   Social Needs    Financial resource strain: Not on file    Food insecurity:     Worry: Not on file     Inability: Not on file    Transportation needs:     Medical: Not on file     Non-medical: Not on file   Tobacco Use    Smoking status: Never Smoker    Smokeless tobacco: Never Used   Substance and Sexual Activity    Alcohol use: No     Frequency: Never    Drug use: No    Sexual activity: Not Currently   Lifestyle    Physical activity:     Days per week: Not on file     Minutes per session: Not on file    Stress: Not on file   Relationships    Social connections:     Talks on phone: Not on file     Gets together: Not on file     Attends Buddhist service: Not on file     Active member of club or organization: Not on file     Attends meetings  "of clubs or organizations: Not on file     Relationship status: Not on file   Other Topics Concern    Not on file   Social History Narrative    Not on file         Medications/Allergies: See med card    Vitals:    08/06/19 0925   BP: 106/70   Pulse: 87   Weight: 82.1 kg (181 lb)   Height: 5' 9" (1.753 m)   PainSc:   8   PainLoc: Foot         Physical exam:    GENERAL: A and O x3, the patient appears well groomed and is in no acute distress.  Skin: No rashes or obvious lesions  HEENT: normocephalic, atraumatic  CARDIOVASCULAR:  RRR  LUNGS: non labored breathing  ABDOMEN: soft, nontender   UPPER EXTREMITIES: Normal alignment, normal range of motion, no atrophy, no skin changes,  hair growth and nail growth normal and equal bilaterally. No swelling, no tenderness.    LOWER EXTREMITIES:  Normal alignment, normal range of motion, no atrophy, no skin changes,  hair growth and nail growth normal and equal bilaterally. No swelling, no tenderness.  LUMBAR SPINE  Lumbar spine: ROM is full with flexion extension and oblique extension with no increased pain.    Chang's test causes no increased pain on either side.    Supine straight leg raise is negative bilaterally.    Internal and external rotation of the hip causes no increased pain on either side.  Myofascial exam: No tenderness to palpation across lumbar paraspinous muscles.    MENTAL STATUS: normal orientation, speech, language, and fund of knowledge for social situation.  Emotional state appropriate.    CRANIAL NERVES:  II:  PERRL bilaterally,   III,IV,VI: EOMI.    V:  Facial sensation equal bilaterally  VII:  Facial motor function normal.  VIII:  Hearing equal to finger rub bilaterally  IX/X: Gag normal, palate symmetric  XI:  Shoulder shrug equal, head turn equal  XII:  Tongue midline without fasciculations      MOTOR: Tone and bulk: normal bilateral upper and lower Strength: normal "   Delt Bi Tri WE WF     R 5 5 5 5 5 5   L 5 5 5 5 5 5     IP ADD ABD Quad TA Gas HAM  R 5 5 5 5 5 5 5  L 5 5 5 5 5 5 5    SENSATION:  Decreased globally bilateral feet  REFLEXES: normal, symmetric, nonbrisk.  Toes down, no clonus. No hoffmans.  GAIT: normal rise, base, steps, and arm swing.      Imaging:  None    Assessment:  Price Noguera is a 58 y.o. female with bilateral foot pain  1. Chronic painful diabetic neuropathy    2. Neuropathic pain      Plan:  1. I have stressed the importance of physical activity and exercise to improve overall health  2.  Patient bilateral painful diabetic neuropathy affecting his lower extremities. Encourage him to continue better glucose control.  3.  Continue gabapentin 600 mg t.i.d. for anti neuropathic adjunct.  4.  Continue nortriptyline 25 mg Q hs for further anti neuropathic adjunct.  5.  May consider lumbar sympathetic nerve blocks in the future for LE.  6.  Discussed EMG for UE N/T. He will consider this  7.  F/u 6 months or sooner  Thank you for referring this interesting patient, and I look forward to continuing to collaborate in his care.

## 2019-08-08 ENCOUNTER — PATIENT OUTREACH (OUTPATIENT)
Dept: ADMINISTRATIVE | Facility: HOSPITAL | Age: 59
End: 2019-08-08

## 2019-09-17 DIAGNOSIS — B35.1 ONYCHOMYCOSIS: ICD-10-CM

## 2019-09-18 RX ORDER — TERBINAFINE HYDROCHLORIDE 250 MG/1
TABLET ORAL
Qty: 30 TABLET | Refills: 1 | Status: SHIPPED | OUTPATIENT
Start: 2019-09-18 | End: 2019-11-17 | Stop reason: SDUPTHER

## 2019-09-24 ENCOUNTER — PATIENT OUTREACH (OUTPATIENT)
Dept: ADMINISTRATIVE | Facility: HOSPITAL | Age: 59
End: 2019-09-24

## 2019-09-24 NOTE — LETTER
September 24, 2019    Price Noguera  [address] [facility] [address] [address]             [facility]  [address]  [facility] [address]  Phone: [phone] [facility] is committed to your overall health.  To help you get the most out of each of your visits, we will review your information to make sure you are up to date on all of your recommended tests and/or procedures.      Dr. Chanel Roberson MD has found that your chart shows you may be due for a:    COLORECTAL CANCER SCREENING  IMMUNIZATIONS     If you have had any of the above done at another facility, please bring the records or information with you so that your record at [facility] will be complete.  If you would like to schedule any of these, please contact the clinic at [phone].    If you are currently taking medication, please bring it with you to your appointment for review.    Also, if you have any type of Advanced Directives, please bring them with you to your office visit so we may scan them into your chart.    Thank You,    Your [facility] Team,  MD Simran Haq LPN Clinical Care Coordinator  [facility]  [address] [place] [id number]  Phone [phone]  Fax [phone]

## 2019-09-24 NOTE — PROGRESS NOTES
Health Maintenance Due   Topic Date Due    Pneumococcal Vaccine (Medium Risk) (1 of 1 - PPSV23) 09/28/1978    Colonoscopy  09/28/2009    Influenza Vaccine (1) 09/01/2019    Shingles Vaccine (2 of 2) 09/02/2019

## 2019-09-30 ENCOUNTER — OFFICE VISIT (OUTPATIENT)
Dept: FAMILY MEDICINE | Facility: CLINIC | Age: 59
End: 2019-09-30
Payer: COMMERCIAL

## 2019-09-30 VITALS
RESPIRATION RATE: 14 BRPM | OXYGEN SATURATION: 97 % | HEIGHT: 69 IN | SYSTOLIC BLOOD PRESSURE: 110 MMHG | BODY MASS INDEX: 26.62 KG/M2 | HEART RATE: 80 BPM | DIASTOLIC BLOOD PRESSURE: 70 MMHG | WEIGHT: 179.69 LBS | TEMPERATURE: 98 F

## 2019-09-30 DIAGNOSIS — E78.5 HYPERLIPIDEMIA ASSOCIATED WITH TYPE 2 DIABETES MELLITUS: ICD-10-CM

## 2019-09-30 DIAGNOSIS — E11.59 HYPERTENSION ASSOCIATED WITH DIABETES: ICD-10-CM

## 2019-09-30 DIAGNOSIS — Z23 FLU VACCINE NEED: ICD-10-CM

## 2019-09-30 DIAGNOSIS — E87.1 HYPONATREMIA: ICD-10-CM

## 2019-09-30 DIAGNOSIS — L60.3 NAIL DYSTROPHY: Primary | ICD-10-CM

## 2019-09-30 DIAGNOSIS — E11.69 HYPERLIPIDEMIA ASSOCIATED WITH TYPE 2 DIABETES MELLITUS: ICD-10-CM

## 2019-09-30 DIAGNOSIS — Z86.010 HISTORY OF COLON POLYPS: ICD-10-CM

## 2019-09-30 DIAGNOSIS — I15.2 HYPERTENSION ASSOCIATED WITH DIABETES: ICD-10-CM

## 2019-09-30 DIAGNOSIS — K21.9 GASTROESOPHAGEAL REFLUX DISEASE WITHOUT ESOPHAGITIS: ICD-10-CM

## 2019-09-30 DIAGNOSIS — E11.65 UNCONTROLLED TYPE 2 DIABETES MELLITUS WITH HYPERGLYCEMIA: ICD-10-CM

## 2019-09-30 PROBLEM — Z86.0100 HISTORY OF COLON POLYPS: Status: ACTIVE | Noted: 2019-09-30

## 2019-09-30 PROCEDURE — 3044F HG A1C LEVEL LT 7.0%: CPT | Mod: CPTII,S$GLB,, | Performed by: FAMILY MEDICINE

## 2019-09-30 PROCEDURE — 99214 PR OFFICE/OUTPT VISIT, EST, LEVL IV, 30-39 MIN: ICD-10-PCS | Mod: S$GLB,,, | Performed by: FAMILY MEDICINE

## 2019-09-30 PROCEDURE — 3044F PR MOST RECENT HEMOGLOBIN A1C LEVEL <7.0%: ICD-10-PCS | Mod: CPTII,S$GLB,, | Performed by: FAMILY MEDICINE

## 2019-09-30 PROCEDURE — 99999 PR PBB SHADOW E&M-EST. PATIENT-LVL IV: CPT | Mod: PBBFAC,,, | Performed by: FAMILY MEDICINE

## 2019-09-30 PROCEDURE — 3074F SYST BP LT 130 MM HG: CPT | Mod: CPTII,S$GLB,, | Performed by: FAMILY MEDICINE

## 2019-09-30 PROCEDURE — 3078F DIAST BP <80 MM HG: CPT | Mod: CPTII,S$GLB,, | Performed by: FAMILY MEDICINE

## 2019-09-30 PROCEDURE — 3074F PR MOST RECENT SYSTOLIC BLOOD PRESSURE < 130 MM HG: ICD-10-PCS | Mod: CPTII,S$GLB,, | Performed by: FAMILY MEDICINE

## 2019-09-30 PROCEDURE — 3078F PR MOST RECENT DIASTOLIC BLOOD PRESSURE < 80 MM HG: ICD-10-PCS | Mod: CPTII,S$GLB,, | Performed by: FAMILY MEDICINE

## 2019-09-30 PROCEDURE — 99999 PR PBB SHADOW E&M-EST. PATIENT-LVL IV: ICD-10-PCS | Mod: PBBFAC,,, | Performed by: FAMILY MEDICINE

## 2019-09-30 PROCEDURE — 3008F BODY MASS INDEX DOCD: CPT | Mod: CPTII,S$GLB,, | Performed by: FAMILY MEDICINE

## 2019-09-30 PROCEDURE — 99214 OFFICE O/P EST MOD 30 MIN: CPT | Mod: S$GLB,,, | Performed by: FAMILY MEDICINE

## 2019-09-30 PROCEDURE — 3008F PR BODY MASS INDEX (BMI) DOCUMENTED: ICD-10-PCS | Mod: CPTII,S$GLB,, | Performed by: FAMILY MEDICINE

## 2019-09-30 RX ORDER — PANTOPRAZOLE SODIUM 40 MG/1
40 TABLET, DELAYED RELEASE ORAL DAILY
Qty: 90 TABLET | Refills: 3 | Status: SHIPPED | OUTPATIENT
Start: 2019-09-30 | End: 2019-12-10 | Stop reason: SDUPTHER

## 2019-09-30 NOTE — PROGRESS NOTES
Subjective:       Patient ID: Price Noguera is a 59 y.o. male.    Chief Complaint: Follow-up (3mth f/u hypertension / diabetes)    HPI  Review of Systems   Constitutional: Negative for fatigue and unexpected weight change.   Respiratory: Negative for chest tightness and shortness of breath.    Cardiovascular: Negative for chest pain, palpitations and leg swelling.   Gastrointestinal: Negative for abdominal pain.   Musculoskeletal: Negative for arthralgias.   Neurological: Negative for dizziness, syncope, light-headedness and headaches.       Patient Active Problem List   Diagnosis    Uncontrolled type 2 diabetes mellitus with hyperglycemia    Coronary artery disease    History of heart artery stent    Hypertension associated with diabetes    Hyponatremia    Hyperlipidemia associated with type 2 diabetes mellitus    Irritable bowel syndrome with diarrhea    Gastroesophageal reflux disease without esophagitis    Onychomycosis    Benign essential tremor    Dysphagia    History of colon polyps     Patient is here for a chronic conditions follow up.    Thumbnail medially , thick and lamisil no help x 2 months       Objective:      Physical Exam   Constitutional: He is oriented to person, place, and time. He appears well-developed and well-nourished.   Cardiovascular: Normal rate, regular rhythm and normal heart sounds.   Pulmonary/Chest: Effort normal and breath sounds normal.   Musculoskeletal: He exhibits no edema.   Neurological: He is alert and oriented to person, place, and time.   Skin: Skin is warm and dry.   Psychiatric: He has a normal mood and affect.   Nursing note and vitals reviewed.      Assessment:       1. Nail dystrophy    2. Gastroesophageal reflux disease without esophagitis    3. Flu vaccine need    4. History of colon polyps    5. Hypertension associated with diabetes    6. Hyperlipidemia associated with type 2 diabetes mellitus    7. Hyponatremia    8. Uncontrolled type 2  diabetes mellitus with hyperglycemia        Plan:       1. Gastroesophageal reflux disease without esophagitis  Controlled on current medications.  Continue current medications.    - pantoprazole (PROTONIX) 40 MG tablet; Take 1 tablet (40 mg total) by mouth once daily.  Dispense: 90 tablet; Refill: 3    2. Nail dystrophy  Refer for eval and treat  - Ambulatory referral to Dermatology    3. Flu vaccine need  Immunize today.  Counseled patient on risks, benefits and side effects.  Patient elected to proceed with vaccination.    - Influenza - Quadrivalent (PF)    4. History of colon polyps  Cont surveillance    5. Hypertension associated with diabetes  Controlled on current medications.  Continue current medications.      6. Hyperlipidemia associated with type 2 diabetes mellitus  .Controlled on current medications.  Continue current medications.    - CBC auto differential; Future  - Comprehensive metabolic panel; Future  - Lipid panel; Future    7. Hyponatremia  Normal range 7/19. Cont to monitor    8. Uncontrolled type 2 diabetes mellitus with hyperglycemia  Controlled on current medications.  Continue current medications.    - Hemoglobin A1c; Future  - Microalbumin/creatinine urine ratio; Future        Time spent with patient: 20 minutes    Patient with be reevaluated in 6 months or sooner prn    Greater than 50% of this visit was spent counseling as described in above documentation:Yes

## 2019-10-15 ENCOUNTER — INITIAL CONSULT (OUTPATIENT)
Dept: DERMATOLOGY | Facility: CLINIC | Age: 59
End: 2019-10-15
Payer: COMMERCIAL

## 2019-10-15 VITALS — HEIGHT: 69 IN | BODY MASS INDEX: 26.51 KG/M2 | WEIGHT: 179 LBS

## 2019-10-15 DIAGNOSIS — L60.3 NAIL DYSTROPHY: Primary | ICD-10-CM

## 2019-10-15 PROCEDURE — 3008F PR BODY MASS INDEX (BMI) DOCUMENTED: ICD-10-PCS | Mod: CPTII,S$GLB,, | Performed by: DERMATOLOGY

## 2019-10-15 PROCEDURE — 3008F BODY MASS INDEX DOCD: CPT | Mod: CPTII,S$GLB,, | Performed by: DERMATOLOGY

## 2019-10-15 PROCEDURE — 99999 PR PBB SHADOW E&M-EST. PATIENT-LVL II: CPT | Mod: PBBFAC,,, | Performed by: DERMATOLOGY

## 2019-10-15 PROCEDURE — 99999 PR PBB SHADOW E&M-EST. PATIENT-LVL II: ICD-10-PCS | Mod: PBBFAC,,, | Performed by: DERMATOLOGY

## 2019-10-15 PROCEDURE — 99202 PR OFFICE/OUTPT VISIT, NEW, LEVL II, 15-29 MIN: ICD-10-PCS | Mod: S$GLB,,, | Performed by: DERMATOLOGY

## 2019-10-15 PROCEDURE — 99202 OFFICE O/P NEW SF 15 MIN: CPT | Mod: S$GLB,,, | Performed by: DERMATOLOGY

## 2019-10-15 NOTE — PROGRESS NOTES
Subjective:       Patient ID:  Price Noguera is a 59 y.o. male who presents for   Chief Complaint   Patient presents with    Nail Problem     both thumbs     HPI    New patient presents today with discoloration to both thumbs, 2 years, was on terbinafine for 60 days, did see a slight improvement. Painful and sore, does seem to ooze sometimes. Has been cutting the nails down and keeping the nails clear.  Did use ciclopirox solution, did not help.     Review of Systems   Constitutional: Negative for fever, chills and fatigue.   HENT: Negative for nosebleeds, congestion and sore throat.    Musculoskeletal: Negative for joint swelling and arthralgias.   Skin: Negative for itching, rash and dry skin.   Hematologic/Lymphatic: Does not bruise/bleed easily.        Objective:    Physical Exam   Constitutional: He appears well-developed and well-nourished. No distress.   HENT:   Mouth/Throat: Lips normal.    Eyes: No conjunctival no injection.   Neurological: He is alert and oriented to person, place, and time. He is not disoriented.   Psychiatric: He has a normal mood and affect.   Skin:   Areas Examined (abnormalities noted in diagram):   Scalp / Hair Palpated and Inspected  Head / Face Inspection Performed  Neck Inspection Performed  Nails and Digits Inspection Performed             Diagram Legend     Erythematous scaling macule/papule c/w actinic keratosis       Vascular papule c/w angioma      Pigmented verrucoid papule/plaque c/w seborrheic keratosis      Yellow umbilicated papule c/w sebaceous hyperplasia      Irregularly shaped tan macule c/w lentigo     1-2 mm smooth white papules consistent with Milia      Movable subcutaneous cyst with punctum c/w epidermal inclusion cyst      Subcutaneous movable cyst c/w pilar cyst      Firm pink to brown papule c/w dermatofibroma      Pedunculated fleshy papule(s) c/w skin tag(s)      Evenly pigmented macule c/w junctional nevus     Mildly variegated pigmented, slightly  irregular-bordered macule c/w mildly atypical nevus      Flesh colored to evenly pigmented papule c/w intradermal nevus       Pink pearly papule/plaque c/w basal cell carcinoma      Erythematous hyperkeratotic cursted plaque c/w SCC      Surgical scar with no sign of skin cancer recurrence      Open and closed comedones      Inflammatory papules and pustules      Verrucoid papule consistent consistent with wart     Erythematous eczematous patches and plaques     Dystrophic onycholytic nail with subungual debris c/w onychomycosis     Umbilicated papule    Erythematous-base heme-crusted tan verrucoid plaque consistent with inflamed seborrheic keratosis     Erythematous Silvery Scaling Plaque c/w Psoriasis     See annotation      Assessment / Plan:        Nail dystrophy  Discussed with patient the etiology and pathogenesis of the disease or skin lesion(s) and possible treatments and aggravators.    Discussed with patient the benign nature of these lesions and that no treatment is indicated.    Chronic nature of this condition discussed with patient.  Patient to watch for recurrence or flares or worsening and to call the clinic for a follow up appointment for such.  Reviewed with patient different treatment options and associated risks.  No hot water bathing reviewed.  Instructed patient to use petroleum jelly at least daily on affected areas.  Patient instructed to start Amlactin cream or lotion nightly to AK prone areas or other specified affected areas.  Warned of skin irritation and to decrease frequency of usage if this occurs.  Stop oral and topical antifungals.           Follow up if symptoms worsen or fail to improve.

## 2019-10-15 NOTE — LETTER
October 15, 2019      Chanel Roberson MD  [address]  [facility] [address]           [address], [address]  [facility] [address]  Phone: [phone]          Patient: Price Noguera   MR Number: [id number]   YOB: 1960   Date of Visit: 10/15/2019       Dear Dr. Chanel Roberson:    Thank you for referring Price Noguera to me for evaluation. Attached you will find relevant portions of my assessment and plan of care.    If you have questions, please do not hesitate to call me. I look forward to following Price Noguera along with you.    Sincerely,    Jacoby Weaver MD    Enclosure  CC:  No Recipients    If you would like to receive this communication electronically, please contact [email or web address] or [phone] to request more information on [id number] Link access.    For providers and/or their staff who would like to refer a patient to [facility], please contact us through our one-stop-shop provider referral line, [facility], at [phone].    If you feel you have received this communication in error or would no longer like to receive these types of communications, please e-mail [email or web address]

## 2019-10-21 ENCOUNTER — LAB VISIT (OUTPATIENT)
Dept: LAB | Facility: HOSPITAL | Age: 59
End: 2019-10-21
Attending: UROLOGY
Payer: COMMERCIAL

## 2019-10-21 DIAGNOSIS — R35.0 URINARY FREQUENCY: ICD-10-CM

## 2019-10-21 DIAGNOSIS — N52.9 ERECTILE DYSFUNCTION, UNSPECIFIED ERECTILE DYSFUNCTION TYPE: ICD-10-CM

## 2019-10-21 LAB — TESTOST SERPL-MCNC: 890 NG/DL (ref 304–1227)

## 2019-10-21 PROCEDURE — 84403 ASSAY OF TOTAL TESTOSTERONE: CPT

## 2019-10-21 PROCEDURE — 36415 COLL VENOUS BLD VENIPUNCTURE: CPT | Mod: PO

## 2019-10-22 ENCOUNTER — OFFICE VISIT (OUTPATIENT)
Dept: UROLOGY | Facility: CLINIC | Age: 59
End: 2019-10-22
Payer: COMMERCIAL

## 2019-10-22 VITALS
DIASTOLIC BLOOD PRESSURE: 75 MMHG | HEIGHT: 69 IN | WEIGHT: 176.94 LBS | HEART RATE: 87 BPM | SYSTOLIC BLOOD PRESSURE: 114 MMHG | BODY MASS INDEX: 26.21 KG/M2

## 2019-10-22 DIAGNOSIS — N13.8 BPH WITH OBSTRUCTION/LOWER URINARY TRACT SYMPTOMS: ICD-10-CM

## 2019-10-22 DIAGNOSIS — N52.9 ERECTILE DYSFUNCTION, UNSPECIFIED ERECTILE DYSFUNCTION TYPE: ICD-10-CM

## 2019-10-22 DIAGNOSIS — R35.1 NOCTURIA: Primary | ICD-10-CM

## 2019-10-22 DIAGNOSIS — R10.2 PERINEAL PAIN: ICD-10-CM

## 2019-10-22 DIAGNOSIS — N40.1 BPH WITH OBSTRUCTION/LOWER URINARY TRACT SYMPTOMS: ICD-10-CM

## 2019-10-22 PROCEDURE — 99213 PR OFFICE/OUTPT VISIT, EST, LEVL III, 20-29 MIN: ICD-10-PCS | Mod: 25,S$GLB,, | Performed by: UROLOGY

## 2019-10-22 PROCEDURE — 81002 URINALYSIS NONAUTO W/O SCOPE: CPT | Mod: S$GLB,,, | Performed by: UROLOGY

## 2019-10-22 PROCEDURE — 99999 PR PBB SHADOW E&M-EST. PATIENT-LVL IV: CPT | Mod: PBBFAC,,, | Performed by: UROLOGY

## 2019-10-22 PROCEDURE — 3074F PR MOST RECENT SYSTOLIC BLOOD PRESSURE < 130 MM HG: ICD-10-PCS | Mod: CPTII,S$GLB,, | Performed by: UROLOGY

## 2019-10-22 PROCEDURE — 3078F DIAST BP <80 MM HG: CPT | Mod: CPTII,S$GLB,, | Performed by: UROLOGY

## 2019-10-22 PROCEDURE — 99213 OFFICE O/P EST LOW 20 MIN: CPT | Mod: 25,S$GLB,, | Performed by: UROLOGY

## 2019-10-22 PROCEDURE — 99999 PR PBB SHADOW E&M-EST. PATIENT-LVL IV: ICD-10-PCS | Mod: PBBFAC,,, | Performed by: UROLOGY

## 2019-10-22 PROCEDURE — 3078F PR MOST RECENT DIASTOLIC BLOOD PRESSURE < 80 MM HG: ICD-10-PCS | Mod: CPTII,S$GLB,, | Performed by: UROLOGY

## 2019-10-22 PROCEDURE — 3008F PR BODY MASS INDEX (BMI) DOCUMENTED: ICD-10-PCS | Mod: CPTII,S$GLB,, | Performed by: UROLOGY

## 2019-10-22 PROCEDURE — 81002 POCT URINE DIPSTICK WITHOUT MICROSCOPE: ICD-10-PCS | Mod: S$GLB,,, | Performed by: UROLOGY

## 2019-10-22 PROCEDURE — 3074F SYST BP LT 130 MM HG: CPT | Mod: CPTII,S$GLB,, | Performed by: UROLOGY

## 2019-10-22 PROCEDURE — 3008F BODY MASS INDEX DOCD: CPT | Mod: CPTII,S$GLB,, | Performed by: UROLOGY

## 2019-10-22 RX ORDER — TADALAFIL 5 MG/1
5 TABLET ORAL DAILY
Qty: 90 TABLET | Refills: 3 | Status: SHIPPED | OUTPATIENT
Start: 2019-10-22 | End: 2019-12-23 | Stop reason: ALTCHOICE

## 2019-10-22 RX ORDER — NORTRIPTYLINE HYDROCHLORIDE 25 MG/1
CAPSULE ORAL
Refills: 5 | COMMUNITY
Start: 2019-10-12 | End: 2020-03-13

## 2019-10-22 RX ORDER — GABAPENTIN 600 MG/1
600 TABLET ORAL 3 TIMES DAILY
Refills: 5 | COMMUNITY
Start: 2019-10-12 | End: 2020-02-09 | Stop reason: SDUPTHER

## 2019-10-22 NOTE — PROGRESS NOTES
[facility] Note - [place]  Staff: MD Eric    Referring provider and please cc:   No referring provider defined for this encounter.     PCP: Chanel Roberson MD    [facility] Utilization: active    Chief Complaint:   Chief Complaint   Patient presents with    Follow-up     3 mos/ testosterone         Subjective:        HPI: Price Noguera is a 59 y.o. male     Seen on 7/18/19 in consult with following complaints  He presents with urinary frequency, hesitancy and nocturia that is been present for the past 5 years and has progressively worsened.  He has never seen a urologist.  He has never tried a urologic medication.  He voids about 5 to 7 times a day and 5-10 times at night.  He denies any significant caffeine use.  He denie s any lower extremity edema, no diuretics, he no caffeine before bedtime.  He does drink 1 cup of water to take his medication prior to bedtime.  Does snore.  Never had a sleep evaluation.  Appears to have normal BMI.     He also has erectile dysfunction.  He has soft erections but has had this issue his whole life.  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  He denies any difficulty urinating as a child.  He denies any UTIs as a child.  He says his testicles are normal in size.  He has no children is never tried have any children.  He was written for Viagra but has not tried.  He has never had his testosterone checked.    AUA ssx:(3 incomplete emptying, 3 frequency, 3 intermittency, 2 urgency, 4 weak stream, 3 straining, 4 sleeping). 20. QOL: unhappy    Interval history:   Started him on flomax 0.4mg - improved stream, less perineal discomfort. Still having nocturia 0-4x a night (previously discussed multi-factorial). aua ssx: 18, mostly satisfied.   Checked testosterone - 890 on 10/21/19  Hadn't made f/u with  to do  another colonoscopy (last one 5 years ago [place] where they saw 7 polyps).   Never saw cardiologist (has a stent) -  (was told he couldn't be seen bc was supposed to see  who did test on him but owed too much to be able to make another appt).   He says he can't get an erection without stopping bp meds and using viagra 100mg      Urinalysis void: 500 glucose  Urine history:   7/18/19 No cx, void: 500 glucose/tr ketones  3/14/19 3+glucose    PSA history: no family hx of prostate cancer  7/9/19 0.95, EMANUEL 7/18/19: 30g, no nodules    Testosterone   10/21/19 890        REVIEW OF SYSTEMS:  General ROS: no fevers, no chills  Psychological ROS: no depression  Endocrine ROS: no heat or cold  Respiratory ROS: no SOB  Cardiovascular ROS: no CP  Gastrointestinal ROS: no abdominal pain, no constipation, no diarrhea, noBRBPR  Musculoskeletal ROS: no muscle pain  Neurological ROS: no headaches  Dermatological ROS: no rashes  HEENT: +glasses, no sinus   ROS: per HPI     PMHx:  Past Medical History:   Diagnosis Date    Coronary artery disease     Diabetes mellitus     History of heart artery stent     Hypertension     Myocardial infarction    MI s/p Cardiac stent in Jan 2012   Diabetic ketoacidosis - dx this year 2019      PSHx:  Past Surgical History:   Procedure Laterality Date    BACK SURGERY      COLONOSCOPY  04/07/2014    CORONARY ANGIOPLASTY WITH STENT PLACEMENT      ESOPHAGOGASTRODUODENOSCOPY N/A 2/21/2019    Procedure: EGD (ESOPHAGOGASTRODUODENOSCOPY);  Surgeon: Nino Ross MD;  Location: [facility];  Service: Endoscopy;  Laterality: N/A;    TUMOR REMOVAL  05/16/2016    Back in skin   myelomeningocele closure as a child    Health Maintenance:  Health Maintenance Topics with due status: Not Due       Topic Last Completion Date    Foot Exam 02/06/2019    Eye Exam 03/08/2019    TETANUS VACCINE 07/08/2019    Lipid Panel 07/09/2019    High Dose Statin 10/15/2019    Aspirin/Antiplatelet Therapy  10/15/2019      Gastroenterologist: - due for one 2019- 7 polyps 5 years ago   Stents/Valves/Foreign Bodies/Cardiac Evaluation/Cardiologist:   Pain medicine:  for neuropathy  In [place]   Fam Hx:   malignancies: none.   kidney stones: none  Parental history:  Father had glioblastoma and  a 72. Mother alive a 90s    Soc Hx:  Social History     Tobacco Use    Smoking status: Never Smoker    Smokeless tobacco: Never Used   Substance Use Topics    Alcohol use: No     Frequency: Never    Drug use: No       Lives: [place]  :unmarried  Patient's occupation: [employer]  Children: none  Hobby:     Allergies:  Patient has no known allergies.    Current Urologic Medications: none  Aspirin: 81mg daily   Anticoagulation: none    Objective:     Vitals:    10/22/19 1253   BP: 114/75   Pulse: 87       General:WDWN in NAD  Eyes: PERRLA, normal conjunctiva  Respiratory: no increased work on breathing. No wheezing.   Cardiovascular: No obvious extremity edema. Warm and well perfused.   GI: no palpation of masses. No tenderness. No hepatosplenomegaly to palpation.  Musculoskeletal: normal range of motion of bilateral upper extremities. Normal muscle strength and tone.  Skin: no obvious rashes or lesions. No tightening of skin noted.  Neurologic: CN grossly normal. Normal sensation.   Psychiatric: awake, alert and oriented x 3. Mood and affect normal. Cooperative.     exam  19  Inspection of anus normal  No scrotal rashes, cysts or lesions  Epididymis normal in size, no tenderness  Testes normal and size, equal size bilaterally, no masses  Urethral meatus normal without discharge  Penis is circumcised   EMANUEL: 30g gland without masses, tenderness. SV not palpable. Normal sphincter tone. No hemhorroids.  No bilateral inguinal hernias noted     Midline back- small opening where he had infected sebaceous cyst  Above gluteus small incision        LABS REVIEW:  Recent Labs   Lab  01/08/19  1118 07/09/19  0809   WBC 8.10 11.46   Hemoglobin 14.1 14.1   Hematocrit 41.4 42.2   Platelets 302 331   ]  Recent Labs   Lab 01/08/19  1118 01/08/19  1904 07/09/19  0809   Sodium 134 L  --  140  140   Potassium 4.2  --  4.3  4.3   Chloride 98  --  104  104   CO2 24  --  26  26   BUN, Bld 11  --  9  9   Creatinine 1.0  --  0.9  0.9   Glucose 311 H  --  123 H  123 H   Calcium 10.0  --  10.5  10.5   Magnesium  --  1.9  --    Phosphorus  --   --  3.6   Alkaline Phosphatase 97  --  87   Total Protein 7.6  --  7.8   Albumin 4.2  --  4.4  4.4   Total Bilirubin 0.7  --  0.6   AST 23  --  17   ALT 28  --  17   ]    Lab Results   Component Value Date    HGBA1C 6.5 (H) 07/09/2019           Pertinent urologic PATHOLOGY REVIEW:  FINAL PATHOLOGIC DIAGNOSIS 2/21/19  1. GASTRIC ANTRUM AND BODY BIOPSIES:  - Chemical gastritis/reactive gastropathy with mild-to-moderate chronic inflammation.  - Helicobacter pylori are not identified on routine staining.  - No evidence of intestinal metaplasia, dysplasia or malignancy.  2. GASTRIC POLYPS, BIOPSIES:  - Fundic gland polyp.  - Helicobacter pylori are not identified on routine staining.  - No evidence of intestinal metaplasia, dysplasia or malignancy.       Pertinent Urologic RADIOGRAPHIC REVIEW:  none        Assessment:       1. Nocturia    2. BPH with obstruction/lower urinary tract symptoms    3. Erectile dysfunction, unspecified erectile dysfunction type    4. Perineal pain          Plan:     Urinary frequency  -     POCT URINE DIPSTICK WITHOUT MICROSCOPE      · Continue flomax 0.4mg nightly  · Will try tadalafil 5mg daily - may help with urination, ED and perineal discomfort. Wrote at [facility], he will let me know if needs it sent somewhere else. Do not take with viagra.  · Testosterone normal  · Could potentially even come off flomax if helping with urination.   · Try both first and then can stop flomax after 1 month if other working and stay off if no difference in  urination.   · Counseled him and told him NOT to stop his other meds to have an erection. At risk for MI.     F/u in 2 months with NP to see how he's doing on cialis daily, see if he's off flomax. Then f/u 1 year after this for yearly psa and EMANUEL      Cecilia Reyes MD

## 2019-10-22 NOTE — PATIENT INSTRUCTIONS
· Continue flomax 0.4mg nightly  · Will try tadalafil 5mg daily - may help with urination, ED and perineal discomfort. Wrote at [facility], he will let me know if needs it sent somewhere else. Do not take with viagra.  · Testosterone normal  · Could potentially even come off flomax if helping with urination.   · Try both first and then can stop flomax after 1 month if other working and stay off if no difference in urination.   · Counseled him and told him NOT to stop his other meds to have an erection. At risk for MI.     F/u in 2 months with NP to see how he's doing on cialis daily, see if he's off flomax. Then f/u 1 year after this for yearly psa and EMANUEL

## 2019-11-17 DIAGNOSIS — B35.1 ONYCHOMYCOSIS: ICD-10-CM

## 2019-11-17 RX ORDER — TERBINAFINE HYDROCHLORIDE 250 MG/1
TABLET ORAL
Qty: 30 TABLET | Refills: 1 | Status: SHIPPED | OUTPATIENT
Start: 2019-11-17 | End: 2019-12-10

## 2019-12-10 ENCOUNTER — HOSPITAL ENCOUNTER (OUTPATIENT)
Dept: RADIOLOGY | Facility: CLINIC | Age: 59
Discharge: HOME | End: 2019-12-10
Attending: FAMILY MEDICINE
Payer: COMMERCIAL

## 2019-12-10 ENCOUNTER — OFFICE VISIT (OUTPATIENT)
Dept: FAMILY MEDICINE | Facility: CLINIC | Age: 59
End: 2019-12-10
Payer: COMMERCIAL

## 2019-12-10 VITALS
BODY MASS INDEX: 27.79 KG/M2 | DIASTOLIC BLOOD PRESSURE: 6 MMHG | SYSTOLIC BLOOD PRESSURE: 100 MMHG | HEIGHT: 69 IN | TEMPERATURE: 98 F | OXYGEN SATURATION: 98 % | WEIGHT: 187.63 LBS | HEART RATE: 90 BPM | RESPIRATION RATE: 12 BRPM

## 2019-12-10 DIAGNOSIS — K21.9 GASTROESOPHAGEAL REFLUX DISEASE, ESOPHAGITIS PRESENCE NOT SPECIFIED: ICD-10-CM

## 2019-12-10 DIAGNOSIS — K21.9 GASTROESOPHAGEAL REFLUX DISEASE WITHOUT ESOPHAGITIS: ICD-10-CM

## 2019-12-10 DIAGNOSIS — E11.59 HYPERTENSION ASSOCIATED WITH DIABETES: ICD-10-CM

## 2019-12-10 DIAGNOSIS — K21.9 GASTROESOPHAGEAL REFLUX DISEASE, ESOPHAGITIS PRESENCE NOT SPECIFIED: Primary | ICD-10-CM

## 2019-12-10 DIAGNOSIS — I15.2 HYPERTENSION ASSOCIATED WITH DIABETES: ICD-10-CM

## 2019-12-10 DIAGNOSIS — G47.00 INSOMNIA, UNSPECIFIED TYPE: ICD-10-CM

## 2019-12-10 DIAGNOSIS — Z23 FLU VACCINE NEED: ICD-10-CM

## 2019-12-10 DIAGNOSIS — K58.0 IRRITABLE BOWEL SYNDROME WITH DIARRHEA: ICD-10-CM

## 2019-12-10 PROCEDURE — 3044F HG A1C LEVEL LT 7.0%: CPT | Mod: CPTII,S$GLB,, | Performed by: FAMILY MEDICINE

## 2019-12-10 PROCEDURE — 3078F PR MOST RECENT DIASTOLIC BLOOD PRESSURE < 80 MM HG: ICD-10-PCS | Mod: CPTII,S$GLB,, | Performed by: FAMILY MEDICINE

## 2019-12-10 PROCEDURE — 3074F PR MOST RECENT SYSTOLIC BLOOD PRESSURE < 130 MM HG: ICD-10-PCS | Mod: CPTII,S$GLB,, | Performed by: FAMILY MEDICINE

## 2019-12-10 PROCEDURE — 3078F DIAST BP <80 MM HG: CPT | Mod: CPTII,S$GLB,, | Performed by: FAMILY MEDICINE

## 2019-12-10 PROCEDURE — 99999 PR PBB SHADOW E&M-EST. PATIENT-LVL IV: CPT | Mod: PBBFAC,,, | Performed by: FAMILY MEDICINE

## 2019-12-10 PROCEDURE — 3008F PR BODY MASS INDEX (BMI) DOCUMENTED: ICD-10-PCS | Mod: CPTII,S$GLB,, | Performed by: FAMILY MEDICINE

## 2019-12-10 PROCEDURE — 3008F BODY MASS INDEX DOCD: CPT | Mod: CPTII,S$GLB,, | Performed by: FAMILY MEDICINE

## 2019-12-10 PROCEDURE — 99999 PR PBB SHADOW E&M-EST. PATIENT-LVL IV: ICD-10-PCS | Mod: PBBFAC,,, | Performed by: FAMILY MEDICINE

## 2019-12-10 PROCEDURE — 76705 ECHO EXAM OF ABDOMEN: CPT | Mod: 26,,, | Performed by: RADIOLOGY

## 2019-12-10 PROCEDURE — 76705 US ABDOMEN LIMITED: ICD-10-PCS | Mod: 26,,, | Performed by: RADIOLOGY

## 2019-12-10 PROCEDURE — 99214 PR OFFICE/OUTPT VISIT, EST, LEVL IV, 30-39 MIN: ICD-10-PCS | Mod: S$GLB,,, | Performed by: FAMILY MEDICINE

## 2019-12-10 PROCEDURE — 3074F SYST BP LT 130 MM HG: CPT | Mod: CPTII,S$GLB,, | Performed by: FAMILY MEDICINE

## 2019-12-10 PROCEDURE — 76705 ECHO EXAM OF ABDOMEN: CPT | Mod: TC,PO

## 2019-12-10 PROCEDURE — 99214 OFFICE O/P EST MOD 30 MIN: CPT | Mod: S$GLB,,, | Performed by: FAMILY MEDICINE

## 2019-12-10 PROCEDURE — 3044F PR MOST RECENT HEMOGLOBIN A1C LEVEL <7.0%: ICD-10-PCS | Mod: CPTII,S$GLB,, | Performed by: FAMILY MEDICINE

## 2019-12-10 RX ORDER — TRAZODONE HYDROCHLORIDE 50 MG/1
50 TABLET ORAL NIGHTLY PRN
Qty: 30 TABLET | Refills: 11 | Status: SHIPPED | OUTPATIENT
Start: 2019-12-10 | End: 2020-12-17

## 2019-12-10 RX ORDER — PANTOPRAZOLE SODIUM 40 MG/1
40 TABLET, DELAYED RELEASE ORAL 2 TIMES DAILY
Qty: 180 TABLET | Refills: 1 | Status: SHIPPED | OUTPATIENT
Start: 2019-12-10 | End: 2020-06-07

## 2019-12-10 NOTE — PROGRESS NOTES
Subjective:       Patient ID: Price Noguera is a 59 y.o. male.    Chief Complaint: Poss. Gallblader Problems    HPI  Review of Systems   Constitutional: Negative for fatigue, fever and unexpected weight change.   Respiratory: Negative for shortness of breath.    Cardiovascular: Negative for chest pain, palpitations and leg swelling.   Gastrointestinal: Positive for diarrhea and vomiting. Negative for abdominal pain, blood in stool, constipation and nausea.   Genitourinary: Negative for dysuria.       Patient Active Problem List   Diagnosis    Uncontrolled type 2 diabetes mellitus with hyperglycemia    Coronary artery disease    History of heart artery stent    Hypertension associated with diabetes    Hyponatremia    Hyperlipidemia associated with type 2 diabetes mellitus    Irritable bowel syndrome with diarrhea    Gastroesophageal reflux disease without esophagitis    Onychomycosis    Benign essential tremor    Dysphagia    History of colon polyps     Patient is here for a chronic conditions follow up.  Has no taste, sores on corner of mouth, bile reflux daily and sometimes feeling of swallowing difficulty. No RUQ pain.  Taking protonix 40 mg a day        Endocrine Dr. Ruiz  Podiatry Dr. Islas     Cad s/p stent in  maker 2012. Card [facility]. No recent sx CP       GI Dr. Ross EGD done 2/21/19 to complete CP work up-had mild schatzki ring -dilated, gastritis and 4 gastric polyps-bening. h pylori neg.  On PPI daily.  LAst colonoscopy 5 years ago-h/o colon polyps? -records requested by Dr. Ross     ENdocrine [facility] . BS < 130 fasting am.  Last eye exam 3/19. Last appt 7/19. Next appt next week.  Last a1c 6.5 7/19     Urology Dr. Reyes-BPH with LUTS, ED  Pain Dr. Naranjo -diabetic neuropathy    Having trouble getting to sleep and has been taking his mother's ambien which helped.    Objective:      Physical Exam   Constitutional: He is oriented to person, place, and time. He appears  well-developed and well-nourished.   Cardiovascular: Normal rate, regular rhythm and normal heart sounds.   Pulmonary/Chest: Effort normal and breath sounds normal.   Abdominal: Soft. Bowel sounds are normal. He exhibits no distension and no mass. There is no tenderness. There is no rebound and no guarding.   Musculoskeletal: He exhibits no edema.   Neurological: He is alert and oriented to person, place, and time.   Skin: Skin is warm and dry.   Psychiatric: He has a normal mood and affect.   Nursing note and vitals reviewed.      Assessment:       1. Gastroesophageal reflux disease, esophagitis presence not specified    2. Flu vaccine need    3. Gastroesophageal reflux disease without esophagitis    4. Hypertension associated with diabetes    5. Irritable bowel syndrome with diarrhea    6. Insomnia, unspecified type        Plan:         1. Gastroesophageal reflux disease, esophagitis presence not specified  Counseled patient on prevention of reflux with changes in diet and behavior.  I recommended avoidance of greasy and spicy foods, caffeine and eating within 3 hours of bedtime.  I counseled the patient to avoid eating large meals and instead eating more frequent small meals.  I also recommended weight loss and elevation of the head of the bed by 6 inches.  If symptoms persist after these changes medication may be needed to control GERD.    R/o gallstones  - US Abdomen Limited; Future    2. Flu vaccine need  done    3. Gastroesophageal reflux disease without esophagitis  Uncontrolled.  Increase x 3 months then reduce if able  - pantoprazole (PROTONIX) 40 MG tablet; Take 1 tablet (40 mg total) by mouth 2 (two) times daily.  Dispense: 180 tablet; Refill: 1  Will need GI referral if sx persist and possibly carafate    4. Hypertension associated with diabetes  Controlled on current medications.  Continue current medications.      5. Irritable bowel syndrome with diarrhea  Counseled patient on IBS diagnosis, treatment  options including probiotics, fiber supplements, anti-spasmodics, anti depressants and antibiotics.  Discussed side effects, risks and benefits of each.     6. Insomnia, unspecified type  Add trazodone 50mg po qhs prn insomnia        Time spent with patient: 20 minutes    Patient with be reevaluated in 6 weeks or sooner prn    Greater than 50% of this visit was spent counseling as described in above documentation:Yes

## 2019-12-11 DIAGNOSIS — K82.4 GALLBLADDER POLYP: Primary | ICD-10-CM

## 2019-12-12 ENCOUNTER — TELEPHONE (OUTPATIENT)
Dept: FAMILY MEDICINE | Facility: CLINIC | Age: 59
End: 2019-12-12

## 2019-12-12 NOTE — TELEPHONE ENCOUNTER
----- Message from Rekha Garsia sent at 12/12/2019  2:45 PM CST -----  Contact: patient   Type:  Patient Returning Call    Who Called:  Patient   Who Left Message for Patient:    Does the patient know what this is regarding?:  results  Best Call Back Number:  [phone]  Additional Information:

## 2019-12-17 DIAGNOSIS — R11.0 NAUSEA: Primary | ICD-10-CM

## 2019-12-17 RX ORDER — ONDANSETRON 4 MG/1
4 TABLET, ORALLY DISINTEGRATING ORAL EVERY 6 HOURS PRN
Qty: 10 TABLET | Refills: 1 | Status: SHIPPED | OUTPATIENT
Start: 2019-12-17 | End: 2023-11-04

## 2019-12-23 ENCOUNTER — OFFICE VISIT (OUTPATIENT)
Dept: UROLOGY | Facility: CLINIC | Age: 59
End: 2019-12-23
Payer: COMMERCIAL

## 2019-12-23 VITALS
TEMPERATURE: 98 F | BODY MASS INDEX: 26.51 KG/M2 | RESPIRATION RATE: 18 BRPM | HEIGHT: 69 IN | WEIGHT: 179 LBS | HEART RATE: 74 BPM | SYSTOLIC BLOOD PRESSURE: 133 MMHG | DIASTOLIC BLOOD PRESSURE: 85 MMHG

## 2019-12-23 DIAGNOSIS — R35.0 URINARY FREQUENCY: ICD-10-CM

## 2019-12-23 DIAGNOSIS — N52.9 ERECTILE DYSFUNCTION, UNSPECIFIED ERECTILE DYSFUNCTION TYPE: ICD-10-CM

## 2019-12-23 DIAGNOSIS — N13.8 BPH WITH OBSTRUCTION/LOWER URINARY TRACT SYMPTOMS: Primary | ICD-10-CM

## 2019-12-23 DIAGNOSIS — N40.1 BPH WITH OBSTRUCTION/LOWER URINARY TRACT SYMPTOMS: Primary | ICD-10-CM

## 2019-12-23 DIAGNOSIS — R10.2 PERINEAL PAIN: ICD-10-CM

## 2019-12-23 LAB
BILIRUB SERPL-MCNC: ABNORMAL MG/DL
BLOOD URINE, POC: ABNORMAL
COLOR, POC UA: YELLOW
GLUCOSE UR QL STRIP: 500
KETONES UR QL STRIP: ABNORMAL
LEUKOCYTE ESTERASE URINE, POC: ABNORMAL
NITRITE, POC UA: ABNORMAL
PH, POC UA: 6
POC RESIDUAL URINE VOLUME: 135 ML (ref 0–100)
PROTEIN, POC: ABNORMAL
SPECIFIC GRAVITY, POC UA: 1.02
UROBILINOGEN, POC UA: 0.2

## 2019-12-23 PROCEDURE — 3008F PR BODY MASS INDEX (BMI) DOCUMENTED: ICD-10-PCS | Mod: CPTII,S$GLB,, | Performed by: NURSE PRACTITIONER

## 2019-12-23 PROCEDURE — 3079F DIAST BP 80-89 MM HG: CPT | Mod: CPTII,S$GLB,, | Performed by: NURSE PRACTITIONER

## 2019-12-23 PROCEDURE — 3075F SYST BP GE 130 - 139MM HG: CPT | Mod: CPTII,S$GLB,, | Performed by: NURSE PRACTITIONER

## 2019-12-23 PROCEDURE — 81002 POCT URINE DIPSTICK WITHOUT MICROSCOPE: ICD-10-PCS | Mod: S$GLB,,, | Performed by: NURSE PRACTITIONER

## 2019-12-23 PROCEDURE — 99214 PR OFFICE/OUTPT VISIT, EST, LEVL IV, 30-39 MIN: ICD-10-PCS | Mod: 25,S$GLB,, | Performed by: NURSE PRACTITIONER

## 2019-12-23 PROCEDURE — 3075F PR MOST RECENT SYSTOLIC BLOOD PRESS GE 130-139MM HG: ICD-10-PCS | Mod: CPTII,S$GLB,, | Performed by: NURSE PRACTITIONER

## 2019-12-23 PROCEDURE — 81002 URINALYSIS NONAUTO W/O SCOPE: CPT | Mod: S$GLB,,, | Performed by: NURSE PRACTITIONER

## 2019-12-23 PROCEDURE — 3008F BODY MASS INDEX DOCD: CPT | Mod: CPTII,S$GLB,, | Performed by: NURSE PRACTITIONER

## 2019-12-23 PROCEDURE — 51798 US URINE CAPACITY MEASURE: CPT | Mod: S$GLB,,, | Performed by: NURSE PRACTITIONER

## 2019-12-23 PROCEDURE — 3079F PR MOST RECENT DIASTOLIC BLOOD PRESSURE 80-89 MM HG: ICD-10-PCS | Mod: CPTII,S$GLB,, | Performed by: NURSE PRACTITIONER

## 2019-12-23 PROCEDURE — 99999 PR PBB SHADOW E&M-EST. PATIENT-LVL V: CPT | Mod: PBBFAC,,, | Performed by: NURSE PRACTITIONER

## 2019-12-23 PROCEDURE — 99214 OFFICE O/P EST MOD 30 MIN: CPT | Mod: 25,S$GLB,, | Performed by: NURSE PRACTITIONER

## 2019-12-23 PROCEDURE — 99999 PR PBB SHADOW E&M-EST. PATIENT-LVL V: ICD-10-PCS | Mod: PBBFAC,,, | Performed by: NURSE PRACTITIONER

## 2019-12-23 PROCEDURE — 51798 POCT BLADDER SCAN: ICD-10-PCS | Mod: S$GLB,,, | Performed by: NURSE PRACTITIONER

## 2019-12-23 RX ORDER — TAMSULOSIN HYDROCHLORIDE 0.4 MG/1
0.4 CAPSULE ORAL
Qty: 90 CAPSULE | Refills: 3 | Status: SHIPPED | OUTPATIENT
Start: 2019-12-23 | End: 2021-03-31

## 2019-12-23 NOTE — PROGRESS NOTES
[facility] Note  Staff: BRAYDEN Christine    PCP: Dr. Chanel Roberson    Chief Complaint: Follow-up visit    Subjective:        HPI: Price Noguera is a 59 y.o. male presents for follow-up/routine recheck visit today.  The pt was last seen by Dr. Cecilia Reyes on 10/22/2019.  On conclusion of last ov MD ordered the pt to continue his flomax 0.4mg nightly and to also try tadalafil 5mg daily for improvement in his urination, ED and perineal discomfort.  Per MD notes pt could potentially even come off flomax if the cialis was eventually helping with urination.     Therefore he was instructed to try both first and then can stop flomax after 1 month if other working and stay off if no difference in urination.   Pt was also counseled to NOT to stop his other meds to have an erection. At risk for MI.     TODAY, pt states he never filled Cialis prescription, was too expensive.  He is on a monthly fixed income at this time of only 800.00 per month, therefore after discussing [facility] pharmacy and med with pt he is unable to afford this on a monthly basis either.    AUA SS today:  12/2 Mostly Satisfied  PVR by bladder scan: 135 mL  Current  meds:  Flomax 0.4 mg one tablet daily     HX:  Seen on 7/18/19 in consult with following complaints  He presented with urinary frequency, hesitancy and nocturia that is been present for the past 5 years and has progressively worsened.  He has never seen a urologist.  He has never tried a urologic medication.  He voids about 5 to 7 times a day and 5-10 times at night.  He denies any significant caffeine use.  He denies any lower extremity edema, no diuretics, he no caffeine before bedtime.  He does drink 1 cup of water to take his medication prior to bedtime.  Does snore.  Never had a sleep evaluation.  Appears to have normal BMI.      He also has erectile dysfunction.  He has soft erections but has had this issue his whole life.  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  He denies any difficulty urinating as a child.  He denies any UTIs as a child.  He says his testicles are normal in size.  He has no children is never tried have any children.  He was written for Viagra but has not tried.  He has never had his testosterone checked.     Last OV with MD:  MARGE ssx:(3 incomplete emptying, 3 frequency, 3 intermittency, 2 urgency, 4 weak stream, 3 straining, 4 sleeping). 20. QOL: unhappy     Interval history:   Started him on flomax 0.4mg - improved stream, less perineal discomfort. Still having nocturia 0-4x a night (previously discussed multi-factorial). aua ssx: 18, mostly satisfied.   Checked testosterone - 890 on 10/21/19  Hadn't made f/u with  to do another colonoscopy (last one [place] ago [place] where they saw 7 polyps).   Never saw cardiologist (has a stent) -  (was told he couldn't be seen bc was supposed to see  who did test on him but owed too much to be able to make another appt).   He says he can't get an erection without stopping bp meds and using viagra 100mg     Urinalysis void today: 1.020, 6.0, 500 Glucose  Urine history:   7/18/19            No cx, void: 500 glucose/tr ketones  3/14/19            3+glucose     PSA history: no family hx of prostate cancer  7/9/19  0.95, EMANUEL 7/18/19: 30g, no nodules     Testosterone   10/21/19          890     REVIEW OF SYSTEMS:  A comprehensive 10 system review was performed and is negative except as noted above in HPI    PMHx:  Past Medical History:   Diagnosis Date    Coronary artery disease     Diabetes mellitus     History of heart artery stent     Hypertension     Myocardial infarction      PSHx:  Past Surgical History:   Procedure Laterality Date    BACK SURGERY      COLONOSCOPY  04/07/2014    CORONARY ANGIOPLASTY WITH STENT  PLACEMENT      ESOPHAGOGASTRODUODENOSCOPY N/A 2/21/2019    Procedure: EGD (ESOPHAGOGASTRODUODENOSCOPY);  Surgeon: Nino Ross MD;  Location: [facility];  Service: Endoscopy;  Laterality: N/A;    TUMOR REMOVAL  05/16/2016    Back in skin     Allergies:  Patient has no known allergies.    Medications: reviewed   Objective:     Vitals:    12/23/19 1312   BP: 133/85   Pulse: 74   Resp: 18   Temp: 98 °F (36.7 °C)     General:WDWN in NAD  Eyes: PERRLA, normal conjunctiva  Respiratory: no increased work on breathing, clear to auscultation  Cardiovascular: regular rate and rhythm. No obvious extremity edema.  GI: palpation of masses. No tenderness. No hepatosplenomegaly to palpation.  Musculoskeletal: normal range of motion of bilateral upper extremities. Normal muscle strength and tone.  Skin: no obvious rashes or lesions. No tightening of skin noted.  Neurologic: CN grossly normal. Normal sensation.   Psychiatric: awake, alert and oriented x 3. Mood and affect normal. Cooperative.    Assessment:       1. BPH with obstruction/lower urinary tract symptoms    2. Erectile dysfunction, unspecified erectile dysfunction type    3. Perineal pain    4. Urinary frequency          Plan:   BPH with LUTS, ED:    Flomax 0.4 mg daily refilled for pt during office visit today.    F/u in one year with PSA level to be done before appointment.    [facility]: Pt Declined    Simran Mazraiegos, [email or web address]

## 2020-01-05 DIAGNOSIS — I15.2 HYPERTENSION ASSOCIATED WITH DIABETES: ICD-10-CM

## 2020-01-05 DIAGNOSIS — E11.59 HYPERTENSION ASSOCIATED WITH DIABETES: ICD-10-CM

## 2020-01-06 RX ORDER — LISINOPRIL 20 MG/1
TABLET ORAL
Qty: 45 TABLET | Refills: 3 | OUTPATIENT
Start: 2020-01-06

## 2020-01-13 ENCOUNTER — PATIENT OUTREACH (OUTPATIENT)
Dept: ADMINISTRATIVE | Facility: HOSPITAL | Age: 60
End: 2020-01-13

## 2020-01-13 DIAGNOSIS — I15.2 HYPERTENSION ASSOCIATED WITH DIABETES: ICD-10-CM

## 2020-01-13 DIAGNOSIS — E11.59 HYPERTENSION ASSOCIATED WITH DIABETES: ICD-10-CM

## 2020-01-13 RX ORDER — LISINOPRIL 20 MG/1
TABLET ORAL
Qty: 45 TABLET | Refills: 3 | Status: SHIPPED | OUTPATIENT
Start: 2020-01-13 | End: 2021-03-31 | Stop reason: SDUPTHER

## 2020-01-13 NOTE — PROGRESS NOTES
Health Maintenance Due   Topic Date Due    Aspirin/Antiplatelet Therapy  09/28/1977    Pneumococcal Vaccine (Medium Risk) (1 of 1 - PPSV23) 09/28/1978    Colonoscopy  09/28/2009    Shingles Vaccine (2 of 2) 09/02/2019    Hemoglobin A1c  01/09/2020

## 2020-01-13 NOTE — LETTER
January 13, 2020    Price Noguera  [address]  [place] [address]             [facility]  [address]  [place] [place] [address]  Phone: [phone] [facility] to your overall health.  To help you get the most out of each of your visits, we will review your information to make sure you are up to date on all of your recommended tests and/or procedures.      Dr. Chanel Roberson MD has found that your chart shows you may be due for a:    COLORECTAL CANCER SCREENING (PREVIOUS RECORDS NEEDED)  HEMOGLOBIN A1C (LAB)       If you have had any of the above done at another facility, please bring the records or information with you so that your record at [facility] will be complete.  If you would like to schedule any of these, please contact the clinic at [phone].    If you are currently taking medication, please bring it with you to your appointment for review.    Also, if you have any type of Advanced Directives, please bring them with you to your office visit so we may scan them into your chart.    Thank You,    Your [facility],  MD Simran Haq LPN Clinical Care Coordinator  [facility] [facility] [address] [place] [address]  Phone [phone]  Fax [phone]

## 2020-01-13 NOTE — TELEPHONE ENCOUNTER
----- Message from Jocelin Benson sent at 1/13/2020  9:56 AM CST -----  Contact: Patient  Type: Needs Medical Advice    Who Called:  Patient  Best Call Back Number: [phone] (home)   Additional Information: The pt said he needs his Lisinopril for his BP and the pharm is telling him the Dr is refusing to refill and he said he cant do without it his BP is out of control please send a new Rx to the [facility] in [place] please call him to advise

## 2020-01-13 NOTE — TELEPHONE ENCOUNTER
Called and spoke to patient regarding Rx refill. Patient was informed that Rx was call into his pharmacy ([facility]) in [place] on today 1/13/2020

## 2020-01-16 DIAGNOSIS — E11.65 UNCONTROLLED TYPE 2 DIABETES MELLITUS WITH HYPERGLYCEMIA: ICD-10-CM

## 2020-01-16 RX ORDER — METFORMIN HYDROCHLORIDE 850 MG/1
850 TABLET ORAL 2 TIMES DAILY WITH MEALS
Qty: 180 TABLET | Refills: 3 | Status: SHIPPED | OUTPATIENT
Start: 2020-01-16 | End: 2020-05-26 | Stop reason: SDUPTHER

## 2020-01-21 ENCOUNTER — OFFICE VISIT (OUTPATIENT)
Dept: SURGERY | Facility: CLINIC | Age: 60
End: 2020-01-21
Payer: COMMERCIAL

## 2020-01-21 VITALS
BODY MASS INDEX: 26.64 KG/M2 | HEART RATE: 101 BPM | SYSTOLIC BLOOD PRESSURE: 119 MMHG | DIASTOLIC BLOOD PRESSURE: 74 MMHG | HEIGHT: 69 IN | RESPIRATION RATE: 16 BRPM | WEIGHT: 179.88 LBS

## 2020-01-21 DIAGNOSIS — K82.4 GALLBLADDER POLYP: Primary | ICD-10-CM

## 2020-01-21 PROCEDURE — 99999 PR PBB SHADOW E&M-EST. PATIENT-LVL III: CPT | Mod: PBBFAC,,, | Performed by: SURGERY

## 2020-01-21 PROCEDURE — 99244 OFF/OP CNSLTJ NEW/EST MOD 40: CPT | Mod: S$GLB,,, | Performed by: SURGERY

## 2020-01-21 PROCEDURE — 99999 PR PBB SHADOW E&M-EST. PATIENT-LVL III: ICD-10-PCS | Mod: PBBFAC,,, | Performed by: SURGERY

## 2020-01-21 PROCEDURE — 99244 PR OFFICE CONSULTATION,LEVEL IV: ICD-10-PCS | Mod: S$GLB,,, | Performed by: SURGERY

## 2020-01-21 NOTE — Clinical Note
I saw your patient, Price Noguera in the office.  Attached are my findings and plan.  Thank you for referring him to my office and if you have any questions please do not hesitate to call my cell [phone].Danielito Alcaraz

## 2020-01-21 NOTE — LETTER
January 21, 2020      Chanel Roberson MD  [address] [address]           [facility]  [address]  [place] [address]  Phone: [phone]          Patient: Price Noguera   MR Number: [id number]   YOB: 1960   Date of Visit: 1/21/2020       Dear Dr. Chanel Roberson:    Thank you for referring Price Noguera to me for evaluation. Attached you will find relevant portions of my assessment and plan of care.    If you have questions, please do not hesitate to call me. I look forward to following Price Noguera along with you.    Sincerely,    Robbin Alcaraz MD    Enclosure  CC:  No Recipients    If you would like to receive this communication electronically, please contact [email or web address] or [phone] to request more information on [facility] access.    For providers and/or their staff who would like to refer a patient to [facility], please contact us through our one-stop-shop provider referral line, [facility] , at [phone].    If you feel you have received this communication in error or would no longer like to receive these types of communications, please e-mail [email or web address]

## 2020-01-22 NOTE — PROGRESS NOTES
Subjective:       Patient ID: Price Noguera is a 59 y.o. male.    Chief Complaint: Consult (gallbladder)    HPI Pleasant 58 yo M referred to me in consulation from Dr Roberson for evaluation of abnormal US.  Pt notes that he has had periods of diarrhea over recent months.  With the diarrhea has noted bile taste in his mouth and has had difficulty with swallowing.  He feels a significant pressure in his chest.  He had an US demonstrating no cholelithiasis however small gallbladder polyps were seen.  Pt has had an EGD in 2/19 with no significant findings.  A few small polyps noted. .  He suffers with HTN, DM, CAD and has had a stent.  NO significant abdominal surgical history  Review of Systems   Constitutional: Negative for activity change, appetite change, diaphoresis, fever and unexpected weight change.   HENT: Negative for congestion and mouth sores.    Respiratory: Negative for cough, chest tightness and wheezing.    Cardiovascular: Negative for chest pain and palpitations.   Gastrointestinal: Positive for nausea. Negative for abdominal distention, abdominal pain, anal bleeding, blood in stool, constipation, diarrhea, rectal pain and vomiting.   Genitourinary: Negative for difficulty urinating, dysuria and hematuria.   Musculoskeletal: Negative for back pain and gait problem.   Skin: Negative for color change and wound.   Neurological: Negative for tremors and seizures.       Objective:      Physical Exam   Constitutional: He is oriented to person, place, and time. He appears well-developed and well-nourished.   HENT:   Head: Normocephalic and atraumatic.   Eyes: Pupils are equal, round, and reactive to light.   Neck: Normal range of motion. No tracheal deviation present. No thyromegaly present.   Cardiovascular: Normal rate, regular rhythm and normal heart sounds. Exam reveals no gallop and no friction rub.   No murmur heard.  Pulmonary/Chest: Effort normal and breath sounds normal. He has no wheezes. He  exhibits no tenderness.   Abdominal: He exhibits no distension and no mass. There is no tenderness. There is no rebound and no guarding.   Musculoskeletal: Normal range of motion.   Neurological: He is alert and oriented to person, place, and time. Coordination normal.   Skin: Skin is warm.   Psychiatric: He has a normal mood and affect.   Vitals reviewed.        US: 12/19  2 small gallbladder polyps    Lab Results   Component Value Date    HGBA1C 6.5 (H) 07/09/2019       Assessment:     GB polyps  No diagnosis found.    Plan:       D/w ptl I Have explained to pt that I am uncertain and doubtful that the polyps are related to his bile reflux and changes in bowel habits. I have recommended HIDA to assess for GB function.  If this is normal I would be concerned about possible other GI sources of his symptoms and consider GI workup.  Pt expressed understanding.  He will f/u with me prn

## 2020-01-23 ENCOUNTER — TELEPHONE (OUTPATIENT)
Dept: SURGERY | Facility: CLINIC | Age: 60
End: 2020-01-23

## 2020-01-23 NOTE — TELEPHONE ENCOUNTER
I called patient to inform him of the HIDA Scan that is scheduled on Wednesday, 1/29/20 at 9am at [facility], but his voicemail isn't set up.  He has to fast after midnight and arrive at 8:45am to patient registration at [facility].  Also no narcotics 6 hours prior.  I'll call patient back.  Kalen

## 2020-01-23 NOTE — TELEPHONE ENCOUNTER
I called patient to inform him of the HIDA Scan scheduled on Wednesday, 1/29/20 at [facility] for 9am and to arrive at 8:45am.  I also informed him to fast after midnight and don't take any narcotics 6hrs prior, which he stated that he doesn't take narcotics. Patient understood.  Kalen

## 2020-01-24 ENCOUNTER — LAB VISIT (OUTPATIENT)
Dept: LAB | Facility: HOSPITAL | Age: 60
End: 2020-01-24
Attending: FAMILY MEDICINE
Payer: COMMERCIAL

## 2020-01-24 DIAGNOSIS — E11.65 UNCONTROLLED TYPE 2 DIABETES MELLITUS WITH HYPERGLYCEMIA: ICD-10-CM

## 2020-01-24 DIAGNOSIS — E78.5 HYPERLIPIDEMIA ASSOCIATED WITH TYPE 2 DIABETES MELLITUS: ICD-10-CM

## 2020-01-24 DIAGNOSIS — E11.69 HYPERLIPIDEMIA ASSOCIATED WITH TYPE 2 DIABETES MELLITUS: ICD-10-CM

## 2020-01-24 LAB
ALBUMIN SERPL BCP-MCNC: 4.3 G/DL (ref 3.5–5.2)
ALP SERPL-CCNC: 106 U/L (ref 55–135)
ALT SERPL W/O P-5'-P-CCNC: 24 U/L (ref 10–44)
ANION GAP SERPL CALC-SCNC: 9 MMOL/L (ref 8–16)
AST SERPL-CCNC: 21 U/L (ref 10–40)
BASOPHILS # BLD AUTO: 0.13 K/UL (ref 0–0.2)
BASOPHILS NFR BLD: 1.5 % (ref 0–1.9)
BILIRUB SERPL-MCNC: 0.4 MG/DL (ref 0.1–1)
BUN SERPL-MCNC: 8 MG/DL (ref 6–20)
CALCIUM SERPL-MCNC: 10.2 MG/DL (ref 8.7–10.5)
CHLORIDE SERPL-SCNC: 102 MMOL/L (ref 95–110)
CHOLEST SERPL-MCNC: 193 MG/DL (ref 120–199)
CHOLEST/HDLC SERPL: 4.3 {RATIO} (ref 2–5)
CO2 SERPL-SCNC: 29 MMOL/L (ref 23–29)
CREAT SERPL-MCNC: 1 MG/DL (ref 0.5–1.4)
DIFFERENTIAL METHOD: ABNORMAL
EOSINOPHIL # BLD AUTO: 1.2 K/UL (ref 0–0.5)
EOSINOPHIL NFR BLD: 13.8 % (ref 0–8)
ERYTHROCYTE [DISTWIDTH] IN BLOOD BY AUTOMATED COUNT: 13.5 % (ref 11.5–14.5)
EST. GFR  (AFRICAN AMERICAN): >60 ML/MIN/1.73 M^2
EST. GFR  (NON AFRICAN AMERICAN): >60 ML/MIN/1.73 M^2
ESTIMATED AVG GLUCOSE: 134 MG/DL (ref 68–131)
GLUCOSE SERPL-MCNC: 140 MG/DL (ref 70–110)
HBA1C MFR BLD HPLC: 6.3 % (ref 4–5.6)
HCT VFR BLD AUTO: 45.4 % (ref 40–54)
HDLC SERPL-MCNC: 45 MG/DL (ref 40–75)
HDLC SERPL: 23.3 % (ref 20–50)
HGB BLD-MCNC: 14.2 G/DL (ref 14–18)
IMM GRANULOCYTES # BLD AUTO: 0.03 K/UL (ref 0–0.04)
IMM GRANULOCYTES NFR BLD AUTO: 0.3 % (ref 0–0.5)
LDLC SERPL CALC-MCNC: 115.6 MG/DL (ref 63–159)
LYMPHOCYTES # BLD AUTO: 3.8 K/UL (ref 1–4.8)
LYMPHOCYTES NFR BLD: 42.4 % (ref 18–48)
MCH RBC QN AUTO: 29.5 PG (ref 27–31)
MCHC RBC AUTO-ENTMCNC: 31.3 G/DL (ref 32–36)
MCV RBC AUTO: 94 FL (ref 82–98)
MONOCYTES # BLD AUTO: 0.9 K/UL (ref 0.3–1)
MONOCYTES NFR BLD: 9.6 % (ref 4–15)
NEUTROPHILS # BLD AUTO: 2.9 K/UL (ref 1.8–7.7)
NEUTROPHILS NFR BLD: 32.4 % (ref 38–73)
NONHDLC SERPL-MCNC: 148 MG/DL
NRBC BLD-RTO: 0 /100 WBC
PLATELET # BLD AUTO: 375 K/UL (ref 150–350)
PMV BLD AUTO: 10.1 FL (ref 9.2–12.9)
POTASSIUM SERPL-SCNC: 4.1 MMOL/L (ref 3.5–5.1)
PROT SERPL-MCNC: 7.8 G/DL (ref 6–8.4)
RBC # BLD AUTO: 4.82 M/UL (ref 4.6–6.2)
SODIUM SERPL-SCNC: 140 MMOL/L (ref 136–145)
TRIGL SERPL-MCNC: 162 MG/DL (ref 30–150)
WBC # BLD AUTO: 8.85 K/UL (ref 3.9–12.7)

## 2020-01-24 PROCEDURE — 36415 COLL VENOUS BLD VENIPUNCTURE: CPT | Mod: PO

## 2020-01-24 PROCEDURE — 85025 COMPLETE CBC W/AUTO DIFF WBC: CPT

## 2020-01-24 PROCEDURE — 80061 LIPID PANEL: CPT

## 2020-01-24 PROCEDURE — 80053 COMPREHEN METABOLIC PANEL: CPT

## 2020-01-24 PROCEDURE — 83036 HEMOGLOBIN GLYCOSYLATED A1C: CPT

## 2020-01-29 ENCOUNTER — HOSPITAL ENCOUNTER (OUTPATIENT)
Dept: RADIOLOGY | Facility: HOSPITAL | Age: 60
Discharge: HOME | End: 2020-01-29
Attending: SURGERY
Payer: COMMERCIAL

## 2020-01-29 DIAGNOSIS — K82.4 GALLBLADDER POLYP: ICD-10-CM

## 2020-01-29 PROCEDURE — A9537 TC99M MEBROFENIN: HCPCS

## 2020-01-29 PROCEDURE — 78227 HEPATOBIL SYST IMAGE W/DRUG: CPT | Mod: 26,,, | Performed by: RADIOLOGY

## 2020-01-29 PROCEDURE — 78227 NM HEPATOBILIARY(HIDA) WITH PHARM AND EF: ICD-10-PCS | Mod: 26,,, | Performed by: RADIOLOGY

## 2020-01-31 ENCOUNTER — TELEPHONE (OUTPATIENT)
Dept: SURGERY | Facility: CLINIC | Age: 60
End: 2020-01-31

## 2020-01-31 DIAGNOSIS — E78.5 HYPERLIPIDEMIA ASSOCIATED WITH TYPE 2 DIABETES MELLITUS: ICD-10-CM

## 2020-01-31 DIAGNOSIS — E11.69 HYPERLIPIDEMIA ASSOCIATED WITH TYPE 2 DIABETES MELLITUS: ICD-10-CM

## 2020-02-02 RX ORDER — ATORVASTATIN CALCIUM 20 MG/1
20 TABLET, FILM COATED ORAL DAILY
Qty: 90 TABLET | Refills: 3 | Status: ON HOLD | OUTPATIENT
Start: 2020-02-02 | End: 2020-12-15 | Stop reason: HOSPADM

## 2020-02-03 ENCOUNTER — OFFICE VISIT (OUTPATIENT)
Dept: GASTROENTEROLOGY | Facility: CLINIC | Age: 60
End: 2020-02-03
Payer: COMMERCIAL

## 2020-02-03 ENCOUNTER — PATIENT OUTREACH (OUTPATIENT)
Dept: ADMINISTRATIVE | Facility: OTHER | Age: 60
End: 2020-02-03

## 2020-02-03 VITALS
HEIGHT: 69 IN | HEART RATE: 82 BPM | SYSTOLIC BLOOD PRESSURE: 118 MMHG | BODY MASS INDEX: 26.02 KG/M2 | WEIGHT: 175.69 LBS | DIASTOLIC BLOOD PRESSURE: 80 MMHG

## 2020-02-03 DIAGNOSIS — R19.4 CHANGE IN BOWEL HABITS: ICD-10-CM

## 2020-02-03 DIAGNOSIS — R19.7 INTERMITTENT DIARRHEA: Primary | ICD-10-CM

## 2020-02-03 DIAGNOSIS — K22.2 SCHATZKI'S RING: ICD-10-CM

## 2020-02-03 DIAGNOSIS — K82.4 GALLBLADDER POLYP: ICD-10-CM

## 2020-02-03 DIAGNOSIS — Z87.19 HISTORY OF GASTRITIS: ICD-10-CM

## 2020-02-03 DIAGNOSIS — K21.9 GERD WITHOUT ESOPHAGITIS: ICD-10-CM

## 2020-02-03 DIAGNOSIS — Z86.010 HISTORY OF COLON POLYPS: ICD-10-CM

## 2020-02-03 PROCEDURE — 3074F PR MOST RECENT SYSTOLIC BLOOD PRESSURE < 130 MM HG: ICD-10-PCS | Mod: CPTII,S$GLB,, | Performed by: NURSE PRACTITIONER

## 2020-02-03 PROCEDURE — 3079F DIAST BP 80-89 MM HG: CPT | Mod: CPTII,S$GLB,, | Performed by: NURSE PRACTITIONER

## 2020-02-03 PROCEDURE — 99214 OFFICE O/P EST MOD 30 MIN: CPT | Mod: S$GLB,,, | Performed by: NURSE PRACTITIONER

## 2020-02-03 PROCEDURE — 3008F BODY MASS INDEX DOCD: CPT | Mod: CPTII,S$GLB,, | Performed by: NURSE PRACTITIONER

## 2020-02-03 PROCEDURE — 99999 PR PBB SHADOW E&M-EST. PATIENT-LVL V: CPT | Mod: PBBFAC,,, | Performed by: NURSE PRACTITIONER

## 2020-02-03 PROCEDURE — 3074F SYST BP LT 130 MM HG: CPT | Mod: CPTII,S$GLB,, | Performed by: NURSE PRACTITIONER

## 2020-02-03 PROCEDURE — 3008F PR BODY MASS INDEX (BMI) DOCUMENTED: ICD-10-PCS | Mod: CPTII,S$GLB,, | Performed by: NURSE PRACTITIONER

## 2020-02-03 PROCEDURE — 99214 PR OFFICE/OUTPT VISIT, EST, LEVL IV, 30-39 MIN: ICD-10-PCS | Mod: S$GLB,,, | Performed by: NURSE PRACTITIONER

## 2020-02-03 PROCEDURE — 99999 PR PBB SHADOW E&M-EST. PATIENT-LVL V: ICD-10-PCS | Mod: PBBFAC,,, | Performed by: NURSE PRACTITIONER

## 2020-02-03 PROCEDURE — 3079F PR MOST RECENT DIASTOLIC BLOOD PRESSURE 80-89 MM HG: ICD-10-PCS | Mod: CPTII,S$GLB,, | Performed by: NURSE PRACTITIONER

## 2020-02-03 NOTE — PROGRESS NOTES
"Subjective:       Patient ID: Price Noguera is a 59 y.o. male, Body mass index is 25.95 kg/m².    Chief Complaint: Diarrhea      Patient is new to me. Established patient of Dr. Ross.    Diarrhea    This is a chronic problem. The current episode started more than 1 month ago (Started ~ one year ago; intermittent). The problem occurs more than 10 times per day. The problem has been gradually worsening (over the past four days). The stool consistency is described as watery (type 7 on bristol scale; denies bloody). The patient states that diarrhea awakens him from sleep. Associated symptoms include a fever (reports low grade fever over the weekend; denies currently). Pertinent negatives include no abdominal pain, bloating, coughing, increased  flatus, vomiting or weight loss. Nothing aggravates the symptoms. There are no known risk factors (denies recent antibiotics, hospitalization, or foreign travel; he has been on Metformin for ~ one year). He has tried anti-motility drug (Imodium OTC PRN) for the symptoms. The treatment provided moderate relief. There is no history of bowel resection, inflammatory bowel disease, irritable bowel syndrome or a recent abdominal surgery. Reports intermittent constipation over the past year as well, associated with a "bile" taste in his mouth, after taking OTC laxative to clear stool the bile taste would resolve; also hx of GERD- denies heartburn on Protonix 40 mg BID     Review of Systems   Constitutional: Positive for fever (reports low grade fever over the weekend; denies currently). Negative for appetite change, unexpected weight change and weight loss.   HENT: Negative for trouble swallowing (hx of dysphagia; resolved after dilation done with EGD in 2/19).    Respiratory: Negative for cough and shortness of breath.    Cardiovascular: Negative for chest pain.   Gastrointestinal: Positive for diarrhea. Negative for abdominal pain, bloating, blood in stool, constipation, flatus, " nausea and vomiting.   Genitourinary: Negative for difficulty urinating and dysuria.   Musculoskeletal: Positive for gait problem.   Skin: Negative for rash.   Neurological: Negative for speech difficulty.   Psychiatric/Behavioral: Negative for confusion.       Past Medical History:   Diagnosis Date    Colon polyp     Coronary artery disease     Diabetes mellitus     GERD (gastroesophageal reflux disease)     History of colonic polyps     History of heart artery stent     Hypertension     Myocardial infarction       Past Surgical History:   Procedure Laterality Date    BACK SURGERY      COLONOSCOPY  04/07/2014    done in [place]; polyps removed per pt report    CORONARY ANGIOPLASTY WITH STENT PLACEMENT      ESOPHAGOGASTRODUODENOSCOPY N/A 2/21/2019    Procedure: EGD (ESOPHAGOGASTRODUODENOSCOPY);  Surgeon: Nino Ross MD;  Location: [facility];  Service: Endoscopy;  Laterality: N/A;    TUMOR REMOVAL  05/16/2016    Back in skin    UPPER GASTROINTESTINAL ENDOSCOPY  02/21/2019    Dr. Ross; mild schatizki ring-dilated; gastritis; gastric polyps; bx negative      Family History   Problem Relation Age of Onset    Arthritis Mother     Pacemaker/defibrilator Mother     Bursitis Mother     Chronic back pain Mother     Cancer Father     Hypertension Father     No Known Problems Sister     Congenital heart disease Brother     Cancer Paternal Grandmother     Alcohol abuse Brother     No Known Problems Brother     Stomach cancer Paternal Aunt     Colon cancer Paternal Uncle     Glaucoma Neg Hx     Macular degeneration Neg Hx     Retinal detachment Neg Hx       Wt Readings from Last 10 Encounters:   02/03/20 79.7 kg (175 lb 11.3 oz)   01/21/20 81.6 kg (179 lb 14.3 oz)   12/23/19 81.2 kg (179 lb 0.2 oz)   12/10/19 85.1 kg (187 lb 9.8 oz)   10/22/19 80.3 kg (176 lb 14.7 oz)   10/15/19 81.2 kg (179 lb)   09/30/19 81.5 kg (179 lb 10.8 oz)   08/06/19 82.1 kg (181 lb)   08/06/19 82.1 kg (181 lb)    07/18/19 82.6 kg (181 lb 15.8 oz)     Lab Results   Component Value Date    WBC 8.85 01/24/2020    HGB 14.2 01/24/2020    HCT 45.4 01/24/2020    MCV 94 01/24/2020     (H) 01/24/2020     CMP  Sodium   Date Value Ref Range Status   01/24/2020 140 136 - 145 mmol/L Final     Potassium   Date Value Ref Range Status   01/24/2020 4.1 3.5 - 5.1 mmol/L Final     Chloride   Date Value Ref Range Status   01/24/2020 102 95 - 110 mmol/L Final     CO2   Date Value Ref Range Status   01/24/2020 29 23 - 29 mmol/L Final     Glucose   Date Value Ref Range Status   01/24/2020 140 (H) 70 - 110 mg/dL Final     BUN, Bld   Date Value Ref Range Status   01/24/2020 8 6 - 20 mg/dL Final     Creatinine   Date Value Ref Range Status   01/24/2020 1.0 0.5 - 1.4 mg/dL Final     Calcium   Date Value Ref Range Status   01/24/2020 10.2 8.7 - 10.5 mg/dL Final     Total Protein   Date Value Ref Range Status   01/24/2020 7.8 6.0 - 8.4 g/dL Final     Albumin   Date Value Ref Range Status   01/24/2020 4.3 3.5 - 5.2 g/dL Final     Total Bilirubin   Date Value Ref Range Status   01/24/2020 0.4 0.1 - 1.0 mg/dL Final     Comment:     For infants and newborns, interpretation of results should be based  on gestational age, weight and in agreement with clinical  observations.  Premature Infant recommended reference ranges:  Up to 24 hours.............<8.0 mg/dL  Up to 48 hours............<12.0 mg/dL  3-5 days..................<15.0 mg/dL  6-29 days.................<15.0 mg/dL       Alkaline Phosphatase   Date Value Ref Range Status   01/24/2020 106 55 - 135 U/L Final     AST   Date Value Ref Range Status   01/24/2020 21 10 - 40 U/L Final     ALT   Date Value Ref Range Status   01/24/2020 24 10 - 44 U/L Final     Anion Gap   Date Value Ref Range Status   01/24/2020 9 8 - 16 mmol/L Final     eGFR if    Date Value Ref Range Status   01/24/2020 >60.0 >60 mL/min/1.73 m^2 Final     eGFR if non    Date Value Ref Range Status    01/24/2020 >60.0 >60 mL/min/1.73 m^2 Final     Comment:     Calculation used to obtain the estimated glomerular filtration  rate (eGFR) is the CKD-EPI equation.        Lab Results   Component Value Date    TSH 0.755 01/08/2019            Reviewed prior medical records including radiology report of HIDA scan 1/29/2020 and US of abdomen 12/10/19 & endoscopy history (see surgical history).     Objective:      Physical Exam   Constitutional: He is oriented to person, place, and time. He appears well-developed and well-nourished.   HENT:   Head: Normocephalic.   Eyes: Pupils are equal, round, and reactive to light.   Neck: Normal range of motion.   Cardiovascular: Normal rate, regular rhythm and normal heart sounds.   No murmur heard.  Pulmonary/Chest: Breath sounds normal. No respiratory distress. He has no wheezes.   Abdominal: Soft. Bowel sounds are normal. He exhibits no distension and no mass. There is no tenderness. There is no guarding.   Musculoskeletal: Normal range of motion.   Ambulates with cane   Neurological: He is alert and oriented to person, place, and time.   Skin: Skin is warm and dry. No rash noted.   Non jaundiced   Psychiatric: He has a normal mood and affect. His speech is normal.         Assessment:       1. Intermittent diarrhea    2. Change in bowel habits    3. GERD without esophagitis    4. History of gastritis    5. Schatzki's ring    6. Gallbladder polyp    7. History of colon polyps           Plan:   All diagnoses and orders for this visit:    Intermittent diarrhea & Change in bowel habits  - Clostridium difficile EIA; Future; Expected date: 02/03/2020  - Stool Exam-Ova,Cysts,Parasites; Future; Expected date: 02/03/2020  - Stool culture; Future; Expected date: 02/03/2020  - Giardia / Cryptosporidum, EIA; Future; Expected date: 02/03/2020  - H. pylori antigen, stool; Future; Expected date: 02/03/2020  - Occult blood x 1, stool; Future; Expected date: 02/03/2020  - WBC, Stool; Future;  Expected date: 02/03/2020  - Rotavirus antigen, stool; Future; Expected date: 02/03/2020  - Start Probiotic once daily  - Recommended increase fiber in diet, especially soluble fiber since this can help bulk up the stool consistency and may help to slow down how fast the stool goes through the colon and can prevent diarrhea  - Schedule Colonoscopy, discussed procedure with the patient, including risks and benefits, patient verbalized understanding        - Discussed with patient that certain medications, such as metformin, may be contributing to symptoms. I recommend follow-up with provider who manages medication to discuss about possible alternative therapy, patient verbalized understanding    GERD without esophagitis & History of gastritis   - Continue Protonix 40 mg BID   - Take PPI in the morning 30 minutes before breakfast and dinner   - Recommend to avoid large meals, avoid eating within 3 hours of bedtime, elevate head of bed if nocturnal symptoms are present, smoking cessation (if current smoker), & weight loss (if overweight).    - Recommend minimize/avoid high-fat foods, chocolate, caffeine, citrus, alcohol, & tomato products.   - Advised to avoid/limit use of NSAID's, since they can cause GI upset, bleeding, and/or ulcers. If needed, take with food.     Schatzki's ring   - Resolved    Gallbladder polyp   - Recommend follow-up with general surgery for continued evaluation and management    History of colon polyps   - Schedule Colonoscopy, discussed procedure with the patient, including risks and benefits, patient verbalized understanding    If no improvement in symptoms or symptoms worsen, call/follow-up at clinic or go to ER

## 2020-02-03 NOTE — PROGRESS NOTES
Chart reviewed. Care Everywhere updates requested. Immunizations reconciled. HM updated.  Hx of polyps

## 2020-02-03 NOTE — PATIENT INSTRUCTIONS

## 2020-02-05 ENCOUNTER — OFFICE VISIT (OUTPATIENT)
Dept: FAMILY MEDICINE | Facility: CLINIC | Age: 60
End: 2020-02-05
Payer: COMMERCIAL

## 2020-02-05 VITALS
HEIGHT: 69 IN | DIASTOLIC BLOOD PRESSURE: 80 MMHG | RESPIRATION RATE: 14 BRPM | HEART RATE: 80 BPM | BODY MASS INDEX: 26.12 KG/M2 | WEIGHT: 176.38 LBS | SYSTOLIC BLOOD PRESSURE: 120 MMHG | TEMPERATURE: 98 F

## 2020-02-05 DIAGNOSIS — I25.10 CORONARY ARTERY DISEASE, ANGINA PRESENCE UNSPECIFIED, UNSPECIFIED VESSEL OR LESION TYPE, UNSPECIFIED WHETHER NATIVE OR TRANSPLANTED HEART: ICD-10-CM

## 2020-02-05 DIAGNOSIS — K82.4 GALLBLADDER POLYP: ICD-10-CM

## 2020-02-05 DIAGNOSIS — I15.2 HYPERTENSION ASSOCIATED WITH DIABETES: Primary | ICD-10-CM

## 2020-02-05 DIAGNOSIS — N52.9 ERECTILE DYSFUNCTION, UNSPECIFIED ERECTILE DYSFUNCTION TYPE: ICD-10-CM

## 2020-02-05 DIAGNOSIS — K52.9 CHRONIC DIARRHEA: ICD-10-CM

## 2020-02-05 DIAGNOSIS — Z11.4 ENCOUNTER FOR SCREENING FOR HIV: ICD-10-CM

## 2020-02-05 DIAGNOSIS — R26.81 GAIT INSTABILITY: ICD-10-CM

## 2020-02-05 DIAGNOSIS — Z79.4 TYPE 2 DIABETES MELLITUS WITH DIABETIC NEUROPATHY, WITH LONG-TERM CURRENT USE OF INSULIN: ICD-10-CM

## 2020-02-05 DIAGNOSIS — E78.5 HYPERLIPIDEMIA ASSOCIATED WITH TYPE 2 DIABETES MELLITUS: ICD-10-CM

## 2020-02-05 DIAGNOSIS — E11.69 HYPERLIPIDEMIA ASSOCIATED WITH TYPE 2 DIABETES MELLITUS: ICD-10-CM

## 2020-02-05 DIAGNOSIS — E11.40 TYPE 2 DIABETES MELLITUS WITH DIABETIC NEUROPATHY, WITH LONG-TERM CURRENT USE OF INSULIN: ICD-10-CM

## 2020-02-05 DIAGNOSIS — K21.9 GASTROESOPHAGEAL REFLUX DISEASE WITHOUT ESOPHAGITIS: ICD-10-CM

## 2020-02-05 DIAGNOSIS — E11.59 HYPERTENSION ASSOCIATED WITH DIABETES: Primary | ICD-10-CM

## 2020-02-05 PROBLEM — E11.49 TYPE 2 DIABETES MELLITUS WITH NEUROLOGIC COMPLICATION: Status: ACTIVE | Noted: 2019-01-08

## 2020-02-05 PROCEDURE — 3074F PR MOST RECENT SYSTOLIC BLOOD PRESSURE < 130 MM HG: ICD-10-PCS | Mod: CPTII,S$GLB,, | Performed by: FAMILY MEDICINE

## 2020-02-05 PROCEDURE — 3079F DIAST BP 80-89 MM HG: CPT | Mod: CPTII,S$GLB,, | Performed by: FAMILY MEDICINE

## 2020-02-05 PROCEDURE — 3079F PR MOST RECENT DIASTOLIC BLOOD PRESSURE 80-89 MM HG: ICD-10-PCS | Mod: CPTII,S$GLB,, | Performed by: FAMILY MEDICINE

## 2020-02-05 PROCEDURE — 3044F HG A1C LEVEL LT 7.0%: CPT | Mod: CPTII,S$GLB,, | Performed by: FAMILY MEDICINE

## 2020-02-05 PROCEDURE — 99999 PR PBB SHADOW E&M-EST. PATIENT-LVL III: CPT | Mod: PBBFAC,,, | Performed by: FAMILY MEDICINE

## 2020-02-05 PROCEDURE — 3044F PR MOST RECENT HEMOGLOBIN A1C LEVEL <7.0%: ICD-10-PCS | Mod: CPTII,S$GLB,, | Performed by: FAMILY MEDICINE

## 2020-02-05 PROCEDURE — 99214 OFFICE O/P EST MOD 30 MIN: CPT | Mod: S$GLB,,, | Performed by: FAMILY MEDICINE

## 2020-02-05 PROCEDURE — 3008F PR BODY MASS INDEX (BMI) DOCUMENTED: ICD-10-PCS | Mod: CPTII,S$GLB,, | Performed by: FAMILY MEDICINE

## 2020-02-05 PROCEDURE — 3008F BODY MASS INDEX DOCD: CPT | Mod: CPTII,S$GLB,, | Performed by: FAMILY MEDICINE

## 2020-02-05 PROCEDURE — 99999 PR PBB SHADOW E&M-EST. PATIENT-LVL III: ICD-10-PCS | Mod: PBBFAC,,, | Performed by: FAMILY MEDICINE

## 2020-02-05 PROCEDURE — 99214 PR OFFICE/OUTPT VISIT, EST, LEVL IV, 30-39 MIN: ICD-10-PCS | Mod: S$GLB,,, | Performed by: FAMILY MEDICINE

## 2020-02-05 PROCEDURE — 3074F SYST BP LT 130 MM HG: CPT | Mod: CPTII,S$GLB,, | Performed by: FAMILY MEDICINE

## 2020-02-05 RX ORDER — SILDENAFIL 100 MG/1
TABLET, FILM COATED ORAL
Qty: 6 TABLET | COMMUNITY
Start: 2020-02-05 | End: 2020-10-06

## 2020-02-05 RX ORDER — ASPIRIN 81 MG/1
81 TABLET ORAL DAILY
Refills: 0 | Status: ON HOLD | COMMUNITY
Start: 2020-02-05 | End: 2020-12-15 | Stop reason: SDUPTHER

## 2020-02-05 NOTE — PROGRESS NOTES
Subjective:       Patient ID: Price Noguera is a 59 y.o. male.    Chief Complaint: No chief complaint on file.    HPI  Review of Systems   Constitutional: Negative for fatigue and unexpected weight change.   Respiratory: Negative for chest tightness and shortness of breath.    Cardiovascular: Negative for chest pain, palpitations and leg swelling.   Gastrointestinal: Negative for abdominal pain.   Musculoskeletal: Positive for arthralgias.   Neurological: Positive for weakness. Negative for dizziness, syncope, light-headedness and headaches.       Patient Active Problem List   Diagnosis    Type 2 diabetes mellitus with neurologic complication, with long-term current use of insulin    Coronary artery disease    History of heart artery stent    Hypertension associated with diabetes    Hyponatremia    Hyperlipidemia associated with type 2 diabetes mellitus    Irritable bowel syndrome with diarrhea    Gastroesophageal reflux disease without esophagitis    Onychomycosis    Benign essential tremor    Dysphagia    History of colon polyps     Patient is here for a chronic conditions follow up.   Reviewed labs 1/20     C/o ble weakness jennifer in knees. Gives out on him and falls.  Uses cane or walker.  Has no dizziness/lightheadedness.  No syncope.       Here to f/u worsening refllux.  protonix increased to 40 mg bid 12/10/19. U/s 12/191.  There are 2 small gallbladder polyps.  There are no gallstones and there is no biliary ductal dilatation.    2.  The pancreas, liver and right kidney are normal.  Hida scan 1/22/20 normal    Also seen by GI since last visit for chronic diarrhea. Stool studies ordered and colonoscopy 2/14/20    Endocrine Dr. Ruiz  Podiatry Dr. Islas     Cad s/p stent in  maker 2012. Card [facility]. No recent sx CP        GI Dr. Ross EGD done 2/21/19 to complete CP work up-had mild schatzki ring -dilated, gastritis and 4 gastric polyps-bening. h pylori neg.  On PPI daily.  LAst  colonoscopy 5 years ago-h/o colon polyps? -records requested by Dr. Ross     ENdocrine PA Keira . BS < 130 fasting am.  Last eye exam 3/19. Last appt 7/19. Next appt next week.  Last a1c 6.5 7/19     Urology Dr. Reyes-BPH with LUTS, ED  Pain Dr. Naranjo -diabetic neuropathy     Having trouble getting to sleep and has been taking his mother's ambien which helped.    Objective:      Physical Exam   Constitutional: He is oriented to person, place, and time. He appears well-developed and well-nourished.   Cardiovascular: Normal rate, regular rhythm and normal heart sounds.   Pulmonary/Chest: Effort normal and breath sounds normal.   Musculoskeletal: He exhibits no edema.   Neurological: He is alert and oriented to person, place, and time.   Skin: Skin is warm and dry.   Psychiatric: He has a normal mood and affect.   Nursing note and vitals reviewed.      Assessment:       1. Hypertension associated with diabetes    2. Hyperlipidemia associated with type 2 diabetes mellitus    3. Coronary artery disease, angina presence unspecified, unspecified vessel or lesion type, unspecified whether native or transplanted heart    4. Type 2 diabetes mellitus with diabetic neuropathy, with long-term current use of insulin    5. Gastroesophageal reflux disease without esophagitis    6. Gallbladder polyp    7. Chronic diarrhea    8. Encounter for screening for HIV    9. Erectile dysfunction, unspecified erectile dysfunction type    10. Gait instability        Plan:         1. Hypertension associated with diabetes  Controlled on current medications.  Continue current medications.      2. Hyperlipidemia associated with type 2 diabetes mellitus  Chol at goal. Your triglycerides are borderline high. I recommend a heart healthy diet rich in fiber, fresh vegetables and fruit and low in saturated fats (fried foods, red meat, etc.).  I also recommend regular exercise including a minimum of 150 minutes of cardio exercise per week and 2-30  minute workouts of strength training like light weights, yoga, pilates, etc.  You should work toward a body mass index of < 25.      - CBC auto differential; Future  - Comprehensive metabolic panel; Future  - Lipid panel; Future    3. Coronary artery disease, angina presence unspecified, unspecified vessel or lesion type, unspecified whether native or transplanted heart  Asx.  Cont card monitoring and mgmt    4. Type 2 diabetes mellitus with diabetic neuropathy, with long-term current use of insulin  Controlled on current medications.  Continue current medications.    - Hemoglobin A1c; Future  - aspirin (ECOTRIN) 81 MG EC tablet; Take 1 tablet (81 mg total) by mouth once daily.; Refill: 0    5. Gastroesophageal reflux disease without esophagitis  Controlled on current medications.  Continue current medications.  Cont bid PPI and f/u gi as planned      6. Gallbladder polyp  Asx.  monitor    7. Chronic diarrhea  Proceed with GI work up-stool studies and colonoscopy. Counseled patient on IBS diagnosis, treatment options including probiotics, fiber supplements, anti-spasmodics, anti depressants and antibiotics.  Discussed side effects, risks and benefits of each.  Printed materials were given and FODMAPS diet was discussed. Patient elected to proceed       8. Encounter for screening for HIV  Screen and treat as indicated:    - HIV 1/2 Ag/Ab (4th Gen); Future    9. Erectile dysfunction, unspecified erectile dysfunction type  Discussed with patient the side effects, benefits and risks of treatment.  Patient elected to proceed with treatment.  Discussed warnings regarding not combining ED meds with nitroglycerin or nitrates as well as concerns for priapism.  Patient told to stop the medication if any serious side effects occur and notify me if severe lightheadedness, chest pain or shortness of breath occur.  Discussed potential secondary causes of ED such as hypogonadism.    - sildenafil (VIAGRA) 100 MG tablet; 1/2 -1 po 1  h prior to intercourse prn ED  Dispense: 6 tablet; Refill: prn    10. Gait instability  Fall precautions. Cont use of cane or walker.  Refer for gait training  - Ambulatory referral/consult to Physical/Occupational Therapy; Future        Time spent with patient: 20 minutes    Patient with be reevaluated in 4 months or sooner prn    Greater than 50% of this visit was spent counseling as described in above documentation:Yes

## 2020-02-09 DIAGNOSIS — E11.65 UNCONTROLLED TYPE 2 DIABETES MELLITUS WITH HYPERGLYCEMIA: ICD-10-CM

## 2020-02-09 RX ORDER — EMPAGLIFLOZIN 25 MG/1
TABLET, FILM COATED ORAL
Qty: 90 TABLET | Refills: 2 | Status: SHIPPED | OUTPATIENT
Start: 2020-02-09 | End: 2021-03-31 | Stop reason: SDUPTHER

## 2020-02-09 RX ORDER — GABAPENTIN 600 MG/1
TABLET ORAL
Qty: 90 TABLET | Refills: 5 | Status: SHIPPED | OUTPATIENT
Start: 2020-02-09 | End: 2020-06-18 | Stop reason: SDUPTHER

## 2020-02-12 ENCOUNTER — TELEPHONE (OUTPATIENT)
Dept: GASTROENTEROLOGY | Facility: CLINIC | Age: 60
End: 2020-02-12

## 2020-02-12 NOTE — TELEPHONE ENCOUNTER
----- Message from Angelica Wilcox sent at 2/12/2020  8:14 AM CST -----  Contact: Pt  Type: Needs Medical Advice    Who Called:  Pt  Best Call Back Number: [phone]  Additional Information: Requesting a call back regarding rescheduling his procedure due to pt has flu  Please Advise ---Thank you

## 2020-02-19 ENCOUNTER — TELEPHONE (OUTPATIENT)
Dept: ENDOCRINOLOGY | Facility: CLINIC | Age: 60
End: 2020-02-19

## 2020-02-19 NOTE — TELEPHONE ENCOUNTER
----- Message from Matti Sanabria sent at 2/19/2020  9:47 AM CST -----  Type: Needs Medical Advice    Who Called:  Patient    Pharmacy name and phone #:    [id number] - [place], [place] - [address] AT [facility]  [address]  [place] [address]  Phone: [phone] Fax: [phone]      Best Call Back Number: [phone]  Additional Information: Patient states that his prescription for JARDIANCE 25 mg Tab has increased in price from $10 to $300 and is seeking an alternative medication or advice.  Please call to advise

## 2020-02-20 NOTE — TELEPHONE ENCOUNTER
----- Message from Pily Silverman sent at 2/20/2020  1:33 PM CST -----  Contact: self [phone]  Patient is requesting a call back from the nurse stated JARDIANCE cost around $300+, is there a coupon he can use or is there an alternative medication.    Please call the patient upon request at phone number [phone].     Patient will be using   [facility]/pharmacy #[id number] - [place], [place] - [address] AT [facility]  [address]  [place] [address]  Phone: [phone] Fax: [phone]    Thanks!

## 2020-02-27 ENCOUNTER — TELEPHONE (OUTPATIENT)
Dept: DIABETES | Facility: CLINIC | Age: 60
End: 2020-02-27

## 2020-02-27 NOTE — TELEPHONE ENCOUNTER
----- Message from Evan Stapleton sent at 2/27/2020  3:17 PM CST -----  Contact: Patient  Type: Needs Medical Advice    Who Called:  Patient  Best Call Back Number: [phone]  Additional Information: Patient states they have a card from Dr. Crockett waiting for them at the  to provide a discount to acquiring JARDIANCE 25 mg Tab. Patient's vehicle has broken and the patient is unable to pick it up and would like to know if it could be mailed to them. Please call to advise. Thanks!

## 2020-03-04 ENCOUNTER — TELEPHONE (OUTPATIENT)
Dept: ENDOCRINOLOGY | Facility: CLINIC | Age: 60
End: 2020-03-04

## 2020-03-04 ENCOUNTER — TELEPHONE (OUTPATIENT)
Dept: FAMILY MEDICINE | Facility: CLINIC | Age: 60
End: 2020-03-04

## 2020-03-04 NOTE — TELEPHONE ENCOUNTER
----- Message from Eli Watson sent at 3/4/2020 12:02 PM CST -----  Contact: self  Pt called to request a coupon for Emilee last Wednesday 2/26/2020 and hasn't received it yet. He came in the clinic today to see if it was mailed.   Please call pt and advise @[phone].  Thank you

## 2020-03-04 NOTE — TELEPHONE ENCOUNTER
----- Message from Rachel Oneil sent at 3/4/2020 11:45 AM CST -----  Pt came in and wanted to see about a coupon that was supposed to be mailed to his home, he is unsure of what provider it was, so he would like to speak with the nurse about it since Karol is his primary

## 2020-03-04 NOTE — TELEPHONE ENCOUNTER
Voice mail not set up. Could not leave message. Coupons were sent out the day we spoke on the phone. Patient was advised that mail went to [facility] and then got mail out from main clinic.

## 2020-03-13 RX ORDER — NORTRIPTYLINE HYDROCHLORIDE 25 MG/1
CAPSULE ORAL
Qty: 90 CAPSULE | Refills: 1 | Status: SHIPPED | OUTPATIENT
Start: 2020-03-13 | End: 2020-09-09

## 2020-05-01 DIAGNOSIS — E11.65 UNCONTROLLED TYPE 2 DIABETES MELLITUS WITH HYPERGLYCEMIA: ICD-10-CM

## 2020-05-01 RX ORDER — INSULIN DEGLUDEC 100 U/ML
13 INJECTION, SOLUTION SUBCUTANEOUS DAILY
Qty: 1 SYRINGE | Refills: 11
Start: 2020-05-01 | End: 2020-05-14 | Stop reason: SDUPTHER

## 2020-05-01 NOTE — TELEPHONE ENCOUNTER
----- Message from Matti Sanabria sent at 5/1/2020  2:46 PM CDT -----  Type:  RX Refill Request    Who Called:  Patient  Refill or New Rx:  Refill  RX Name and Strength:  insulin degludec (TRESIBA FLEXTOUCH U-100) 100 unit/mL (3 mL) InPn  How is the patient currently taking it? (ex. 1XDay):  1XDay  Is this a 30 day or 90 day RX:  90    Preferred Pharmacy with phone number:    [facility]/pharmacy #[id number] - [place], [place] - [address] AT [facility]  [address]  [place] [address]  Phone: [phone] Fax: [phone]    Local or Mail Order:  Local  Ordering Provider:  Karol Mays Call Back Number: [phone]  Additional Information:  Patient states that he would like to know if there is a coupon available for the medication.  Please call to advise

## 2020-05-04 ENCOUNTER — PATIENT OUTREACH (OUTPATIENT)
Dept: ADMINISTRATIVE | Facility: OTHER | Age: 60
End: 2020-05-04

## 2020-05-05 ENCOUNTER — OFFICE VISIT (OUTPATIENT)
Dept: OPTOMETRY | Facility: CLINIC | Age: 60
End: 2020-05-05
Payer: COMMERCIAL

## 2020-05-05 DIAGNOSIS — E11.9 DIABETES MELLITUS WITHOUT OPHTHALMIC MANIFESTATIONS: Primary | ICD-10-CM

## 2020-05-05 DIAGNOSIS — H25.13 NUCLEAR SCLEROSIS, BILATERAL: ICD-10-CM

## 2020-05-05 DIAGNOSIS — H52.7 REFRACTIVE ERROR: ICD-10-CM

## 2020-05-05 PROCEDURE — 92014 COMPRE OPH EXAM EST PT 1/>: CPT | Mod: S$GLB,,, | Performed by: OPTOMETRIST

## 2020-05-05 PROCEDURE — 99999 PR PBB SHADOW E&M-EST. PATIENT-LVL II: CPT | Mod: PBBFAC,,, | Performed by: OPTOMETRIST

## 2020-05-05 PROCEDURE — 92014 PR EYE EXAM, EST PATIENT,COMPREHESV: ICD-10-PCS | Mod: S$GLB,,, | Performed by: OPTOMETRIST

## 2020-05-05 PROCEDURE — 99999 PR PBB SHADOW E&M-EST. PATIENT-LVL II: ICD-10-PCS | Mod: PBBFAC,,, | Performed by: OPTOMETRIST

## 2020-05-05 NOTE — PROGRESS NOTES
HPI     Diabetic Eye Exam      Additional comments: BSL controlled              Blurred Vision      Additional comments: at near only -- distance [facility] // does not want   new rx due to insurance coverage              Comments     Hemoglobin A1C       Date                     Value               Ref Range             Status                01/24/2020               6.3 (H)             4.0 - 5.6 %           Final                 07/09/2019               6.5 (H)             4.0 - 5.6 %           Final                  01/08/2019               11.1 (H)            4.0 - 5.6 %           Final                          Last edited by Morales Mcnair, OD on 5/5/2020  1:31 PM. (History)            Assessment /Plan     For exam results, see Encounter Report.    Diabetes mellitus without ophthalmic manifestations    Nuclear sclerosis, bilateral    Refractive error      DM type 2 w/o ocular retinopathy OU. Discussed possible ocular affects of uncontrolled blood sugar with patient. Recommended continued strong blood sugar control and continued care with PCP. Monitor yearly.     Mild NS OU, not yet significant. Discussed possible ocular affects of cataracts. Acceptable BCVA OU. Discussed treatment options. Surgery not recommended at this time. Monitor yearly.     Patient doing well with current specs, defers refraction, only eligible for specs once every two years through insurance. Return as desired for updated spec Rx.      RTC in 1 year for comprehensive eye exam, or sooner prn.

## 2020-05-06 ENCOUNTER — TELEPHONE (OUTPATIENT)
Dept: GASTROENTEROLOGY | Facility: CLINIC | Age: 60
End: 2020-05-06

## 2020-05-06 NOTE — TELEPHONE ENCOUNTER
Called patient to reschedule colonoscopy he states not ready to reschedule at this time will call back

## 2020-05-14 DIAGNOSIS — E11.65 UNCONTROLLED TYPE 2 DIABETES MELLITUS WITH HYPERGLYCEMIA: ICD-10-CM

## 2020-05-14 RX ORDER — INSULIN DEGLUDEC 100 U/ML
13 INJECTION, SOLUTION SUBCUTANEOUS DAILY
Qty: 1 SYRINGE | Refills: 11
Start: 2020-05-14 | End: 2021-06-07

## 2020-05-21 ENCOUNTER — TELEPHONE (OUTPATIENT)
Dept: FAMILY MEDICINE | Facility: CLINIC | Age: 60
End: 2020-05-21

## 2020-05-21 NOTE — TELEPHONE ENCOUNTER
----- Message from Dariel Max sent at 5/21/2020  9:25 AM CDT -----  Contact: patient  Type: Needs Medical Advice  Who Called:  patient  Symptoms (please be specific):    How long has patient had these symptoms:    Pharmacy name and phone #:    Best Call Back Number: [phone]  Additional Information: requesting a call back from Angelica regarding insulin degludec (TRESIBA FLEXTOUCH U-100) 100 unit/mL (3 mL) InPn script says no print in epic. Patient stated he called 3 three times to get this resolved. He has no medication pharmacy dosen't have the script.and need a card for the discount for medication

## 2020-05-21 NOTE — TELEPHONE ENCOUNTER
I spoke to the patient and then I called the pharmacy to call in the medication and then let the patient know that it was called in plus the pen needles. Also that I sent over a discount card for the patient but that [facility] said he did not need it because the pens were only $1.48 each.

## 2020-05-26 DIAGNOSIS — E11.65 UNCONTROLLED TYPE 2 DIABETES MELLITUS WITH HYPERGLYCEMIA: ICD-10-CM

## 2020-05-26 NOTE — TELEPHONE ENCOUNTER
----- Message from Evan Stapleton sent at 5/26/2020  1:22 PM CDT -----  Contact: Patient  Type:  RX Refill Request    Who Called:  Patient  Refill or New Rx:  Refill  RX Name and Strength:  metFORMIN (GLUCOPHAGE) 850 MG tablet  How is the patient currently taking it? (ex. 1XDay):  As ordered  Is this a 30 day or 90 day RX:  90  Preferred Pharmacy with phone number:    [facility]/pharmacy #[id number] - [place], [place] - [address] AT [facility]  [address]  [place] [address]  Phone: [phone] Fax: [phone]  Local or Mail Order:  Local  Ordering Provider:  Minal Mays Call Back Number:  [phone]  Additional Information:  SHANTEL

## 2020-05-27 RX ORDER — METFORMIN HYDROCHLORIDE 850 MG/1
850 TABLET ORAL 2 TIMES DAILY WITH MEALS
Qty: 180 TABLET | Refills: 3 | Status: SHIPPED | OUTPATIENT
Start: 2020-05-27 | End: 2021-06-17 | Stop reason: SDUPTHER

## 2020-06-05 ENCOUNTER — LAB VISIT (OUTPATIENT)
Dept: LAB | Facility: HOSPITAL | Age: 60
End: 2020-06-05
Attending: FAMILY MEDICINE
Payer: COMMERCIAL

## 2020-06-05 DIAGNOSIS — Z79.4 TYPE 2 DIABETES MELLITUS WITH DIABETIC NEUROPATHY, WITH LONG-TERM CURRENT USE OF INSULIN: ICD-10-CM

## 2020-06-05 DIAGNOSIS — Z11.4 ENCOUNTER FOR SCREENING FOR HIV: ICD-10-CM

## 2020-06-05 DIAGNOSIS — E11.40 TYPE 2 DIABETES MELLITUS WITH DIABETIC NEUROPATHY, WITH LONG-TERM CURRENT USE OF INSULIN: ICD-10-CM

## 2020-06-05 DIAGNOSIS — E11.69 HYPERLIPIDEMIA ASSOCIATED WITH TYPE 2 DIABETES MELLITUS: ICD-10-CM

## 2020-06-05 DIAGNOSIS — E78.5 HYPERLIPIDEMIA ASSOCIATED WITH TYPE 2 DIABETES MELLITUS: ICD-10-CM

## 2020-06-05 LAB
ALBUMIN SERPL BCP-MCNC: 4.3 G/DL (ref 3.5–5.2)
ALP SERPL-CCNC: 97 U/L (ref 55–135)
ALT SERPL W/O P-5'-P-CCNC: 24 U/L (ref 10–44)
ANION GAP SERPL CALC-SCNC: 8 MMOL/L (ref 8–16)
AST SERPL-CCNC: 21 U/L (ref 10–40)
BASOPHILS # BLD AUTO: 0.06 K/UL (ref 0–0.2)
BASOPHILS NFR BLD: 0.7 % (ref 0–1.9)
BILIRUB SERPL-MCNC: 0.6 MG/DL (ref 0.1–1)
BUN SERPL-MCNC: 13 MG/DL (ref 6–20)
CALCIUM SERPL-MCNC: 9.8 MG/DL (ref 8.7–10.5)
CHLORIDE SERPL-SCNC: 103 MMOL/L (ref 95–110)
CHOLEST SERPL-MCNC: 186 MG/DL (ref 120–199)
CHOLEST/HDLC SERPL: 4.7 {RATIO} (ref 2–5)
CO2 SERPL-SCNC: 27 MMOL/L (ref 23–29)
CREAT SERPL-MCNC: 1 MG/DL (ref 0.5–1.4)
DIFFERENTIAL METHOD: NORMAL
EOSINOPHIL # BLD AUTO: 0.2 K/UL (ref 0–0.5)
EOSINOPHIL NFR BLD: 1.8 % (ref 0–8)
ERYTHROCYTE [DISTWIDTH] IN BLOOD BY AUTOMATED COUNT: 13.6 % (ref 11.5–14.5)
EST. GFR  (AFRICAN AMERICAN): >60 ML/MIN/1.73 M^2
EST. GFR  (NON AFRICAN AMERICAN): >60 ML/MIN/1.73 M^2
GLUCOSE SERPL-MCNC: 149 MG/DL (ref 70–110)
HCT VFR BLD AUTO: 45.2 % (ref 40–54)
HDLC SERPL-MCNC: 40 MG/DL (ref 40–75)
HDLC SERPL: 21.5 % (ref 20–50)
HGB BLD-MCNC: 14.5 G/DL (ref 14–18)
IMM GRANULOCYTES # BLD AUTO: 0.02 K/UL (ref 0–0.04)
IMM GRANULOCYTES NFR BLD AUTO: 0.2 % (ref 0–0.5)
LDLC SERPL CALC-MCNC: 109.2 MG/DL (ref 63–159)
LYMPHOCYTES # BLD AUTO: 2.6 K/UL (ref 1–4.8)
LYMPHOCYTES NFR BLD: 28.7 % (ref 18–48)
MCH RBC QN AUTO: 29.7 PG (ref 27–31)
MCHC RBC AUTO-ENTMCNC: 32.1 G/DL (ref 32–36)
MCV RBC AUTO: 92 FL (ref 82–98)
MONOCYTES # BLD AUTO: 0.8 K/UL (ref 0.3–1)
MONOCYTES NFR BLD: 9 % (ref 4–15)
NEUTROPHILS # BLD AUTO: 5.4 K/UL (ref 1.8–7.7)
NEUTROPHILS NFR BLD: 59.6 % (ref 38–73)
NONHDLC SERPL-MCNC: 146 MG/DL
NRBC BLD-RTO: 0 /100 WBC
PLATELET # BLD AUTO: 348 K/UL (ref 150–350)
PMV BLD AUTO: 10 FL (ref 9.2–12.9)
POTASSIUM SERPL-SCNC: 4.6 MMOL/L (ref 3.5–5.1)
PROT SERPL-MCNC: 7.6 G/DL (ref 6–8.4)
RBC # BLD AUTO: 4.89 M/UL (ref 4.6–6.2)
SODIUM SERPL-SCNC: 138 MMOL/L (ref 136–145)
TRIGL SERPL-MCNC: 184 MG/DL (ref 30–150)
WBC # BLD AUTO: 9.02 K/UL (ref 3.9–12.7)

## 2020-06-05 PROCEDURE — 80053 COMPREHEN METABOLIC PANEL: CPT

## 2020-06-05 PROCEDURE — 80061 LIPID PANEL: CPT

## 2020-06-05 PROCEDURE — 83036 HEMOGLOBIN GLYCOSYLATED A1C: CPT

## 2020-06-05 PROCEDURE — 36415 COLL VENOUS BLD VENIPUNCTURE: CPT | Mod: PO

## 2020-06-05 PROCEDURE — 85025 COMPLETE CBC W/AUTO DIFF WBC: CPT

## 2020-06-05 PROCEDURE — 86703 HIV-1/HIV-2 1 RESULT ANTBDY: CPT

## 2020-06-06 LAB
ESTIMATED AVG GLUCOSE: 143 MG/DL (ref 68–131)
HBA1C MFR BLD HPLC: 6.6 % (ref 4–5.6)

## 2020-06-08 ENCOUNTER — TELEPHONE (OUTPATIENT)
Dept: FAMILY MEDICINE | Facility: CLINIC | Age: 60
End: 2020-06-08

## 2020-06-08 LAB — HIV 1+2 AB+HIV1 P24 AG SERPL QL IA: NEGATIVE

## 2020-06-08 NOTE — TELEPHONE ENCOUNTER
Called patient regarding appointment on today 6/8/2020 with Dr. Roberson, no answer could not leave voice message, voice mail not set up.

## 2020-06-08 NOTE — TELEPHONE ENCOUNTER
----- Message from Edwin Alcala sent at 6/8/2020  9:14 AM CDT -----  Contact: pt  Type:  Patient Returning Call    Who Called:  pt  Who Left Message for Patient:  Vicenta  Does the patient know what this is regarding?:    Best Call Back Number:  [phone]  Additional Information:

## 2020-06-09 ENCOUNTER — OFFICE VISIT (OUTPATIENT)
Dept: FAMILY MEDICINE | Facility: CLINIC | Age: 60
End: 2020-06-09
Payer: COMMERCIAL

## 2020-06-09 VITALS
WEIGHT: 185.44 LBS | OXYGEN SATURATION: 97 % | TEMPERATURE: 98 F | BODY MASS INDEX: 27.46 KG/M2 | RESPIRATION RATE: 14 BRPM | HEART RATE: 80 BPM | HEIGHT: 69 IN | SYSTOLIC BLOOD PRESSURE: 100 MMHG | DIASTOLIC BLOOD PRESSURE: 60 MMHG

## 2020-06-09 DIAGNOSIS — E11.69 HYPERLIPIDEMIA ASSOCIATED WITH TYPE 2 DIABETES MELLITUS: ICD-10-CM

## 2020-06-09 DIAGNOSIS — Z79.4 TYPE 2 DIABETES MELLITUS WITH DIABETIC NEUROPATHY, WITH LONG-TERM CURRENT USE OF INSULIN: ICD-10-CM

## 2020-06-09 DIAGNOSIS — L60.0 INGROWING TOENAIL OF RIGHT FOOT: ICD-10-CM

## 2020-06-09 DIAGNOSIS — E11.40 TYPE 2 DIABETES MELLITUS WITH DIABETIC NEUROPATHY, WITH LONG-TERM CURRENT USE OF INSULIN: ICD-10-CM

## 2020-06-09 DIAGNOSIS — E11.59 HYPERTENSION ASSOCIATED WITH DIABETES: ICD-10-CM

## 2020-06-09 DIAGNOSIS — I25.10 CORONARY ARTERY DISEASE, ANGINA PRESENCE UNSPECIFIED, UNSPECIFIED VESSEL OR LESION TYPE, UNSPECIFIED WHETHER NATIVE OR TRANSPLANTED HEART: ICD-10-CM

## 2020-06-09 DIAGNOSIS — L60.8 NAIL DISCOLORATION: Primary | ICD-10-CM

## 2020-06-09 DIAGNOSIS — Z23 NEED FOR PNEUMOCOCCAL VACCINATION: ICD-10-CM

## 2020-06-09 DIAGNOSIS — I15.2 HYPERTENSION ASSOCIATED WITH DIABETES: ICD-10-CM

## 2020-06-09 DIAGNOSIS — E11.9 ENCOUNTER FOR DIABETIC FOOT EXAM: ICD-10-CM

## 2020-06-09 DIAGNOSIS — E78.5 HYPERLIPIDEMIA ASSOCIATED WITH TYPE 2 DIABETES MELLITUS: ICD-10-CM

## 2020-06-09 PROCEDURE — 3078F PR MOST RECENT DIASTOLIC BLOOD PRESSURE < 80 MM HG: ICD-10-PCS | Mod: CPTII,S$GLB,, | Performed by: FAMILY MEDICINE

## 2020-06-09 PROCEDURE — 99999 PR PBB SHADOW E&M-EST. PATIENT-LVL V: CPT | Mod: PBBFAC,,, | Performed by: FAMILY MEDICINE

## 2020-06-09 PROCEDURE — 3074F SYST BP LT 130 MM HG: CPT | Mod: CPTII,S$GLB,, | Performed by: FAMILY MEDICINE

## 2020-06-09 PROCEDURE — 90471 PNEUMOCOCCAL POLYSACCHARIDE VACCINE 23-VALENT =>2YO SQ IM: ICD-10-PCS | Mod: S$GLB,,, | Performed by: FAMILY MEDICINE

## 2020-06-09 PROCEDURE — 90732 PNEUMOCOCCAL POLYSACCHARIDE VACCINE 23-VALENT =>2YO SQ IM: ICD-10-PCS | Mod: S$GLB,,, | Performed by: FAMILY MEDICINE

## 2020-06-09 PROCEDURE — 3044F PR MOST RECENT HEMOGLOBIN A1C LEVEL <7.0%: ICD-10-PCS | Mod: CPTII,S$GLB,, | Performed by: FAMILY MEDICINE

## 2020-06-09 PROCEDURE — 3008F BODY MASS INDEX DOCD: CPT | Mod: CPTII,S$GLB,, | Performed by: FAMILY MEDICINE

## 2020-06-09 PROCEDURE — 3078F DIAST BP <80 MM HG: CPT | Mod: CPTII,S$GLB,, | Performed by: FAMILY MEDICINE

## 2020-06-09 PROCEDURE — 90471 IMMUNIZATION ADMIN: CPT | Mod: S$GLB,,, | Performed by: FAMILY MEDICINE

## 2020-06-09 PROCEDURE — 99214 OFFICE O/P EST MOD 30 MIN: CPT | Mod: 25,S$GLB,, | Performed by: FAMILY MEDICINE

## 2020-06-09 PROCEDURE — 99999 PR PBB SHADOW E&M-EST. PATIENT-LVL V: ICD-10-PCS | Mod: PBBFAC,,, | Performed by: FAMILY MEDICINE

## 2020-06-09 PROCEDURE — 3044F HG A1C LEVEL LT 7.0%: CPT | Mod: CPTII,S$GLB,, | Performed by: FAMILY MEDICINE

## 2020-06-09 PROCEDURE — 3074F PR MOST RECENT SYSTOLIC BLOOD PRESSURE < 130 MM HG: ICD-10-PCS | Mod: CPTII,S$GLB,, | Performed by: FAMILY MEDICINE

## 2020-06-09 PROCEDURE — 99214 PR OFFICE/OUTPT VISIT, EST, LEVL IV, 30-39 MIN: ICD-10-PCS | Mod: 25,S$GLB,, | Performed by: FAMILY MEDICINE

## 2020-06-09 PROCEDURE — 90732 PPSV23 VACC 2 YRS+ SUBQ/IM: CPT | Mod: S$GLB,,, | Performed by: FAMILY MEDICINE

## 2020-06-09 PROCEDURE — 3008F PR BODY MASS INDEX (BMI) DOCUMENTED: ICD-10-PCS | Mod: CPTII,S$GLB,, | Performed by: FAMILY MEDICINE

## 2020-06-09 NOTE — PROGRESS NOTES
Patient verified by name and . Patient received Pnuemo 23 in R deltoid. Patient tolerated injection well. Patient advised to wait in clinic for 15 minutes in case of adverse reactions. Patient verbalized understanding.

## 2020-06-09 NOTE — PROGRESS NOTES
Subjective:       Patient ID: Price Noguera is a 59 y.o. male.    Chief Complaint: Follow-up (4mth f/u hypertension / diabetes)    HPI  Review of Systems   Constitutional: Negative for fatigue and unexpected weight change.   Respiratory: Negative for chest tightness and shortness of breath.    Cardiovascular: Negative for chest pain, palpitations and leg swelling.   Gastrointestinal: Negative for abdominal pain.   Musculoskeletal: Positive for gait problem. Negative for arthralgias.   Neurological: Positive for weakness. Negative for dizziness, syncope, light-headedness and headaches.       Patient Active Problem List   Diagnosis    Type 2 diabetes mellitus with neurologic complication, with long-term current use of insulin    Coronary artery disease    History of heart artery stent    Hypertension associated with diabetes    Hyponatremia    Hyperlipidemia associated with type 2 diabetes mellitus    Irritable bowel syndrome with diarrhea    Gastroesophageal reflux disease without esophagitis    Onychomycosis    Benign essential tremor    Dysphagia    History of colon polyps     Patient is here for a chronic conditions follow up.    C/o ble weakness jennifer in knees. Gives out on him and falls.  Uses cane or walker.  Has no dizziness/lightheadedness.  No syncope.  referred to PT for gait training 2/20-cannot afford it and does not want COVID exposure.  Doing home exercises and walking daily     GERD/GI  protonix increased to 40 mg bid 12/10/19. U/s 12/191.  There are 2 small gallbladder polyps.  There are no gallstones and there is no biliary ductal dilatation.  2.  The pancreas, liver and right kidney are normal.  Hida scan 1/22/20 normal   Also seen by GI since last visit for chronic diarrhea. Stool studies ordered and colonoscopy 2/14/20-postponed. Protonix stopped and sx resolved     Endocrine Dr. Ruiz  Podiatry Dr. Islas     Cad s/p stent in  maker 2012. Card [facility] group. No recent sx  CP        GI Dr. Ross EGD done 2/21/19 to complete CP work up-had mild schatzki ring -dilated, gastritis and 4 gastric polyps-bening. h pylori neg.  On PPI daily.  LAst colonoscopy 5 years ago-h/o colon polyps? -records requested by Dr. Ross. Protonix made him nauseated and diarrhea     ENdocrine PA Keira . BS < 130 fasting am.  Last eye exam 5/20. Last appt 7/19. No f/u scheduled.  Last a1c 6.6 6/5/20. Chol at goal. TG borderline high     Urology Dr. Reyes-BPH with LUTS, ED  Pain Dr. Naranjo -diabetic neuropathy     Having trouble getting to sleep and has been taking his mother's ambien which helped.      Right great toenail black x 6 months medial and ingrowing.    Objective:      Physical Exam   Constitutional: He is oriented to person, place, and time. He appears well-developed and well-nourished.   Cardiovascular: Normal rate, regular rhythm and normal heart sounds.   Pulmonary/Chest: Effort normal and breath sounds normal.   Musculoskeletal: He exhibits no edema.   Neurological: He is alert and oriented to person, place, and time.   Skin: Skin is warm and dry.        Psychiatric: He has a normal mood and affect.   Nursing note and vitals reviewed.      Assessment:       1. Nail discoloration    2. Need for pneumococcal vaccination    3. Encounter for diabetic foot exam    4. Ingrowing toenail of right foot    5. Type 2 diabetes mellitus with diabetic neuropathy, with long-term current use of insulin    6. Coronary artery disease, angina presence unspecified, unspecified vessel or lesion type, unspecified whether native or transplanted heart    7. Hypertension associated with diabetes    8. Hyperlipidemia associated with type 2 diabetes mellitus        Plan:         1. Nail discoloration  Refer  - Ambulatory referral/consult to Dermatology; Future    2. Need for pneumococcal vaccination  Immunize today.  Counseled patient on risks, benefits and side effects.  Patient elected to proceed with  vaccination.    - (In Office Administered) Pneumococcal Polysaccharide Vaccine (23 Valent) (SQ/IM)    3. Encounter for diabetic foot exam  Refer for eval and treat  - Ambulatory referral/consult to Podiatry; Future    4. Ingrowing toenail of right foot  Refer for eval and treat    5. Type 2 diabetes mellitus with diabetic neuropathy, with long-term current use of insulin  Controlled on current medications.  Continue current medications.    - CBC auto differential; Future  - Comprehensive metabolic panel; Future  - Hemoglobin A1C; Future    6. Coronary artery disease, angina presence unspecified, unspecified vessel or lesion type, unspecified whether native or transplanted heart  Cont card monitoring    7. Hypertension associated with diabetes  Controlled on current medications.  Continue current medications.      8. Hyperlipidemia associated with type 2 diabetes mellitus  Controlled on current medications.  Continue current medications.  Your triglycerides are borderline high. I recommend a heart healthy diet rich in fiber, fresh vegetables and fruit and low in saturated fats (fried foods, red meat, etc.).  I also recommend regular exercise including a minimum of 150 minutes of cardio exercise per week and 2-30 minute workouts of strength training like light weights, yoga, pilates, etc.  You should work toward a body mass index of < 25.      - Lipid Panel; Future        Time spent with patient: 20 minutes    Patient with be reevaluated in 6 months or sooner prn    Greater than 50% of this visit was spent counseling as described in above documentation:Yes

## 2020-11-24 ENCOUNTER — TELEPHONE (OUTPATIENT)
Dept: GASTROENTEROLOGY | Facility: CLINIC | Age: 60
End: 2020-11-24

## 2020-11-24 ENCOUNTER — PATIENT OUTREACH (OUTPATIENT)
Dept: ADMINISTRATIVE | Facility: HOSPITAL | Age: 60
End: 2020-11-24

## 2020-11-24 NOTE — TELEPHONE ENCOUNTER
Tried both numbers for patient listed in chart no answer no voice mail set up unable to leave message.

## 2020-11-24 NOTE — PROGRESS NOTES
2020 Care Everywhere updates requested and reviewed.  Immunizations reconciled. Media reports reviewed.  Duplicate HM overrides and  orders removed.  Overdue HM topic chart audit and/or requested.  Overdue lab testing linked to upcoming lab appointments if applies.    LINKS DOWN 2020    COLONOSCOPY HAS BEEN CANCELLED/POSTPONED SINCE EARLIER IN THE YEAR. STAFF MESSAGE SENT TO GI TO REACH OUT TO PT ABOUT RESCHEDULING. PRIOR COLONOSCOPY RECORDS ALSO NEEDED. GULFPORT ?    Health Maintenance Due   Topic Date Due    Colorectal Cancer Screening  2009    Shingles Vaccine (2 of 2) 2019    Foot Exam  2020    Influenza Vaccine (1) 2020

## 2020-11-24 NOTE — TELEPHONE ENCOUNTER
----- Message from Simran Romero LPN sent at 11/24/2020 11:37 AM CST -----  Regarding: COLONOSCOPY  Good Morning,    This pt's colonoscopy with Dr. Ross was postponed earlier in the year. Can you reach out to him again about rescheduling? It also looks like his previous colonoscopy records, requested by Dr. Ross, were never received. If you can find out where this was done, or who the provider was when you speak with him, I can try to get those records. Thank you!    Simran Romero LPN  Clinical Care Coordinator  [address] [address]  P: [phone]  F: [phone]

## 2020-12-07 ENCOUNTER — OFFICE VISIT (OUTPATIENT)
Dept: FAMILY MEDICINE | Facility: CLINIC | Age: 60
End: 2020-12-07
Payer: COMMERCIAL

## 2020-12-07 VITALS
HEIGHT: 69 IN | RESPIRATION RATE: 14 BRPM | OXYGEN SATURATION: 96 % | BODY MASS INDEX: 26.87 KG/M2 | WEIGHT: 181.44 LBS | HEART RATE: 82 BPM | SYSTOLIC BLOOD PRESSURE: 112 MMHG | DIASTOLIC BLOOD PRESSURE: 70 MMHG | TEMPERATURE: 97 F

## 2020-12-07 DIAGNOSIS — Z79.4 TYPE 2 DIABETES MELLITUS WITH DIABETIC NEUROPATHY, WITH LONG-TERM CURRENT USE OF INSULIN: ICD-10-CM

## 2020-12-07 DIAGNOSIS — E11.65 UNCONTROLLED TYPE 2 DIABETES MELLITUS WITH HYPERGLYCEMIA: ICD-10-CM

## 2020-12-07 DIAGNOSIS — I25.10 CORONARY ARTERY DISEASE, ANGINA PRESENCE UNSPECIFIED, UNSPECIFIED VESSEL OR LESION TYPE, UNSPECIFIED WHETHER NATIVE OR TRANSPLANTED HEART: ICD-10-CM

## 2020-12-07 DIAGNOSIS — I15.2 HYPERTENSION ASSOCIATED WITH DIABETES: Primary | ICD-10-CM

## 2020-12-07 DIAGNOSIS — E78.5 HYPERLIPIDEMIA ASSOCIATED WITH TYPE 2 DIABETES MELLITUS: ICD-10-CM

## 2020-12-07 DIAGNOSIS — E11.59 HYPERTENSION ASSOCIATED WITH DIABETES: Primary | ICD-10-CM

## 2020-12-07 DIAGNOSIS — E87.1 HYPONATREMIA: ICD-10-CM

## 2020-12-07 DIAGNOSIS — Z23 NEED FOR SHINGLES VACCINE: ICD-10-CM

## 2020-12-07 DIAGNOSIS — Z23 FLU VACCINE NEED: ICD-10-CM

## 2020-12-07 DIAGNOSIS — J20.9 ACUTE BRONCHITIS, UNSPECIFIED ORGANISM: ICD-10-CM

## 2020-12-07 DIAGNOSIS — E11.69 HYPERLIPIDEMIA ASSOCIATED WITH TYPE 2 DIABETES MELLITUS: ICD-10-CM

## 2020-12-07 DIAGNOSIS — Z86.010 HISTORY OF COLON POLYPS: ICD-10-CM

## 2020-12-07 DIAGNOSIS — E11.40 TYPE 2 DIABETES MELLITUS WITH DIABETIC NEUROPATHY, WITH LONG-TERM CURRENT USE OF INSULIN: ICD-10-CM

## 2020-12-07 PROCEDURE — 90472 ZOSTER RECOMBINANT VACCINE: ICD-10-PCS | Mod: S$GLB,,, | Performed by: FAMILY MEDICINE

## 2020-12-07 PROCEDURE — 90472 IMMUNIZATION ADMIN EACH ADD: CPT | Mod: S$GLB,,, | Performed by: FAMILY MEDICINE

## 2020-12-07 PROCEDURE — 3008F BODY MASS INDEX DOCD: CPT | Mod: CPTII,S$GLB,, | Performed by: FAMILY MEDICINE

## 2020-12-07 PROCEDURE — 3074F SYST BP LT 130 MM HG: CPT | Mod: CPTII,S$GLB,, | Performed by: FAMILY MEDICINE

## 2020-12-07 PROCEDURE — 90471 FLU VACCINE (QUAD) GREATER THAN OR EQUAL TO 3YO PRESERVATIVE FREE IM: ICD-10-PCS | Mod: S$GLB,,, | Performed by: FAMILY MEDICINE

## 2020-12-07 PROCEDURE — 3078F DIAST BP <80 MM HG: CPT | Mod: CPTII,S$GLB,, | Performed by: FAMILY MEDICINE

## 2020-12-07 PROCEDURE — 3072F LOW RISK FOR RETINOPATHY: CPT | Mod: S$GLB,,, | Performed by: FAMILY MEDICINE

## 2020-12-07 PROCEDURE — 99999 PR PBB SHADOW E&M-EST. PATIENT-LVL III: ICD-10-PCS | Mod: PBBFAC,,, | Performed by: FAMILY MEDICINE

## 2020-12-07 PROCEDURE — 3072F PR LOW RISK FOR RETINOPATHY: ICD-10-PCS | Mod: S$GLB,,, | Performed by: FAMILY MEDICINE

## 2020-12-07 PROCEDURE — 90686 IIV4 VACC NO PRSV 0.5 ML IM: CPT | Mod: S$GLB,,, | Performed by: FAMILY MEDICINE

## 2020-12-07 PROCEDURE — 1126F PR PAIN SEVERITY QUANTIFIED, NO PAIN PRESENT: ICD-10-PCS | Mod: S$GLB,,, | Performed by: FAMILY MEDICINE

## 2020-12-07 PROCEDURE — 3044F HG A1C LEVEL LT 7.0%: CPT | Mod: CPTII,S$GLB,, | Performed by: FAMILY MEDICINE

## 2020-12-07 PROCEDURE — 1126F AMNT PAIN NOTED NONE PRSNT: CPT | Mod: S$GLB,,, | Performed by: FAMILY MEDICINE

## 2020-12-07 PROCEDURE — 99214 PR OFFICE/OUTPT VISIT, EST, LEVL IV, 30-39 MIN: ICD-10-PCS | Mod: 25,S$GLB,, | Performed by: FAMILY MEDICINE

## 2020-12-07 PROCEDURE — 90750 HZV VACC RECOMBINANT IM: CPT | Mod: S$GLB,,, | Performed by: FAMILY MEDICINE

## 2020-12-07 PROCEDURE — 99214 OFFICE O/P EST MOD 30 MIN: CPT | Mod: 25,S$GLB,, | Performed by: FAMILY MEDICINE

## 2020-12-07 PROCEDURE — 3044F PR MOST RECENT HEMOGLOBIN A1C LEVEL <7.0%: ICD-10-PCS | Mod: CPTII,S$GLB,, | Performed by: FAMILY MEDICINE

## 2020-12-07 PROCEDURE — 90686 FLU VACCINE (QUAD) GREATER THAN OR EQUAL TO 3YO PRESERVATIVE FREE IM: ICD-10-PCS | Mod: S$GLB,,, | Performed by: FAMILY MEDICINE

## 2020-12-07 PROCEDURE — 3008F PR BODY MASS INDEX (BMI) DOCUMENTED: ICD-10-PCS | Mod: CPTII,S$GLB,, | Performed by: FAMILY MEDICINE

## 2020-12-07 PROCEDURE — 90471 IMMUNIZATION ADMIN: CPT | Mod: S$GLB,,, | Performed by: FAMILY MEDICINE

## 2020-12-07 PROCEDURE — 3078F PR MOST RECENT DIASTOLIC BLOOD PRESSURE < 80 MM HG: ICD-10-PCS | Mod: CPTII,S$GLB,, | Performed by: FAMILY MEDICINE

## 2020-12-07 PROCEDURE — 3074F PR MOST RECENT SYSTOLIC BLOOD PRESSURE < 130 MM HG: ICD-10-PCS | Mod: CPTII,S$GLB,, | Performed by: FAMILY MEDICINE

## 2020-12-07 PROCEDURE — 99999 PR PBB SHADOW E&M-EST. PATIENT-LVL III: CPT | Mod: PBBFAC,,, | Performed by: FAMILY MEDICINE

## 2020-12-07 PROCEDURE — 90750 ZOSTER RECOMBINANT VACCINE: ICD-10-PCS | Mod: S$GLB,,, | Performed by: FAMILY MEDICINE

## 2020-12-07 RX ORDER — AZITHROMYCIN 250 MG/1
TABLET, FILM COATED ORAL
Qty: 6 TABLET | Refills: 0 | Status: ON HOLD | OUTPATIENT
Start: 2020-12-07 | End: 2020-12-13

## 2020-12-07 RX ORDER — PEN NEEDLE, DIABETIC 30 GX3/16"
1 NEEDLE, DISPOSABLE MISCELLANEOUS DAILY
Qty: 200 EACH | Refills: 3 | Status: SHIPPED | OUTPATIENT
Start: 2020-12-07 | End: 2022-04-04

## 2020-12-07 NOTE — PROGRESS NOTES
Subjective:       Patient ID: Price Noguera is a 60 y.o. male.    Chief Complaint: Follow-up (6mth f/u hypertension / diabetes)    HPI  Review of Systems   Constitutional: Negative for fatigue and unexpected weight change.   Respiratory: Positive for cough. Negative for chest tightness and shortness of breath.    Cardiovascular: Negative for chest pain, palpitations and leg swelling.   Gastrointestinal: Negative for abdominal pain.   Musculoskeletal: Negative for arthralgias.   Neurological: Negative for dizziness, syncope, light-headedness and headaches.       Patient Active Problem List   Diagnosis    Type 2 diabetes mellitus with neurologic complication, with long-term current use of insulin    Coronary artery disease    History of heart artery stent    Hypertension associated with diabetes    Hyponatremia    Hyperlipidemia associated with type 2 diabetes mellitus    Irritable bowel syndrome with diarrhea    Gastroesophageal reflux disease without esophagitis    Onychomycosis    Benign essential tremor    Dysphagia    History of colon polyps     Patient is here for a chronic conditions follow up.    Has appt for fasting labs later this week    C/o Cough occ prod x 2 months.  No reflux. No fever. Mild pnd.  No sore throat. No sob.  Asking for zpack to clear it up    Eye sight and balance worsening over time.  Fall risk.  Dr. Mcnair mentioned cataracts but not ready for removal yet.  C/o ble weakness jennifer in knees. Gives out on him and falls.  Uses cane or walker.  Has no dizziness/lightheadedness.  No syncope.  referred to PT for gait training 2/20-cannot afford it and does not want COVID exposure.  Doing home exercises and walking daily     GERD/GI  protonix increased to 40 mg bid 12/10/19. GI Dr. Ross EGD done 2/21/19 to complete CP work up-had mild schatzki ring -dilated, gastritis and 4 gastric polyps-bening. h pylori neg  U/s 12/191.  There are 2 small gallbladder polyps.  There are no gallstones  and there is no biliary ductal dilatation.  2.  The pancreas, liver and right kidney are normal.  Hida scan 1/22/20 normal   Also seen by GI since last visit for chronic diarrhea. Stool studies ordered and colonoscopy 2/14/20-postponed. Protonix stopped and sx resolved. Cannot afford $200 for copay on colonoscopy      Podiatry Dr. Islas     Cad s/p stent in  maker 2012. Card [facility]. No recent sx CP        ENdocrine PA Keira, now me . BS < 130 fasting am.  Last eye exam 5/20. Last appt 7/19. No f/u scheduled.  Last a1c 6.6 6/5/20. Chol at goal. TG borderline high. On ACE I, lipitor and asa     Urology Dr. Reyes-BPH with LUTS, ED    Pain Dr. Naranjo -diabetic neuropathy     Having trouble getting to sleep and has been taking his mother's ambien which helped.  RXd trazodone   Objective:      Physical Exam  Vitals signs and nursing note reviewed.   Constitutional:       Appearance: He is well-developed.   Cardiovascular:      Rate and Rhythm: Normal rate and regular rhythm.      Heart sounds: Normal heart sounds.   Pulmonary:      Effort: Pulmonary effort is normal.      Breath sounds: Normal breath sounds.   Skin:     General: Skin is warm and dry.   Neurological:      Mental Status: He is alert and oriented to person, place, and time.         Assessment:       1. Hypertension associated with diabetes    2. Flu vaccine need    3. Hyperlipidemia associated with type 2 diabetes mellitus    4. Coronary artery disease, angina presence unspecified, unspecified vessel or lesion type, unspecified whether native or transplanted heart    5. Hyponatremia    6. Type 2 diabetes mellitus with diabetic neuropathy, with long-term current use of insulin    7. History of colon polyps    8. Uncontrolled type 2 diabetes mellitus with hyperglycemia    9. Need for shingles vaccine    10. Acute bronchitis, unspecified organism        Plan:         1. Hypertension associated with diabetes  Controlled on current medications.   "Continue current medications.      2. Flu vaccine need  Immunize today.  Counseled patient on risks, benefits and side effects.  Patient elected to proceed with vaccination.    - Influenza - Quadrivalent (PF)    3. Hyperlipidemia associated with type 2 diabetes mellitus  Chol at goal. Your triglycerides are borderline high. I recommend a heart healthy diet rich in fiber, fresh vegetables and fruit and low in saturated fats (fried foods, red meat, etc.).  I also recommend regular exercise including a minimum of 150 minutes of cardio exercise per week and 2-30 minute workouts of strength training like light weights, yoga, pilates, etc.  You should work toward a body mass index of < 25.        4. Coronary artery disease, angina presence unspecified, unspecified vessel or lesion type, unspecified whether native or transplanted heart  Cont card monitoring    5. Hyponatremia  Stable and chronic.  Will continue to monitor q3-6 months and control chronic conditions as optimally as possible to preserve function.      6. Type 2 diabetes mellitus with diabetic neuropathy, with long-term current use of insulin  Stable condition.  Continue current medications.  Will adjust based on lab findings or if condition changes.      7. History of colon polyps  Colonoscopy asap    8. Uncontrolled type 2 diabetes mellitus with hyperglycemia  Stable condition.  Continue current medications.  Will adjust based on lab findings or if condition changes.    - pen needle, diabetic (PEN NEEDLE) 32 gauge x 5/32" Ndle; Inject 1 each into the skin once daily. Generic  Dispense: 200 each; Refill: 3    9. Need for shingles vaccine  Immunize today.  Counseled patient on risks, benefits and side effects.  Patient elected to proceed with vaccination.    - (In Office Administered) Zoster Recombinant Vaccine    10. Acute bronchitis, unspecified organism  General home care guidelines for cough and congestion:increase fluid intake, get plenty of rest, and use " OTC cough and cold medications for relief of symptoms.  I recommended guafenesin for congestion and dextromethorphan as directed for cough.  Brands like Mucinex DM or Chloricidin HBP or similar are recommended.  Avoidance of decongestants is recommended for patients with heart problems and hypertension.  Extra vitamin C may also benefit.  Return to clinic if symptoms last longer than 10 days or sooner if worsen with symptoms like fever > 100.4, severe sinus pain or headache, thick yellow nasal discharge or sputum, dehydration, sudden confusion or mental status changes, shortness of breath, labored breathing, or lethargy. Anti-fever medications can be alternated like OTC ibuprofen or tylenol as needed and as directed unless told to avoid these by your doctor.     - azithromycin (Z-LEIA) 250 MG tablet; Take 2 tablets by mouth on day 1; Take 1 tablet by mouth on days 2-5  Dispense: 6 tablet; Refill: 0        Time spent with patient: 20 minutes    Patient with be reevaluated in 4 months or sooner prn    Greater than 50% of this visit was spent counseling as described in above documentation:Yes

## 2020-12-07 NOTE — PROGRESS NOTES
ID patient by name and date of birth.  Allergies confirmed.  Flu shot and ShingRix given as per orders , using aseptic technique.  Patient tolerated well.  Information sheet reviewed and given to patient.

## 2020-12-13 ENCOUNTER — HOSPITAL ENCOUNTER (INPATIENT)
Facility: HOSPITAL | Age: 60
LOS: 3 days | Discharge: NURSING, REHAB OR LONG-TERM CARE | DRG: 065 | End: 2020-12-16
Attending: EMERGENCY MEDICINE | Admitting: STUDENT IN AN ORGANIZED HEALTH CARE EDUCATION/TRAINING PROGRAM
Payer: COMMERCIAL

## 2020-12-13 DIAGNOSIS — E11.40 TYPE 2 DIABETES MELLITUS WITH DIABETIC NEUROPATHY, WITH LONG-TERM CURRENT USE OF INSULIN: ICD-10-CM

## 2020-12-13 DIAGNOSIS — I63.9 SMALL VESSEL STROKE: ICD-10-CM

## 2020-12-13 DIAGNOSIS — I63.9 STROKE: Primary | ICD-10-CM

## 2020-12-13 DIAGNOSIS — I63.9 CVA (CEREBRAL VASCULAR ACCIDENT): ICD-10-CM

## 2020-12-13 DIAGNOSIS — Z79.4 TYPE 2 DIABETES MELLITUS WITH DIABETIC NEUROPATHY, WITH LONG-TERM CURRENT USE OF INSULIN: ICD-10-CM

## 2020-12-13 PROBLEM — N40.0 BPH (BENIGN PROSTATIC HYPERPLASIA): Status: ACTIVE | Noted: 2020-12-13

## 2020-12-13 LAB
ALBUMIN SERPL BCP-MCNC: 4.3 G/DL (ref 3.5–5.2)
ALP SERPL-CCNC: 108 U/L (ref 55–135)
ALT SERPL W/O P-5'-P-CCNC: 24 U/L (ref 10–44)
ANION GAP SERPL CALC-SCNC: 16 MMOL/L (ref 8–16)
AST SERPL-CCNC: 27 U/L (ref 10–40)
BASOPHILS # BLD AUTO: 0.09 K/UL (ref 0–0.2)
BASOPHILS NFR BLD: 0.8 % (ref 0–1.9)
BILIRUB SERPL-MCNC: 0.5 MG/DL (ref 0.1–1)
BUN SERPL-MCNC: 15 MG/DL (ref 8–23)
CALCIUM SERPL-MCNC: 9.4 MG/DL (ref 8.7–10.5)
CHLORIDE SERPL-SCNC: 102 MMOL/L (ref 95–110)
CHOLEST SERPL-MCNC: 196 MG/DL (ref 120–199)
CHOLEST/HDLC SERPL: 5.2 {RATIO} (ref 2–5)
CO2 SERPL-SCNC: 18 MMOL/L (ref 23–29)
CREAT SERPL-MCNC: 0.9 MG/DL (ref 0.5–1.4)
DIFFERENTIAL METHOD: ABNORMAL
EOSINOPHIL # BLD AUTO: 0.4 K/UL (ref 0–0.5)
EOSINOPHIL NFR BLD: 3.2 % (ref 0–8)
ERYTHROCYTE [DISTWIDTH] IN BLOOD BY AUTOMATED COUNT: 13.8 % (ref 11.5–14.5)
EST. GFR  (AFRICAN AMERICAN): >60 ML/MIN/1.73 M^2
EST. GFR  (NON AFRICAN AMERICAN): >60 ML/MIN/1.73 M^2
ESTIMATED AVG GLUCOSE: 148 MG/DL (ref 68–131)
GLUCOSE SERPL-MCNC: 177 MG/DL (ref 70–110)
HBA1C MFR BLD HPLC: 6.8 % (ref 4–5.6)
HCT VFR BLD AUTO: 42.5 % (ref 40–54)
HDLC SERPL-MCNC: 38 MG/DL (ref 40–75)
HDLC SERPL: 19.4 % (ref 20–50)
HGB BLD-MCNC: 14.4 G/DL (ref 14–18)
IMM GRANULOCYTES # BLD AUTO: 0.03 K/UL (ref 0–0.04)
IMM GRANULOCYTES NFR BLD AUTO: 0.3 % (ref 0–0.5)
INR PPP: 0.9 (ref 0.8–1.2)
LDLC SERPL CALC-MCNC: 130.2 MG/DL (ref 63–159)
LYMPHOCYTES # BLD AUTO: 2.2 K/UL (ref 1–4.8)
LYMPHOCYTES NFR BLD: 18.9 % (ref 18–48)
MCH RBC QN AUTO: 30.6 PG (ref 27–31)
MCHC RBC AUTO-ENTMCNC: 33.9 G/DL (ref 32–36)
MCV RBC AUTO: 90 FL (ref 82–98)
MONOCYTES # BLD AUTO: 0.8 K/UL (ref 0.3–1)
MONOCYTES NFR BLD: 6.5 % (ref 4–15)
NEUTROPHILS # BLD AUTO: 8.2 K/UL (ref 1.8–7.7)
NEUTROPHILS NFR BLD: 70.3 % (ref 38–73)
NONHDLC SERPL-MCNC: 158 MG/DL
NRBC BLD-RTO: 0 /100 WBC
PLATELET # BLD AUTO: 307 K/UL (ref 150–350)
PMV BLD AUTO: 8.7 FL (ref 9.2–12.9)
POC PTINR: 1 (ref 0.9–1.2)
POC PTWBT: 12.2 SEC (ref 9.7–14.3)
POCT GLUCOSE: 108 MG/DL (ref 70–110)
POCT GLUCOSE: 119 MG/DL (ref 70–110)
POCT GLUCOSE: 120 MG/DL (ref 70–110)
POCT GLUCOSE: 164 MG/DL (ref 70–110)
POTASSIUM SERPL-SCNC: 4.4 MMOL/L (ref 3.5–5.1)
PROT SERPL-MCNC: 8.1 G/DL (ref 6–8.4)
PROTHROMBIN TIME: 10.5 SEC (ref 9–12.5)
RBC # BLD AUTO: 4.71 M/UL (ref 4.6–6.2)
SAMPLE: NORMAL
SARS-COV-2 RDRP RESP QL NAA+PROBE: NEGATIVE
SODIUM SERPL-SCNC: 136 MMOL/L (ref 136–145)
TRIGL SERPL-MCNC: 139 MG/DL (ref 30–150)
TSH SERPL DL<=0.005 MIU/L-ACNC: 0.67 UIU/ML (ref 0.4–4)
WBC # BLD AUTO: 11.69 K/UL (ref 3.9–12.7)

## 2020-12-13 PROCEDURE — 99243 PR OFFICE CONSULTATION,LEVEL III: ICD-10-PCS | Mod: GT,,, | Performed by: PSYCHIATRY & NEUROLOGY

## 2020-12-13 PROCEDURE — 85025 COMPLETE CBC W/AUTO DIFF WBC: CPT

## 2020-12-13 PROCEDURE — 93010 EKG 12-LEAD: ICD-10-PCS | Mod: ,,, | Performed by: INTERNAL MEDICINE

## 2020-12-13 PROCEDURE — A9585 GADOBUTROL INJECTION: HCPCS | Performed by: STUDENT IN AN ORGANIZED HEALTH CARE EDUCATION/TRAINING PROGRAM

## 2020-12-13 PROCEDURE — 99243 OFF/OP CNSLTJ NEW/EST LOW 30: CPT | Mod: GT,,, | Performed by: PSYCHIATRY & NEUROLOGY

## 2020-12-13 PROCEDURE — 36415 COLL VENOUS BLD VENIPUNCTURE: CPT

## 2020-12-13 PROCEDURE — 84443 ASSAY THYROID STIM HORMONE: CPT

## 2020-12-13 PROCEDURE — 82962 GLUCOSE BLOOD TEST: CPT

## 2020-12-13 PROCEDURE — 63600175 PHARM REV CODE 636 W HCPCS: Performed by: STUDENT IN AN ORGANIZED HEALTH CARE EDUCATION/TRAINING PROGRAM

## 2020-12-13 PROCEDURE — 85610 PROTHROMBIN TIME: CPT

## 2020-12-13 PROCEDURE — 99285 EMERGENCY DEPT VISIT HI MDM: CPT | Mod: 25

## 2020-12-13 PROCEDURE — 80053 COMPREHEN METABOLIC PANEL: CPT

## 2020-12-13 PROCEDURE — 25500020 PHARM REV CODE 255: Performed by: EMERGENCY MEDICINE

## 2020-12-13 PROCEDURE — U0002 COVID-19 LAB TEST NON-CDC: HCPCS

## 2020-12-13 PROCEDURE — 25500020 PHARM REV CODE 255: Performed by: STUDENT IN AN ORGANIZED HEALTH CARE EDUCATION/TRAINING PROGRAM

## 2020-12-13 PROCEDURE — 25000003 PHARM REV CODE 250: Performed by: EMERGENCY MEDICINE

## 2020-12-13 PROCEDURE — 99900035 HC TECH TIME PER 15 MIN (STAT)

## 2020-12-13 PROCEDURE — 80061 LIPID PANEL: CPT

## 2020-12-13 PROCEDURE — 93005 ELECTROCARDIOGRAM TRACING: CPT

## 2020-12-13 PROCEDURE — 93010 ELECTROCARDIOGRAM REPORT: CPT | Mod: ,,, | Performed by: INTERNAL MEDICINE

## 2020-12-13 PROCEDURE — 94761 N-INVAS EAR/PLS OXIMETRY MLT: CPT

## 2020-12-13 PROCEDURE — 83036 HEMOGLOBIN GLYCOSYLATED A1C: CPT

## 2020-12-13 PROCEDURE — 12000002 HC ACUTE/MED SURGE SEMI-PRIVATE ROOM

## 2020-12-13 PROCEDURE — 27000221 HC OXYGEN, UP TO 24 HOURS

## 2020-12-13 RX ORDER — INSULIN ASPART 100 [IU]/ML
0-5 INJECTION, SOLUTION INTRAVENOUS; SUBCUTANEOUS EVERY 6 HOURS PRN
Status: DISCONTINUED | OUTPATIENT
Start: 2020-12-13 | End: 2020-12-16 | Stop reason: HOSPADM

## 2020-12-13 RX ORDER — GADOBUTROL 604.72 MG/ML
7 INJECTION INTRAVENOUS
Status: COMPLETED | OUTPATIENT
Start: 2020-12-13 | End: 2020-12-13

## 2020-12-13 RX ORDER — LISINOPRIL 10 MG/1
10 TABLET ORAL DAILY
Status: DISCONTINUED | OUTPATIENT
Start: 2020-12-13 | End: 2020-12-16 | Stop reason: HOSPADM

## 2020-12-13 RX ORDER — SODIUM CHLORIDE 0.9 % (FLUSH) 0.9 %
10 SYRINGE (ML) INJECTION
Status: DISCONTINUED | OUTPATIENT
Start: 2020-12-13 | End: 2020-12-16 | Stop reason: HOSPADM

## 2020-12-13 RX ORDER — NORTRIPTYLINE HYDROCHLORIDE 25 MG/1
25 CAPSULE ORAL NIGHTLY
Status: DISCONTINUED | OUTPATIENT
Start: 2020-12-13 | End: 2020-12-16 | Stop reason: HOSPADM

## 2020-12-13 RX ORDER — TAMSULOSIN HYDROCHLORIDE 0.4 MG/1
0.4 CAPSULE ORAL
Status: DISCONTINUED | OUTPATIENT
Start: 2020-12-13 | End: 2020-12-16 | Stop reason: HOSPADM

## 2020-12-13 RX ORDER — HEPARIN SODIUM 5000 [USP'U]/ML
5000 INJECTION, SOLUTION INTRAVENOUS; SUBCUTANEOUS EVERY 8 HOURS
Status: DISCONTINUED | OUTPATIENT
Start: 2020-12-13 | End: 2020-12-13

## 2020-12-13 RX ORDER — GABAPENTIN 300 MG/1
600 CAPSULE ORAL 3 TIMES DAILY
Status: DISCONTINUED | OUTPATIENT
Start: 2020-12-13 | End: 2020-12-16 | Stop reason: HOSPADM

## 2020-12-13 RX ORDER — GLUCAGON 1 MG
1 KIT INJECTION
Status: DISCONTINUED | OUTPATIENT
Start: 2020-12-13 | End: 2020-12-16 | Stop reason: HOSPADM

## 2020-12-13 RX ORDER — TRAZODONE HYDROCHLORIDE 50 MG/1
50 TABLET ORAL NIGHTLY PRN
Status: DISCONTINUED | OUTPATIENT
Start: 2020-12-13 | End: 2020-12-16 | Stop reason: HOSPADM

## 2020-12-13 RX ORDER — ATORVASTATIN CALCIUM 40 MG/1
80 TABLET, FILM COATED ORAL DAILY
Status: DISCONTINUED | OUTPATIENT
Start: 2020-12-13 | End: 2020-12-16 | Stop reason: HOSPADM

## 2020-12-13 RX ORDER — CLOPIDOGREL BISULFATE 75 MG/1
75 TABLET ORAL DAILY
Status: DISCONTINUED | OUTPATIENT
Start: 2020-12-14 | End: 2020-12-16 | Stop reason: HOSPADM

## 2020-12-13 RX ORDER — HEPARIN SODIUM 5000 [USP'U]/ML
5000 INJECTION, SOLUTION INTRAVENOUS; SUBCUTANEOUS EVERY 8 HOURS
Status: DISCONTINUED | OUTPATIENT
Start: 2020-12-14 | End: 2020-12-16 | Stop reason: HOSPADM

## 2020-12-13 RX ORDER — ASPIRIN 81 MG/1
81 TABLET ORAL DAILY
Status: DISCONTINUED | OUTPATIENT
Start: 2020-12-13 | End: 2020-12-16 | Stop reason: HOSPADM

## 2020-12-13 RX ORDER — CLOPIDOGREL BISULFATE 75 MG/1
300 TABLET ORAL
Status: COMPLETED | OUTPATIENT
Start: 2020-12-13 | End: 2020-12-13

## 2020-12-13 RX ADMIN — CLOPIDOGREL 300 MG: 75 TABLET, FILM COATED ORAL at 12:12

## 2020-12-13 RX ADMIN — GADOBUTROL 7 ML: 604.72 INJECTION INTRAVENOUS at 04:12

## 2020-12-13 RX ADMIN — IOHEXOL 75 ML: 350 INJECTION, SOLUTION INTRAVENOUS at 11:12

## 2020-12-13 RX ADMIN — HEPARIN SODIUM 5000 UNITS: 5000 INJECTION INTRAVENOUS; SUBCUTANEOUS at 05:12

## 2020-12-13 NOTE — SUBJECTIVE & OBJECTIVE
Past Medical History:   Diagnosis Date    Colon polyp     Coronary artery disease     Diabetes mellitus     GERD (gastroesophageal reflux disease)     History of colonic polyps     History of heart artery stent     Hypertension     Myocardial infarction        Past Surgical History:   Procedure Laterality Date    BACK SURGERY      COLONOSCOPY  2014    done in [place], [place]; polyps removed per pt report    CORONARY ANGIOPLASTY WITH STENT PLACEMENT      ESOPHAGOGASTRODUODENOSCOPY N/A 2019    Procedure: EGD (ESOPHAGOGASTRODUODENOSCOPY);  Surgeon: Nino Ross MD;  Location: [facility];  Service: Endoscopy;  Laterality: N/A;    TUMOR REMOVAL  2016    Back in skin    UPPER GASTROINTESTINAL ENDOSCOPY  2019    Dr. Ross; mild schatizki ring-dilated; gastritis; gastric polyps; bx negative       Review of patient's allergies indicates:   Allergen Reactions    Protonix [pantoprazole]      Nausea, diarrhea       No current facility-administered medications on file prior to encounter.      Current Outpatient Medications on File Prior to Encounter   Medication Sig    acetaminophen (TYLENOL) 500 MG tablet Take 2 tablets (1,000 mg total) by mouth every 8 (eight) hours as needed.    aspirin (ECOTRIN) 81 MG EC tablet Take 1 tablet (81 mg total) by mouth once daily.    atorvastatin (LIPITOR) 20 MG tablet Take 1 tablet (20 mg total) by mouth once daily.    [] azithromycin (Z-LEIA) 250 MG tablet Take 2 tablets by mouth on day 1; Take 1 tablet by mouth on days 2-5    blood sugar diagnostic Strp 2 strips by Misc.(Non-Drug; Combo Route) route once daily.    blood sugar diagnostic Strp 1 each by Misc.(Non-Drug; Combo Route) route 4 (four) times daily before meals and nightly.    blood-glucose meter kit Use as instructed.    ciclopirox (LOPROX) 0.77 % Susp Apply topically 2 (two) times daily.    gabapentin (NEURONTIN) 600 MG tablet TAKE 1 TABLET BY MOUTH THREE TIMES A DAY    insulin  "degludec (TRESIBA FLEXTOUCH U-100) 100 unit/mL (3 mL) InPn Inject 13 Units into the skin once daily.    JARDIANCE 25 mg Tab TAKE ONE TABLET EVERY MORNING.    lancets (ACCU-CHEK FASTCLIX LANCET DRUM) Misc 1 Device by Misc.(Non-Drug; Combo Route) route 2 (two) times daily.    lancets 28 gauge Misc 1 lancet by Misc.(Non-Drug; Combo Route) route 4 (four) times daily before meals and nightly.    lisinopril (PRINIVIL,ZESTRIL) 20 MG tablet TAKE 0.5 TABLETS (10 MG TOTAL) BY MOUTH ONCE DAILY.    metFORMIN (GLUCOPHAGE) 850 MG tablet Take 1 tablet (850 mg total) by mouth 2 (two) times daily with meals.    nortriptyline (PAMELOR) 25 MG capsule TAKE 1 CAPSULE (25 MG TOTAL) BY MOUTH EVERY EVENING.    omega 3-dha-epa-fish oil 1,000 mg (120 mg-180 mg) Cap Take 1 capsule by mouth 2 (two) times daily with meals. (Patient not taking: Reported on 1/21/2020)    ondansetron (ZOFRAN-ODT) 4 MG TbDL Take 1 tablet (4 mg total) by mouth every 6 (six) hours as needed.    pen needle, diabetic (PEN NEEDLE) 32 gauge x 5/32" Ndle Inject 1 each into the skin once daily. Generic    sildenafiL (VIAGRA) 100 MG tablet TAKE 1/2 -1 TABLET BY MOUTH 1 HOUR PRIOR TO INTERCOURSE AS NEEDED ERECTILE DYSFUNCTION    tamsulosin (FLOMAX) 0.4 mg Cap Take 1 capsule (0.4 mg total) by mouth after dinner.    traZODone (DESYREL) 50 MG tablet Take 1 tablet (50 mg total) by mouth nightly as needed for Insomnia.    [DISCONTINUED] fluconazole (DIFLUCAN) 200 MG Tab Take one tablet once. Repeat in seven days if infection is not cleared.    [DISCONTINUED] gabapentin (NEURONTIN) 600 MG tablet TAKE 1 TABLET BY MOUTH THREE TIMES A DAY     Family History     Problem Relation (Age of Onset)    Alcohol abuse Brother    Arthritis Mother    Bursitis Mother    Cancer Father, Paternal Grandmother    Chronic back pain Mother    Colon cancer Paternal Uncle    Congenital heart disease Brother    Hypertension Father    No Known Problems Sister, Brother    " Pacemaker/defibrilator Mother    Stomach cancer Paternal Aunt        Tobacco Use    Smoking status: Never Smoker    Smokeless tobacco: Never Used   Substance and Sexual Activity    Alcohol use: No     Frequency: Never    Drug use: No    Sexual activity: Not Currently     Review of Systems   Constitutional: Positive for activity change. Negative for appetite change, chills and fever.   HENT: Positive for voice change.    Eyes: Negative.    Respiratory: Negative for cough, chest tightness and shortness of breath.    Cardiovascular: Negative for chest pain, palpitations and leg swelling.   Gastrointestinal: Positive for nausea and vomiting. Negative for abdominal distention, abdominal pain and diarrhea.   Endocrine: Negative.    Genitourinary: Negative.    Musculoskeletal: Negative.    Skin: Negative.    Allergic/Immunologic: Negative.    Neurological: Positive for dizziness, facial asymmetry, speech difficulty and weakness.   Hematological: Negative.    Psychiatric/Behavioral: Negative.      Objective:     Vital Signs (Most Recent):  Temp: 98.4 °F (36.9 °C) (12/13/20 1028)  Pulse: 68 (12/13/20 1131)  Resp: 20 (12/13/20 1028)  BP: 116/74 (12/13/20 1131)  SpO2: 99 % (12/13/20 1131) Vital Signs (24h Range):  Temp:  [98.4 °F (36.9 °C)] 98.4 °F (36.9 °C)  Pulse:  [68-90] 68  Resp:  [20] 20  SpO2:  [97 %-99 %] 99 %  BP: (116-134)/(72-76) 116/74     Weight: 82.1 kg (181 lb)  Body mass index is 26.73 kg/m².    Physical Exam  Vitals signs and nursing note reviewed.   Constitutional:       General: He is not in acute distress.     Appearance: Normal appearance.   HENT:      Head: Normocephalic and atraumatic.      Right Ear: External ear normal.      Left Ear: External ear normal.      Nose: Nose normal.      Mouth/Throat:      Mouth: Mucous membranes are moist.      Pharynx: Oropharynx is clear.   Eyes:      Extraocular Movements: Extraocular movements intact.      Conjunctiva/sclera: Conjunctivae normal.   Neck:       Musculoskeletal: Normal range of motion and neck supple.   Cardiovascular:      Rate and Rhythm: Normal rate and regular rhythm.      Pulses: Normal pulses.      Heart sounds: Normal heart sounds.   Pulmonary:      Effort: Pulmonary effort is normal.      Breath sounds: Normal breath sounds.   Abdominal:      General: Abdomen is flat. Bowel sounds are normal.      Palpations: Abdomen is soft.   Genitourinary:     Comments: deferred  Musculoskeletal:      Right lower leg: No edema.      Left lower leg: No edema.      Comments: 3/5 strength RUE and RLE   Skin:     General: Skin is warm and dry.   Neurological:      Mental Status: He is alert and oriented to person, place, and time.      Motor: Weakness present.      Comments: Right sided facial droop; dysarthria   Psychiatric:         Mood and Affect: Mood normal.         Behavior: Behavior normal.         Thought Content: Thought content normal.         Judgment: Judgment normal.             Significant Labs: All pertinent labs within the past 24 hours have been reviewed.    Significant Imaging: I have reviewed all pertinent imaging results/findings within the past 24 hours.

## 2020-12-13 NOTE — CONSULTS
[facility]  Vascular Neurology  Comprehensive Stroke Center  Tele-Consultation Note      Consults    Consulting Provider: MAHIN PASTRANA  Current Providers  No providers found    Patient Location:  Health system EMERGENCY DEPARTMENT Emergency Department  Spoke hospital nurse at bedside with patient assisting consultant.     Patient information was obtained from patient.         Assessment/Plan:     STROKE DOCUMENTATION     Acute Stroke Times:   Acute Stroke Times   Last Known Normal Date: 12/11/20  Last Known Normal Time: 2300  Symptom Onset Date: 12/12/20  Symptom Onset Time: 0600  Stroke Team Called Date: 12/13/20  Stroke Team Called Time: 1021  Stroke Team Arrival Date: 12/13/20  Stroke Team Arrival Time: 1030  CT Interpretation Time: 1031    NIH Scale:  1a. Level of Consciousness: 0-->Alert, keenly responsive  1b. LOC Questions: 0-->Answers both questions correctly  1c. LOC Commands: 0-->Performs both tasks correctly  2. Best Gaze: 0-->Normal  3. Visual: 0-->No visual loss  4. Facial Palsy: 1-->Minor paralysis (flattened nasolabial fold, asymmetry on smiling)  5a. Motor Arm, Left: 0-->No drift, limb holds 90 (or 45) degrees for full 10 secs  5b. Motor Arm, Right: 0-->No drift, limb holds 90 (or 45) degrees for full 10 secs  6a. Motor Leg, Left: 0-->No drift, leg holds 30 degree position for full 5 secs  6b. Motor Leg, Right: 1-->Drift, leg falls by the end of the 5-sec period but does not hit bed  7. Limb Ataxia: 0-->Absent  8. Sensory: 0-->Normal, no sensory loss  9. Best Language: 0-->No aphasia, normal  10. Dysarthria: 1-->Mild-to-moderate dysarthria, patient slurs at least some words and, at worst, can be understood with some difficulty  11. Extinction and Inattention (formerly Neglect): 0-->No abnormality  Total (NIH Stroke Scale): 3     Modified [place]    Alan Coma Scale:    ABCD2 Score:    [id number] Score:   HAS -BLED Score:   ICH Score:   Hunt & Charles Classification:        Diagnoses:   Small vessel stroke  61 y/o man with HTN, T2DM who presents with R hemiparesis x 24 hrs.  Not in the window for tPA.  Suspect subcortical stroke.  No cortical signs.  Recommend admission for MRI, stroke workup.    Antithrombotics for secondary stroke prevention: Antiplatelets: Aspirin: 81 mg daily  Clopidogrel: 300 mg loading dose x 1, now  Clopidogrel: 75 mg daily    Statins for secondary stroke prevention and hyperlipidemia, if present:   Statins: Atorvastatin- 80 mg daily    Aggressive risk factor modification: HTN, DM     Rehab efforts: The patient has been evaluated by a stroke team provider and the therapy needs have been fully considered based off the presenting complaints and exam findings. The following therapy evaluations are needed: PT evaluate and treat, OT evaluate and treat, SLP evaluate and treat, PM&R evaluate for appropriate placement    Diagnostics ordered/pending: HgbA1C to assess blood glucose levels, Lipid Profile to assess cholesterol levels, MRA head to assess vasculature, MRA neck/arch to assess vasculature, MRI head without contrast to assess brain parenchyma, TTE to assess cardiac function/status , TSH to assess thyroid function    VTE prophylaxis: Heparin 5000 units SQ every 8 hours    BP parameters: Infarct: No intervention, SBP <220            Blood pressure 134/76, pulse 90, temperature 98.4 °F (36.9 °C), temperature source Oral, resp. rate 20, weight 82.1 kg (181 lb), SpO2 97 %.  Alteplase Eligible?: No  Alteplase Recommendation: Alteplase not recommended due to Outside of treatment window   Possible Interventional Revascularization Candidate? No; No large vessel occlusion    Disposition Recommendation: admit to inpatient    Subjective:     History of Present Illness:  61 y/o man with HTN, DM2 who presents with R sided weakness.  Patient states he noticed R sided weakness upon waking up yesterday.  He stayed in bed most of the day yesterday.  When he woke up this  morning he fell several times.      Woke up with symptoms?: yes    Recent bleeding noted: no  Does the patient take any Blood Thinners? no  Medications: Antiplatelets:  aspirin      Past Medical History: hypertension and diabetes    Past Surgical History: no relevant surgical history    Family History: no relevant history    Social History: no smoking, no drinking, no drugs    Allergies: Protonix [Pantoprazole] No relevant allergies    Review of Systems   Constitutional: Negative for fever.   HENT: Negative for hearing loss.    Eyes: Negative for visual disturbance.   Respiratory: Negative for chest tightness and shortness of breath.    Cardiovascular: Negative for chest pain.   Neurological: Positive for weakness. Negative for speech difficulty and light-headedness.     Objective:   Vitals: Blood pressure 134/76, pulse 90, temperature 98.4 °F (36.9 °C), temperature source Oral, resp. rate 20, weight 82.1 kg (181 lb), SpO2 97 %. Heart Rate: .    CT READ: Yes  No hemmorhage. No mass effect. No early infarct signs.     Physical Exam  Constitutional:       General: He is not in acute distress.  HENT:      Head: Normocephalic and atraumatic.   Eyes:      Pupils: Pupils are equal, round, and reactive to light.   Pulmonary:      Effort: Pulmonary effort is normal.   Neurological:      Comments: MS: A&XO3, speech fluent, follows commands, no neglect  CN: PERRL, EOMI, sensation intact, mild R facial, mild dysarthria  Motor: no arm drift but orbits R arm  R leg drift  Sens: intact to LT  Coord: no ataxia on finger to nose                   Recommended the emergency room physician to have a brief discussion with the patient and/or family if available regarding the risks and benefits of treatment, and to briefly document the occurrence of that discussion in his clinical encounter note.     The attending portion of this evaluation, treatment, and documentation was performed per Bertha Tompkins MD via audiovisual.    Billing  code:  (moderate to severe stroke, large areas of edema, some mimics)    · This patient has a critical neurological condition/illness, with high morbidity and mortality.  · There is a high probability for acute neurological change leading to clinical and possibly life-threatening deterioration requiring highest level of physician preparedness for urgent intervention.  · Care was coordinated with other physicians involved in the patient's care.  · Radiologic studies and laboratory data were reviewed and interpreted, and plan of care was re-assessed based on the results.  · Diagnosis, treatment options and prognosis may have been discussed with the patient and/or family members or caregiver.  · Further advanced medical management and further evaluation is warranted for his care.      In your opinion, this was a: Tier 2 Van Negative    Consult End Time: 10:56 AM     Bertha Tompkins MD  [facility]  Vascular Neurology   [facility]

## 2020-12-13 NOTE — ASSESSMENT & PLAN NOTE
NIHSS 3 per Tele-stroke. TSH normal. CTA and CT Head negative.  -MRI brain  -MRA head and neck  -TTE with bubble  -Telemetry  -Neurology consulted  -ASA, Plavix, statin  -PT/OT/SLP  -Lipid panel and Hgb A1c in AM  -SBP < 220

## 2020-12-13 NOTE — H&P
[facility] Hospital Medicine  History & Physical    Patient Name: Price Noguera  MRN: [id number]  Admission Date: 12/13/2020  Attending Physician: Erlin Taylor MD  Primary Care Provider: Chanel Roberson MD         Patient information was obtained from patient, past medical records and ER records.     Subjective:     Principal Problem:Small vessel stroke    Chief Complaint:   Chief Complaint   Patient presents with    Cerebrovascular Accident        HPI: Price Ann is a 60 year old man with a past medical history of DM2, HTN, HLD, CAD s/p PCI, and BPH who presents with two days of right-sided arm and leg weakness, slurred speech, dizziness, nausea, and facial droop. He states the symptoms first occurred the morning of 12/12; he states he spent most of the day in bed since he did not feel well. Over the next 24 hours, the patient began to notice his speech began to worsen. He attempted to get up to use the bathroom and noticed that he could not keep his balance as his right side was weak. The patient recently saw his PCP 12/7. He is a non-smoker. The patient was stroke activated in the ED; CT of head and CTA head and negative were negative. The patient was not given tPA as he was determined to be out of the window. Tele-stroke was consulted and recommended the patient be admitted for further stroke workup. The patient was also Plavix loaded in the ED.     Past Medical History:   Diagnosis Date    Colon polyp     Coronary artery disease     Diabetes mellitus     GERD (gastroesophageal reflux disease)     History of colonic polyps     History of heart artery stent     Hypertension     Myocardial infarction        Past Surgical History:   Procedure Laterality Date    BACK SURGERY      COLONOSCOPY  04/07/2014    done in [place], [place]; polyps removed per pt report    CORONARY ANGIOPLASTY WITH STENT PLACEMENT      ESOPHAGOGASTRODUODENOSCOPY N/A 2/21/2019    Procedure: EGD  (ESOPHAGOGASTRODUODENOSCOPY);  Surgeon: Nino Ross MD;  Location: [facility];  Service: Endoscopy;  Laterality: N/A;    TUMOR REMOVAL  2016    Back in skin    UPPER GASTROINTESTINAL ENDOSCOPY  2019    Dr. Ross; mild schatizki ring-dilated; gastritis; gastric polyps; bx negative       Review of patient's allergies indicates:   Allergen Reactions    Protonix [pantoprazole]      Nausea, diarrhea       No current facility-administered medications on file prior to encounter.      Current Outpatient Medications on File Prior to Encounter   Medication Sig    acetaminophen (TYLENOL) 500 MG tablet Take 2 tablets (1,000 mg total) by mouth every 8 (eight) hours as needed.    aspirin (ECOTRIN) 81 MG EC tablet Take 1 tablet (81 mg total) by mouth once daily.    atorvastatin (LIPITOR) 20 MG tablet Take 1 tablet (20 mg total) by mouth once daily.    [] azithromycin (Z-LEIA) 250 MG tablet Take 2 tablets by mouth on day 1; Take 1 tablet by mouth on days 2-5    blood sugar diagnostic Strp 2 strips by Misc.(Non-Drug; Combo Route) route once daily.    blood sugar diagnostic Strp 1 each by Misc.(Non-Drug; Combo Route) route 4 (four) times daily before meals and nightly.    blood-glucose meter kit Use as instructed.    ciclopirox (LOPROX) 0.77 % Susp Apply topically 2 (two) times daily.    gabapentin (NEURONTIN) 600 MG tablet TAKE 1 TABLET BY MOUTH THREE TIMES A DAY    insulin degludec (TRESIBA FLEXTOUCH U-100) 100 unit/mL (3 mL) InPn Inject 13 Units into the skin once daily.    JARDIANCE 25 mg Tab TAKE ONE TABLET EVERY MORNING.    lancets (ACCU-CHEK FASTCLIX LANCET DRUM) Misc 1 Device by Misc.(Non-Drug; Combo Route) route 2 (two) times daily.    lancets 28 gauge Misc 1 lancet by Misc.(Non-Drug; Combo Route) route 4 (four) times daily before meals and nightly.    lisinopril (PRINIVIL,ZESTRIL) 20 MG tablet TAKE 0.5 TABLETS (10 MG TOTAL) BY MOUTH ONCE DAILY.    metFORMIN (GLUCOPHAGE) 850 MG  "tablet Take 1 tablet (850 mg total) by mouth 2 (two) times daily with meals.    nortriptyline (PAMELOR) 25 MG capsule TAKE 1 CAPSULE (25 MG TOTAL) BY MOUTH EVERY EVENING.    omega 3-dha-epa-fish oil 1,000 mg (120 mg-180 mg) Cap Take 1 capsule by mouth 2 (two) times daily with meals. (Patient not taking: Reported on 1/21/2020)    ondansetron (ZOFRAN-ODT) 4 MG TbDL Take 1 tablet (4 mg total) by mouth every 6 (six) hours as needed.    pen needle, diabetic (PEN NEEDLE) 32 gauge x 5/32" Ndle Inject 1 each into the skin once daily. Generic    sildenafiL (VIAGRA) 100 MG tablet TAKE 1/2 -1 TABLET BY MOUTH 1 HOUR PRIOR TO INTERCOURSE AS NEEDED ERECTILE DYSFUNCTION    tamsulosin (FLOMAX) 0.4 mg Cap Take 1 capsule (0.4 mg total) by mouth after dinner.    traZODone (DESYREL) 50 MG tablet Take 1 tablet (50 mg total) by mouth nightly as needed for Insomnia.    [DISCONTINUED] fluconazole (DIFLUCAN) 200 MG Tab Take one tablet once. Repeat in seven days if infection is not cleared.    [DISCONTINUED] gabapentin (NEURONTIN) 600 MG tablet TAKE 1 TABLET BY MOUTH THREE TIMES A DAY     Family History     Problem Relation (Age of Onset)    Alcohol abuse Brother    Arthritis Mother    Bursitis Mother    Cancer Father, Paternal Grandmother    Chronic back pain Mother    Colon cancer Paternal Uncle    Congenital heart disease Brother    Hypertension Father    No Known Problems Sister, Brother    Pacemaker/defibrilator Mother    Stomach cancer Paternal Aunt        Tobacco Use    Smoking status: Never Smoker    Smokeless tobacco: Never Used   Substance and Sexual Activity    Alcohol use: No     Frequency: Never    Drug use: No    Sexual activity: Not Currently     Review of Systems   Constitutional: Positive for activity change. Negative for appetite change, chills and fever.   HENT: Positive for voice change.    Eyes: Negative.    Respiratory: Negative for cough, chest tightness and shortness of breath.    Cardiovascular: " Negative for chest pain, palpitations and leg swelling.   Gastrointestinal: Positive for nausea and vomiting. Negative for abdominal distention, abdominal pain and diarrhea.   Endocrine: Negative.    Genitourinary: Negative.    Musculoskeletal: Negative.    Skin: Negative.    Allergic/Immunologic: Negative.    Neurological: Positive for dizziness, facial asymmetry, speech difficulty and weakness.   Hematological: Negative.    Psychiatric/Behavioral: Negative.      Objective:     Vital Signs (Most Recent):  Temp: 98.4 °F (36.9 °C) (12/13/20 1028)  Pulse: 68 (12/13/20 1131)  Resp: 20 (12/13/20 1028)  BP: 116/74 (12/13/20 1131)  SpO2: 99 % (12/13/20 1131) Vital Signs (24h Range):  Temp:  [98.4 °F (36.9 °C)] 98.4 °F (36.9 °C)  Pulse:  [68-90] 68  Resp:  [20] 20  SpO2:  [97 %-99 %] 99 %  BP: (116-134)/(72-76) 116/74     Weight: 82.1 kg (181 lb)  Body mass index is 26.73 kg/m².    Physical Exam  Vitals signs and nursing note reviewed.   Constitutional:       General: He is not in acute distress.     Appearance: Normal appearance.   HENT:      Head: Normocephalic and atraumatic.      Right Ear: External ear normal.      Left Ear: External ear normal.      Nose: Nose normal.      Mouth/Throat:      Mouth: Mucous membranes are moist.      Pharynx: Oropharynx is clear.   Eyes:      Extraocular Movements: Extraocular movements intact.      Conjunctiva/sclera: Conjunctivae normal.   Neck:      Musculoskeletal: Normal range of motion and neck supple.   Cardiovascular:      Rate and Rhythm: Normal rate and regular rhythm.      Pulses: Normal pulses.      Heart sounds: Normal heart sounds.   Pulmonary:      Effort: Pulmonary effort is normal.      Breath sounds: Normal breath sounds.   Abdominal:      General: Abdomen is flat. Bowel sounds are normal.      Palpations: Abdomen is soft.   Genitourinary:     Comments: deferred  Musculoskeletal:      Right lower leg: No edema.      Left lower leg: No edema.      Comments: 3/5 strength  RUE and RLE   Skin:     General: Skin is warm and dry.   Neurological:      Mental Status: He is alert and oriented to person, place, and time.      Motor: Weakness present.      Comments: Right sided facial droop; dysarthria   Psychiatric:         Mood and Affect: Mood normal.         Behavior: Behavior normal.         Thought Content: Thought content normal.         Judgment: Judgment normal.             Significant Labs: All pertinent labs within the past 24 hours have been reviewed.    Significant Imaging: I have reviewed all pertinent imaging results/findings within the past 24 hours.    Assessment/Plan:     * Small vessel stroke  NIHSS 3 per Tele-stroke. TSH normal. CTA and CT Head negative.  -MRI brain  -MRA head and neck  -TTE with bubble  -Telemetry  -Neurology consulted  -ASA, Plavix, statin  -PT/OT/SLP  -Lipid panel and Hgb A1c in AM  -SBP < 220        BPH (benign prostatic hyperplasia)  -Continue home Flomax    Dysphagia  -Speech consulted  -NPO      Hyperlipidemia associated with type 2 diabetes mellitus  -High dose statin      Hypertension associated with diabetes  -Continue home lisinopril      History of heart artery stent  Noted.  -DAPT      Coronary artery disease  -ASA. Plavix, high dose atorvastatin  -TTE ordered      Type 2 diabetes mellitus with neurologic complication, with long-term current use of insulin  -Hgb A1c for risk stratification  -Low dose SSI  -NPO; Speech and Nutrition consulted  -ACHS glucose checks Q6H  -Hypoglycemic precautions        VTE Risk Mitigation (From admission, onward)    None             Erlin Taylor MD  Department of Hospital Medicine   [facility]

## 2020-12-13 NOTE — SUBJECTIVE & OBJECTIVE
Woke up with symptoms?: yes    Recent bleeding noted: no  Does the patient take any Blood Thinners? no  Medications: Antiplatelets:  aspirin      Past Medical History: hypertension and diabetes    Past Surgical History: no relevant surgical history    Family History: no relevant history    Social History: no smoking, no drinking, no drugs    Allergies: Protonix [Pantoprazole] No relevant allergies    Review of Systems   Constitutional: Negative for fever.   HENT: Negative for hearing loss.    Eyes: Negative for visual disturbance.   Respiratory: Negative for chest tightness and shortness of breath.    Cardiovascular: Negative for chest pain.   Neurological: Positive for weakness. Negative for speech difficulty and light-headedness.     Objective:   Vitals: Blood pressure 134/76, pulse 90, temperature 98.4 °F (36.9 °C), temperature source Oral, resp. rate 20, weight 82.1 kg (181 lb), SpO2 97 %. Heart Rate: .    CT READ: Yes  No hemmorhage. No mass effect. No early infarct signs.     Physical Exam  Constitutional:       General: He is not in acute distress.  HENT:      Head: Normocephalic and atraumatic.   Eyes:      Pupils: Pupils are equal, round, and reactive to light.   Pulmonary:      Effort: Pulmonary effort is normal.   Neurological:      Comments: MS: A&XO3, speech fluent, follows commands, no neglect  CN: PERRL, EOMI, sensation intact, mild R facial, mild dysarthria  Motor: no arm drift but orbits R arm  R leg drift  Sens: intact to LT  Coord: no ataxia on finger to nose

## 2020-12-13 NOTE — PLAN OF CARE
The pt confirmed his home address and that he lives alone. He denies HH and has a home cane Pharmacy of choice is [facility] and pcp is Dr. Roberson. Insurance verified as [employer]. The pt reports independence in ADL's including driving. At this time the pt does not have any discharge needs . CM following. [facility], [facility]     12/13/20 3294   Discharge Assessment   Assessment Type Discharge Planning Assessment   Confirmed/corrected address and phone number on facesheet? Yes   Assessment information obtained from? Patient   Communicated expected length of stay with patient/caregiver no   Prior to hospitilization cognitive status: Alert/Oriented   Prior to hospitalization functional status: Independent   Current cognitive status: Alert/Oriented   Current Functional Status: Independent   Lives With alone   Able to Return to Prior Arrangements yes   Is patient able to care for self after discharge? Yes   Who are your caregiver(s) and their phone number(s)? Sister Petty [phone]   Readmission Within the Last 30 Days no previous admission in last 30 days   Patient currently being followed by outpatient case management? No   Patient currently receives any other outside agency services? No   Equipment Currently Used at Home cane, straight   Do you have any problems affording any of your prescribed medications? No   Is the patient taking medications as prescribed? yes   Does the patient have transportation home? Yes   Transportation Anticipated family or friend will provide   Does the patient receive services at the [facility]? No   Discharge Plan A Home   Discharge Plan B Home;Home Health   DME Needed Upon Discharge  none   Patient/Family in Agreement with Plan yes

## 2020-12-13 NOTE — ED NOTES
Please contact patient's sister Petty Rios at [phone]  For any questions, changes or disposition.

## 2020-12-13 NOTE — HPI
61 y/o man with HTN, DM2 who presents with R sided weakness.  Patient states he noticed R sided weakness upon waking up yesterday.  He stayed in bed most of the day yesterday.  When he woke up this morning he fell several times.

## 2020-12-13 NOTE — ED PROVIDER NOTES
"Encounter Date: 12/13/2020    SCRIBE #1 NOTE: IPhyllis, am scribing for, and in the presence of, Alexis Miranda MD.       History     Chief Complaint   Patient presents with    Cerebrovascular Accident       Time seen by provider: 10:10 AM on 12/13/2020    Price Noguera is a 60 y.o. male with PMHx of CAD, MI, HTN, and DM who presents to the ED with an onset of slurred speech and right-sided weakness with concern for possible stroke. Patient also c/o "upset stomach" and nausea. He states symptoms began yesterday morning. The patient denies blurred vision, CP, HA or any other symptoms at this time. He is not currently on blood thinners. PSHx of coronary angioplasty with stent.     The history is provided by the patient.     Review of patient's allergies indicates:   Allergen Reactions    Protonix [pantoprazole]      Nausea, diarrhea     Past Medical History:   Diagnosis Date    Colon polyp     Coronary artery disease     Diabetes mellitus     GERD (gastroesophageal reflux disease)     History of colonic polyps     History of heart artery stent     Hypertension     Myocardial infarction      Past Surgical History:   Procedure Laterality Date    BACK SURGERY      COLONOSCOPY  04/07/2014    done in [place], [place]; polyps removed per pt report    CORONARY ANGIOPLASTY WITH STENT PLACEMENT      ESOPHAGOGASTRODUODENOSCOPY N/A 2/21/2019    Procedure: EGD (ESOPHAGOGASTRODUODENOSCOPY);  Surgeon: Nino Ross MD;  [facility];  Service: Endoscopy;  Laterality: N/A;    TUMOR REMOVAL  05/16/2016    Back in skin    UPPER GASTROINTESTINAL ENDOSCOPY  02/21/2019    Dr. Ross; mild schatizki ring-dilated; gastritis; gastric polyps; bx negative     Family History   Problem Relation Age of Onset    Arthritis Mother     Pacemaker/defibrilator Mother     Bursitis Mother     Chronic back pain Mother     Cancer Father     Hypertension Father     No Known Problems Sister     Congenital heart " disease Brother     Cancer Paternal Grandmother     Alcohol abuse Brother     No Known Problems Brother     Stomach cancer Paternal Aunt     Colon cancer Paternal Uncle     Glaucoma Neg Hx     Macular degeneration Neg Hx     Retinal detachment Neg Hx      Social History     Tobacco Use    Smoking status: Never Smoker    Smokeless tobacco: Never Used   Substance Use Topics    Alcohol use: No     Frequency: Never    Drug use: No     Review of Systems   Constitutional: Negative for fever.   HENT: Negative for sore throat.    Eyes: Negative for visual disturbance.   Respiratory: Negative for shortness of breath.    Cardiovascular: Negative for chest pain.   Gastrointestinal: Positive for nausea.        Positive for abdominal discomfort.   Genitourinary: Negative for dysuria.   Musculoskeletal: Negative for back pain.   Skin: Negative for rash.   Neurological: Positive for speech difficulty and weakness. Negative for headaches.   Hematological: Does not bruise/bleed easily.       Physical Exam     Initial Vitals   BP Pulse Resp Temp SpO2   12/13/20 1024 12/13/20 1024 12/13/20 1028 12/13/20 1028 12/13/20 1028   134/76 82 20 98.4 °F (36.9 °C) 97 %      MAP       --                Physical Exam    Nursing note and vitals reviewed.  Constitutional: He appears well-developed and well-nourished. He is not diaphoretic.   HENT:   Head: Normocephalic and atraumatic.   Eyes: EOM are normal. Pupils are equal, round, and reactive to light.   Neck: Normal range of motion. Neck supple.   Cardiovascular: Normal rate, regular rhythm, normal heart sounds and intact distal pulses. Exam reveals no gallop and no friction rub.    No murmur heard.  Pulmonary/Chest: Breath sounds normal. No respiratory distress. He has no wheezes. He has no rhonchi. He has no rales.   Abdominal: Soft. Bowel sounds are normal. There is no abdominal tenderness. There is no rebound and no guarding.   Musculoskeletal: Normal range of motion.    Neurological: He is alert and oriented to person, place, and time.   Slurred speech. Word finding difficulty. Right-sided weakness. Left side stronger.   Skin: Skin is warm.   Psychiatric: He has a normal mood and affect. His behavior is normal. Judgment and thought content normal.         ED Course   Critical Care  Performed by: Alexis Miranda MD  Authorized by: Alexis Miranda MD   Direct patient critical care time: 15 minutes  Ordering / reviewing critical care time: 12 minutes  Documentation critical care time: 10 minutes  Consulting other physicians critical care time: 7 minutes  Total critical care time (exclusive of procedural time) : 44 minutes  Critical care was time spent personally by me on the following activities: development of treatment plan with patient or surrogate, examination of patient, ordering and review of radiographic studies, ordering and review of laboratory studies, obtaining history from patient or surrogate and re-evaluation of patient's condition.        Labs Reviewed   CBC W/ AUTO DIFFERENTIAL - Abnormal; Notable for the following components:       Result Value    MPV 8.7 (*)     Gran # (ANC) 8.2 (*)     All other components within normal limits   COMPREHENSIVE METABOLIC PANEL - Abnormal; Notable for the following components:    CO2 18 (*)     Glucose 177 (*)     All other components within normal limits   LIPID PANEL - Abnormal; Notable for the following components:    HDL 38 (*)     HDL/Cholesterol Ratio 19.4 (*)     Total Cholesterol/HDL Ratio 5.2 (*)     All other components within normal limits   POCT GLUCOSE - Abnormal; Notable for the following components:    POCT Glucose 164 (*)     All other components within normal limits   PROTIME-INR   TSH   SARS-COV-2 RNA AMPLIFICATION, QUAL   POCT GLUCOSE, HAND-HELD DEVICE   ISTAT PROCEDURE   POCT PROTIME-INR     EKG Readings: (Independently Interpreted)   Normal sinus rhythm at rate of 83 bpm with normal axis. RBBB. No acute ST  segment changes. No STEMI.       Imaging Results          CTA Head and Neck (xpd) (Final result)  Result time 12/13/20 11:38:55    Final result by Cale Sepulveda Jr., MD (12/13/20 11:38:55)                 Impression:      Small amounts of atherosclerotic plaque are noted at the aortic arch, at the origin of the left subclavian artery and in the carotid bifurcations.  Maximum level stenosis is 12% in the proximal right internal carotid artery.  There is intact flow through both internal carotids and both vertebral arteries to the otmuwx-ao-Vsyquj and flow through the rdjaab-og-Jeuwfe to the brain parenchyma without aneurysm, AVM, stenosis or cut off vessels.    Within the brain parenchyma there is mild brain atrophy without acute findings.      Electronically signed by: Cale Sepulveda MD  Date:    12/13/2020  Time:    11:38             Narrative:    EXAMINATION:  CTA HEAD AND NECK (XPD)    CLINICAL HISTORY:  Neuro deficit, acute, stroke suspected;    TECHNIQUE:  Non contrast low dose axial images were obtained through the head.  CT angiogram was performed from the level of the anna to the top of the head following the IV administration of 75mL of Omnipaque 350.   Sagittal and coronal reconstructions and maximum intensity projection reconstructions were performed. Arterial stenosis percentages are based on NASCET measurement criteria.    COMPARISON:  None    FINDINGS:  The arch the aorta is of normal configuration.  Very small amount of atherosclerotic plaque is noted.  There is small amount of plaque at the origin of the left subclavian artery without stenosis..  There is normal flow in the right brachiocephalic artery and in the left carotid artery.  There is normal flow in both common carotid arteries to the bifurcation.    At the right carotid bifurcation there is a small amount of atherosclerotic plaque laterally and a small atherosclerotic plaque in the proximal internal carotid artery.  Calculated  stenosis is 12%.  On the left there is very small lateral plaque without stenosis.  There is normal flow in both internal carotid arteries to the base of the skull, carotid siphons and wzweqv-oa-Tmfdwc.    There is normal flow in both vertebral arteries.  There is calcified plaque near the origin of the left vertebral artery.  The vertebral arteries are fairly symmetric with intact flow to the basilar artery and through the basilar artery to the wzxvsg-mq-Hwqvdw.    At the rpqonq-rj-Tcrhqh no aneurysm is identified.  No stenosis or occlusion is identified.  There is intact flow to both anterior cerebral arteries, both middle cerebral arteries and both posterior cerebral arteries without anomaly.  Peripherally no AVM is identified.  A cut off vessel is not demonstrated.  No masses are seen.    On CT of the head the basal cisterns are clear and symmetric.  There is no mass effect or midline shift.  The ventricles are mildly enlarged as are the cerebral sulci consistent with mild brain atrophy.  Within the brain parenchyma no hyper or hypo dense lesions consistent with tumor, edema, CVA or hemorrhage is seen.  No intracranial hemorrhage or hematoma is noted.                               CT Head Without Contrast (Final result)  Result time 12/13/20 10:26:59    Final result by Cale Sepulveda Jr., MD (12/13/20 10:26:59)                 Impression:      Mild brain atrophy otherwise negative head CT.      Electronically signed by: Cale Sepulveda MD  Date:    12/13/2020  Time:    10:26             Narrative:    EXAMINATION:  CT HEAD WITHOUT CONTRAST    CLINICAL HISTORY:  Neuro deficit, acute, stroke suspected;    TECHNIQUE:  Low dose axial images were obtained through the head.  Coronal and sagittal reformations were also performed. Contrast was not administered.    COMPARISON:  CT head of January 8, 2019.    FINDINGS:  No cranial fracture is identified.  Scalp edema or hematoma is not noted.    The basal cisterns are  clear and symmetric.  There is mild enlargement of the lateral ventricles and cerebral sulci consistent with mild brain atrophy.  Within the brain parenchyma hyper or hypodense lesions  consistent with tumor, edema, CVA or hemorrhage is not seen.  Intracranial hemorrhage or hematoma is not noted.                              X-Rays:   Independently Interpreted Readings:   Head CT: 10:23 AM No hemorrhages on CT.     Medical Decision Making:   History:   Old Medical Records: I decided to obtain old medical records.  Initial Assessment:   60-year-old male presented with weakness and dysarthria.  Differential Diagnosis:   Initial differential diagnosis included but not limited to intracranial hemorrhage, CVA, and hypoglycemia.  Independently Interpreted Test(s):   I have ordered and independently interpreted EKG Reading(s) - see prior notes  Clinical Tests:   Lab Tests: Ordered and Reviewed  Radiological Study: Ordered and Reviewed  Medical Tests: Ordered and Reviewed  ED Management:  The patient was emergently evaluated in the emergency department, his evaluation was significant for an elderly male with neurologic deficits.  The patient's CT scan of his head showed no acute hemorrhages per my independent interpretation.  The patient's labs showed no acute processes.  The patient was stroke activated on arrival and emergently consulted to telemedicine vascular Neurology.  The patient is outside of the tPA window.  The patient's CTA of his head and neck showed no significant stenosis.  The patient was loaded with p.o. Plavix here in the emergency department.  I will admit the patient to the hospitalist service for further stroke workup.  I discussed the case with the hospitalist on call.  He has accepted the patient for admission.  Last known normal: 12/11/20 at 11:00 PM  Provider contact time: 10:16 AM  Head CT Read by MD: 10:23 AM  Neurology consult ordered by ED MD: MD ALETHA Hedrick positive?  yes    Weakness: Right-sided weakness.    Visual changes: No  Aphasia: yes  Neglect: No      TPa administered: No            Scribe Attestation:   Scribe #1: I performed the above scribed service and the documentation accurately describes the services I performed. I attest to the accuracy of the note.           I, Dr. Alexis Miranda, personally performed the services described in this documentation. All medical record entries made by the scribe were at my direction and in my presence.  I have reviewed the chart and agree that the record reflects my personal performance and is accurate and complete. Alexis Miranda MD.  12:52 PM 12/13/2020              Clinical Impression:       ICD-10-CM ICD-9-CM   1. Stroke  I63.9 434.91                          ED Disposition Condition    Observation                             Alexis Miranda MD  12/13/20 1252       Alexis Miranda MD  12/30/20 [id number]

## 2020-12-13 NOTE — CARE UPDATE
12/13/20 1412   PRE-TX-O2   O2 Device (Oxygen Therapy) nasal cannula   $ Is the patient on Low Flow Oxygen? Yes   Flow (L/min) 2   SpO2 99 %   Pulse Oximetry Type Intermittent   $ Pulse Oximetry - Multiple Charge Pulse Oximetry - Multiple

## 2020-12-13 NOTE — ASSESSMENT & PLAN NOTE
61 y/o man with HTN, T2DM who presents with R hemiparesis x 24 hrs.  Not in the window for tPA.  Suspect subcortical stroke.  No cortical signs.  Recommend admission for MRI, stroke workup.    Antithrombotics for secondary stroke prevention: Antiplatelets: Aspirin: 81 mg daily  Clopidogrel: 300 mg loading dose x 1, now  Clopidogrel: 75 mg daily    Statins for secondary stroke prevention and hyperlipidemia, if present:   Statins: Atorvastatin- 80 mg daily    Aggressive risk factor modification: HTN, DM     Rehab efforts: The patient has been evaluated by a stroke team provider and the therapy needs have been fully considered based off the presenting complaints and exam findings. The following therapy evaluations are needed: PT evaluate and treat, OT evaluate and treat, SLP evaluate and treat, PM&R evaluate for appropriate placement    Diagnostics ordered/pending: HgbA1C to assess blood glucose levels, Lipid Profile to assess cholesterol levels, MRA head to assess vasculature, MRA neck/arch to assess vasculature, MRI head without contrast to assess brain parenchyma, TTE to assess cardiac function/status , TSH to assess thyroid function    VTE prophylaxis: Heparin 5000 units SQ every 8 hours    BP parameters: Infarct: No intervention, SBP <220

## 2020-12-13 NOTE — ASSESSMENT & PLAN NOTE
-Hgb A1c for risk stratification  -Low dose SSI  -NPO; Speech and Nutrition consulted  -ACHS glucose checks Q6H  -Hypoglycemic precautions

## 2020-12-13 NOTE — HPI
Price Ann is a 60 year old man with a past medical history of DM2, HTN, HLD, CAD s/p PCI, and BPH who presents with two days of right-sided arm and leg weakness, slurred speech, dizziness, nausea, and facial droop. He states the symptoms first occurred the morning of 12/12; he states he spent most of the day in bed since he did not feel well. Over the next 24 hours, the patient began to notice his speech began to worsen. He attempted to get up to use the bathroom and noticed that he could not keep his balance as his right side was weak. The patient recently saw his PCP 12/7. He is a non-smoker. The patient was stroke activated in the ED; CT of head and CTA head and negative were negative. The patient was not given tPA as he was determined to be out of the window. Tele-stroke was consulted and recommended the patient be admitted for further stroke workup. The patient was also Plavix loaded in the ED.

## 2020-12-13 NOTE — ED NOTES
AAOx4, skin warm/dry, respirations even/unlabored.  NAD.  Mild right-sided deficits noted (asymetrical smile, mild weakened extremities).  Placed on cardiac/BP/O2 sat monitors.

## 2020-12-14 ENCOUNTER — TELEPHONE (OUTPATIENT)
Dept: FAMILY MEDICINE | Facility: CLINIC | Age: 60
End: 2020-12-14

## 2020-12-14 LAB
ALBUMIN SERPL BCP-MCNC: 4.2 G/DL (ref 3.5–5.2)
ALP SERPL-CCNC: 107 U/L (ref 55–135)
ALT SERPL W/O P-5'-P-CCNC: 24 U/L (ref 10–44)
ANION GAP SERPL CALC-SCNC: 11 MMOL/L (ref 8–16)
AORTIC ROOT ANNULUS: 3.68 CM
AORTIC VALVE CUSP SEPERATION: 2.02 CM
AST SERPL-CCNC: 23 U/L (ref 10–40)
AV INDEX (PROSTH): 0.99
AV MEAN GRADIENT: 3 MMHG
AV PEAK GRADIENT: 6 MMHG
AV VALVE AREA: 3.39 CM2
AV VELOCITY RATIO: 0.98
BASOPHILS # BLD AUTO: 0.1 K/UL (ref 0–0.2)
BASOPHILS # BLD AUTO: 0.1 K/UL (ref 0–0.2)
BASOPHILS NFR BLD: 1 % (ref 0–1.9)
BASOPHILS NFR BLD: 1 % (ref 0–1.9)
BILIRUB SERPL-MCNC: 0.9 MG/DL (ref 0.1–1)
BSA FOR ECHO PROCEDURE: 1.94 M2
BUN SERPL-MCNC: 18 MG/DL (ref 8–23)
CALCIUM SERPL-MCNC: 9.4 MG/DL (ref 8.7–10.5)
CHLORIDE SERPL-SCNC: 101 MMOL/L (ref 95–110)
CO2 SERPL-SCNC: 25 MMOL/L (ref 23–29)
CREAT SERPL-MCNC: 0.8 MG/DL (ref 0.5–1.4)
CV ECHO LV RWT: 0.53 CM
DIFFERENTIAL METHOD: ABNORMAL
DIFFERENTIAL METHOD: ABNORMAL
DOP CALC AO PEAK VEL: 1.26 M/S
DOP CALC AO VTI: 24.51 CM
DOP CALC LVOT AREA: 3.4 CM2
DOP CALC LVOT DIAMETER: 2.09 CM
DOP CALC LVOT PEAK VEL: 1.23 M/S
DOP CALC LVOT STROKE VOLUME: 83.19 CM3
DOP CALCLVOT PEAK VEL VTI: 24.26 CM
E WAVE DECELERATION TIME: 273.52 MSEC
E/A RATIO: 0.83
E/E' RATIO: 8.13 M/S
ECHO LV POSTERIOR WALL: 1.08 CM (ref 0.6–1.1)
EOSINOPHIL # BLD AUTO: 0.6 K/UL (ref 0–0.5)
EOSINOPHIL # BLD AUTO: 0.6 K/UL (ref 0–0.5)
EOSINOPHIL NFR BLD: 5.9 % (ref 0–8)
EOSINOPHIL NFR BLD: 5.9 % (ref 0–8)
ERYTHROCYTE [DISTWIDTH] IN BLOOD BY AUTOMATED COUNT: 13.7 % (ref 11.5–14.5)
ERYTHROCYTE [DISTWIDTH] IN BLOOD BY AUTOMATED COUNT: 13.7 % (ref 11.5–14.5)
EST. GFR  (AFRICAN AMERICAN): >60 ML/MIN/1.73 M^2
EST. GFR  (NON AFRICAN AMERICAN): >60 ML/MIN/1.73 M^2
ESTIMATED AVG GLUCOSE: 146 MG/DL (ref 68–131)
FRACTIONAL SHORTENING: 53 % (ref 28–44)
GLUCOSE SERPL-MCNC: 117 MG/DL (ref 70–110)
HBA1C MFR BLD HPLC: 6.7 % (ref 4–5.6)
HCT VFR BLD AUTO: 42.4 % (ref 40–54)
HCT VFR BLD AUTO: 42.4 % (ref 40–54)
HGB BLD-MCNC: 14 G/DL (ref 14–18)
HGB BLD-MCNC: 14 G/DL (ref 14–18)
IMM GRANULOCYTES # BLD AUTO: 0.04 K/UL (ref 0–0.04)
IMM GRANULOCYTES # BLD AUTO: 0.04 K/UL (ref 0–0.04)
IMM GRANULOCYTES NFR BLD AUTO: 0.4 % (ref 0–0.5)
IMM GRANULOCYTES NFR BLD AUTO: 0.4 % (ref 0–0.5)
INTERVENTRICULAR SEPTUM: 1.15 CM (ref 0.6–1.1)
LA MAJOR: 4.13 CM
LA MINOR: 3.89 CM
LA WIDTH: 3.33 CM
LEFT ATRIUM SIZE: 3.71 CM
LEFT ATRIUM VOLUME INDEX: 21.8 ML/M2
LEFT ATRIUM VOLUME: 42.07 CM3
LEFT INTERNAL DIMENSION IN SYSTOLE: 1.92 CM (ref 2.1–4)
LEFT VENTRICLE DIASTOLIC VOLUME INDEX: 37.87 ML/M2
LEFT VENTRICLE DIASTOLIC VOLUME: 73.01 ML
LEFT VENTRICLE MASS INDEX: 79 G/M2
LEFT VENTRICLE SYSTOLIC VOLUME INDEX: 6 ML/M2
LEFT VENTRICLE SYSTOLIC VOLUME: 11.54 ML
LEFT VENTRICULAR INTERNAL DIMENSION IN DIASTOLE: 4.07 CM (ref 3.5–6)
LEFT VENTRICULAR MASS: 152.55 G
LV LATERAL E/E' RATIO: 7.22 M/S
LV SEPTAL E/E' RATIO: 9.29 M/S
LYMPHOCYTES # BLD AUTO: 2.7 K/UL (ref 1–4.8)
LYMPHOCYTES # BLD AUTO: 2.7 K/UL (ref 1–4.8)
LYMPHOCYTES NFR BLD: 28.1 % (ref 18–48)
LYMPHOCYTES NFR BLD: 28.1 % (ref 18–48)
MAGNESIUM SERPL-MCNC: 2 MG/DL (ref 1.6–2.6)
MCH RBC QN AUTO: 29.7 PG (ref 27–31)
MCH RBC QN AUTO: 29.7 PG (ref 27–31)
MCHC RBC AUTO-ENTMCNC: 33 G/DL (ref 32–36)
MCHC RBC AUTO-ENTMCNC: 33 G/DL (ref 32–36)
MCV RBC AUTO: 90 FL (ref 82–98)
MCV RBC AUTO: 90 FL (ref 82–98)
MONOCYTES # BLD AUTO: 0.9 K/UL (ref 0.3–1)
MONOCYTES # BLD AUTO: 0.9 K/UL (ref 0.3–1)
MONOCYTES NFR BLD: 8.9 % (ref 4–15)
MONOCYTES NFR BLD: 8.9 % (ref 4–15)
MV PEAK A VEL: 0.78 M/S
MV PEAK E VEL: 0.65 M/S
NEUTROPHILS # BLD AUTO: 5.4 K/UL (ref 1.8–7.7)
NEUTROPHILS # BLD AUTO: 5.4 K/UL (ref 1.8–7.7)
NEUTROPHILS NFR BLD: 55.7 % (ref 38–73)
NEUTROPHILS NFR BLD: 55.7 % (ref 38–73)
NRBC BLD-RTO: 0 /100 WBC
NRBC BLD-RTO: 0 /100 WBC
PHOSPHATE SERPL-MCNC: 3.2 MG/DL (ref 2.7–4.5)
PISA TR MAX VEL: 2.52 M/S
PLATELET # BLD AUTO: 350 K/UL (ref 150–350)
PLATELET # BLD AUTO: 350 K/UL (ref 150–350)
PMV BLD AUTO: 8.5 FL (ref 9.2–12.9)
PMV BLD AUTO: 8.5 FL (ref 9.2–12.9)
POCT GLUCOSE: 125 MG/DL (ref 70–110)
POCT GLUCOSE: 143 MG/DL (ref 70–110)
POCT GLUCOSE: 144 MG/DL (ref 70–110)
POCT GLUCOSE: 178 MG/DL (ref 70–110)
POTASSIUM SERPL-SCNC: 4.1 MMOL/L (ref 3.5–5.1)
PROT SERPL-MCNC: 7.6 G/DL (ref 6–8.4)
PV PEAK VELOCITY: 1.03 CM/S
RA MAJOR: 4.41 CM
RA WIDTH: 2.9 CM
RBC # BLD AUTO: 4.72 M/UL (ref 4.6–6.2)
RBC # BLD AUTO: 4.72 M/UL (ref 4.6–6.2)
RIGHT VENTRICULAR END-DIASTOLIC DIMENSION: 2.51 CM
SINUS: 3.88 CM
SODIUM SERPL-SCNC: 137 MMOL/L (ref 136–145)
TDI LATERAL: 0.09 M/S
TDI SEPTAL: 0.07 M/S
TDI: 0.08 M/S
TR MAX PG: 25 MMHG
WBC # BLD AUTO: 9.62 K/UL (ref 3.9–12.7)
WBC # BLD AUTO: 9.62 K/UL (ref 3.9–12.7)

## 2020-12-14 PROCEDURE — 85025 COMPLETE CBC W/AUTO DIFF WBC: CPT

## 2020-12-14 PROCEDURE — 12000002 HC ACUTE/MED SURGE SEMI-PRIVATE ROOM

## 2020-12-14 PROCEDURE — 25000003 PHARM REV CODE 250: Performed by: STUDENT IN AN ORGANIZED HEALTH CARE EDUCATION/TRAINING PROGRAM

## 2020-12-14 PROCEDURE — 97110 THERAPEUTIC EXERCISES: CPT

## 2020-12-14 PROCEDURE — 36415 COLL VENOUS BLD VENIPUNCTURE: CPT

## 2020-12-14 PROCEDURE — 83735 ASSAY OF MAGNESIUM: CPT

## 2020-12-14 PROCEDURE — 94761 N-INVAS EAR/PLS OXIMETRY MLT: CPT

## 2020-12-14 PROCEDURE — 97116 GAIT TRAINING THERAPY: CPT

## 2020-12-14 PROCEDURE — 83036 HEMOGLOBIN GLYCOSYLATED A1C: CPT

## 2020-12-14 PROCEDURE — 97535 SELF CARE MNGMENT TRAINING: CPT

## 2020-12-14 PROCEDURE — 84100 ASSAY OF PHOSPHORUS: CPT

## 2020-12-14 PROCEDURE — 97165 OT EVAL LOW COMPLEX 30 MIN: CPT

## 2020-12-14 PROCEDURE — 97802 MEDICAL NUTRITION INDIV IN: CPT

## 2020-12-14 PROCEDURE — 80053 COMPREHEN METABOLIC PANEL: CPT

## 2020-12-14 PROCEDURE — 97161 PT EVAL LOW COMPLEX 20 MIN: CPT

## 2020-12-14 PROCEDURE — 63600175 PHARM REV CODE 636 W HCPCS: Performed by: STUDENT IN AN ORGANIZED HEALTH CARE EDUCATION/TRAINING PROGRAM

## 2020-12-14 RX ADMIN — HEPARIN SODIUM 5000 UNITS: 5000 INJECTION INTRAVENOUS; SUBCUTANEOUS at 09:12

## 2020-12-14 RX ADMIN — HEPARIN SODIUM 5000 UNITS: 5000 INJECTION INTRAVENOUS; SUBCUTANEOUS at 02:12

## 2020-12-14 RX ADMIN — ATORVASTATIN CALCIUM 80 MG: 40 TABLET, FILM COATED ORAL at 09:12

## 2020-12-14 RX ADMIN — LISINOPRIL 10 MG: 10 TABLET ORAL at 12:12

## 2020-12-14 RX ADMIN — TAMSULOSIN HYDROCHLORIDE 0.4 MG: 0.4 CAPSULE ORAL at 04:12

## 2020-12-14 RX ADMIN — GABAPENTIN 600 MG: 300 CAPSULE ORAL at 09:12

## 2020-12-14 RX ADMIN — GABAPENTIN 600 MG: 300 CAPSULE ORAL at 04:12

## 2020-12-14 RX ADMIN — NORTRIPTYLINE HYDROCHLORIDE 25 MG: 25 CAPSULE ORAL at 09:12

## 2020-12-14 RX ADMIN — CLOPIDOGREL 75 MG: 75 TABLET, FILM COATED ORAL at 12:12

## 2020-12-14 RX ADMIN — GABAPENTIN 600 MG: 300 CAPSULE ORAL at 12:12

## 2020-12-14 RX ADMIN — HEPARIN SODIUM 5000 UNITS: 5000 INJECTION INTRAVENOUS; SUBCUTANEOUS at 04:12

## 2020-12-14 RX ADMIN — ASPIRIN 81 MG: 81 TABLET, COATED ORAL at 12:12

## 2020-12-14 NOTE — HOSPITAL COURSE
Price Noguera is a 60 year old man with a past medical history of DM2, HTN, HLD, CAD s/p PCI, and BPH who presented with acute ischemic CVA to the left internal capsule imaged on MRI. He experienced dysarthria and right sided weakness of the RUE and RLE. He did not receive tPA or any neurosurgical intervention. Neurology was consulted who recommended three weeks of DAPT and high dose statin therapy. PT/OT and SLP were also consulted; the patient was placed into a rehabilitation facility for further PT/OT. Please see below for more detail.

## 2020-12-14 NOTE — ASSESSMENT & PLAN NOTE
Hgb A1c 6.8%.  -Low dose SSI  -NPO; Speech and Nutrition consulted  -ACHS glucose checks Q6H  -Hypoglycemic precautions

## 2020-12-14 NOTE — RESPIRATORY THERAPY
12/14/20 0756   PRE-TX-O2   O2 Device (Oxygen Therapy) room air   SpO2 96 %   Pulse Oximetry Type Intermittent   $ Pulse Oximetry - Multiple Charge Pulse Oximetry - Multiple   Pulse 62   Resp 16

## 2020-12-14 NOTE — PLAN OF CARE
Orientation: AAO x 4  Cardiac Monitoring: SR  Vitals: Stable  Glucose Monitoring: Monitored to sliding scale  Pain Evaluation: No complaints of pain  Elimination: urinal at bedside  VTE: Heparin  Safety: Bed in low position, bed alarm set, call light within reach, SR up x 2, free from falls, resting between care, will continue to monitor.   Plan of Care: Neuro ie. 4 hrs, NPO failed BJ awaiting speech eval,  ECHO a.m. Oxygen 2L NC

## 2020-12-14 NOTE — CONSULTS
[facility]  Neurology  Consult Note    Patient Name: Price Noguera  MRN: [id number]  Admission Date: 12/13/2020  Hospital Length of Stay: 1 days  Code Status: Full Code   Attending Provider: Erlin Taylor MD   Consulting Provider: Bhavna Devlin NP  Primary Care Physician: Chanel Roberson MD  Principal Problem:Small vessel stroke    Consults  Subjective:     Chief Complaint:     Cerebrovascular Accident         HPI per EMR: Price Ann is a 60 year old man with a past medical history of DM2, HTN, HLD, CAD s/p PCI, and BPH who presents with two days of right-sided arm and leg weakness, slurred speech, dizziness, nausea, and facial droop. He states the symptoms first occurred the morning of 12/12; he states he spent most of the day in bed since he did not feel well. Over the next 24 hours, the patient began to notice his speech began to worsen. He attempted to get up to use the bathroom and noticed that he could not keep his balance as his right side was weak. The patient recently saw his PCP 12/7. He is a non-smoker. The patient was stroke activated in the ED; CT of head and CTA head and negative were negative. The patient was not given tPA as he was determined to be out of the window. Tele-stroke was consulted and recommended the patient be admitted for further stroke workup. The patient was also Plavix loaded in the ED.     Neurological Consult:  Patient seen examined.  Plan care discussed with Dr. Miner.  Patient is alert and oriented.  He has right-sided weakness and some right-sided facial droop and dysarthria.  Patient denies history of stroke and does not take any anticoagulation or antiplatelets at home.    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        Past Surgical History:   Procedure Laterality Date    BACK SURGERY      COLONOSCOPY  04/07/2014    done in [place]; polyps removed per pt report    CORONARY ANGIOPLASTY WITH STENT PLACEMENT      ESOPHAGOGASTRODUODENOSCOPY N/A 2/21/2019    Procedure: EGD (ESOPHAGOGASTRODUODENOSCOPY);  Surgeon: Nino Ross MD;  Location: [facility];  Service: Endoscopy;  Laterality: N/A;    TUMOR REMOVAL  05/16/2016    Back in skin    UPPER GASTROINTESTINAL ENDOSCOPY  02/21/2019    Dr. Ross; mild schatizki ring-dilated; gastritis; gastric polyps; bx negative       Review of patient's allergies indicates:   Allergen Reactions    Protonix [pantoprazole]      Nausea, diarrhea       Current Neurological Medications:  Start aspirin 81 mg daily, increase statin 40 mg daily    No current facility-administered medications on file prior to encounter.      Current Outpatient Medications on File Prior to Encounter   Medication Sig    aspirin (ECOTRIN) 81 MG EC tablet Take 1 tablet (81 mg total) by mouth once daily.    atorvastatin (LIPITOR) 20 MG tablet Take 1 tablet (20 mg total) by mouth once daily.    gabapentin (NEURONTIN) 600 MG tablet TAKE 1 TABLET BY MOUTH THREE TIMES A DAY    insulin degludec (TRESIBA FLEXTOUCH U-100) 100 unit/mL (3 mL) InPn Inject 13 Units into the skin once daily.    JARDIANCE 25 mg Tab TAKE ONE TABLET EVERY MORNING.    lisinopril (PRINIVIL,ZESTRIL) 20 MG tablet TAKE 0.5 TABLETS (10 MG TOTAL) BY MOUTH ONCE DAILY.    metFORMIN (GLUCOPHAGE) 850 MG tablet Take 1 tablet (850 mg total) by mouth 2 (two) times daily with meals.    nortriptyline (PAMELOR) 25 MG capsule TAKE 1 CAPSULE (25 MG TOTAL) BY MOUTH EVERY EVENING.    tamsulosin (FLOMAX) 0.4 mg Cap Take 1 capsule (0.4 mg total) by mouth after dinner.    traZODone (DESYREL) 50 MG tablet Take 1 tablet  "(50 mg total) by mouth nightly as needed for Insomnia.    acetaminophen (TYLENOL) 500 MG tablet Take 2 tablets (1,000 mg total) by mouth every 8 (eight) hours as needed.    blood sugar diagnostic Strp 2 strips by Misc.(Non-Drug; Combo Route) route once daily.    blood sugar diagnostic Strp 1 each by Misc.(Non-Drug; Combo Route) route 4 (four) times daily before meals and nightly.    blood-glucose meter kit Use as instructed.    lancets (ACCU-CHEK FASTCLIX LANCET DRUM) Misc 1 Device by Misc.(Non-Drug; Combo Route) route 2 (two) times daily.    lancets 28 gauge Misc 1 lancet by Misc.(Non-Drug; Combo Route) route 4 (four) times daily before meals and nightly.    ondansetron (ZOFRAN-ODT) 4 MG TbDL Take 1 tablet (4 mg total) by mouth every 6 (six) hours as needed.    pen needle, diabetic (PEN NEEDLE) 32 gauge x 5/32" Ndle Inject 1 each into the skin once daily. Generic    sildenafiL (VIAGRA) 100 MG tablet TAKE 1/2 -1 TABLET BY MOUTH 1 HOUR PRIOR TO INTERCOURSE AS NEEDED ERECTILE DYSFUNCTION    [DISCONTINUED] gabapentin (NEURONTIN) 600 MG tablet TAKE 1 TABLET BY MOUTH THREE TIMES A DAY      Family History     Problem Relation (Age of Onset)    Alcohol abuse Brother    Arthritis Mother    Bursitis Mother    Cancer Father, Paternal Grandmother    Chronic back pain Mother    Colon cancer Paternal Uncle    Congenital heart disease Brother    Hypertension Father    No Known Problems Sister, Brother    Pacemaker/defibrilator Mother    Stomach cancer Paternal Aunt        Tobacco Use    Smoking status: Never Smoker    Smokeless tobacco: Never Used   Substance and Sexual Activity    Alcohol use: No     Frequency: Never    Drug use: No    Sexual activity: Not Currently     Review of Systems   Neurological: Positive for dizziness, speech difficulty and weakness.   All other systems reviewed and are negative.    Objective:     Vital Signs (Most Recent):  Temp: 97.8 °F (36.6 °C) (12/14/20 1627)  Pulse: 73 (12/14/20 " 1627)  Resp: 20 (12/14/20 1627)  BP: (!) 164/86 (12/14/20 1627)  SpO2: 98 % (12/14/20 1627) Vital Signs (24h Range):  Temp:  [96.6 °F (35.9 °C)-98.5 °F (36.9 °C)] 97.8 °F (36.6 °C)  Pulse:  [55-76] 73  Resp:  [16-20] 20  SpO2:  [95 %-99 %] 98 %  BP: (127-164)/(70-86) 164/86     Weight: 77.1 kg (169 lb 15.6 oz)  Body mass index is 25.1 kg/m².    Physical Exam  HENT:      Head: Normocephalic.      Nose: Nose normal.      Mouth/Throat:      Mouth: Mucous membranes are moist.   Eyes:      Extraocular Movements: Extraocular movements intact.      Pupils: Pupils are equal, round, and reactive to light.   Neck:      Musculoskeletal: Normal range of motion.   Cardiovascular:      Rate and Rhythm: Normal rate.   Pulmonary:      Effort: Pulmonary effort is normal.   Abdominal:      Palpations: Abdomen is soft.   Musculoskeletal: Normal range of motion.   Skin:     General: Skin is warm.   Neurological:      Mental Status: He is oriented to person, place, and time.      Cranial Nerves: Cranial nerve deficit present.      Motor: Weakness present.      Coordination: Finger-Nose-Finger Test normal.   Psychiatric:         Speech: Speech is slurred.         NEUROLOGICAL EXAMINATION:     MENTAL STATUS   Oriented to person, place, and time.   Attention: normal. Concentration: normal.   Speech: slurred (Mildly dysarthric)  Level of consciousness: alert  Able to name object. Able to repeat.     CRANIAL NERVES     CN II   Visual acuity: normal with correction    CN III, IV, VI   Pupils are equal, round, and reactive to light.  Right pupil: Size: 2 mm. Reactivity: brisk.   Left pupil: Size: 2 mm. Reactivity: brisk.   CN III: no CN III palsy    CN V   Facial sensation intact.     CN VII   Right facial weakness: peripheral    CN VIII   Hearing: intact    CN XI   CN XI normal.     CN XII   CN XII normal.     MOTOR EXAM        Right-sided weaker than left     SENSORY EXAM   Light touch normal.        Decreased sensation to right-sided      GAIT AND COORDINATION      Coordination   Finger to nose coordination: normal    Tremor   Resting tremor: absent       [place]     NIH Stroke Scale:    Level of Consciousness: 0 - alert  LOC Questions: 0 - answers both correctly  LOC Commands: 0 - performs both correctly  Best Gaze: 0 - normal  Visual: 0 - no visual loss  Facial Palsy: 1 - minor  Motor Left Arm: 0 - no drift  Motor Right Arm: 3 - no effort against gravity  Motor Left Le - no drift  Motor Right Le - drift  Limb Ataxia: 0 - absent  Sensory: 0 - normal  Best Language: 0 - no aphasia  Dysarthria: 1 - mild to moderate dysarthria  Extinction and Inattention: 0 - no neglect  NIH Stroke Scale Total: 6        Significant Labs:   Lab Results   Component Value Date    CHOL 196 2020    CHOL 186 2020    CHOL 193 2020     Lab Results   Component Value Date    HDL 38 (L) 2020    HDL 40 2020    HDL 45 2020     Lab Results   Component Value Date    LDLCALC 130.2 2020    LDLCALC 109.2 2020    LDLCALC 115.6 2020     Lab Results   Component Value Date    TRIG 139 2020    TRIG 184 (H) 2020    TRIG 162 (H) 2020     Lab Results   Component Value Date    CHOLHDL 19.4 (L) 2020    CHOLHDL 21.5 2020    CHOLHDL 23.3 2020     Echo Color Flow Doppler? Yes; Bubble Contrast? Yes  · Bubble study is normal.  · There is mild left ventricular concentric hypertrophy.  · The left ventricle is normal in sizeThe estimated ejection fraction is   65%.  · Grade I diastolic dysfunction.  · Normal right ventricular size with normal right ventricular systolic   function.  · Mild left atrial enlargement.  · No pulmonary hypertension           Significant Imaging: Echo Color Flow Doppler? Yes; Bubble Contrast? Yes  · Bubble study is normal.  · There is mild left ventricular concentric hypertrophy.  · The left ventricle is normal in sizeThe estimated ejection fraction is   65%.  · Grade I diastolic  dysfunction.  · Normal right ventricular size with normal right ventricular systolic   function.  · Mild left atrial enlargement.  · No pulmonary hypertension           Assessment and Plan:    Facial palsy, right-sided weakness  CVA confirmed on MRI- r/t small vessel disease   MRI brain:  Acute ischemic infarct on the posterior limb of the left internal capsule measuring 1.9 x 0.6 cm no hemorrhage or mass details above  CT head:  Mild brain atrophy  CTA head and neck:  Small amounts of atherosclerotic plaque in the aortic arch, in the left subclavian artery, and the carotid bifurcations sylvie 12%  ECHO:  Mild left atrial enlargement, EF 65%, normal bubble study  Lipids: 196 chol 130 l dl  A1c: 6.8  Secondary stroke prevention: DAPT asa 81 mg and plavix 75 mg daily for three weeks, then asa 81 mg monotherapy. Increase atorvastatin to 40 mg daily  Reduce risk factors with BS control to decrease A1c below 6 and diet/medication changes to decrease LDL to less than 70.   -SLP/PT/OT  -Permissive /110  On day 3, begin decreasing BP by 20%         [phone] within 3 days from discharge.     Stroke education was provided including stroke risk factors modification and any acute neurological changes including weakness, confusion, visual changes to come straight to the ER.     All questions were answered.                                  Active Diagnoses:    Diagnosis Date Noted POA    PRINCIPAL PROBLEM:  Small vessel stroke [I63.9] 12/13/2020 Yes    BPH (benign prostatic hyperplasia) [N40.0] 12/13/2020 Yes    Stroke [I63.9] 12/13/2020 Yes    Dysphagia [R13.10] 02/21/2019 Yes    Hyperlipidemia associated with type 2 diabetes mellitus [E11.69, E78.5] 01/09/2019 Yes    Coronary artery disease [I25.10] 01/08/2019 Yes    History of heart artery stent [Z95.5] 01/08/2019 Not Applicable    Hypertension associated with diabetes [E11.59, I10] 01/08/2019 Yes    Type  2 diabetes mellitus with neurologic complication, with long-term current use of insulin [E11.49, Z79.4] 01/08/2019 Not Applicable      Problems Resolved During this Admission:    Diagnosis Date Noted Date Resolved POA    Stroke [I63.9] 12/13/2020 12/13/2020 Yes       VTE Risk Mitigation (From admission, onward)         Ordered     heparin (porcine) injection 5,000 Units  Every 8 hours      12/13/20 2219     Place sequential compression device  Until discontinued      12/13/20 1338     IP VTE LOW RISK PATIENT  Once      12/13/20 1338                Thank you for your consult. I will follow-up with patient. Please contact us if you have any additional questions.    Bhavna Devlin NP  Neurology  [facility]

## 2020-12-14 NOTE — PLAN OF CARE
Consume Dental soft textures & thin liquids with no s/s oropharyngeal dysphagia.  Complete OMEx indep as prescribed daily.  Complete [id number] evaluation

## 2020-12-14 NOTE — TELEPHONE ENCOUNTER
----- Message from Lorena Wakefield sent at 12/14/2020  8:29 AM CST -----  Patient is admitted to [facility].  He said he had a stroke, wanted to notify you.  His number is [phone].  Thank you!

## 2020-12-14 NOTE — PROGRESS NOTES
[facility] Hospital Medicine  Progress Note    Patient Name: Price Noguera  MRN: [id number]  Patient Class: IP- Inpatient   Admission Date: 12/13/2020  Length of Stay: 1 days  Attending Physician: Erlin Taylor MD  Primary Care Provider: Chanel Roberson MD        Subjective:     Principal Problem:Small vessel stroke        HPI:  Price Ann is a 60 year old man with a past medical history of DM2, HTN, HLD, CAD s/p PCI, and BPH who presents with two days of right-sided arm and leg weakness, slurred speech, dizziness, nausea, and facial droop. He states the symptoms first occurred the morning of 12/12; he states he spent most of the day in bed since he did not feel well. Over the next 24 hours, the patient began to notice his speech began to worsen. He attempted to get up to use the bathroom and noticed that he could not keep his balance as his right side was weak. The patient recently saw his PCP 12/7. He is a non-smoker. The patient was stroke activated in the ED; CT of head and CTA head and negative were negative. The patient was not given tPA as he was determined to be out of the window. Tele-stroke was consulted and recommended the patient be admitted for further stroke workup. The patient was also Plavix loaded in the ED.     Overview/Hospital Course:  12/14: MRI brain shows acute ischemic infarct a L internal capsule; patient remain NPO; awaiting speech and PT/OT.    Interval History: MRI brain shows acute ischemic infarct a L internal capsule; patient remain NPO; awaiting speech and PT/OT.    Review of Systems   Constitutional: Positive for activity change. Negative for appetite change, chills and fever.   HENT: Positive for voice change.    Eyes: Negative.    Respiratory: Negative for cough, chest tightness and shortness of breath.    Cardiovascular: Negative for chest pain, palpitations and leg swelling.   Gastrointestinal: Negative for abdominal distention, abdominal pain, diarrhea,  nausea and vomiting.   Endocrine: Negative.    Genitourinary: Negative.    Musculoskeletal: Negative.    Skin: Negative.    Allergic/Immunologic: Negative.    Neurological: Positive for dizziness, facial asymmetry, speech difficulty and weakness.   Hematological: Negative.    Psychiatric/Behavioral: Negative.      Objective:     Vital Signs (Most Recent):  Temp: 96.9 °F (36.1 °C) (12/14/20 0309)  Pulse: 62 (12/14/20 0309)  Resp: 18 (12/14/20 0309)  BP: 129/70 (12/14/20 0309)  SpO2: 95 % (12/14/20 0309) Vital Signs (24h Range):  Temp:  [96.6 °F (35.9 °C)-98.5 °F (36.9 °C)] 96.9 °F (36.1 °C)  Pulse:  [55-90] 62  Resp:  [16-20] 18  SpO2:  [95 %-100 %] 95 %  BP: (111-145)/(68-79) 129/70     Weight: 77.4 kg (170 lb 10.2 oz)  Body mass index is 25.2 kg/m².    Intake/Output Summary (Last 24 hours) at 12/14/2020 0737  Last data filed at 12/14/2020 0600  Gross per 24 hour   Intake 0 ml   Output 1000 ml   Net -1000 ml      Physical Exam  Vitals signs and nursing note reviewed.   Constitutional:       General: He is not in acute distress.     Appearance: Normal appearance.   HENT:      Head: Normocephalic and atraumatic.      Right Ear: External ear normal.      Left Ear: External ear normal.      Nose: Nose normal.      Mouth/Throat:      Mouth: Mucous membranes are moist.      Pharynx: Oropharynx is clear.   Eyes:      Extraocular Movements: Extraocular movements intact.      Conjunctiva/sclera: Conjunctivae normal.   Neck:      Musculoskeletal: Normal range of motion and neck supple.   Cardiovascular:      Rate and Rhythm: Normal rate and regular rhythm.      Pulses: Normal pulses.      Heart sounds: Normal heart sounds.   Pulmonary:      Effort: Pulmonary effort is normal.      Breath sounds: Normal breath sounds.   Abdominal:      General: Abdomen is flat. Bowel sounds are normal.      Palpations: Abdomen is soft.   Genitourinary:     Comments: deferred  Musculoskeletal:      Right lower leg: No edema.      Left lower  "leg: No edema.      Comments: 3/5 strength RUE and RLE   Skin:     General: Skin is warm and dry.   Neurological:      Mental Status: He is alert and oriented to person, place, and time.      Motor: Weakness present.      Comments: Right sided facial droop; dysarthria   Psychiatric:         Mood and Affect: Mood normal.         Behavior: Behavior normal.         Thought Content: Thought content normal.         Judgment: Judgment normal.         Significant Labs: All pertinent labs within the past 24 hours have been reviewed.    Significant Imaging: I have reviewed all pertinent imaging results/findings within the past 24 hours.      Assessment/Plan:      * Small vessel stroke  NIHSS 3 per Tele-stroke. TSH normal. CTA and CT Head negative. MRA negatives. MRI brain with acute ischemic infarct to left internal capsule. Many if not all of patient's risk factors well controlled; will investigate for pAfib.  -TTE with bubble  -Telemetry  -Neurology consulted  -ASA, Plavix, statin  -PT/OT/SLP  -SBP < 220        Stroke  See "Small vessel stroke."      BPH (benign prostatic hyperplasia)  -Continue home Flomax    Dysphagia  -Speech consulted  -NPO      Hyperlipidemia associated with type 2 diabetes mellitus  -High dose statin      Hypertension associated with diabetes  -Continue home lisinopril      History of heart artery stent  Noted.  -DAPT      Coronary artery disease  -ASA, Plavix, high dose atorvastatin  -TTE ordered      Type 2 diabetes mellitus with neurologic complication, with long-term current use of insulin  Hgb A1c 6.8%.  -Low dose SSI  -NPO; Speech and Nutrition consulted  -ACHS glucose checks Q6H  -Hypoglycemic precautions        VTE Risk Mitigation (From admission, onward)         Ordered     heparin (porcine) injection 5,000 Units  Every 8 hours      12/13/20 2219     Place sequential compression device  Until discontinued      12/13/20 1338     IP VTE LOW RISK PATIENT  Once      12/13/20 1338          "       Discharge Planning   ANH: 12/17/2020    Code Status: Full Code   Is the patient medically ready for discharge?:     Reason for patient still in hospital (select all that apply): Consult recommendations, PT / OT recommendations and Pending disposition  Discharge Plan A: Home                  Erlin Taylor MD  Department of Hospital Medicine   [facility]

## 2020-12-14 NOTE — ASSESSMENT & PLAN NOTE
NIHSS 3 per Tele-stroke. TSH normal. CTA and CT Head negative. MRA negatives. MRI brain with acute ischemic infarct to left internal capsule. Many if not all of patient's risk factors well controlled; will investigate for pAfib.  -TTE with bubble  -Telemetry  -Neurology consulted  -ASA, Plavix, statin  -PT/OT/SLP  -SBP < 220

## 2020-12-14 NOTE — RESPIRATORY THERAPY
12/13/20 1929   Patient Assessment/Suction   Level of Consciousness (AVPU) alert   Respiratory Effort Unlabored   PRE-TX-O2   O2 Device (Oxygen Therapy) nasal cannula   Flow (L/min) 2   Oxygen Concentration (%) 28   SpO2 99 %   Pulse Oximetry Type Intermittent   $ Pulse Oximetry - Multiple Charge Pulse Oximetry - Multiple   Ready to Wean/Extubation Screen   FIO2<=50 (chart decimal) 0.28

## 2020-12-14 NOTE — PT/OT/SLP EVAL
Speech Language Pathology Evaluation  Bedside Swallow    Patient Name:  Price Noguera   MRN:  [id number]  Admitting Diagnosis: Small vessel stroke    Recommendations:                 General Recommendations:  Dysphagia therapy, Speech/language therapy, Speech language evaluation and Cognitive-linguistic evaluation  Diet recommendations:  Dental Soft, Thin   Aspiration Precautions: Check for pocketing/oral residue, Meds whole 1 at a time, Remain upright 30 minutes post meal and Standard aspiration precautions   General Precautions: Standard, aphasia, dental soft  Communication strategies:  provide increased time to answer and go to room if call light pushed    History:     Past Medical History:   Diagnosis Date    Colon polyp     Coronary artery disease     Diabetes mellitus     GERD (gastroesophageal reflux disease)     GERD (gastroesophageal reflux disease)     History of colonic polyps     History of heart artery stent     Hypertension     Myocardial infarction     Neuropathy     Tumor     BENIGN TUMOR ON BACK THAT COMPRESSED SPINAL CHORD THAT CAUSED PAIN-BUT REMOVED.        Past Surgical History:   Procedure Laterality Date    BACK SURGERY      COLONOSCOPY  04/07/2014    done in [place], [place]; polyps removed per pt report    CORONARY ANGIOPLASTY WITH STENT PLACEMENT      ESOPHAGOGASTRODUODENOSCOPY N/A 2/21/2019    Procedure: EGD (ESOPHAGOGASTRODUODENOSCOPY);  Surgeon: Nino Ross MD;  Location: [facility];  Service: Endoscopy;  Laterality: N/A;    TUMOR REMOVAL  05/16/2016    Back in skin    UPPER GASTROINTESTINAL ENDOSCOPY  02/21/2019    Dr. Ross; mild schatizki ring-dilated; gastritis; gastric polyps; bx negative       Social History: Patient lives in the community    Prior Intubation HX:  None this admission    Modified Barium Swallow: pt denied prior dysphagia    Chest X-Rays: none recent  MRI Brain- There is an acute ischemic infarct identified in the posterior limb of the left  internal capsule measuring 1.9 x 0.6 cm.  Hemorrhage or mass effect is not identified.  There is mild brain atrophy and minor deep white matter ischemic changes noted bilaterally.  Larger focal areas of CVA are not seen.    Prior diet: presumed modified regular    Occupation/hobbies/homemaking: indep..    Subjective     I didn't have any problem eating before.    Patient goals: something to eat & drink     Objective:     Oral Musculature Evaluation  · Oral Musculature: right weakness  · Dentition: (multiple missing teeth)  · Secretion Management: adequate  · Mucosal Quality: adequate  · Mandibular Strength and Mobility: WFL  · Oral Labial Strength and Mobility: impaired pursing, impaired retraction, impaired seal, impaired coordination  · Lingual Strength and Mobility: functional strength, functional lateral movement, functional protrusion(deviated to right on protrusion)  · Velar Elevation: WFL  · Buccal Strength and Mobility: decreased tone(mild pocketing on right)  · Volitional Cough: adequate  · Voice Prior to PO Intake: clear, low volume, mild-moderate dysarthria    Bedside Swallow Eval:   Consistencies Assessed:  · Thin liquids via cup & straw sips & 3 oz water challenge  · Puree via spoon  · Mixed consistencies peaches in thin juice  · Soft solids peaches without juice  · Solids cookie     Oral Phase:   · right liquid loss around edge of cup  · Prolonged mastication   · Spontaneous tongue sweep on right cheek    Pharyngeal Phase:   · no overt clinical signs/symptoms of aspiration  · no overt clinical signs/symptoms of pharyngeal dysphagia    Compensatory Strategies  · None    Treatment: Education to pt re impressions & recommendations, demo of 2 OMEx to practice.. Pt agreed to Dental Soft textures.    Assessment:     Price Noguera is a 60 y.o. male with an SLP diagnosis of Dysphagia and Dysarthria.  He presented with hx GERD & dilation of Schatziki's ring .  Today he has mild-moderate dysarthria, and mild  oral prep & transit dysphagia.  Recommend Dental Soft textures (IDDSI 6) with thin liquids.  Meds whole with liquid as tolerated or whole in puree if he throat clears or coughs.  Will follow for full [id number] eval.    Goals:   Multidisciplinary Problems     SLP Goals        Problem: SLP Goal    Goal Priority Disciplines Outcome   SLP Goal     SLP                    Plan:     · Patient to be seen:  3 x/week   · Plan of Care expires:  12/19/20  · Plan of Care reviewed with:  patient   · SLP Follow-Up:  Yes       Discharge recommendations:  outpatient speech therapy   Barriers to Discharge:  None    Time Tracking:     SLP Treatment Date:   12/14/20  Speech Start Time:  0940  Speech Stop Time:  0957     Speech Total Time (min):  17 min    Billable Minutes: Eval Swallow and Oral Function 17    Marsha Shane CCC-SLP/A  12/14/2020

## 2020-12-14 NOTE — CONSULTS
"Visited w/pt for about 15 min; shared w/me about his stroke; I talked up how Rehab with help him get strong and mobile; saying that he looks like he's strong and healthy and would take well to Rehab-- and he said "I hope so"; I shared with him that I heard the Rehab where he is going is the best and he said, "that's what I've heard." Epic said he is Islam, but he said he's also been Anabaptism and strong Yazidi influence, we joked and said that he is a "well-rounded Religion"; pt laughed; His mom is 90 yo and worried about him; I think he said he lives with her; we talked about taking this recovery and strengthening journey one day at a time and one hour at a time; he welcomed me to pray for/with/over him; He appreciated the visit, he said; Lord, in your mercy.  "

## 2020-12-14 NOTE — SUBJECTIVE & OBJECTIVE
Interval History: MRI brain shows acute ischemic infarct a L internal capsule; patient remain NPO; awaiting speech and PT/OT.    Review of Systems   Constitutional: Positive for activity change. Negative for appetite change, chills and fever.   HENT: Positive for voice change.    Eyes: Negative.    Respiratory: Negative for cough, chest tightness and shortness of breath.    Cardiovascular: Negative for chest pain, palpitations and leg swelling.   Gastrointestinal: Negative for abdominal distention, abdominal pain, diarrhea, nausea and vomiting.   Endocrine: Negative.    Genitourinary: Negative.    Musculoskeletal: Negative.    Skin: Negative.    Allergic/Immunologic: Negative.    Neurological: Positive for dizziness, facial asymmetry, speech difficulty and weakness.   Hematological: Negative.    Psychiatric/Behavioral: Negative.      Objective:     Vital Signs (Most Recent):  Temp: 96.9 °F (36.1 °C) (12/14/20 0309)  Pulse: 62 (12/14/20 0309)  Resp: 18 (12/14/20 0309)  BP: 129/70 (12/14/20 0309)  SpO2: 95 % (12/14/20 0309) Vital Signs (24h Range):  Temp:  [96.6 °F (35.9 °C)-98.5 °F (36.9 °C)] 96.9 °F (36.1 °C)  Pulse:  [55-90] 62  Resp:  [16-20] 18  SpO2:  [95 %-100 %] 95 %  BP: (111-145)/(68-79) 129/70     Weight: 77.4 kg (170 lb 10.2 oz)  Body mass index is 25.2 kg/m².    Intake/Output Summary (Last 24 hours) at 12/14/2020 0737  Last data filed at 12/14/2020 0600  Gross per 24 hour   Intake 0 ml   Output 1000 ml   Net -1000 ml      Physical Exam  Vitals signs and nursing note reviewed.   Constitutional:       General: He is not in acute distress.     Appearance: Normal appearance.   HENT:      Head: Normocephalic and atraumatic.      Right Ear: External ear normal.      Left Ear: External ear normal.      Nose: Nose normal.      Mouth/Throat:      Mouth: Mucous membranes are moist.      Pharynx: Oropharynx is clear.   Eyes:      Extraocular Movements: Extraocular movements intact.      Conjunctiva/sclera:  Conjunctivae normal.   Neck:      Musculoskeletal: Normal range of motion and neck supple.   Cardiovascular:      Rate and Rhythm: Normal rate and regular rhythm.      Pulses: Normal pulses.      Heart sounds: Normal heart sounds.   Pulmonary:      Effort: Pulmonary effort is normal.      Breath sounds: Normal breath sounds.   Abdominal:      General: Abdomen is flat. Bowel sounds are normal.      Palpations: Abdomen is soft.   Genitourinary:     Comments: deferred  Musculoskeletal:      Right lower leg: No edema.      Left lower leg: No edema.      Comments: 3/5 strength RUE and RLE   Skin:     General: Skin is warm and dry.   Neurological:      Mental Status: He is alert and oriented to person, place, and time.      Motor: Weakness present.      Comments: Right sided facial droop; dysarthria   Psychiatric:         Mood and Affect: Mood normal.         Behavior: Behavior normal.         Thought Content: Thought content normal.         Judgment: Judgment normal.         Significant Labs: All pertinent labs within the past 24 hours have been reviewed.    Significant Imaging: I have reviewed all pertinent imaging results/findings within the past 24 hours.

## 2020-12-14 NOTE — PLAN OF CARE
Goals to be met by: 1/14/2021     Patient will increase functional independence with ADLs by performing:    Feeding with Modified [place].  UE Dressing with [place].  LE Dressing with Modified [place] with AD/AE as needed.  Grooming while standing with Modified [place].  Toileting from toilet with Modified [place] for hygiene and clothing management.   Toilet transfer to toilet with Modified [place] with AD as needed.    OT POC initiated and established in collaboration with patient.

## 2020-12-14 NOTE — CONSULTS
"  [facility]  Adult Nutrition  Consult Note    SUMMARY    Intervention: carbohydrate/texture modified diet and nutrition education     Recommendation:  1) Change diet to DM 2000 kcal, cardiac, texture per SLP   2) Add Boost glucose control BID strawberry   3) weigh pt weekly   4) nutrition education and handouts given    Goals: 1) PO intakes > 75% of meals and supplements at f/u  Nutrition Goal Status: new  Communication of RD Recs: reviewed with physician(POC, sticky note, second sign)    Reason for Assessment    Reason For Assessment: consult  Diagnosis: (small vessel stroke)  Relevant Medical History: DM2, HTN, HLD, CAD, BPH  Interdisciplinary Rounds: did not attend    General Information Comments: 61 y/o male admitted with stroke. Per SLP advanced to dysphagia soft diet this morning s/p being NPO x 1 day. PTA pt says he eats well usually but has had a taste/texture aversion to meat x ~ 10 weeks. Significant wt loss noted. Pt follows a regular diet at home, does not check blood sugars but claims is A1C is WNL and he take oral hypoglycemic meds. [place] his food often. Educated pt on DM, cardiac diet/carb coutning. NFPE done 12/14/20, mild wasting seen.    Nutrition Discharge Planning: to be determined- Cardiac, DM 1800 kcal diet , texture per SLP    Nutrition Risk Screen    Nutrition Risk Screen: no indicators present    Nutrition/Diet History    Patient Reported Diet/Restrictions/Preferences: general  Spiritual, Cultural Beliefs, Rastafari Practices, Values that Affect Care: no  Food Allergies: NKFA  Factors Affecting Nutritional Intake: difficulty/impaired swallowing    Anthropometrics    Temp: 97.6 °F (36.4 °C)  Height Method: Measured(office 12/18/20)  Height: 5' 9"  Height (inches): 69 in  Weight Method: Bed Scale  Weight: 77.1 kg (169 lb 15.6 oz)  Weight (lb): 169.98 lb  Ideal Body Weight (IBW), Male: 160 lb  % Ideal Body Weight, Male (lb): 106.24 %  BMI (Calculated): 25.1  BMI Grade: 25 - " 29.9 - overweight  Usual Body Weight (UBW), k.3 kg(20)  % Usual Body Weight: 93.88  % Weight Change From Usual Weight: -6.32 %       Lab/Procedures/Meds    Pertinent Labs Reviewed: reviewed  BMP  Lab Results   Component Value Date     2020    K 4.1 2020     2020    CO2 25 2020    BUN 18 2020    CREATININE 0.8 2020    CALCIUM 9.4 2020    ANIONGAP 11 2020    ESTGFRAFRICA >60 2020    EGFRNONAA >60 2020     Lab Results   Component Value Date    CHOL 196 2020    CHOL 186 2020    CHOL 193 2020     Lab Results   Component Value Date    HDL 38 (L) 2020    HDL 40 2020    HDL 45 2020     Lab Results   Component Value Date    LDLCALC 130.2 2020    LDLCALC 109.2 2020    LDLCALC 115.6 2020     Lab Results   Component Value Date    TRIG 139 2020    TRIG 184 (H) 2020    TRIG 162 (H) 2020     Lab Results   Component Value Date    CHOLHDL 19.4 (L) 2020    CHOLHDL 21.5 2020    CHOLHDL 23.3 2020     POCT Glucose   Date Value Ref Range Status   2020 178 (H) 70 - 110 mg/dL Final   2020 125 (H) 70 - 110 mg/dL Final   2020 108 70 - 110 mg/dL Final   2020 119 (H) 70 - 110 mg/dL Final   2020 120 (H) 70 - 110 mg/dL Final   2020 164 (H) 70 - 110 mg/dL Final     Lab Results   Component Value Date    HGBA1C 6.7 (H) 2020       Pertinent Medications Reviewed: reviewed  Pertinent Medications Comments: statin, insulin    Estimated/Assessed Needs    Weight Used For Calorie Calculations: 77.1 kg (169 lb 15.6 oz)  Energy Calorie Requirements (kcal): MSJ ( x 1.2) = 1880 kcal  Energy Need Method: [place]  Protein Requirements: 0.8-1.0 g protein/kg ( 61-77 g protein)  Weight Used For Protein Calculations: 77.1 kg (169 lb 15.6 oz)  Fluid Requirements (mL): 1900 ml or per MD  Estimated Fluid Requirement Method: RDA Method  CHO  Requirement: 211-235 g      Nutrition Prescription Ordered    Current Diet Order: Dysphagia 6, diabetic diet    Evaluation of Received Nutrient/Fluid Intake    Energy Calories Required: not meeting needs  Protein Required: not meeting needs  Fluid Required: not meeting needs  Comments: diet just advanced  Tolerance: tolerating  % Intake of Estimated Energy Needs: 0%  % Meal Intake: 0%    Nutrition Risk    Level of Risk/Frequency of Follow-up: moderate 2 x weekly     Assessment and Plan    Acute moderate condition related malnutrition  R/t decreased appetite, food aversions  As evidenced by  1) mild muscle/fat loss as charted below  2) PO intakes < 75% EEN x > 7 days  3) > 2% wt loss x 1 week  Intervention: above  new     Monitor and Evaluation    Food and Nutrient Intake: energy intake  Food and Nutrient Adminstration: diet order  Anthropometric Measurements: weight  Biochemical Data, Medical Tests and Procedures: electrolyte and renal panel, glucose/endocrine profile  Nutrition-Focused Physical Findings: overall appearance     Malnutrition Assessment  Malnutrition Type: acute illness or injury  Skin (Micronutrient): (Max = 19)  Teeth (Micronutrient): (denies trouble chewing)   Micronutrient Evaluation: no deficiencies   Weight Loss (Malnutrition): greater than 2% in 1 week  Energy Intake (Malnutrition): less than 75% for greater than 7 days   Orbital Region (Subcutaneous Fat Loss): mild depletion  Upper Arm Region (Subcutaneous Fat Loss): well nourished  Thoracic and Lumbar Region: well nourished   Sikh Region (Muscle Loss): mild depletion  Clavicle Bone Region (Muscle Loss): mild depletion  Clavicle and Acromion Bone Region (Muscle Loss): mild depletion  Scapular Bone Region (Muscle Loss): well nourished  Dorsal Hand (Muscle Loss): well nourished  Patellar Region (Muscle Loss): mild depletion  Anterior Thigh Region (Muscle Loss): mild depletion  Posterior Calf Region (Muscle Loss): mild depletion   Edema  (Fluid Accumulation): 0-->no edema present   Subcutaneous Fat Loss (Final Summary): mild protein-calorie malnutrition  Muscle Loss Evaluation (Final Summary): mild protein-calorie malnutrition         Nutrition Follow-Up    RD Follow-up?: Yes

## 2020-12-14 NOTE — PT/OT/SLP EVAL
Occupational Therapy   Evaluation    Name: Price Noguera  MRN: [id number]  Admitting Diagnosis:  Small vessel stroke      Recommendations:     Discharge Recommendations: rehabilitation facility  Discharge Equipment Recommendations:  other (see comments)(TBD at next level of care)  Barriers to discharge:  Decreased caregiver support    Assessment:     Price Noguera is a 60 y.o. male with a medical diagnosis of Small vessel stroke.  He presents with the following performance deficits affecting function: weakness, impaired endurance, impaired self care skills, impaired functional mobilty, gait instability, impaired balance, decreased upper extremity function, decreased lower extremity function, decreased coordination, decreased safety awareness, abnormal tone, impaired coordination, impaired fine motor. Patient also demonstrates dysphagia and dysarthria. Patient cooperative and motivated. Requires Set-up/close SBA to perform feeding tasks using non-dominant and close SBA to perform grooming tasks from seated position. Patient requires Mod/Max (A) to perform LB dressing tasks.    Patient will benefit from acute OT services to address the above deficits and increase functional independence, mobility and functional use of dominant R UE.    Recommend inpatient rehab upon discharge.    Rehab Prognosis: Good; patient would benefit from acute skilled OT services to address these deficits and reach maximum level of function.       Plan:     Patient to be seen 5 x/week to address the above listed problems via self-care/home management, therapeutic activities, therapeutic exercises, neuromuscular re-education  · Plan of Care Expires: 01/14/21  · Plan of Care Reviewed with: patient    Subjective     Chief Complaint: R hand weakness  Patient/Family Comments/goals: To get stronger    Occupational Profile:  Living Environment: Lives alone in [facility]  Previous level of function: Independent to Mod (I) using straight cane and  driving  Equipment Used at Home:  cane, straight  Assistance upon Discharge: Recommend IRF; friend    Pain/Comfort:  · Pain Rating 1: other (see comments)(no pain)    Patients cultural, spiritual, Church conflicts given the current situation: no    Objective:     Communicated with: Nurse prior to session.  Patient found up in bedside chair in AM; HOB elevated in PM with telemetry, bed alarm, peripheral IV upon OT entry to room.    General Precautions: Standard, aspiration, fall, dental soft   Orthopedic Precautions:N/A   Braces: N/A     Occupational Performance:    Bed Mobility:    · Patient completed Scooting/Bridging with minimum assistance  · Patient completed Supine to Sit with minimum assistance  · Patient completed Sit to Supine with moderate assistance    Activities of Daily Living:  · Feeding:  Set-up/SBA to feeding himself using non-dominant L UE; OTR instructing on use of R UE as base of support  · Grooming: Set-up/close SBA using non-dominant L UE; OTR instructing on use of R UE as base of support  · Lower Body Dressing: Mod/Max (A)      Cognitive/Visual Perceptual:  Cognitive/Psychosocial Skills:     -       Oriented to: Person, Place and Situation   -       Follows Commands/attention:Follows two-step commands  -       Communication: dysarthria  -       Safety awareness/insight to disability: impaired     Physical Exam:  Balance:    -       Static sit balance if FAIR+ to GOOD-; pt with 1 LOB posteriorly while seated EOB during PM session  Postural examination/scapula alignment:    -       Rounded shoulders  Skin integrity: Visible skin intact  Motor Planning:    -       R UE impaired  Dominant hand:    -       Right  Upper Extremity Range of Motion:     -       Right Upper Extremity: PROM; AROM WFL except shoulder and hand limited by less than 25%  -       Left Upper Extremity: AROM WNL  Upper Extremity Strength:    -       Right Upper Extremity: Shoulder 3- to 3+/5; elbow 3+/5; wrist/hand 3- to 3/5    -       Left Upper Extremity: WNL  Fine Motor Coordination:    -       Impaired  Right hand, finger to nose, Right hand serial thumb/finger opposition skills and Right hand, manipulation of objects   Gross motor coordination:   Impaired R UE    AMPAC 6 Click ADL:  AMPAC Total Score: 15    Treatment & Education:  - OTR providing education/instruction regarding OT role/POC, use of call button, calling for all OOB mobility, fall prevention/safety awareness, and importance of OOB mobility  - OTR providing education/instruction regarding importance of R UE re-integration, weight bearing, use of R UE as base of support when appropriate, importance of proper positioning while at rest (inhibition of flexor tone - shoulder ABD > or = 45*, wrist/digits extended) - pt verbalizes/demonstrates understanding and in agreement when appropriate  - OTR providing education/instruction regarding R UE HEP for the following: all joints flexion/extension, forearm supination/pronation and functional elbow flex/extension with forearm supination/pronation for palm to/from thigh and shoulder to facilitate independence with feeding/grooming/bathing tasks, palm to/from thigh and chin - pt performing 2 sets x 5 reps each and encouraged to perform 3x/daily - pt verbalizes/demonstrates understanding; will benefit from reinforcement  - OTR providing and instructing on use of therapy squeeze ball for  and digit strengthening - instructed on therex and pt performing 5 reps each  - OTR initiating discharge planning - education provided regarding benefits/ therapy benefits/requirements of IRF and initiation of DME recommendations; pt verbalizes understanding but will benefit from reinforcement  Education:    Patient left HOB elevated with all lines intact, call button in reach, bed alarm on and nurse notified    GOALS:   Multidisciplinary Problems     Occupational Therapy Goals        Problem: Occupational Therapy Goal    Goal Priority  Disciplines Outcome Interventions   Occupational Therapy Goal     OT, PT/OT     Description: Goals to be met by: 1/14/2021     Patient will increase functional independence with ADLs by performing:    Feeding with Modified [place].  UE Dressing with [place].  LE Dressing with Modified [place] with AD/AE as needed.  Grooming while standing with Modified [place].  Toileting from toilet with Modified [place] for hygiene and clothing management.   Toilet transfer to toilet with Modified [place] with AD as needed.                     History:     Past Medical History:   Diagnosis Date    Colon polyp     Coronary artery disease     Diabetes mellitus     GERD (gastroesophageal reflux disease)     GERD (gastroesophageal reflux disease)     History of colonic polyps     History of heart artery stent     Hypertension     Myocardial infarction     Neuropathy     Tumor     BENIGN TUMOR ON BACK THAT COMPRESSED SPINAL CHORD THAT CAUSED PAIN-BUT REMOVED.        Past Surgical History:   Procedure Laterality Date    BACK SURGERY      COLONOSCOPY  04/07/2014    done in [place], [place]; polyps removed per pt report    CORONARY ANGIOPLASTY WITH STENT PLACEMENT      ESOPHAGOGASTRODUODENOSCOPY N/A 2/21/2019    Procedure: EGD (ESOPHAGOGASTRODUODENOSCOPY);  Surgeon: Nino Ross MD;  Location: [facility];  Service: Endoscopy;  Laterality: N/A;    TUMOR REMOVAL  05/16/2016    Back in skin    UPPER GASTROINTESTINAL ENDOSCOPY  02/21/2019    Dr. Ross; mild schatizki ring-dilated; gastritis; gastric polyps; bx negative       Time Tracking:     OT Date of Treatment: 12/14/20  OT Start Time: 1153  OT Stop Time: 1200(14:20 to 14:45)  OT Total Time (min): 7 min + 25 minutes for 32 minutes total    Billable Minutes:Evaluation 15  Self Care/Home Management 8  Therapeutic Exercise 9    PRITESH Zuñiga LOTR  12/14/2020

## 2020-12-14 NOTE — PT/OT/SLP EVAL
Physical Therapy Evaluation    Patient Name:  Price Noguera   MRN:  [id number]    Recommendations:     Discharge Recommendations:  rehabilitation facility   Discharge Equipment Recommendations: (TBD at next level of care)   Barriers to discharge: None    Assessment:     Price Noguera is a 60 y.o. male admitted with a medical diagnosis of Small vessel stroke.  He presents with the following impairments/functional limitations:  weakness, impaired endurance, impaired self care skills, impaired functional mobilty, gait instability, impaired balance, decreased upper extremity function, decreased lower extremity function, impaired fine motor. Patient is agreeable to participation with PT evaluation. He has a medical history of T2DM, HTN, HLD, CAD, and BPH. He is A/O x4. He lives alone and uses a SPC at baseline. He reports falling approximately 2x/month secondary to diabetic neuropathy. He presents with facial droop, dysarthria, and R sided weakness. He requires SBA for supine to sit transfer with 2 LOB noted to the right in sitting. He requires Neno for sit to stand transfer with RW. He ambulated 60' with RW, Neno, slow gait, and VC to keep right hand on walker. He is agreeable to sit up in chair upon return to room to eat lunch with chair alarm on and RN notified. PT discussed the benefit of IRF upon D/C with patient and he verbalizes understanding and states he will consider it.     Rehab Prognosis: Good; patient would benefit from acute skilled PT services to address these deficits and reach maximum level of function.    Recent Surgery: * No surgery found *      Plan:     During this hospitalization, patient to be seen daily to address the identified rehab impairments via gait training, therapeutic activities, therapeutic exercises and progress toward the following goals:    · Plan of Care Expires:  01/14/21    Subjective     Chief Complaint: weak  Patient/Family Comments/goals: get some food   Pain/Comfort:  · Pain  Rating 1: 0/10    Patients cultural, spiritual, Restorationism conflicts given the current situation:      Living Environment:  Patient lives alone with PATRICIA and B rails  Prior to admission, patients level of function was Allie with SPC. He endorses falling 2x/month due to neuropathy. He does drive.  Equipment used at home: cane, straight.  DME owned (not currently used): none.  Upon discharge, patient will have assistance from neighbor.    Objective:     Communicated with RN David prior to session.  Patient found HOB elevated with bed alarm, peripheral IV, telemetry  upon PT entry to room.    General Precautions: Standard, aphasia, fall, dental soft   Orthopedic Precautions:N/A   Braces: N/A     Exams:  · Cognitive Exam:  Patient is oriented to Person, Place, Time and Situation  · Fine Motor Coordination:    · -       Intact  Left hand, finger to nose, Left hand thumb/finger opposition skills and Left hand, diadochokinesis skill   · -       Impaired  Right hand, finger to nose increased time to perform with patient moving nose to finger, Right hand thumb/finger opposition skills delayed and Right hand, diadochokinesis skill delayed  · RUE Strength: decreased  strength and UE 3-/5  · LUE ROM: WFL  · RLE Strength: 3/5  · LLE Strength: WFL    Functional Mobility:  · Bed Mobility:     · Supine to Sit: stand by assistance  · Transfers:     · Sit to Stand:  minimum assistance with rolling walker  · Gait: 60' RW, Neno, slow gait, VC to maintain hand on walker  · Balance: 2 LOB to right sitting EOB with MaxA for recovery    Therapeutic Activities and Exercises:   Patient was educated on the importance of OOB activity and functional mobility to negate negative effects of prolonged bed rest during hospitalization, safe transfers and ambulation, and D/C planning     AM-PAC 6 CLICK MOBILITY  Total Score:19     Patient left up in chair with all lines intact, call button in reach, chair alarm on and RN notified.    GOALS:    Multidisciplinary Problems     Physical Therapy Goals     Not on file                History:     Past Medical History:   Diagnosis Date    Colon polyp     Coronary artery disease     Diabetes mellitus     GERD (gastroesophageal reflux disease)     GERD (gastroesophageal reflux disease)     History of colonic polyps     History of heart artery stent     Hypertension     Myocardial infarction     Neuropathy     Tumor     BENIGN TUMOR ON BACK THAT COMPRESSED SPINAL CHORD THAT CAUSED PAIN-BUT REMOVED.        Past Surgical History:   Procedure Laterality Date    BACK SURGERY      COLONOSCOPY  04/07/2014    done in [place], [place]; polyps removed per pt report    CORONARY ANGIOPLASTY WITH STENT PLACEMENT      ESOPHAGOGASTRODUODENOSCOPY N/A 2/21/2019    Procedure: EGD (ESOPHAGOGASTRODUODENOSCOPY);  Surgeon: Nino Ross MD;  Location: [facility];  Service: Endoscopy;  Laterality: N/A;    TUMOR REMOVAL  05/16/2016    Back in skin    UPPER GASTROINTESTINAL ENDOSCOPY  02/21/2019    Dr. Ross; mild schatizki ring-dilated; gastritis; gastric polyps; bx negative       Time Tracking:     PT Received On: 12/14/20  PT Start Time: 1129     PT Stop Time: 1148  PT Total Time (min): 19 min     Billable Minutes: Evaluation 9 and Gait Training 10      Tamiko Fraser, PT  12/14/2020

## 2020-12-14 NOTE — PLAN OF CARE
Intervention: carbohydrate/texture modified diet and nutrition education     Recommendation:  1) Change diet to DM 2000 kcal, cardiac, texture per SLP   2) Add Boost glucose control BID strawberry   3) weigh pt weekly   4) nutrition education and handouts given    Goals: 1) PO intakes > 75% of meals and supplements at f/u  Nutrition Goal Status: new  Communication of RD Recs: reviewed with physician(POC, sticky note, second sign)

## 2020-12-15 LAB
ALBUMIN SERPL BCP-MCNC: 4.1 G/DL (ref 3.5–5.2)
ALP SERPL-CCNC: 101 U/L (ref 55–135)
ALT SERPL W/O P-5'-P-CCNC: 23 U/L (ref 10–44)
ANION GAP SERPL CALC-SCNC: 12 MMOL/L (ref 8–16)
AST SERPL-CCNC: 24 U/L (ref 10–40)
BASOPHILS # BLD AUTO: 0.06 K/UL (ref 0–0.2)
BASOPHILS NFR BLD: 0.8 % (ref 0–1.9)
BILIRUB SERPL-MCNC: 0.6 MG/DL (ref 0.1–1)
BUN SERPL-MCNC: 17 MG/DL (ref 8–23)
CALCIUM SERPL-MCNC: 9.5 MG/DL (ref 8.7–10.5)
CHLORIDE SERPL-SCNC: 101 MMOL/L (ref 95–110)
CO2 SERPL-SCNC: 23 MMOL/L (ref 23–29)
CREAT SERPL-MCNC: 0.8 MG/DL (ref 0.5–1.4)
DIFFERENTIAL METHOD: ABNORMAL
EOSINOPHIL # BLD AUTO: 0.4 K/UL (ref 0–0.5)
EOSINOPHIL NFR BLD: 5.5 % (ref 0–8)
ERYTHROCYTE [DISTWIDTH] IN BLOOD BY AUTOMATED COUNT: 13.6 % (ref 11.5–14.5)
EST. GFR  (AFRICAN AMERICAN): >60 ML/MIN/1.73 M^2
EST. GFR  (NON AFRICAN AMERICAN): >60 ML/MIN/1.73 M^2
GLUCOSE SERPL-MCNC: 142 MG/DL (ref 70–110)
HCT VFR BLD AUTO: 41.4 % (ref 40–54)
HGB BLD-MCNC: 14.1 G/DL (ref 14–18)
IMM GRANULOCYTES # BLD AUTO: 0.02 K/UL (ref 0–0.04)
IMM GRANULOCYTES NFR BLD AUTO: 0.3 % (ref 0–0.5)
LYMPHOCYTES # BLD AUTO: 3.1 K/UL (ref 1–4.8)
LYMPHOCYTES NFR BLD: 40.9 % (ref 18–48)
MAGNESIUM SERPL-MCNC: 2.2 MG/DL (ref 1.6–2.6)
MCH RBC QN AUTO: 30.5 PG (ref 27–31)
MCHC RBC AUTO-ENTMCNC: 34.1 G/DL (ref 32–36)
MCV RBC AUTO: 90 FL (ref 82–98)
MONOCYTES # BLD AUTO: 0.8 K/UL (ref 0.3–1)
MONOCYTES NFR BLD: 10.3 % (ref 4–15)
NEUTROPHILS # BLD AUTO: 3.2 K/UL (ref 1.8–7.7)
NEUTROPHILS NFR BLD: 42.2 % (ref 38–73)
NRBC BLD-RTO: 0 /100 WBC
PHOSPHATE SERPL-MCNC: 3.7 MG/DL (ref 2.7–4.5)
PLATELET # BLD AUTO: 336 K/UL (ref 150–350)
PMV BLD AUTO: 8.6 FL (ref 9.2–12.9)
POCT GLUCOSE: 131 MG/DL (ref 70–110)
POCT GLUCOSE: 132 MG/DL (ref 70–110)
POCT GLUCOSE: 149 MG/DL (ref 70–110)
POTASSIUM SERPL-SCNC: 3.8 MMOL/L (ref 3.5–5.1)
PROT SERPL-MCNC: 7.6 G/DL (ref 6–8.4)
RBC # BLD AUTO: 4.62 M/UL (ref 4.6–6.2)
SODIUM SERPL-SCNC: 136 MMOL/L (ref 136–145)
WBC # BLD AUTO: 7.66 K/UL (ref 3.9–12.7)

## 2020-12-15 PROCEDURE — 92523 SPEECH SOUND LANG COMPREHEN: CPT

## 2020-12-15 PROCEDURE — 94761 N-INVAS EAR/PLS OXIMETRY MLT: CPT

## 2020-12-15 PROCEDURE — 97112 NEUROMUSCULAR REEDUCATION: CPT | Mod: CQ

## 2020-12-15 PROCEDURE — 25000003 PHARM REV CODE 250: Performed by: STUDENT IN AN ORGANIZED HEALTH CARE EDUCATION/TRAINING PROGRAM

## 2020-12-15 PROCEDURE — 63600175 PHARM REV CODE 636 W HCPCS: Performed by: STUDENT IN AN ORGANIZED HEALTH CARE EDUCATION/TRAINING PROGRAM

## 2020-12-15 PROCEDURE — 84100 ASSAY OF PHOSPHORUS: CPT

## 2020-12-15 PROCEDURE — 12000002 HC ACUTE/MED SURGE SEMI-PRIVATE ROOM

## 2020-12-15 PROCEDURE — 36415 COLL VENOUS BLD VENIPUNCTURE: CPT

## 2020-12-15 PROCEDURE — 80053 COMPREHEN METABOLIC PANEL: CPT

## 2020-12-15 PROCEDURE — 85025 COMPLETE CBC W/AUTO DIFF WBC: CPT

## 2020-12-15 PROCEDURE — 83735 ASSAY OF MAGNESIUM: CPT

## 2020-12-15 RX ORDER — ATORVASTATIN CALCIUM 80 MG/1
80 TABLET, FILM COATED ORAL DAILY
Qty: 90 TABLET | Refills: 3 | Status: SHIPPED | OUTPATIENT
Start: 2020-12-15 | End: 2021-03-31 | Stop reason: SDUPTHER

## 2020-12-15 RX ORDER — ASPIRIN 81 MG/1
81 TABLET ORAL DAILY
Qty: 360 TABLET | Refills: 0 | Status: SHIPPED | OUTPATIENT
Start: 2020-12-15 | End: 2023-07-05

## 2020-12-15 RX ORDER — CLOPIDOGREL BISULFATE 75 MG/1
75 TABLET ORAL DAILY
Qty: 19 TABLET | Refills: 0 | Status: SHIPPED | OUTPATIENT
Start: 2020-12-16 | End: 2020-12-16

## 2020-12-15 RX ADMIN — GABAPENTIN 600 MG: 300 CAPSULE ORAL at 08:12

## 2020-12-15 RX ADMIN — TRAZODONE HYDROCHLORIDE 50 MG: 50 TABLET ORAL at 10:12

## 2020-12-15 RX ADMIN — TAMSULOSIN HYDROCHLORIDE 0.4 MG: 0.4 CAPSULE ORAL at 05:12

## 2020-12-15 RX ADMIN — HEPARIN SODIUM 5000 UNITS: 5000 INJECTION INTRAVENOUS; SUBCUTANEOUS at 01:12

## 2020-12-15 RX ADMIN — HEPARIN SODIUM 5000 UNITS: 5000 INJECTION INTRAVENOUS; SUBCUTANEOUS at 08:12

## 2020-12-15 RX ADMIN — CLOPIDOGREL 75 MG: 75 TABLET, FILM COATED ORAL at 08:12

## 2020-12-15 RX ADMIN — HEPARIN SODIUM 5000 UNITS: 5000 INJECTION INTRAVENOUS; SUBCUTANEOUS at 05:12

## 2020-12-15 RX ADMIN — LISINOPRIL 10 MG: 10 TABLET ORAL at 08:12

## 2020-12-15 RX ADMIN — ATORVASTATIN CALCIUM 80 MG: 40 TABLET, FILM COATED ORAL at 08:12

## 2020-12-15 RX ADMIN — NORTRIPTYLINE HYDROCHLORIDE 25 MG: 25 CAPSULE ORAL at 08:12

## 2020-12-15 RX ADMIN — GABAPENTIN 600 MG: 300 CAPSULE ORAL at 05:12

## 2020-12-15 RX ADMIN — ASPIRIN 81 MG: 81 TABLET, COATED ORAL at 08:12

## 2020-12-15 NOTE — PLAN OF CARE
Problem: SLP Goal  Goal: SLP Goal  Outcome: Ongoing, Progressing   1. Perform OMEx x10 indep.  2. Use Clear Speech Strategies given SBA in sentences he reads, 90% acc.  3. Demo comprehension of complex info presented verbally, 90% acc.  4. Demo sustained attention to task for 5 minutes, indep.  5. Use memory strategies modified indep to recall info 90% acc, immediately & after a 5 min filled delay.

## 2020-12-15 NOTE — PLAN OF CARE
I sent Milagros a note in [employer] asking if she can get a status updated on pts pending auth for rehab. Carol English LCSW     Per Vera she is still awaiting insurance auth which is [facility] and is still pending . She has orders and pt is already preadmitted. Report will just need to be called to [phone]. Carol English LCSW      Date Status/Notes Created By   · 12/15/2020 2:31:37 PM Note: I spoke with [facility] at 2:25pm. It's been assigned to a RN and they have everything they need for review, but the auth is still pending.  [email or web address]       12/15/20 0875   Post-Acute Status   Post-Acute Authorization Placement   Post-Acute Placement Status Pending Payor Review

## 2020-12-15 NOTE — PT/OT/SLP EVAL
Speech Language Pathology Evaluation  Cognitive Communication    Patient Name:  Price Noguera   MRN:  [id number]  Admitting Diagnosis: Small vessel stroke    Recommendations:     Recommendations:                General Recommendations:  Speech/language therapy and Cognitive-linguistic therapy  Diet recommendations:  Dental Soft, Thin   Aspiration Precautions: Standard aspiration precautions   General Precautions: Standard, aphasia, dental soft  Communication strategies:  provide increased time to answer    History:     Past Medical History:   Diagnosis Date    Colon polyp     Coronary artery disease     Diabetes mellitus     GERD (gastroesophageal reflux disease)     GERD (gastroesophageal reflux disease)     History of colonic polyps     History of heart artery stent     Hypertension     Myocardial infarction     Neuropathy     Tumor     BENIGN TUMOR ON BACK THAT COMPRESSED SPINAL CHORD THAT CAUSED PAIN-BUT REMOVED.        Past Surgical History:   Procedure Laterality Date    BACK SURGERY      COLONOSCOPY  04/07/2014    done in [place], [place]; polyps removed per pt report    CORONARY ANGIOPLASTY WITH STENT PLACEMENT      ESOPHAGOGASTRODUODENOSCOPY N/A 2/21/2019    Procedure: EGD (ESOPHAGOGASTRODUODENOSCOPY);  Surgeon: Nino Ross MD;  Location: [facility];  Service: Endoscopy;  Laterality: N/A;    TUMOR REMOVAL  05/16/2016    Back in skin    UPPER GASTROINTESTINAL ENDOSCOPY  02/21/2019    Dr. Ross; mild schatizki ring-dilated; gastritis; gastric polyps; bx negative       Social History: Patient lives in the community    Prior Intubation HX:  None this admission    Occupation/hobbies/homemaking: indep.      Subjective     I have degrees from [employer] and [employer]  Patient goals: better speech     Objective:   Cognitive Status:  Alert, cooperative, oriented x4; moderate sustained attention deficit, mild immediate memory deficit, moderate delayed memory deficit, intact long term memory given  extra time.   [place] Cognitive Assessment (MoCA) score - 16 out of 25 (5 items deleted due to right dominant-right hemiplegia) indicating moderate cognitive-linguistic deficits: decreased divergent naming, sustained attention  & delayed memory.     Receptive Language/Comprehension: deficit with increased complexity of information due to decreased sustained attention/immediate memory     Pragmatics:    intact    Expressive Language: moderate deficit divergent naming & word fluency in conversation        Motor Speech: moderate dysarthria due to imprecision, & low volume voice    Voice: WNL quality     Visual-Spatial: No apparent field cuts or neglect    Reading: intact for oral readign & comprehension to complex info     Written Expression: Right dominant-right suhas.  Unable to write legibly or draw with left hand.    Treatment: Eduation re findings & recommendations.  Intro to Clear Speech Strategies.    Assessment:   Price Noguera is a 60 y.o. male with an SLP diagnosis of Dysarthria and Cognitive-Linguistic Impairment.  He presents with moderate dysarthria, mild receptive & expressive language deficits, & mild-moderate attention & memory deficits.  He will benefit from tx..    Goals:   Multidisciplinary Problems     SLP Goals        Problem: SLP Goal    Goal Priority Disciplines Outcome   SLP Goal     SLP Ongoing, Progressing                   Plan:   · Patient to be seen:  3 x/week   · Plan of Care expires:  12/19/20  · Plan of Care reviewed with:  patient   · SLP Follow-Up:  Yes       Discharge recommendations:  Discharge Facility/Level of Care Needs: outpatient speech therapy   Barriers to Discharge:  None    Time Tracking:   SLP Treatment Date:   12/15/20  Speech Start Time:  1225  Speech Stop Time:  1256     Speech Total Time (min):  31 min    Billable Minutes: Eval 31     Marsha Shane CCC-SLP/A  12/15/2020

## 2020-12-15 NOTE — PLAN OF CARE
CM attempted to schedule [phone]. No answer. CM left VM requesting them to contact pt to schedule 2 week follow up for CVA

## 2020-12-15 NOTE — RESPIRATORY THERAPY
12/14/20 1902   Patient Assessment/Suction   Level of Consciousness (AVPU) alert   Respiratory Effort Unlabored   PRE-TX-O2   O2 Device (Oxygen Therapy) room air   SpO2 97 %   Pulse Oximetry Type Intermittent   $ Pulse Oximetry - Multiple Charge Pulse Oximetry - Multiple

## 2020-12-15 NOTE — PLAN OF CARE
Medications administered per orders  Ambulation encouraged as tolerated  Neuro checks performed as ordered  Safe environment maintained

## 2020-12-15 NOTE — PLAN OF CARE
Orientation: AAO x 4  Cardiac Monitoring: SR  Vitals: Stable  Glucose Monitoring: Monitored according to sliding scale orders  Pain Evaluation: no complaints of pain   Elimination: Urinal at bedside  VTE: Heparin  Safety: Bed in low position, bed alarm set, call light within reach, SR up x 2, free from falls, resting between care, will continue to monitor.   Plan of Care: Neuro ie. 4 hrs, Passive ROM, Oxygen 2L NC, PT/OT/ST

## 2020-12-15 NOTE — PT/OT/SLP PROGRESS
Physical Therapy Treatment    Patient Name:  Price Noguera   MRN:  [id number]    Recommendations:     Discharge Recommendations:  rehabilitation facility   Discharge Equipment Recommendations: walker, rolling   Barriers to discharge: Decreased caregiver support    Assessment:     Price Noguera is a 60 y.o. male admitted with a medical diagnosis of Small vessel stroke.  He presents with the following impairments/functional limitations:  weakness, impaired self care skills, impaired functional mobilty, gait instability, impaired balance, decreased coordination, decreased upper extremity function, decreased lower extremity function, decreased safety awareness, impaired coordination, impaired fine motor .    Rehab Prognosis: Good; patient would benefit from acute skilled PT services to address these deficits and reach maximum level of function.    Recent Surgery: * No surgery found *      Plan:     During this hospitalization, patient to be seen daily to address the identified rehab impairments via gait training, therapeutic activities, therapeutic exercises, neuromuscular re-education and progress toward the following goals:    · Plan of Care Expires:  01/14/21    Subjective     Chief Complaint: more weakness in r side today  Patient/Family Comments/goals: to get better in order to go home  Pain/Comfort:  · Pain Rating 1: 0/10      Objective:     Communicated with David prior to session.  Patient found supine with telemetry, peripheral IV upon PT entry to room.     General Precautions: Standard, aphasia   Orthopedic Precautions:N/A   Braces:       Functional Mobility:  · Bed Mobility:     · Rolling Right: minimum assistance  · Scooting: contact guard assistance  · Supine to Sit: minimum assistance  · Transfers:     · Sit to Stand:  minimum assistance with rolling walker  · Gait: 20ft with fww  · Balance: min/mod a, max assist to maintain r hand in contat with fww, R le buckling in stance phase of gait, foot  drag      AM-PAC 6 CLICK MOBILITY          Therapeutic Activities and Exercises:   supine le exer x 10 reps with cg and cues: heel slides, hip abd, slr, bridges. PROM r wrist, AAROM at elbow and shoulder for range in all planes.  Bed mobs to r side with no r ue use, min/mod A for balance and transition sup>sit.  Fair sit balance with mod A for WB thru r ue on bed. Sit<>stand with fww and min A.     Patient left up in chair with call button in reach, chair alarm on and David LATHAM notified..    GOALS:   Multidisciplinary Problems     Physical Therapy Goals     Not on file                Time Tracking:     PT Received On:    PT Start Time: 1047     PT Stop Time: 1115  PT Total Time (min): 28 min     Billable Minutes: Neuromuscular Re-education 28     Treatment Type: Treatment  PT/PTA: PTA     PTA Visit Number: 1     Danielle Brandt, ALISSA  12/15/2020

## 2020-12-15 NOTE — RESPIRATORY THERAPY
12/15/20 0834   Patient Assessment/Suction   Level of Consciousness (AVPU) alert   All Lung Fields Breath Sounds diminished   PRE-TX-O2   O2 Device (Oxygen Therapy) room air   SpO2 (!) 94 %   Pulse Oximetry Type Intermittent   $ Pulse Oximetry - Multiple Charge Pulse Oximetry - Multiple   Pulse 67   Resp 18

## 2020-12-15 NOTE — PLAN OF CARE
[facility] is still pending [facility] auth for admit. Carol English LCSW     12/15/20 0854   Post-Acute Status   Post-Acute Authorization Placement   Post-Acute Placement Status Pending Payor Review

## 2020-12-15 NOTE — PROGRESS NOTES
[facility]  Neurology  Consult Note    Patient Name: Price Noguera  MRN: [id number]  Admission Date: 12/13/2020  Hospital Length of Stay: 2 days  Code Status: Full Code   Attending Provider: Erlin Taylor MD   Consulting Provider: Katerina Posada DNP  Primary Care Physician: Chanel Roberson MD  Principal Problem:Small vessel stroke    Consults  Subjective:     Chief Complaint:     Cerebrovascular Accident         HPI per EMR: Pirce Ann is a 60 year old man with a past medical history of DM2, HTN, HLD, CAD s/p PCI, and BPH who presents with two days of right-sided arm and leg weakness, slurred speech, dizziness, nausea, and facial droop. He states the symptoms first occurred the morning of 12/12; he states he spent most of the day in bed since he did not feel well. Over the next 24 hours, the patient began to notice his speech began to worsen. He attempted to get up to use the bathroom and noticed that he could not keep his balance as his right side was weak. The patient recently saw his PCP 12/7. He is a non-smoker. The patient was stroke activated in the ED; CT of head and CTA head and negative were negative. The patient was not given tPA as he was determined to be out of the window. Tele-stroke was consulted and recommended the patient be admitted for further stroke workup. The patient was also Plavix loaded in the ED.     Neurological Consult:  Patient seen examined.  Plan care discussed with Dr. Miner.  Patient is alert and oriented.  He has right-sided weakness and some right-sided facial droop and dysarthria.  Patient denies history of stroke and does not take any anticoagulation or antiplatelets at home.    12/15: Patient seen and examined this morning with Dr. Miner. Rec'd call from nurse prior to entering room statin that pt was c/o worsening weakness to RUE. On exam, patient states he was previously able to lift RUE w/o assistance and this morning upon waking he was unable to do  that. STAT Ct head w/o contrast ordered. Dysarthria, RLE drift and decrease sensation to R side also noted.         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        Past Surgical History:   Procedure Laterality Date    BACK SURGERY      COLONOSCOPY  04/07/2014    done in [place], [place]; polyps removed per pt report    CORONARY ANGIOPLASTY WITH STENT PLACEMENT      ESOPHAGOGASTRODUODENOSCOPY N/A 2/21/2019    Procedure: EGD (ESOPHAGOGASTRODUODENOSCOPY);  Surgeon: Nino Ross MD;  Location: [facility];  Service: Endoscopy;  Laterality: N/A;    TUMOR REMOVAL  05/16/2016    Back in skin    UPPER GASTROINTESTINAL ENDOSCOPY  02/21/2019    Dr. Ross; mild schatizki ring-dilated; gastritis; gastric polyps; bx negative       Review of patient's allergies indicates:   Allergen Reactions    Protonix [pantoprazole]      Nausea, diarrhea       Current Neurological Medications:  Start aspirin 81 mg daily, increase statin 40 mg daily    No current facility-administered medications on file prior to encounter.      Current Outpatient Medications on File Prior to Encounter   Medication Sig    gabapentin (NEURONTIN) 600 MG tablet TAKE 1 TABLET BY MOUTH THREE TIMES A DAY    insulin degludec (TRESIBA FLEXTOUCH U-100) 100 unit/mL (3 mL) InPn Inject 13 Units into the skin once daily.    JARDIANCE 25 mg Tab TAKE ONE TABLET EVERY MORNING.    lisinopril (PRINIVIL,ZESTRIL) 20 MG tablet TAKE 0.5 TABLETS (10 MG TOTAL) BY MOUTH ONCE DAILY.    metFORMIN (GLUCOPHAGE) 850 MG tablet Take 1 tablet (850 mg total) by mouth 2 (two) times daily with meals.     "nortriptyline (PAMELOR) 25 MG capsule TAKE 1 CAPSULE (25 MG TOTAL) BY MOUTH EVERY EVENING.    tamsulosin (FLOMAX) 0.4 mg Cap Take 1 capsule (0.4 mg total) by mouth after dinner.    traZODone (DESYREL) 50 MG tablet Take 1 tablet (50 mg total) by mouth nightly as needed for Insomnia.    [DISCONTINUED] aspirin (ECOTRIN) 81 MG EC tablet Take 1 tablet (81 mg total) by mouth once daily.    [DISCONTINUED] atorvastatin (LIPITOR) 20 MG tablet Take 1 tablet (20 mg total) by mouth once daily.    acetaminophen (TYLENOL) 500 MG tablet Take 2 tablets (1,000 mg total) by mouth every 8 (eight) hours as needed.    blood sugar diagnostic Strp 1 each by Misc.(Non-Drug; Combo Route) route 4 (four) times daily before meals and nightly.    blood-glucose meter kit Use as instructed.    lancets 28 gauge Misc 1 lancet by Misc.(Non-Drug; Combo Route) route 4 (four) times daily before meals and nightly.    ondansetron (ZOFRAN-ODT) 4 MG TbDL Take 1 tablet (4 mg total) by mouth every 6 (six) hours as needed.    pen needle, diabetic (PEN NEEDLE) 32 gauge x 5/32" Ndle Inject 1 each into the skin once daily. Generic    sildenafiL (VIAGRA) 100 MG tablet TAKE 1/2 -1 TABLET BY MOUTH 1 HOUR PRIOR TO INTERCOURSE AS NEEDED ERECTILE DYSFUNCTION    [DISCONTINUED] blood sugar diagnostic Strp 2 strips by Misc.(Non-Drug; Combo Route) route once daily.    [DISCONTINUED] gabapentin (NEURONTIN) 600 MG tablet TAKE 1 TABLET BY MOUTH THREE TIMES A DAY    [DISCONTINUED] lancets (ACCU-CHEK FASTCLIX LANCET DRUM) Misc 1 Device by Misc.(Non-Drug; Combo Route) route 2 (two) times daily.      Family History     Problem Relation (Age of Onset)    Alcohol abuse Brother    Arthritis Mother    Bursitis Mother    Cancer Father, Paternal Grandmother    Chronic back pain Mother    Colon cancer Paternal Uncle    Congenital heart disease Brother    Hypertension Father    No Known Problems Sister, Brother    Pacemaker/defibrilator Mother    Stomach cancer Paternal " Aunt        Tobacco Use    Smoking status: Never Smoker    Smokeless tobacco: Never Used   Substance and Sexual Activity    Alcohol use: No     Frequency: Never    Drug use: No    Sexual activity: Not Currently     Review of Systems   Neurological: Positive for dizziness, speech difficulty and weakness.   All other systems reviewed and are negative.    Objective:     Vital Signs (Most Recent):  Temp: 97.1 °F (36.2 °C) (12/15/20 0740)  Pulse: 67 (12/15/20 0834)  Resp: 18 (12/15/20 0834)  BP: 125/76 (12/15/20 0740)  SpO2: (!) 94 % (12/15/20 0834) Vital Signs (24h Range):  Temp:  [97.1 °F (36.2 °C)-97.8 °F (36.6 °C)] 97.1 °F (36.2 °C)  Pulse:  [62-88] 67  Resp:  [17-20] 18  SpO2:  [92 %-98 %] 94 %  BP: (107-164)/(59-86) 125/76     Weight: 77.1 kg (169 lb 15.6 oz)  Body mass index is 25.1 kg/m².    Physical Exam  HENT:      Head: Normocephalic.      Nose: Nose normal.      Mouth/Throat:      Mouth: Mucous membranes are moist.   Eyes:      Extraocular Movements: Extraocular movements intact.      Pupils: Pupils are equal, round, and reactive to light.   Neck:      Musculoskeletal: Normal range of motion.   Cardiovascular:      Rate and Rhythm: Normal rate.   Pulmonary:      Effort: Pulmonary effort is normal.   Abdominal:      Palpations: Abdomen is soft.   Musculoskeletal: Normal range of motion.   Skin:     General: Skin is warm.   Neurological:      Mental Status: He is oriented to person, place, and time.      Cranial Nerves: Cranial nerve deficit present.      Motor: Weakness present.      Coordination: Finger-Nose-Finger Test normal.   Psychiatric:         Speech: Speech is slurred.         NEUROLOGICAL EXAMINATION:     MENTAL STATUS   Oriented to person, place, and time.   Attention: normal. Concentration: normal.   Speech: slurred (Mildly dysarthric)  Level of consciousness: alert  Able to name object. Able to repeat.     CRANIAL NERVES     CN II   Visual acuity: normal with correction    CN III, IV, VI    Pupils are equal, round, and reactive to light.  Right pupil: Size: 2 mm. Reactivity: brisk.   Left pupil: Size: 2 mm. Reactivity: brisk.   CN III: no CN III palsy    CN V   Facial sensation intact.     CN VII   Right facial weakness: peripheral    CN VIII   Hearing: intact    CN XI   CN XI normal.     CN XII   CN XII normal.     MOTOR EXAM        Right-sided weaker than left     SENSORY EXAM   Light touch normal.        Decreased sensation to right-sided     GAIT AND COORDINATION      Coordination   Finger to nose coordination: normal    Tremor   Resting tremor: absent       [place]     NIH Stroke Scale:    Level of Consciousness: 0 - alert  LOC Questions: 0 - answers both correctly  LOC Commands: 0 - performs both correctly  Best Gaze: 0 - normal  Visual: 0 - no visual loss  Facial Palsy: 1 - minor  Motor Left Arm: 0 - no drift  Motor Right Arm: 3 - no effort against gravity  Motor Left Le - no drift  Motor Right Le - drift  Limb Ataxia: 0 - absent  Sensory: 0 - normal  Best Language: 0 - no aphasia  Dysarthria: 1 - mild to moderate dysarthria  Extinction and Inattention: 0 - no neglect  NIH Stroke Scale Total: 6        Significant Labs:   Lab Results   Component Value Date    CHOL 196 2020    CHOL 186 2020    CHOL 193 2020     Lab Results   Component Value Date    HDL 38 (L) 2020    HDL 40 2020    HDL 45 2020     Lab Results   Component Value Date    LDLCALC 130.2 2020    LDLCALC 109.2 2020    LDLCALC 115.6 2020     Lab Results   Component Value Date    TRIG 139 2020    TRIG 184 (H) 2020    TRIG 162 (H) 2020     Lab Results   Component Value Date    CHOLHDL 19.4 (L) 2020    CHOLHDL 21.5 2020    CHOLHDL 23.3 2020     Echo Color Flow Doppler? Yes; Bubble Contrast? Yes  · Bubble study is normal.  · There is mild left ventricular concentric hypertrophy.  · The left ventricle is normal in sizeThe estimated ejection fraction  is   65%.  · Grade I diastolic dysfunction.  · Normal right ventricular size with normal right ventricular systolic   function.  · Mild left atrial enlargement.  · No pulmonary hypertension           Significant Imaging: Echo Color Flow Doppler? Yes; Bubble Contrast? Yes  · Bubble study is normal.  · There is mild left ventricular concentric hypertrophy.  · The left ventricle is normal in sizeThe estimated ejection fraction is   65%.  · Grade I diastolic dysfunction.  · Normal right ventricular size with normal right ventricular systolic   function.  · Mild left atrial enlargement.  · No pulmonary hypertension           Assessment and Plan:    Facial palsy, right-sided weakness  CVA confirmed on MRI- r/t small vessel disease   MRI brain:  Acute ischemic infarct on the posterior limb of the left internal capsule measuring 1.9 x 0.6 cm no hemorrhage or mass details above  CT head:  Mild brain atrophy  CTA head and neck:  Small amounts of atherosclerotic plaque in the aortic arch, in the left subclavian artery, and the carotid bifurcations sylvie 12%  ECHO:  Mild left atrial enlargement, EF 65%, normal bubble study  Lipids: 196 chol 130 l dl  A1c: 6.8  Secondary stroke prevention: DAPT asa 81 mg and plavix 75 mg daily for three weeks, then asa 81 mg monotherapy. Increase atorvastatin to 40 mg daily  Reduce risk factors with BS control to decrease A1c below 6 and diet/medication changes to decrease LDL to less than 70.   -SLP/PT/OT  -Permissive /110  On day 3, begin decreasing BP by 20%    11/15: Continue holding BP for at least another 24 hours, tx prn to keep SBP <200. Repeat CT head did not show significant change or acute bleed. Recommend that patient goes to inpatient rehab upon discharge.     [phone] within 3 days from discharge.     Stroke education was provided including stroke risk factors modification and any acute neurological changes including  weakness, confusion, visual changes to come straight to the ER.     All questions were answered.                                  Active Diagnoses:    Diagnosis Date Noted POA    PRINCIPAL PROBLEM:  Small vessel stroke [I63.9] 12/13/2020 Yes    BPH (benign prostatic hyperplasia) [N40.0] 12/13/2020 Yes    Stroke [I63.9] 12/13/2020 Yes    Dysphagia [R13.10] 02/21/2019 Yes    Hyperlipidemia associated with type 2 diabetes mellitus [E11.69, E78.5] 01/09/2019 Yes    Coronary artery disease [I25.10] 01/08/2019 Yes    History of heart artery stent [Z95.5] 01/08/2019 Not Applicable    Hypertension associated with diabetes [E11.59, I10] 01/08/2019 Yes    Type 2 diabetes mellitus with neurologic complication, with long-term current use of insulin [E11.49, Z79.4] 01/08/2019 Not Applicable      Problems Resolved During this Admission:    Diagnosis Date Noted Date Resolved POA    Stroke [I63.9] 12/13/2020 12/13/2020 Yes       VTE Risk Mitigation (From admission, onward)         Ordered     heparin (porcine) injection 5,000 Units  Every 8 hours      12/13/20 2219     Place sequential compression device  Until discontinued      12/13/20 1338     IP VTE LOW RISK PATIENT  Once      12/13/20 1338                Thank you for your consult. I will follow-up with patient. Please contact us if you have any additional questions.    Katerina Posada, PAO  Neurology  [facility]

## 2020-12-15 NOTE — PLAN OF CARE
Pt demo increased weakness on R side this day.  Pt requires assist to maintain R hand in contact with fww, r knee buckling noted with gait.  Pt lives alone and would benefit from skilled Inpt PT to improve strength, fxn mobs, gait , safety and balance in order to return home alone.

## 2020-12-16 VITALS
SYSTOLIC BLOOD PRESSURE: 128 MMHG | RESPIRATION RATE: 18 BRPM | OXYGEN SATURATION: 97 % | TEMPERATURE: 97 F | DIASTOLIC BLOOD PRESSURE: 71 MMHG | HEIGHT: 69 IN | BODY MASS INDEX: 25.18 KG/M2 | HEART RATE: 62 BPM | WEIGHT: 170 LBS

## 2020-12-16 LAB
ALBUMIN SERPL BCP-MCNC: 3.9 G/DL (ref 3.5–5.2)
ALP SERPL-CCNC: 92 U/L (ref 55–135)
ALT SERPL W/O P-5'-P-CCNC: 27 U/L (ref 10–44)
ANION GAP SERPL CALC-SCNC: 10 MMOL/L (ref 8–16)
AST SERPL-CCNC: 23 U/L (ref 10–40)
BILIRUB SERPL-MCNC: 0.5 MG/DL (ref 0.1–1)
BUN SERPL-MCNC: 15 MG/DL (ref 8–23)
CALCIUM SERPL-MCNC: 9.5 MG/DL (ref 8.7–10.5)
CHLORIDE SERPL-SCNC: 102 MMOL/L (ref 95–110)
CO2 SERPL-SCNC: 25 MMOL/L (ref 23–29)
CREAT SERPL-MCNC: 0.8 MG/DL (ref 0.5–1.4)
EST. GFR  (AFRICAN AMERICAN): >60 ML/MIN/1.73 M^2
EST. GFR  (NON AFRICAN AMERICAN): >60 ML/MIN/1.73 M^2
GLUCOSE SERPL-MCNC: 146 MG/DL (ref 70–110)
MAGNESIUM SERPL-MCNC: 2.1 MG/DL (ref 1.6–2.6)
PHOSPHATE SERPL-MCNC: 3.8 MG/DL (ref 2.7–4.5)
POCT GLUCOSE: 111 MG/DL (ref 70–110)
POCT GLUCOSE: 154 MG/DL (ref 70–110)
POCT GLUCOSE: 185 MG/DL (ref 70–110)
POTASSIUM SERPL-SCNC: 3.7 MMOL/L (ref 3.5–5.1)
PROT SERPL-MCNC: 7.3 G/DL (ref 6–8.4)
SODIUM SERPL-SCNC: 137 MMOL/L (ref 136–145)

## 2020-12-16 PROCEDURE — 83735 ASSAY OF MAGNESIUM: CPT

## 2020-12-16 PROCEDURE — 36415 COLL VENOUS BLD VENIPUNCTURE: CPT

## 2020-12-16 PROCEDURE — 84100 ASSAY OF PHOSPHORUS: CPT

## 2020-12-16 PROCEDURE — 80053 COMPREHEN METABOLIC PANEL: CPT

## 2020-12-16 PROCEDURE — 99222 PR INITIAL HOSPITAL CARE,LEVL II: ICD-10-PCS | Mod: ,,, | Performed by: PHYSICAL MEDICINE & REHABILITATION

## 2020-12-16 PROCEDURE — 94761 N-INVAS EAR/PLS OXIMETRY MLT: CPT

## 2020-12-16 PROCEDURE — 25000003 PHARM REV CODE 250: Performed by: STUDENT IN AN ORGANIZED HEALTH CARE EDUCATION/TRAINING PROGRAM

## 2020-12-16 PROCEDURE — 63600175 PHARM REV CODE 636 W HCPCS: Performed by: STUDENT IN AN ORGANIZED HEALTH CARE EDUCATION/TRAINING PROGRAM

## 2020-12-16 PROCEDURE — 99222 1ST HOSP IP/OBS MODERATE 55: CPT | Mod: ,,, | Performed by: PHYSICAL MEDICINE & REHABILITATION

## 2020-12-16 RX ORDER — CLOPIDOGREL BISULFATE 75 MG/1
75 TABLET ORAL DAILY
Qty: 18 TABLET | Refills: 0 | Status: SHIPPED | OUTPATIENT
Start: 2020-12-16 | End: 2021-03-31

## 2020-12-16 RX ADMIN — GABAPENTIN 600 MG: 300 CAPSULE ORAL at 09:12

## 2020-12-16 RX ADMIN — HEPARIN SODIUM 5000 UNITS: 5000 INJECTION INTRAVENOUS; SUBCUTANEOUS at 09:12

## 2020-12-16 RX ADMIN — LISINOPRIL 10 MG: 10 TABLET ORAL at 09:12

## 2020-12-16 RX ADMIN — HEPARIN SODIUM 5000 UNITS: 5000 INJECTION INTRAVENOUS; SUBCUTANEOUS at 12:12

## 2020-12-16 RX ADMIN — ATORVASTATIN CALCIUM 80 MG: 40 TABLET, FILM COATED ORAL at 09:12

## 2020-12-16 RX ADMIN — CLOPIDOGREL 75 MG: 75 TABLET, FILM COATED ORAL at 09:12

## 2020-12-16 RX ADMIN — ASPIRIN 81 MG: 81 TABLET, COATED ORAL at 09:12

## 2020-12-16 NOTE — PLAN OF CARE
Pt clear for DC from case management standpoint. Discharging to [facility]. Vera with rehab has notified family       12/16/20 9481   Final Note   Assessment Type Final Discharge Note   Anticipated Discharge Disposition Rehab

## 2020-12-16 NOTE — ASSESSMENT & PLAN NOTE
Noted.  -DAPT; patient to be on Plavix and ASA for three weeks; followed by ASA monotherapy for secondary prevention

## 2020-12-16 NOTE — NURSING
Attempted to call report to nurse at [facility] rehab- nurse unavailable will call me back asap.  Pt's IV's removed per policy, no bldg or hematomas or s/s infection.  Bandages applied.  Tele removed as well.   Pt has notified his family of txf to rehab today.  Pt in NAD.

## 2020-12-16 NOTE — SUBJECTIVE & OBJECTIVE
Interval History: Patient awaiting transfer to rehabilitation facility.    Review of Systems   Constitutional: Positive for activity change. Negative for appetite change, chills and fever.   HENT: Positive for voice change.    Eyes: Negative.    Respiratory: Negative for cough, chest tightness and shortness of breath.    Cardiovascular: Negative for chest pain, palpitations and leg swelling.   Gastrointestinal: Negative for abdominal distention, abdominal pain, diarrhea, nausea and vomiting.   Endocrine: Negative.    Genitourinary: Negative.    Musculoskeletal: Negative.    Skin: Negative.    Allergic/Immunologic: Negative.    Neurological: Positive for dizziness, facial asymmetry, speech difficulty and weakness.   Hematological: Negative.    Psychiatric/Behavioral: Negative.      Objective:     Vital Signs (Most Recent):  Temp: 97.3 °F (36.3 °C) (12/15/20 1913)  Pulse: 86 (12/15/20 1913)  Resp: 17 (12/15/20 1913)  BP: 136/88 (12/15/20 1913)  SpO2: 98 % (12/15/20 1913) Vital Signs (24h Range):  Temp:  [96.5 °F (35.8 °C)-97.6 °F (36.4 °C)] 97.3 °F (36.3 °C)  Pulse:  [59-88] 86  Resp:  [17-19] 17  SpO2:  [92 %-98 %] 98 %  BP: (106-136)/(59-88) 136/88     Weight: 77.1 kg (169 lb 15.6 oz)  Body mass index is 25.1 kg/m².    Intake/Output Summary (Last 24 hours) at 12/15/2020 2027  Last data filed at 12/15/2020 1845  Gross per 24 hour   Intake 840 ml   Output 1700 ml   Net -860 ml      Physical Exam  Vitals signs and nursing note reviewed.   Constitutional:       General: He is not in acute distress.     Appearance: Normal appearance.   HENT:      Head: Normocephalic and atraumatic.      Right Ear: External ear normal.      Left Ear: External ear normal.      Nose: Nose normal.      Mouth/Throat:      Mouth: Mucous membranes are moist.      Pharynx: Oropharynx is clear.   Eyes:      Extraocular Movements: Extraocular movements intact.      Conjunctiva/sclera: Conjunctivae normal.   Neck:      Musculoskeletal: Normal range  of motion and neck supple.   Cardiovascular:      Rate and Rhythm: Normal rate and regular rhythm.      Pulses: Normal pulses.      Heart sounds: Normal heart sounds.   Pulmonary:      Effort: Pulmonary effort is normal.      Breath sounds: Normal breath sounds.   Abdominal:      General: Abdomen is flat. Bowel sounds are normal.      Palpations: Abdomen is soft.   Genitourinary:     Comments: deferred  Musculoskeletal:      Right lower leg: No edema.      Left lower leg: No edema.      Comments: 3/5 strength RUE and RLE   Skin:     General: Skin is warm and dry.   Neurological:      Mental Status: He is alert and oriented to person, place, and time.      Motor: Weakness present.      Comments: Right sided facial droop; dysarthria   Psychiatric:         Mood and Affect: Mood normal.         Behavior: Behavior normal.         Thought Content: Thought content normal.         Judgment: Judgment normal.         Significant Labs: All pertinent labs within the past 24 hours have been reviewed.    Significant Imaging: I have reviewed all pertinent imaging results/findings within the past 24 hours.

## 2020-12-16 NOTE — PLAN OF CARE
Speech is slurred, motion is absent to the R arm with no grasp, R leg has some movement with weak dorsal and plantar flexion, iv access in place, fall precautions maintained tele in place po intake tolerated well with no s/s of aspiration, will continue to monitor.

## 2020-12-16 NOTE — PLAN OF CARE
Vera with [facility] received auth. VIK sent this AM via [employer]- once orders approved and report is called, Vera will set up transportation with [place] [facility].       12/16/20 1202   Post-Acute Status   Post-Acute Authorization Placement   Post-Acute Placement Status Authorization Obtained

## 2020-12-16 NOTE — ASSESSMENT & PLAN NOTE
Hgb A1c 6.8%.  -Low dose SSI  -Diabetic soft diet; Speech and Nutrition consulted  -ACHS glucose checks Q6H  -Hypoglycemic precautions

## 2020-12-16 NOTE — CARE UPDATE
12/16/20 0808   Patient Assessment/Suction   Level of Consciousness (AVPU) alert   Respiratory Effort Normal;Unlabored   Expansion/Accessory Muscles/Retractions expansion symmetric;no retractions;no use of accessory muscles   All Lung Fields Breath Sounds equal bilaterally;clear   Rhythm/Pattern, Respiratory depth regular;pattern regular;unlabored   Cough Frequency infrequent   Cough Type good;nonproductive   PRE-TX-O2   O2 Device (Oxygen Therapy) room air   SpO2 96 %   Pulse Oximetry Type Intermittent   $ Pulse Oximetry - Multiple Charge Pulse Oximetry - Multiple   Pulse 70   Resp 16   Positioning Sitting on edge of bed (dangling)   Will continue to monitor

## 2020-12-16 NOTE — PROGRESS NOTES
[facility] Medicine  Progress Note    Patient Name: Price Noguera  MRN: [id number]  Patient Class: IP- Inpatient   Admission Date: 12/13/2020  Length of Stay: 2 days  Attending Physician: Erlin Taylor MD  Primary Care Provider: Chanel Roberson MD        Subjective:     Principal Problem:Small vessel stroke        HPI:  Price Ann is a 60 year old man with a past medical history of DM2, HTN, HLD, CAD s/p PCI, and BPH who presents with two days of right-sided arm and leg weakness, slurred speech, dizziness, nausea, and facial droop. He states the symptoms first occurred the morning of 12/12; he states he spent most of the day in bed since he did not feel well. Over the next 24 hours, the patient began to notice his speech began to worsen. He attempted to get up to use the bathroom and noticed that he could not keep his balance as his right side was weak. The patient recently saw his PCP 12/7. He is a non-smoker. The patient was stroke activated in the ED; CT of head and CTA head and negative were negative. The patient was not given tPA as he was determined to be out of the window. Tele-stroke was consulted and recommended the patient be admitted for further stroke workup. The patient was also Plavix loaded in the ED.     Overview/Hospital Course:  12/14: MRI brain shows acute ischemic infarct a L internal capsule; patient remain NPO; awaiting speech and PT/OT.  12/15: Patient awaiting transfer to rehabilitation facility.    Interval History: Patient awaiting transfer to rehabilitation facility.    Review of Systems   Constitutional: Positive for activity change. Negative for appetite change, chills and fever.   HENT: Positive for voice change.    Eyes: Negative.    Respiratory: Negative for cough, chest tightness and shortness of breath.    Cardiovascular: Negative for chest pain, palpitations and leg swelling.   Gastrointestinal: Negative for abdominal distention, abdominal pain,  diarrhea, nausea and vomiting.   Endocrine: Negative.    Genitourinary: Negative.    Musculoskeletal: Negative.    Skin: Negative.    Allergic/Immunologic: Negative.    Neurological: Positive for dizziness, facial asymmetry, speech difficulty and weakness.   Hematological: Negative.    Psychiatric/Behavioral: Negative.      Objective:     Vital Signs (Most Recent):  Temp: 97.3 °F (36.3 °C) (12/15/20 1913)  Pulse: 86 (12/15/20 1913)  Resp: 17 (12/15/20 1913)  BP: 136/88 (12/15/20 1913)  SpO2: 98 % (12/15/20 1913) Vital Signs (24h Range):  Temp:  [96.5 °F (35.8 °C)-97.6 °F (36.4 °C)] 97.3 °F (36.3 °C)  Pulse:  [59-88] 86  Resp:  [17-19] 17  SpO2:  [92 %-98 %] 98 %  BP: (106-136)/(59-88) 136/88     Weight: 77.1 kg (169 lb 15.6 oz)  Body mass index is 25.1 kg/m².    Intake/Output Summary (Last 24 hours) at 12/15/2020 2027  Last data filed at 12/15/2020 1845  Gross per 24 hour   Intake 840 ml   Output 1700 ml   Net -860 ml      Physical Exam  Vitals signs and nursing note reviewed.   Constitutional:       General: He is not in acute distress.     Appearance: Normal appearance.   HENT:      Head: Normocephalic and atraumatic.      Right Ear: External ear normal.      Left Ear: External ear normal.      Nose: Nose normal.      Mouth/Throat:      Mouth: Mucous membranes are moist.      Pharynx: Oropharynx is clear.   Eyes:      Extraocular Movements: Extraocular movements intact.      Conjunctiva/sclera: Conjunctivae normal.   Neck:      Musculoskeletal: Normal range of motion and neck supple.   Cardiovascular:      Rate and Rhythm: Normal rate and regular rhythm.      Pulses: Normal pulses.      Heart sounds: Normal heart sounds.   Pulmonary:      Effort: Pulmonary effort is normal.      Breath sounds: Normal breath sounds.   Abdominal:      General: Abdomen is flat. Bowel sounds are normal.      Palpations: Abdomen is soft.   Genitourinary:     Comments: deferred  Musculoskeletal:      Right lower leg: No edema.      Left  "lower leg: No edema.      Comments: 3/5 strength RUE and RLE   Skin:     General: Skin is warm and dry.   Neurological:      Mental Status: He is alert and oriented to person, place, and time.      Motor: Weakness present.      Comments: Right sided facial droop; dysarthria   Psychiatric:         Mood and Affect: Mood normal.         Behavior: Behavior normal.         Thought Content: Thought content normal.         Judgment: Judgment normal.         Significant Labs: All pertinent labs within the past 24 hours have been reviewed.    Significant Imaging: I have reviewed all pertinent imaging results/findings within the past 24 hours.      Assessment/Plan:      * Small vessel stroke  NIHSS 3 per Tele-stroke. TSH normal. CTA and CT Head negative. MRA negatives. MRI brain with acute ischemic infarct to left internal capsule. TTE unremarkable. Many if not all of patient's risk factors well controlled; will investigate for pAfib.  -Telemetry  -Neurology consulted  -ASA, Plavix, statin  -PT/OT/SLP  -SBP < 220        Stroke  See "Small vessel stroke."      BPH (benign prostatic hyperplasia)  -Continue home Flomax    Dysphagia  -Speech consulted      Hyperlipidemia associated with type 2 diabetes mellitus  -High dose statin      Hypertension associated with diabetes  -Continue home lisinopril      History of heart artery stent  Noted.  -DAPT; patient to be on Plavix and ASA for three weeks; followed by ASA monotherapy for secondary prevention      Coronary artery disease  TTE without PFO or Afib.  -ASA, Plavix, high dose atorvastatin        Type 2 diabetes mellitus with neurologic complication, with long-term current use of insulin  Hgb A1c 6.8%.  -Low dose SSI  -Diabetic soft diet; Speech and Nutrition consulted  -ACHS glucose checks Q6H  -Hypoglycemic precautions        VTE Risk Mitigation (From admission, onward)         Ordered     heparin (porcine) injection 5,000 Units  Every 8 hours      12/13/20 2219     Place " sequential compression device  Until discontinued      12/13/20 1338     IP VTE LOW RISK PATIENT  Once      12/13/20 1338                Discharge Planning   ANH: 12/15/2020     Code Status: DNR   Is the patient medically ready for discharge?:     Reason for patient still in hospital (select all that apply): Pending disposition  Discharge Plan A: Rehab                  Erlin Taylor MD  Department of Hospital Medicine   [facility]

## 2020-12-16 NOTE — NURSING
Neuro check at 0600 shows pt is now able to show some movement on the left upper extremity, no grasp present still, no other acute changes, will continue to monitor.

## 2020-12-16 NOTE — PLAN OF CARE
Per Vera with [facility], report can be called to [phone]. Guardian scheduled for  at 1330. CM updated pt's nurse, Coco.       12/16/20 1231   Post-Acute Status   Post-Acute Authorization Placement   Post-Acute Placement Status Set-up Complete   Discharge Plan   Discharge Plan A Rehab

## 2020-12-16 NOTE — ASSESSMENT & PLAN NOTE
NIHSS 3 per Tele-stroke. TSH normal. CTA and CT Head negative. MRA negatives. MRI brain with acute ischemic infarct to left internal capsule. TTE unremarkable. Many if not all of patient's risk factors well controlled; will investigate for pAfib.  -Telemetry  -Neurology consulted  -ASA, Plavix, statin  -PT/OT/SLP  -SBP < 220

## 2020-12-17 ENCOUNTER — TELEPHONE (OUTPATIENT)
Dept: MEDSURG UNIT | Facility: HOSPITAL | Age: 60
End: 2020-12-17

## 2020-12-17 NOTE — PHYSICIAN QUERY
PT Name: Price Noguera  MR #: [id number]    Nutrition Clarification     CDS/: Donnie Huggins RN              Contact information: [email or web address]    This form is a permanent document in the medical record.     Query Date: December 17, 2020    By submitting this query, we are merely seeking further clarification of documentation.. Please utilize your independent clinical judgment when addressing the question(s) below.    The medical record contains the following:   Indicators  Supporting Clinical Findings Location in Medical Record   x % of Estimated Energy Intake over a time frame from p.o., TF, or TPN Per SLP advanced to dysphagia soft diet this morning s/p being NPO x 1 day. PTA pt says he eats well usually but has had a taste/texture aversion to meat x ~ 10 weeks   RD Note 12/14   x Weight Status over a time frame Significant wt loss noted    Weight Loss (Malnutrition): greater than 2% in 1 week   RD Note 12/14   x Subcutaneous Fat and/or Muscle Loss Orbital Region (Subcutaneous Fat Loss): mild depletion  Upper Arm Region (Subcutaneous Fat Loss): well nourished  Thoracic and Lumbar Region: well nourished   Orthodoxy Region (Muscle Loss): mild depletion  Clavicle Bone Region (Muscle Loss): mild depletion  Clavicle and Acromion Bone Region (Muscle Loss): mild depletion  Scapular Bone Region (Muscle Loss): well nourished  Dorsal Hand (Muscle Loss): well nourished  Patellar Region (Muscle Loss): mild depletion  Anterior Thigh Region (Muscle Loss): mild depletion  Posterior Calf Region (Muscle Loss): mild depletion   Edema (Fluid Accumulation): 0-->no edema present   Subcutaneous Fat Loss (Final Summary): mild protein-calorie malnutrition  Muscle Loss Evaluation (Final Summary): mild protein-calorie malnutrition     RD Note 12/14    Fluid Accumulation or Edema     x Wt/BMI/Usual Body Weight Weight (lb): 169.98 lb  Ideal Body Weight (IBW), Male: 160 lb  % Ideal Body Weight, Male (lb): 106.24 %  BMI  (Calculated): 25.1  BMI Grade: 25 - 29.9 - overweight  Usual Body Weight (UBW), k.3 kg(20)  % Usual Body Weight: 93.88   RD Note     Delayed Wound Healing/Failure to Thrive     x Acute or Chronic Illness Diagnosis: (small vessel stroke)  Relevant Medical History: DM2, HTN, HLD, CAD, BPH   RD Note     Medication      Treatment     x Other Acute moderate condition related malnutrition  R/t decreased appetite, food aversions  As evidenced by  1) mild muscle/fat loss as charted below  2) PO intakes < 75% EEN x > 7 days  3) > 2% wt loss x 1 week  Intervention: above   RD Note      AND / ASPEN Clinical Characteristics (2011)  A minimum of two characteristics is recommended for diagnosing either moderate or severe malnutrition   Mild Malnutrition Moderate Malnutrition Severe Malnutrition   Energy Intake from p.o., TF or TPN. < 75% intake of estimated energy needs for less than 7 days < 75% intake of estimated energy needs for greater than 7 days < 50% intake of estimated energy needs for > 5 days   Weight Loss 1-2% in 1 month  5% in 3 months  7.5% in 6 months  10% in 1 year 1-2 % in 1 week  5% in 1 month  7.5% in 3 months  10% in 6 months  20% in 1 year > 2% in 1 week  > 5% in 1 month  > 7.5% in 3 months  > 10% in 6 months  > 20% in 1 year   Physical Findings     None *Mild subcutaneous fat and/or muscle loss  *Mild fluid accumulation  *Stage II decubitus  *Surgical wound or non-healing wound *Mod/severe subcutaneous fat and/or muscle loss  *Mod/severe fluid accumulation  *Stage III or IV decubitus  *Non-healing surgical wound     Provider, please specify diagnosis or diagnoses associated with above clinical findings.    [X  ] Mild Protein-Calorie Malnutrition   [  ] Other Nutritional Diagnosis (please specify): _______   [  ] Malnutrition ruled out   [  ] Clinically Undetermined     Please document in your progress notes daily for the duration of treatment until resolved and include in  your discharge summary.

## 2020-12-17 NOTE — DISCHARGE SUMMARY
[facility] Hospital Medicine  Discharge Summary      Patient Name: Price Noguera  MRN: [id number]  Admission Date: 12/13/2020  Hospital Length of Stay: 3 days  Discharge Date and Time:  12/16/2020 9:11 PM  Attending Physician: No att. providers found   Discharging Provider: Erlin Taylor MD  Primary Care Provider: Chanel Roberson MD      HPI:   Price Ann is a 60 year old man with a past medical history of DM2, HTN, HLD, CAD s/p PCI, and BPH who presents with two days of right-sided arm and leg weakness, slurred speech, dizziness, nausea, and facial droop. He states the symptoms first occurred the morning of 12/12; he states he spent most of the day in bed since he did not feel well. Over the next 24 hours, the patient began to notice his speech began to worsen. He attempted to get up to use the bathroom and noticed that he could not keep his balance as his right side was weak. The patient recently saw his PCP 12/7. He is a non-smoker. The patient was stroke activated in the ED; CT of head and CTA head and negative were negative. The patient was not given tPA as he was determined to be out of the window. Tele-stroke was consulted and recommended the patient be admitted for further stroke workup. The patient was also Plavix loaded in the ED.     * No surgery found *      Hospital Course:   Price Noguera is a 60 year old man with a past medical history of DM2, HTN, HLD, CAD s/p PCI, and BPH who presented with acute ischemic CVA to the left internal capsule imaged on MRI. He experienced dysarthria and right sided weakness of the RUE and RLE. He did not receive tPA or any neurosurgical intervention. Neurology was consulted who recommended three weeks of DAPT and high dose statin therapy. PT/OT and SLP were also consulted; the patient was placed into a rehabilitation facility for further PT/OT. Please see below for more detail.     Consults:   Consults (From admission, onward)        Status Ordering  "Provider     Inpatient consult to Registered Dietitian/Nutritionist  Once     Provider:  (Not yet assigned)    Completed CHRISTINA JACKSON     Inpatient consult to Social Work/Case Management  Once     Provider:  (Not yet assigned)    Completed CHRISTINA JACKSON     Inpatient consult to Spiritual Care  Once     Provider:  (Not yet assigned)    Completed CHRISTINA JACKSON     IP consult to case management/social work  Once     Provider:  (Not yet assigned)    Completed CHRISTINA JACKSON          * Small vessel stroke  NIHSS 3 per Tele-stroke. TSH normal. CTA and CT Head negative. MRA negatives. MRI brain with acute ischemic infarct to left internal capsule. TTE unremarkable. Repeat CT Head without evidence of significant edema and/or hemorrhage. Many if not all of patient's risk factors well controlled; will investigate for pAfib.  -Telemetry  -Neurology consulted  -ASA, Plavix, statin  -PT/OT/SLP  -SBP < 220        Stroke  See "Small vessel stroke."      BPH (benign prostatic hyperplasia)  -Continue home Flomax    Dysphagia  -Speech consulted      Hyperlipidemia associated with type 2 diabetes mellitus  -High dose statin      Hypertension associated with diabetes  -Continue home lisinopril      History of heart artery stent  Noted.  -DAPT; patient to be on Plavix and ASA for three weeks; followed by ASA monotherapy for secondary prevention      Coronary artery disease  TTE without PFO or Afib.  -ASA, Plavix, high dose atorvastatin        Type 2 diabetes mellitus with neurologic complication, with long-term current use of insulin  Hgb A1c 6.8%.  -Low dose SSI  -Diabetic soft diet; Speech and Nutrition consulted  -ACHS glucose checks Q6H  -Hypoglycemic precautions        Final Active Diagnoses:    Diagnosis Date Noted POA    PRINCIPAL PROBLEM:  Small vessel stroke [I63.9] 12/13/2020 Yes    BPH (benign prostatic hyperplasia) [N40.0] 12/13/2020 Yes    Stroke [I63.9] 12/13/2020 Yes    Dysphagia [R13.10] 02/21/2019 Yes    " Hyperlipidemia associated with type 2 diabetes mellitus [E11.69, E78.5] 01/09/2019 Yes    Coronary artery disease [I25.10] 01/08/2019 Yes    History of heart artery stent [Z95.5] 01/08/2019 Not Applicable    Hypertension associated with diabetes [E11.59, I10] 01/08/2019 Yes    Type 2 diabetes mellitus with neurologic complication, with long-term current use of insulin [E11.49, Z79.4] 01/08/2019 Not Applicable      Problems Resolved During this Admission:    Diagnosis Date Noted Date Resolved POA    Stroke [I63.9] 12/13/2020 12/13/2020 Yes       Discharged Condition: stable    Disposition: Rehab Facility    Follow Up:  Follow-up Information     Chanel Roberson MD In 2 weeks.    Specialty: Family Medicine  Contact information:  [address] [facility] [address]  [phone]             Jacoby Miner MD In 2 weeks.    Specialty: Neurology  Contact information:  [address] [facility] [address]  [phone]             [facility].    Why: Rehab  Contact information:  [address] [place] [id number] [phone]               Patient Instructions:      Diet Dysphagia Soft     Notify your health care provider if you experience any of the following:  persistent nausea and vomiting or diarrhea     Notify your health care provider if you experience any of the following:  severe persistent headache     Notify your health care provider if you experience any of the following:  persistent dizziness, light-headedness, or visual disturbances     Notify your health care provider if you experience any of the following:  increased confusion or weakness     Activity as tolerated       Significant Diagnostic Studies: Radiology: MRI: There is an acute ischemic infarct identified in the posterior limb of the left internal capsule measuring 1.9 x 0.6 cm.  Hemorrhage or mass effect is not identified.  There is mild brain atrophy and minor deep white matter ischemic changes noted bilaterally.   Larger focal areas of CVA are not seen.    Pending Diagnostic Studies:     None         Medications:  Reconciled Home Medications:      Medication List      START taking these medications    clopidogreL 75 mg tablet  Commonly known as: PLAVIX  Take 1 tablet (75 mg total) by mouth once daily. for 18 days        CHANGE how you take these medications    atorvastatin 80 MG tablet  Commonly known as: LIPITOR  Take 1 tablet (80 mg total) by mouth once daily.  What changed:   · medication strength  · how much to take     blood sugar diagnostic Strp  1 each by Misc.(Non-Drug; Combo Route) route 4 (four) times daily before meals and nightly.  What changed: Another medication with the same name was removed. Continue taking this medication, and follow the directions you see here.     lancets 28 gauge Misc  1 lancet by Misc.(Non-Drug; Combo Route) route 4 (four) times daily before meals and nightly.  What changed: Another medication with the same name was removed. Continue taking this medication, and follow the directions you see here.        CONTINUE taking these medications    acetaminophen 500 MG tablet  Commonly known as: TYLENOL  Take 2 tablets (1,000 mg total) by mouth every 8 (eight) hours as needed.     aspirin 81 MG EC tablet  Commonly known as: ECOTRIN  Take 1 tablet (81 mg total) by mouth once daily.     blood-glucose meter kit  Use as instructed.     insulin degludec 100 unit/mL (3 mL) Inpn  Commonly known as: TRESIBA FLEXTOUCH U-100  Inject 13 Units into the skin once daily.     JARDIANCE 25 mg tablet  Generic drug: empagliflozin  TAKE ONE TABLET EVERY MORNING.     lisinopriL 20 MG tablet  Commonly known as: PRINIVIL,ZESTRIL  TAKE 0.5 TABLETS (10 MG TOTAL) BY MOUTH ONCE DAILY.     metFORMIN 850 MG tablet  Commonly known as: GLUCOPHAGE  Take 1 tablet (850 mg total) by mouth 2 (two) times daily with meals.     nortriptyline 25 MG capsule  Commonly known as: PAMELOR  TAKE 1 CAPSULE (25 MG TOTAL) BY MOUTH EVERY  "EVENING.     ondansetron 4 MG Tbdl  Commonly known as: ZOFRAN-ODT  Take 1 tablet (4 mg total) by mouth every 6 (six) hours as needed.     pen needle, diabetic 32 gauge x 5/32" Ndle  Commonly known as: PEN NEEDLE  Inject 1 each into the skin once daily. Generic     sildenafiL 100 MG tablet  Commonly known as: VIAGRA  TAKE 1/2 -1 TABLET BY MOUTH 1 HOUR PRIOR TO INTERCOURSE AS NEEDED ERECTILE DYSFUNCTION     tamsulosin 0.4 mg Cap  Commonly known as: FLOMAX  Take 1 capsule (0.4 mg total) by mouth after dinner.     traZODone 50 MG tablet  Commonly known as: DESYREL  Take 1 tablet (50 mg total) by mouth nightly as needed for Insomnia.        STOP taking these medications    fluconazole 200 MG Tab  Commonly known as: DIFLUCAN            Indwelling Lines/Drains at time of discharge:   Lines/Drains/Airways     None                 Time spent on the discharge of patient: 39 minutes  Patient was seen and examined on the date of discharge and determined to be suitable for discharge.         Erlin Taylor MD  Department of Hospital Medicine  [facility]  "

## 2020-12-17 NOTE — PHYSICIAN QUERY
PT Name: Price Noguera  MR #: [id number]    Hypertensive Condition Clarification      CDS/: Donnie Huggins RN              Contact information: [email or web address]    This form is a permanent document in the medical record.     Query Date: December 17, 2020    By submitting this query, we are merely seeking further clarification of documentation. Please utilize your independent clinical judgment when addressing the question(s) below.    The Medical Record contains the following:   Indicators   Supporting Clinical Findings Location in Medical Record   x Hypertension associated with diabetes documented Hypertension associated with diabetes  -Continue home lisinopril    Type 2 diabetes mellitus with neurologic complication, with long-term current use of insulin  Hgb A1c 6.8%.  -Low dose SSI  -Diabetic soft diet; Speech and Nutrition consulted  -ACHS glucose checks Q6H  -Hypoglycemic precautions   D/c Summary  12/16 (kiss)   x Chronic condition(s) past medical history of DM2, HTN, HLD, CAD s/p PCI, and BPH who presents with two days of right-sided arm and leg weakness, slurred speech, dizziness, nausea, and facial droop.    D/c Summary  12/16 (kissee)   x Vital signs BP: (106-136)/(59-88) 136/88   Progress Note  12/15 (Kiss)    Treatment/Medication      Other     Provider, please clarify the relationship between the Hypertension and Diabetes Mellitus  [   ] Hypertension is a manifestation of Diabetes Mellitus (Secondary Hypertension)   [ X  ]  Hypertension is not a manifestation of Diabetes Mellitus (Essential Hypertension)   [   ] Other Clarification (please specify): ____________   [  ] Clinically Undetermined       Please document in your progress notes daily for the duration of treatment, until resolved, and include in your discharge summary.

## 2020-12-17 NOTE — ASSESSMENT & PLAN NOTE
NIHSS 3 per Tele-stroke. TSH normal. CTA and CT Head negative. MRA negatives. MRI brain with acute ischemic infarct to left internal capsule. TTE unremarkable. Repeat CT Head without evidence of significant edema and/or hemorrhage. Many if not all of patient's risk factors well controlled; will investigate for pAfib.  -Telemetry  -Neurology consulted  -ASA, Plavix, statin  -PT/OT/SLP  -SBP < 220

## 2021-01-11 NOTE — ASSESSMENT & PLAN NOTE
-ASA, Plavix, high dose atorvastatin  -TTE ordered     Minoxidil Counseling: Minoxidil is a topical medication which can increase blood flow where it is applied. It is uncertain how this medication increases hair growth. Side effects are uncommon and include stinging and allergic reactions.

## 2021-03-10 ENCOUNTER — TELEPHONE (OUTPATIENT)
Dept: FAMILY MEDICINE | Facility: CLINIC | Age: 61
End: 2021-03-10

## 2021-03-11 ENCOUNTER — IMMUNIZATION (OUTPATIENT)
Dept: PRIMARY CARE CLINIC | Facility: CLINIC | Age: 61
End: 2021-03-11

## 2021-03-11 DIAGNOSIS — Z23 NEED FOR VACCINATION: Primary | ICD-10-CM

## 2021-03-11 PROCEDURE — 91300 COVID-19, MRNA, LNP-S, PF, 30 MCG/0.3 ML DOSE VACCINE: ICD-10-PCS | Mod: S$GLB,,, | Performed by: FAMILY MEDICINE

## 2021-03-11 PROCEDURE — 0001A COVID-19, MRNA, LNP-S, PF, 30 MCG/0.3 ML DOSE VACCINE: CPT | Mod: CV19,S$GLB,, | Performed by: FAMILY MEDICINE

## 2021-03-11 PROCEDURE — 0001A COVID-19, MRNA, LNP-S, PF, 30 MCG/0.3 ML DOSE VACCINE: ICD-10-PCS | Mod: CV19,S$GLB,, | Performed by: FAMILY MEDICINE

## 2021-03-11 PROCEDURE — 91300 COVID-19, MRNA, LNP-S, PF, 30 MCG/0.3 ML DOSE VACCINE: CPT | Mod: S$GLB,,, | Performed by: FAMILY MEDICINE

## 2021-03-31 ENCOUNTER — OFFICE VISIT (OUTPATIENT)
Dept: FAMILY MEDICINE | Facility: CLINIC | Age: 61
End: 2021-03-31
Payer: COMMERCIAL

## 2021-03-31 VITALS
TEMPERATURE: 98 F | HEART RATE: 69 BPM | DIASTOLIC BLOOD PRESSURE: 62 MMHG | OXYGEN SATURATION: 97 % | SYSTOLIC BLOOD PRESSURE: 100 MMHG | RESPIRATION RATE: 18 BRPM | BODY MASS INDEX: 24.52 KG/M2 | HEIGHT: 69 IN | WEIGHT: 165.56 LBS

## 2021-03-31 DIAGNOSIS — I15.2 HYPERTENSION ASSOCIATED WITH DIABETES: ICD-10-CM

## 2021-03-31 DIAGNOSIS — I63.9 CEREBROVASCULAR ACCIDENT (CVA), UNSPECIFIED MECHANISM: ICD-10-CM

## 2021-03-31 DIAGNOSIS — G47.00 INSOMNIA, UNSPECIFIED TYPE: ICD-10-CM

## 2021-03-31 DIAGNOSIS — N52.9 ERECTILE DYSFUNCTION, UNSPECIFIED ERECTILE DYSFUNCTION TYPE: ICD-10-CM

## 2021-03-31 DIAGNOSIS — N40.0 BENIGN PROSTATIC HYPERPLASIA, UNSPECIFIED WHETHER LOWER URINARY TRACT SYMPTOMS PRESENT: ICD-10-CM

## 2021-03-31 DIAGNOSIS — K59.00 CONSTIPATION, UNSPECIFIED CONSTIPATION TYPE: Primary | ICD-10-CM

## 2021-03-31 DIAGNOSIS — E11.59 HYPERTENSION ASSOCIATED WITH DIABETES: ICD-10-CM

## 2021-03-31 DIAGNOSIS — E11.65 UNCONTROLLED TYPE 2 DIABETES MELLITUS WITH HYPERGLYCEMIA: ICD-10-CM

## 2021-03-31 PROCEDURE — 1126F AMNT PAIN NOTED NONE PRSNT: CPT | Mod: S$GLB,,, | Performed by: FAMILY MEDICINE

## 2021-03-31 PROCEDURE — 3078F PR MOST RECENT DIASTOLIC BLOOD PRESSURE < 80 MM HG: ICD-10-PCS | Mod: CPTII,S$GLB,, | Performed by: FAMILY MEDICINE

## 2021-03-31 PROCEDURE — 99214 OFFICE O/P EST MOD 30 MIN: CPT | Mod: S$GLB,,, | Performed by: FAMILY MEDICINE

## 2021-03-31 PROCEDURE — 3044F HG A1C LEVEL LT 7.0%: CPT | Mod: CPTII,S$GLB,, | Performed by: FAMILY MEDICINE

## 2021-03-31 PROCEDURE — 3044F PR MOST RECENT HEMOGLOBIN A1C LEVEL <7.0%: ICD-10-PCS | Mod: CPTII,S$GLB,, | Performed by: FAMILY MEDICINE

## 2021-03-31 PROCEDURE — 3008F PR BODY MASS INDEX (BMI) DOCUMENTED: ICD-10-PCS | Mod: CPTII,S$GLB,, | Performed by: FAMILY MEDICINE

## 2021-03-31 PROCEDURE — 3008F BODY MASS INDEX DOCD: CPT | Mod: CPTII,S$GLB,, | Performed by: FAMILY MEDICINE

## 2021-03-31 PROCEDURE — 3072F PR LOW RISK FOR RETINOPATHY: ICD-10-PCS | Mod: S$GLB,,, | Performed by: FAMILY MEDICINE

## 2021-03-31 PROCEDURE — 99999 PR PBB SHADOW E&M-EST. PATIENT-LVL IV: ICD-10-PCS | Mod: PBBFAC,,, | Performed by: FAMILY MEDICINE

## 2021-03-31 PROCEDURE — 3078F DIAST BP <80 MM HG: CPT | Mod: CPTII,S$GLB,, | Performed by: FAMILY MEDICINE

## 2021-03-31 PROCEDURE — 99999 PR PBB SHADOW E&M-EST. PATIENT-LVL IV: CPT | Mod: PBBFAC,,, | Performed by: FAMILY MEDICINE

## 2021-03-31 PROCEDURE — 3074F SYST BP LT 130 MM HG: CPT | Mod: CPTII,S$GLB,, | Performed by: FAMILY MEDICINE

## 2021-03-31 PROCEDURE — 3072F LOW RISK FOR RETINOPATHY: CPT | Mod: S$GLB,,, | Performed by: FAMILY MEDICINE

## 2021-03-31 PROCEDURE — 99214 PR OFFICE/OUTPT VISIT, EST, LEVL IV, 30-39 MIN: ICD-10-PCS | Mod: S$GLB,,, | Performed by: FAMILY MEDICINE

## 2021-03-31 PROCEDURE — 1126F PR PAIN SEVERITY QUANTIFIED, NO PAIN PRESENT: ICD-10-PCS | Mod: S$GLB,,, | Performed by: FAMILY MEDICINE

## 2021-03-31 PROCEDURE — 3074F PR MOST RECENT SYSTOLIC BLOOD PRESSURE < 130 MM HG: ICD-10-PCS | Mod: CPTII,S$GLB,, | Performed by: FAMILY MEDICINE

## 2021-03-31 RX ORDER — SILDENAFIL 100 MG/1
TABLET, FILM COATED ORAL
Qty: 6 TABLET | Status: SHIPPED | OUTPATIENT
Start: 2021-03-31 | End: 2022-11-05 | Stop reason: SDUPTHER

## 2021-03-31 RX ORDER — CLOPIDOGREL BISULFATE 75 MG/1
75 TABLET ORAL DAILY
COMMUNITY
End: 2021-03-31 | Stop reason: SDUPTHER

## 2021-03-31 RX ORDER — EMPAGLIFLOZIN 25 MG/1
25 TABLET, FILM COATED ORAL EVERY MORNING
Qty: 90 TABLET | Refills: 3 | Status: SHIPPED | OUTPATIENT
Start: 2021-03-31 | End: 2022-04-04

## 2021-03-31 RX ORDER — NORTRIPTYLINE HYDROCHLORIDE 25 MG/1
CAPSULE ORAL
Qty: 90 CAPSULE | Refills: 3 | Status: SHIPPED | OUTPATIENT
Start: 2021-03-31 | End: 2022-05-22

## 2021-03-31 RX ORDER — CLOPIDOGREL BISULFATE 75 MG/1
75 TABLET ORAL DAILY
Qty: 90 TABLET | Refills: 3 | Status: SHIPPED | OUTPATIENT
Start: 2021-03-31 | End: 2022-05-23

## 2021-03-31 RX ORDER — LISINOPRIL 20 MG/1
TABLET ORAL
Qty: 45 TABLET | Refills: 3 | Status: SHIPPED | OUTPATIENT
Start: 2021-03-31 | End: 2022-02-22

## 2021-03-31 RX ORDER — DOCUSATE CALCIUM 240 MG
240 CAPSULE ORAL DAILY
Qty: 90 CAPSULE | Refills: 3 | Status: SHIPPED | OUTPATIENT
Start: 2021-03-31 | End: 2022-11-05 | Stop reason: SDUPTHER

## 2021-03-31 RX ORDER — ATORVASTATIN CALCIUM 80 MG/1
80 TABLET, FILM COATED ORAL DAILY
Qty: 90 TABLET | Refills: 3 | Status: SHIPPED | OUTPATIENT
Start: 2021-03-31 | End: 2022-05-03

## 2021-03-31 RX ORDER — POLYETHYLENE GLYCOL 3350 17 G/17G
17 POWDER, FOR SOLUTION ORAL DAILY
Qty: 1700 G | Status: SHIPPED | OUTPATIENT
Start: 2021-03-31 | End: 2023-07-05

## 2021-03-31 RX ORDER — TRAZODONE HYDROCHLORIDE 50 MG/1
50 TABLET ORAL NIGHTLY PRN
Qty: 90 TABLET | Refills: 3 | Status: SHIPPED | OUTPATIENT
Start: 2021-03-31 | End: 2022-04-04

## 2021-04-01 ENCOUNTER — IMMUNIZATION (OUTPATIENT)
Dept: PRIMARY CARE CLINIC | Facility: CLINIC | Age: 61
End: 2021-04-01
Payer: COMMERCIAL

## 2021-04-01 DIAGNOSIS — Z23 NEED FOR VACCINATION: Primary | ICD-10-CM

## 2021-04-01 PROCEDURE — 0002A COVID-19, MRNA, LNP-S, PF, 30 MCG/0.3 ML DOSE VACCINE: ICD-10-PCS | Mod: CV19,S$GLB,, | Performed by: FAMILY MEDICINE

## 2021-04-01 PROCEDURE — 0002A COVID-19, MRNA, LNP-S, PF, 30 MCG/0.3 ML DOSE VACCINE: CPT | Mod: CV19,S$GLB,, | Performed by: FAMILY MEDICINE

## 2021-04-01 PROCEDURE — 91300 COVID-19, MRNA, LNP-S, PF, 30 MCG/0.3 ML DOSE VACCINE: ICD-10-PCS | Mod: S$GLB,,, | Performed by: FAMILY MEDICINE

## 2021-04-01 PROCEDURE — 91300 COVID-19, MRNA, LNP-S, PF, 30 MCG/0.3 ML DOSE VACCINE: CPT | Mod: S$GLB,,, | Performed by: FAMILY MEDICINE

## 2021-04-05 ENCOUNTER — TELEPHONE (OUTPATIENT)
Dept: FAMILY MEDICINE | Facility: CLINIC | Age: 61
End: 2021-04-05

## 2021-04-08 ENCOUNTER — TELEPHONE (OUTPATIENT)
Dept: FAMILY MEDICINE | Facility: CLINIC | Age: 61
End: 2021-04-08

## 2021-04-09 ENCOUNTER — TELEPHONE (OUTPATIENT)
Dept: FAMILY MEDICINE | Facility: CLINIC | Age: 61
End: 2021-04-09

## 2021-05-03 ENCOUNTER — PATIENT OUTREACH (OUTPATIENT)
Dept: ADMINISTRATIVE | Facility: OTHER | Age: 61
End: 2021-05-03

## 2021-05-05 ENCOUNTER — TELEPHONE (OUTPATIENT)
Dept: FAMILY MEDICINE | Facility: CLINIC | Age: 61
End: 2021-05-05

## 2021-05-05 ENCOUNTER — OFFICE VISIT (OUTPATIENT)
Dept: OPTOMETRY | Facility: CLINIC | Age: 61
End: 2021-05-05
Payer: COMMERCIAL

## 2021-05-05 DIAGNOSIS — E11.36 DIABETIC CATARACT: ICD-10-CM

## 2021-05-05 DIAGNOSIS — E11.9 DIABETES MELLITUS TYPE 2 WITHOUT RETINOPATHY: ICD-10-CM

## 2021-05-05 DIAGNOSIS — Z01.00 EXAMINATION OF EYES AND VISION: Primary | ICD-10-CM

## 2021-05-05 DIAGNOSIS — Z71.89 ENCOUNTER FOR COUNSELING REGARDING ADVANCE DIRECTIVES: Primary | ICD-10-CM

## 2021-05-05 DIAGNOSIS — H52.7 REFRACTIVE ERROR: ICD-10-CM

## 2021-05-05 DIAGNOSIS — Z12.11 SPECIAL SCREENING FOR MALIGNANT NEOPLASM OF COLON: Primary | ICD-10-CM

## 2021-05-05 DIAGNOSIS — H25.13 NUCLEAR SCLEROSIS, BILATERAL: ICD-10-CM

## 2021-05-05 PROCEDURE — 99999 PR PBB SHADOW E&M-EST. PATIENT-LVL III: CPT | Mod: PBBFAC,,, | Performed by: OPTOMETRIST

## 2021-05-05 PROCEDURE — 92015 DETERMINE REFRACTIVE STATE: CPT | Mod: S$GLB,,, | Performed by: OPTOMETRIST

## 2021-05-05 PROCEDURE — 92015 PR REFRACTION: ICD-10-PCS | Mod: S$GLB,,, | Performed by: OPTOMETRIST

## 2021-05-05 PROCEDURE — 92014 COMPRE OPH EXAM EST PT 1/>: CPT | Mod: S$GLB,,, | Performed by: OPTOMETRIST

## 2021-05-05 PROCEDURE — 99999 PR PBB SHADOW E&M-EST. PATIENT-LVL III: ICD-10-PCS | Mod: PBBFAC,,, | Performed by: OPTOMETRIST

## 2021-05-05 PROCEDURE — 92014 PR EYE EXAM, EST PATIENT,COMPREHESV: ICD-10-PCS | Mod: S$GLB,,, | Performed by: OPTOMETRIST

## 2021-05-06 DIAGNOSIS — E11.9 TYPE 2 DIABETES MELLITUS WITHOUT COMPLICATION: ICD-10-CM

## 2021-05-10 ENCOUNTER — TELEPHONE (OUTPATIENT)
Dept: FAMILY MEDICINE | Facility: CLINIC | Age: 61
End: 2021-05-10

## 2021-05-12 ENCOUNTER — TELEPHONE (OUTPATIENT)
Dept: FAMILY MEDICINE | Facility: CLINIC | Age: 61
End: 2021-05-12

## 2021-05-17 ENCOUNTER — TELEPHONE (OUTPATIENT)
Dept: FAMILY MEDICINE | Facility: CLINIC | Age: 61
End: 2021-05-17

## 2021-06-03 ENCOUNTER — OFFICE VISIT (OUTPATIENT)
Dept: FAMILY MEDICINE | Facility: CLINIC | Age: 61
End: 2021-06-03
Payer: COMMERCIAL

## 2021-06-03 VITALS
SYSTOLIC BLOOD PRESSURE: 100 MMHG | HEIGHT: 69 IN | RESPIRATION RATE: 18 BRPM | HEART RATE: 81 BPM | DIASTOLIC BLOOD PRESSURE: 70 MMHG | BODY MASS INDEX: 23.84 KG/M2 | OXYGEN SATURATION: 98 % | TEMPERATURE: 98 F | WEIGHT: 160.94 LBS

## 2021-06-03 DIAGNOSIS — L89.42 PRESSURE INJURY OF CONTIGUOUS REGION INVOLVING BACK AND BUTTOCK, STAGE 2, UNSPECIFIED LATERALITY: Primary | ICD-10-CM

## 2021-06-03 DIAGNOSIS — Z71.89 ADVANCED DIRECTIVES, COUNSELING/DISCUSSION: ICD-10-CM

## 2021-06-03 DIAGNOSIS — L03.317 CELLULITIS OF BUTTOCK: ICD-10-CM

## 2021-06-03 PROCEDURE — 1125F AMNT PAIN NOTED PAIN PRSNT: CPT | Mod: S$GLB,,, | Performed by: FAMILY MEDICINE

## 2021-06-03 PROCEDURE — 99999 PR PBB SHADOW E&M-EST. PATIENT-LVL IV: CPT | Mod: PBBFAC,,, | Performed by: FAMILY MEDICINE

## 2021-06-03 PROCEDURE — 99214 PR OFFICE/OUTPT VISIT, EST, LEVL IV, 30-39 MIN: ICD-10-PCS | Mod: S$GLB,,, | Performed by: FAMILY MEDICINE

## 2021-06-03 PROCEDURE — 99999 PR PBB SHADOW E&M-EST. PATIENT-LVL IV: ICD-10-PCS | Mod: PBBFAC,,, | Performed by: FAMILY MEDICINE

## 2021-06-03 PROCEDURE — 99214 OFFICE O/P EST MOD 30 MIN: CPT | Mod: S$GLB,,, | Performed by: FAMILY MEDICINE

## 2021-06-03 PROCEDURE — 3008F BODY MASS INDEX DOCD: CPT | Mod: CPTII,S$GLB,, | Performed by: FAMILY MEDICINE

## 2021-06-03 PROCEDURE — 3072F LOW RISK FOR RETINOPATHY: CPT | Mod: S$GLB,,, | Performed by: FAMILY MEDICINE

## 2021-06-03 PROCEDURE — 1125F PR PAIN SEVERITY QUANTIFIED, PAIN PRESENT: ICD-10-PCS | Mod: S$GLB,,, | Performed by: FAMILY MEDICINE

## 2021-06-03 PROCEDURE — 3008F PR BODY MASS INDEX (BMI) DOCUMENTED: ICD-10-PCS | Mod: CPTII,S$GLB,, | Performed by: FAMILY MEDICINE

## 2021-06-03 PROCEDURE — 3072F PR LOW RISK FOR RETINOPATHY: ICD-10-PCS | Mod: S$GLB,,, | Performed by: FAMILY MEDICINE

## 2021-06-03 RX ORDER — MUPIROCIN 20 MG/G
OINTMENT TOPICAL 3 TIMES DAILY PRN
Qty: 30 G | Status: SHIPPED | OUTPATIENT
Start: 2021-06-03 | End: 2023-07-05

## 2021-06-03 RX ORDER — SULFAMETHOXAZOLE AND TRIMETHOPRIM 800; 160 MG/1; MG/1
1 TABLET ORAL 2 TIMES DAILY
Qty: 20 TABLET | Refills: 0 | Status: SHIPPED | OUTPATIENT
Start: 2021-06-03 | End: 2021-09-08

## 2021-06-16 ENCOUNTER — TELEPHONE (OUTPATIENT)
Dept: FAMILY MEDICINE | Facility: CLINIC | Age: 61
End: 2021-06-16

## 2021-06-17 ENCOUNTER — OFFICE VISIT (OUTPATIENT)
Dept: FAMILY MEDICINE | Facility: CLINIC | Age: 61
End: 2021-06-17
Payer: COMMERCIAL

## 2021-06-17 VITALS
HEIGHT: 69 IN | TEMPERATURE: 98 F | SYSTOLIC BLOOD PRESSURE: 114 MMHG | BODY MASS INDEX: 24 KG/M2 | HEART RATE: 80 BPM | WEIGHT: 162.06 LBS | OXYGEN SATURATION: 97 % | DIASTOLIC BLOOD PRESSURE: 74 MMHG

## 2021-06-17 DIAGNOSIS — E11.65 TYPE 2 DIABETES MELLITUS WITH HYPERGLYCEMIA, WITH LONG-TERM CURRENT USE OF INSULIN: ICD-10-CM

## 2021-06-17 DIAGNOSIS — E11.40 TYPE 2 DIABETES MELLITUS WITH DIABETIC NEUROPATHY, WITH LONG-TERM CURRENT USE OF INSULIN: ICD-10-CM

## 2021-06-17 DIAGNOSIS — E11.69 HYPERLIPIDEMIA ASSOCIATED WITH TYPE 2 DIABETES MELLITUS: ICD-10-CM

## 2021-06-17 DIAGNOSIS — Z79.4 TYPE 2 DIABETES MELLITUS WITH DIABETIC NEUROPATHY, WITH LONG-TERM CURRENT USE OF INSULIN: ICD-10-CM

## 2021-06-17 DIAGNOSIS — I15.2 HYPERTENSION ASSOCIATED WITH DIABETES: ICD-10-CM

## 2021-06-17 DIAGNOSIS — E11.59 HYPERTENSION ASSOCIATED WITH DIABETES: ICD-10-CM

## 2021-06-17 DIAGNOSIS — E78.5 HYPERLIPIDEMIA ASSOCIATED WITH TYPE 2 DIABETES MELLITUS: ICD-10-CM

## 2021-06-17 DIAGNOSIS — L89.42 PRESSURE INJURY OF CONTIGUOUS REGION INVOLVING BACK AND BUTTOCK, STAGE 2, UNSPECIFIED LATERALITY: Primary | ICD-10-CM

## 2021-06-17 DIAGNOSIS — G47.00 INSOMNIA, UNSPECIFIED TYPE: ICD-10-CM

## 2021-06-17 DIAGNOSIS — Z79.4 TYPE 2 DIABETES MELLITUS WITH HYPERGLYCEMIA, WITH LONG-TERM CURRENT USE OF INSULIN: ICD-10-CM

## 2021-06-17 DIAGNOSIS — I63.9 CEREBROVASCULAR ACCIDENT (CVA), UNSPECIFIED MECHANISM: ICD-10-CM

## 2021-06-17 PROCEDURE — 99214 OFFICE O/P EST MOD 30 MIN: CPT | Mod: S$GLB,,, | Performed by: NURSE PRACTITIONER

## 2021-06-17 PROCEDURE — 99999 PR PBB SHADOW E&M-EST. PATIENT-LVL V: CPT | Mod: PBBFAC,,, | Performed by: NURSE PRACTITIONER

## 2021-06-17 PROCEDURE — 3072F LOW RISK FOR RETINOPATHY: CPT | Mod: S$GLB,,, | Performed by: NURSE PRACTITIONER

## 2021-06-17 PROCEDURE — 99214 PR OFFICE/OUTPT VISIT, EST, LEVL IV, 30-39 MIN: ICD-10-PCS | Mod: S$GLB,,, | Performed by: NURSE PRACTITIONER

## 2021-06-17 PROCEDURE — 3008F PR BODY MASS INDEX (BMI) DOCUMENTED: ICD-10-PCS | Mod: CPTII,S$GLB,, | Performed by: NURSE PRACTITIONER

## 2021-06-17 PROCEDURE — 3072F PR LOW RISK FOR RETINOPATHY: ICD-10-PCS | Mod: S$GLB,,, | Performed by: NURSE PRACTITIONER

## 2021-06-17 PROCEDURE — 99999 PR PBB SHADOW E&M-EST. PATIENT-LVL V: ICD-10-PCS | Mod: PBBFAC,,, | Performed by: NURSE PRACTITIONER

## 2021-06-17 PROCEDURE — 3008F BODY MASS INDEX DOCD: CPT | Mod: CPTII,S$GLB,, | Performed by: NURSE PRACTITIONER

## 2021-06-17 RX ORDER — INSULIN PUMP SYRINGE, 3 ML
EACH MISCELLANEOUS
Qty: 1 EACH | Refills: 0 | Status: SHIPPED | OUTPATIENT
Start: 2021-06-17 | End: 2023-09-12

## 2021-06-17 RX ORDER — LISINOPRIL 5 MG/1
5 TABLET ORAL DAILY
COMMUNITY
Start: 2021-01-04 | End: 2021-09-08

## 2021-06-17 RX ORDER — LANCETS 28 GAUGE
1 EACH MISCELLANEOUS
Qty: 400 EACH | Refills: 5 | Status: SHIPPED | OUTPATIENT
Start: 2021-06-17 | End: 2023-09-12 | Stop reason: SDUPTHER

## 2021-06-17 RX ORDER — METFORMIN HYDROCHLORIDE 850 MG/1
850 TABLET ORAL 2 TIMES DAILY WITH MEALS
Qty: 180 TABLET | Refills: 3 | Status: SHIPPED | OUTPATIENT
Start: 2021-06-17 | End: 2022-11-05 | Stop reason: SDUPTHER

## 2021-06-29 ENCOUNTER — TELEPHONE (OUTPATIENT)
Dept: FAMILY MEDICINE | Facility: CLINIC | Age: 61
End: 2021-06-29

## 2021-07-30 ENCOUNTER — TELEPHONE (OUTPATIENT)
Dept: FAMILY MEDICINE | Facility: CLINIC | Age: 61
End: 2021-07-30

## 2021-09-08 ENCOUNTER — OFFICE VISIT (OUTPATIENT)
Dept: FAMILY MEDICINE | Facility: CLINIC | Age: 61
End: 2021-09-08
Payer: COMMERCIAL

## 2021-09-08 ENCOUNTER — LAB VISIT (OUTPATIENT)
Dept: LAB | Facility: HOSPITAL | Age: 61
End: 2021-09-08
Attending: FAMILY MEDICINE
Payer: COMMERCIAL

## 2021-09-08 VITALS
RESPIRATION RATE: 14 BRPM | TEMPERATURE: 99 F | DIASTOLIC BLOOD PRESSURE: 70 MMHG | BODY MASS INDEX: 23.28 KG/M2 | HEIGHT: 69 IN | WEIGHT: 157.19 LBS | SYSTOLIC BLOOD PRESSURE: 110 MMHG | OXYGEN SATURATION: 97 % | HEART RATE: 84 BPM

## 2021-09-08 DIAGNOSIS — E78.5 HYPERLIPIDEMIA ASSOCIATED WITH TYPE 2 DIABETES MELLITUS: ICD-10-CM

## 2021-09-08 DIAGNOSIS — E11.69 HYPERLIPIDEMIA ASSOCIATED WITH TYPE 2 DIABETES MELLITUS: ICD-10-CM

## 2021-09-08 DIAGNOSIS — E11.59 HYPERTENSION ASSOCIATED WITH DIABETES: ICD-10-CM

## 2021-09-08 DIAGNOSIS — L89.42 PRESSURE INJURY OF CONTIGUOUS REGION INVOLVING BACK AND BUTTOCK, STAGE 2, UNSPECIFIED LATERALITY: Primary | ICD-10-CM

## 2021-09-08 DIAGNOSIS — R63.4 WEIGHT LOSS, NON-INTENTIONAL: ICD-10-CM

## 2021-09-08 DIAGNOSIS — Z79.4 TYPE 2 DIABETES MELLITUS WITH DIABETIC NEUROPATHY, WITH LONG-TERM CURRENT USE OF INSULIN: ICD-10-CM

## 2021-09-08 DIAGNOSIS — I15.2 HYPERTENSION ASSOCIATED WITH DIABETES: ICD-10-CM

## 2021-09-08 DIAGNOSIS — E11.40 TYPE 2 DIABETES MELLITUS WITH DIABETIC NEUROPATHY, WITH LONG-TERM CURRENT USE OF INSULIN: ICD-10-CM

## 2021-09-08 DIAGNOSIS — I63.9 CEREBROVASCULAR ACCIDENT (CVA), UNSPECIFIED MECHANISM: ICD-10-CM

## 2021-09-08 PROCEDURE — 3078F DIAST BP <80 MM HG: CPT | Mod: CPTII,S$GLB,, | Performed by: FAMILY MEDICINE

## 2021-09-08 PROCEDURE — 3078F PR MOST RECENT DIASTOLIC BLOOD PRESSURE < 80 MM HG: ICD-10-PCS | Mod: CPTII,S$GLB,, | Performed by: FAMILY MEDICINE

## 2021-09-08 PROCEDURE — 83036 HEMOGLOBIN GLYCOSYLATED A1C: CPT | Performed by: FAMILY MEDICINE

## 2021-09-08 PROCEDURE — 1160F PR REVIEW ALL MEDS BY PRESCRIBER/CLIN PHARMACIST DOCUMENTED: ICD-10-PCS | Mod: CPTII,S$GLB,, | Performed by: FAMILY MEDICINE

## 2021-09-08 PROCEDURE — 80053 COMPREHEN METABOLIC PANEL: CPT | Performed by: FAMILY MEDICINE

## 2021-09-08 PROCEDURE — 3008F BODY MASS INDEX DOCD: CPT | Mod: CPTII,S$GLB,, | Performed by: FAMILY MEDICINE

## 2021-09-08 PROCEDURE — 3008F PR BODY MASS INDEX (BMI) DOCUMENTED: ICD-10-PCS | Mod: CPTII,S$GLB,, | Performed by: FAMILY MEDICINE

## 2021-09-08 PROCEDURE — 3072F LOW RISK FOR RETINOPATHY: CPT | Mod: CPTII,S$GLB,, | Performed by: FAMILY MEDICINE

## 2021-09-08 PROCEDURE — 80061 LIPID PANEL: CPT | Performed by: FAMILY MEDICINE

## 2021-09-08 PROCEDURE — 99999 PR PBB SHADOW E&M-EST. PATIENT-LVL IV: CPT | Mod: PBBFAC,,, | Performed by: FAMILY MEDICINE

## 2021-09-08 PROCEDURE — 1159F PR MEDICATION LIST DOCUMENTED IN MEDICAL RECORD: ICD-10-PCS | Mod: CPTII,S$GLB,, | Performed by: FAMILY MEDICINE

## 2021-09-08 PROCEDURE — 36415 COLL VENOUS BLD VENIPUNCTURE: CPT | Mod: PO | Performed by: FAMILY MEDICINE

## 2021-09-08 PROCEDURE — 3074F SYST BP LT 130 MM HG: CPT | Mod: CPTII,S$GLB,, | Performed by: FAMILY MEDICINE

## 2021-09-08 PROCEDURE — 99214 OFFICE O/P EST MOD 30 MIN: CPT | Mod: S$GLB,,, | Performed by: FAMILY MEDICINE

## 2021-09-08 PROCEDURE — 1159F MED LIST DOCD IN RCRD: CPT | Mod: CPTII,S$GLB,, | Performed by: FAMILY MEDICINE

## 2021-09-08 PROCEDURE — 85025 COMPLETE CBC W/AUTO DIFF WBC: CPT | Performed by: FAMILY MEDICINE

## 2021-09-08 PROCEDURE — 4010F PR ACE/ARB THEARPY RXD/TAKEN: ICD-10-PCS | Mod: CPTII,S$GLB,, | Performed by: FAMILY MEDICINE

## 2021-09-08 PROCEDURE — 1160F RVW MEDS BY RX/DR IN RCRD: CPT | Mod: CPTII,S$GLB,, | Performed by: FAMILY MEDICINE

## 2021-09-08 PROCEDURE — 3074F PR MOST RECENT SYSTOLIC BLOOD PRESSURE < 130 MM HG: ICD-10-PCS | Mod: CPTII,S$GLB,, | Performed by: FAMILY MEDICINE

## 2021-09-08 PROCEDURE — 99999 PR PBB SHADOW E&M-EST. PATIENT-LVL IV: ICD-10-PCS | Mod: PBBFAC,,, | Performed by: FAMILY MEDICINE

## 2021-09-08 PROCEDURE — 4010F ACE/ARB THERAPY RXD/TAKEN: CPT | Mod: CPTII,S$GLB,, | Performed by: FAMILY MEDICINE

## 2021-09-08 PROCEDURE — 99214 PR OFFICE/OUTPT VISIT, EST, LEVL IV, 30-39 MIN: ICD-10-PCS | Mod: S$GLB,,, | Performed by: FAMILY MEDICINE

## 2021-09-08 PROCEDURE — 3072F PR LOW RISK FOR RETINOPATHY: ICD-10-PCS | Mod: CPTII,S$GLB,, | Performed by: FAMILY MEDICINE

## 2021-09-09 LAB
ALBUMIN SERPL BCP-MCNC: 4.5 G/DL (ref 3.5–5.2)
ALP SERPL-CCNC: 126 U/L (ref 55–135)
ALT SERPL W/O P-5'-P-CCNC: 14 U/L (ref 10–44)
ANION GAP SERPL CALC-SCNC: 15 MMOL/L (ref 8–16)
AST SERPL-CCNC: 18 U/L (ref 10–40)
BASOPHILS # BLD AUTO: 0.11 K/UL (ref 0–0.2)
BASOPHILS NFR BLD: 1.1 % (ref 0–1.9)
BILIRUB SERPL-MCNC: 0.6 MG/DL (ref 0.1–1)
BUN SERPL-MCNC: 9 MG/DL (ref 8–23)
CALCIUM SERPL-MCNC: 10.1 MG/DL (ref 8.7–10.5)
CHLORIDE SERPL-SCNC: 104 MMOL/L (ref 95–110)
CHOLEST SERPL-MCNC: 151 MG/DL (ref 120–199)
CHOLEST/HDLC SERPL: 3.1 {RATIO} (ref 2–5)
CO2 SERPL-SCNC: 22 MMOL/L (ref 23–29)
CREAT SERPL-MCNC: 0.9 MG/DL (ref 0.5–1.4)
DIFFERENTIAL METHOD: NORMAL
EOSINOPHIL # BLD AUTO: 0.4 K/UL (ref 0–0.5)
EOSINOPHIL NFR BLD: 3.9 % (ref 0–8)
ERYTHROCYTE [DISTWIDTH] IN BLOOD BY AUTOMATED COUNT: 13 % (ref 11.5–14.5)
EST. GFR  (AFRICAN AMERICAN): >60 ML/MIN/1.73 M^2
EST. GFR  (NON AFRICAN AMERICAN): >60 ML/MIN/1.73 M^2
ESTIMATED AVG GLUCOSE: 131 MG/DL (ref 68–131)
GLUCOSE SERPL-MCNC: 103 MG/DL (ref 70–110)
HBA1C MFR BLD: 6.2 % (ref 4–5.6)
HCT VFR BLD AUTO: 42.9 % (ref 40–54)
HDLC SERPL-MCNC: 49 MG/DL (ref 40–75)
HDLC SERPL: 32.5 % (ref 20–50)
HGB BLD-MCNC: 14 G/DL (ref 14–18)
IMM GRANULOCYTES # BLD AUTO: 0.01 K/UL (ref 0–0.04)
IMM GRANULOCYTES NFR BLD AUTO: 0.1 % (ref 0–0.5)
LDLC SERPL CALC-MCNC: 86.8 MG/DL (ref 63–159)
LYMPHOCYTES # BLD AUTO: 3.2 K/UL (ref 1–4.8)
LYMPHOCYTES NFR BLD: 31.4 % (ref 18–48)
MCH RBC QN AUTO: 29.4 PG (ref 27–31)
MCHC RBC AUTO-ENTMCNC: 32.6 G/DL (ref 32–36)
MCV RBC AUTO: 90 FL (ref 82–98)
MONOCYTES # BLD AUTO: 0.9 K/UL (ref 0.3–1)
MONOCYTES NFR BLD: 8.3 % (ref 4–15)
NEUTROPHILS # BLD AUTO: 5.7 K/UL (ref 1.8–7.7)
NEUTROPHILS NFR BLD: 55.2 % (ref 38–73)
NONHDLC SERPL-MCNC: 102 MG/DL
NRBC BLD-RTO: 0 /100 WBC
PLATELET # BLD AUTO: 404 K/UL (ref 150–450)
PMV BLD AUTO: 9.4 FL (ref 9.2–12.9)
POTASSIUM SERPL-SCNC: 4.2 MMOL/L (ref 3.5–5.1)
PROT SERPL-MCNC: 8.1 G/DL (ref 6–8.4)
RBC # BLD AUTO: 4.76 M/UL (ref 4.6–6.2)
SODIUM SERPL-SCNC: 141 MMOL/L (ref 136–145)
TRIGL SERPL-MCNC: 76 MG/DL (ref 30–150)
WBC # BLD AUTO: 10.31 K/UL (ref 3.9–12.7)

## 2021-09-22 ENCOUNTER — TELEPHONE (OUTPATIENT)
Dept: FAMILY MEDICINE | Facility: CLINIC | Age: 61
End: 2021-09-22

## 2021-09-23 ENCOUNTER — TELEPHONE (OUTPATIENT)
Dept: FAMILY MEDICINE | Facility: CLINIC | Age: 61
End: 2021-09-23

## 2021-10-07 ENCOUNTER — TELEPHONE (OUTPATIENT)
Dept: FAMILY MEDICINE | Facility: CLINIC | Age: 61
End: 2021-10-07

## 2021-10-11 ENCOUNTER — TELEPHONE (OUTPATIENT)
Dept: GASTROENTEROLOGY | Facility: CLINIC | Age: 61
End: 2021-10-11

## 2021-11-10 ENCOUNTER — TELEPHONE (OUTPATIENT)
Dept: GASTROENTEROLOGY | Facility: CLINIC | Age: 61
End: 2021-11-10
Payer: COMMERCIAL

## 2021-11-11 ENCOUNTER — TELEPHONE (OUTPATIENT)
Dept: GASTROENTEROLOGY | Facility: CLINIC | Age: 61
End: 2021-11-11
Payer: COMMERCIAL

## 2021-11-17 ENCOUNTER — OFFICE VISIT (OUTPATIENT)
Dept: GASTROENTEROLOGY | Facility: CLINIC | Age: 61
End: 2021-11-17
Payer: COMMERCIAL

## 2021-11-17 VITALS
BODY MASS INDEX: 23.94 KG/M2 | HEART RATE: 86 BPM | HEIGHT: 69 IN | DIASTOLIC BLOOD PRESSURE: 74 MMHG | WEIGHT: 161.63 LBS | SYSTOLIC BLOOD PRESSURE: 122 MMHG

## 2021-11-17 DIAGNOSIS — Z79.4 TYPE 2 DIABETES MELLITUS WITH DIABETIC NEUROPATHY, WITH LONG-TERM CURRENT USE OF INSULIN: ICD-10-CM

## 2021-11-17 DIAGNOSIS — Z86.010 HISTORY OF COLON POLYPS: ICD-10-CM

## 2021-11-17 DIAGNOSIS — I63.9 CEREBROVASCULAR ACCIDENT (CVA), UNSPECIFIED MECHANISM: Primary | ICD-10-CM

## 2021-11-17 DIAGNOSIS — I25.10 CORONARY ARTERY DISEASE, UNSPECIFIED VESSEL OR LESION TYPE, UNSPECIFIED WHETHER ANGINA PRESENT, UNSPECIFIED WHETHER NATIVE OR TRANSPLANTED HEART: ICD-10-CM

## 2021-11-17 DIAGNOSIS — E11.40 TYPE 2 DIABETES MELLITUS WITH DIABETIC NEUROPATHY, WITH LONG-TERM CURRENT USE OF INSULIN: ICD-10-CM

## 2021-11-17 DIAGNOSIS — R19.4 CHANGE IN BOWEL HABITS: ICD-10-CM

## 2021-11-17 DIAGNOSIS — R63.4 WEIGHT LOSS: ICD-10-CM

## 2021-11-17 DIAGNOSIS — R68.81 EARLY SATIETY: ICD-10-CM

## 2021-11-17 PROCEDURE — 3061F PR NEG MICROALBUMINURIA RESULT DOCUMENTED/REVIEW: ICD-10-PCS | Mod: CPTII,S$GLB,, | Performed by: INTERNAL MEDICINE

## 2021-11-17 PROCEDURE — 4010F PR ACE/ARB THEARPY RXD/TAKEN: ICD-10-PCS | Mod: CPTII,S$GLB,, | Performed by: INTERNAL MEDICINE

## 2021-11-17 PROCEDURE — 1159F MED LIST DOCD IN RCRD: CPT | Mod: CPTII,S$GLB,, | Performed by: INTERNAL MEDICINE

## 2021-11-17 PROCEDURE — 3061F NEG MICROALBUMINURIA REV: CPT | Mod: CPTII,S$GLB,, | Performed by: INTERNAL MEDICINE

## 2021-11-17 PROCEDURE — 1159F PR MEDICATION LIST DOCUMENTED IN MEDICAL RECORD: ICD-10-PCS | Mod: CPTII,S$GLB,, | Performed by: INTERNAL MEDICINE

## 2021-11-17 PROCEDURE — 99214 OFFICE O/P EST MOD 30 MIN: CPT | Mod: S$GLB,,, | Performed by: INTERNAL MEDICINE

## 2021-11-17 PROCEDURE — 3008F BODY MASS INDEX DOCD: CPT | Mod: CPTII,S$GLB,, | Performed by: INTERNAL MEDICINE

## 2021-11-17 PROCEDURE — 99999 PR PBB SHADOW E&M-EST. PATIENT-LVL IV: CPT | Mod: PBBFAC,,, | Performed by: INTERNAL MEDICINE

## 2021-11-17 PROCEDURE — 99214 PR OFFICE/OUTPT VISIT, EST, LEVL IV, 30-39 MIN: ICD-10-PCS | Mod: S$GLB,,, | Performed by: INTERNAL MEDICINE

## 2021-11-17 PROCEDURE — 3066F PR DOCUMENTATION OF TREATMENT FOR NEPHROPATHY: ICD-10-PCS | Mod: CPTII,S$GLB,, | Performed by: INTERNAL MEDICINE

## 2021-11-17 PROCEDURE — 3044F PR MOST RECENT HEMOGLOBIN A1C LEVEL <7.0%: ICD-10-PCS | Mod: CPTII,S$GLB,, | Performed by: INTERNAL MEDICINE

## 2021-11-17 PROCEDURE — 4010F ACE/ARB THERAPY RXD/TAKEN: CPT | Mod: CPTII,S$GLB,, | Performed by: INTERNAL MEDICINE

## 2021-11-17 PROCEDURE — 3072F PR LOW RISK FOR RETINOPATHY: ICD-10-PCS | Mod: CPTII,S$GLB,, | Performed by: INTERNAL MEDICINE

## 2021-11-17 PROCEDURE — 3078F PR MOST RECENT DIASTOLIC BLOOD PRESSURE < 80 MM HG: ICD-10-PCS | Mod: CPTII,S$GLB,, | Performed by: INTERNAL MEDICINE

## 2021-11-17 PROCEDURE — 3072F LOW RISK FOR RETINOPATHY: CPT | Mod: CPTII,S$GLB,, | Performed by: INTERNAL MEDICINE

## 2021-11-17 PROCEDURE — 3074F PR MOST RECENT SYSTOLIC BLOOD PRESSURE < 130 MM HG: ICD-10-PCS | Mod: CPTII,S$GLB,, | Performed by: INTERNAL MEDICINE

## 2021-11-17 PROCEDURE — 3074F SYST BP LT 130 MM HG: CPT | Mod: CPTII,S$GLB,, | Performed by: INTERNAL MEDICINE

## 2021-11-17 PROCEDURE — 3008F PR BODY MASS INDEX (BMI) DOCUMENTED: ICD-10-PCS | Mod: CPTII,S$GLB,, | Performed by: INTERNAL MEDICINE

## 2021-11-17 PROCEDURE — 3044F HG A1C LEVEL LT 7.0%: CPT | Mod: CPTII,S$GLB,, | Performed by: INTERNAL MEDICINE

## 2021-11-17 PROCEDURE — 3078F DIAST BP <80 MM HG: CPT | Mod: CPTII,S$GLB,, | Performed by: INTERNAL MEDICINE

## 2021-11-17 PROCEDURE — 99999 PR PBB SHADOW E&M-EST. PATIENT-LVL IV: ICD-10-PCS | Mod: PBBFAC,,, | Performed by: INTERNAL MEDICINE

## 2021-11-17 PROCEDURE — 3066F NEPHROPATHY DOC TX: CPT | Mod: CPTII,S$GLB,, | Performed by: INTERNAL MEDICINE

## 2021-12-09 ENCOUNTER — ANESTHESIA EVENT (OUTPATIENT)
Dept: ENDOSCOPY | Facility: HOSPITAL | Age: 61
End: 2021-12-09
Payer: COMMERCIAL

## 2021-12-09 ENCOUNTER — ANESTHESIA (OUTPATIENT)
Dept: ENDOSCOPY | Facility: HOSPITAL | Age: 61
End: 2021-12-09
Payer: COMMERCIAL

## 2021-12-09 ENCOUNTER — HOSPITAL ENCOUNTER (OUTPATIENT)
Facility: HOSPITAL | Age: 61
Discharge: HOME | End: 2021-12-09
Attending: INTERNAL MEDICINE | Admitting: INTERNAL MEDICINE
Payer: COMMERCIAL

## 2021-12-09 VITALS
RESPIRATION RATE: 16 BRPM | SYSTOLIC BLOOD PRESSURE: 119 MMHG | BODY MASS INDEX: 25.18 KG/M2 | WEIGHT: 170 LBS | HEART RATE: 82 BPM | HEIGHT: 69 IN | DIASTOLIC BLOOD PRESSURE: 76 MMHG | OXYGEN SATURATION: 98 % | TEMPERATURE: 98 F

## 2021-12-09 DIAGNOSIS — K64.8 INTERNAL HEMORRHOIDS: Primary | ICD-10-CM

## 2021-12-09 DIAGNOSIS — R19.4 CHANGE IN BOWEL HABITS: ICD-10-CM

## 2021-12-09 DIAGNOSIS — K44.9 HIATAL HERNIA: ICD-10-CM

## 2021-12-09 LAB — POCT GLUCOSE: 194 MG/DL (ref 70–110)

## 2021-12-09 PROCEDURE — D9220A PRA ANESTHESIA: Mod: CRNA,,, | Performed by: NURSE ANESTHETIST, CERTIFIED REGISTERED

## 2021-12-09 PROCEDURE — 37000008 HC ANESTHESIA 1ST 15 MINUTES: Performed by: INTERNAL MEDICINE

## 2021-12-09 PROCEDURE — 37000009 HC ANESTHESIA EA ADD 15 MINS: Performed by: INTERNAL MEDICINE

## 2021-12-09 PROCEDURE — 45380 COLONOSCOPY AND BIOPSY: CPT | Mod: ,,, | Performed by: INTERNAL MEDICINE

## 2021-12-09 PROCEDURE — 43239 PR EGD, FLEX, W/BIOPSY, SGL/MULTI: ICD-10-PCS | Mod: 59,,, | Performed by: INTERNAL MEDICINE

## 2021-12-09 PROCEDURE — 43239 EGD BIOPSY SINGLE/MULTIPLE: CPT | Mod: 59,,, | Performed by: INTERNAL MEDICINE

## 2021-12-09 PROCEDURE — 63600175 PHARM REV CODE 636 W HCPCS: Performed by: NURSE ANESTHETIST, CERTIFIED REGISTERED

## 2021-12-09 PROCEDURE — D9220A PRA ANESTHESIA: ICD-10-PCS | Mod: ANES,,, | Performed by: ANESTHESIOLOGY

## 2021-12-09 PROCEDURE — D9220A PRA ANESTHESIA: Mod: ANES,,, | Performed by: ANESTHESIOLOGY

## 2021-12-09 PROCEDURE — D9220A PRA ANESTHESIA: ICD-10-PCS | Mod: CRNA,,, | Performed by: NURSE ANESTHETIST, CERTIFIED REGISTERED

## 2021-12-09 PROCEDURE — 45380 COLONOSCOPY AND BIOPSY: CPT | Performed by: INTERNAL MEDICINE

## 2021-12-09 PROCEDURE — 43239 EGD BIOPSY SINGLE/MULTIPLE: CPT | Performed by: INTERNAL MEDICINE

## 2021-12-09 PROCEDURE — 88305 TISSUE EXAM BY PATHOLOGIST: ICD-10-PCS | Mod: 26,,, | Performed by: PATHOLOGY

## 2021-12-09 PROCEDURE — 27201012 HC FORCEPS, HOT/COLD, DISP: Performed by: INTERNAL MEDICINE

## 2021-12-09 PROCEDURE — 25000003 PHARM REV CODE 250: Performed by: NURSE ANESTHETIST, CERTIFIED REGISTERED

## 2021-12-09 PROCEDURE — 25000003 PHARM REV CODE 250: Performed by: INTERNAL MEDICINE

## 2021-12-09 PROCEDURE — 88305 TISSUE EXAM BY PATHOLOGIST: CPT | Performed by: PATHOLOGY

## 2021-12-09 PROCEDURE — 82962 GLUCOSE BLOOD TEST: CPT | Performed by: INTERNAL MEDICINE

## 2021-12-09 PROCEDURE — 88305 TISSUE EXAM BY PATHOLOGIST: CPT | Mod: 26,,, | Performed by: PATHOLOGY

## 2021-12-09 PROCEDURE — 45380 PR COLONOSCOPY,BIOPSY: ICD-10-PCS | Mod: ,,, | Performed by: INTERNAL MEDICINE

## 2021-12-09 RX ORDER — LIDOCAINE HCL/PF 100 MG/5ML
SYRINGE (ML) INTRAVENOUS
Status: DISCONTINUED | OUTPATIENT
Start: 2021-12-09 | End: 2021-12-09

## 2021-12-09 RX ORDER — SODIUM CHLORIDE 9 MG/ML
INJECTION, SOLUTION INTRAVENOUS CONTINUOUS
Status: DISCONTINUED | OUTPATIENT
Start: 2021-12-09 | End: 2021-12-09 | Stop reason: HOSPADM

## 2021-12-09 RX ORDER — PROPOFOL 10 MG/ML
VIAL (ML) INTRAVENOUS
Status: DISCONTINUED | OUTPATIENT
Start: 2021-12-09 | End: 2021-12-09

## 2021-12-09 RX ADMIN — SODIUM CHLORIDE: 0.9 INJECTION, SOLUTION INTRAVENOUS at 09:12

## 2021-12-09 RX ADMIN — PROPOFOL 50 MG: 10 INJECTION, EMULSION INTRAVENOUS at 09:12

## 2021-12-09 RX ADMIN — LIDOCAINE HYDROCHLORIDE 100 MG: 20 INJECTION INTRAVENOUS at 08:12

## 2021-12-09 RX ADMIN — SODIUM CHLORIDE: 0.9 INJECTION, SOLUTION INTRAVENOUS at 08:12

## 2021-12-09 RX ADMIN — PROPOFOL 100 MG: 10 INJECTION, EMULSION INTRAVENOUS at 08:12

## 2021-12-09 RX ADMIN — GLYCOPYRROLATE 0.2 MG: 0.2 INJECTION, SOLUTION INTRAMUSCULAR; INTRAVITREAL at 08:12

## 2021-12-15 LAB
FINAL PATHOLOGIC DIAGNOSIS: NORMAL
GROSS: NORMAL
Lab: NORMAL

## 2021-12-17 ENCOUNTER — TELEPHONE (OUTPATIENT)
Dept: GASTROENTEROLOGY | Facility: CLINIC | Age: 61
End: 2021-12-17
Payer: COMMERCIAL

## 2022-02-21 DIAGNOSIS — I15.2 HYPERTENSION ASSOCIATED WITH DIABETES: ICD-10-CM

## 2022-02-21 DIAGNOSIS — E11.59 HYPERTENSION ASSOCIATED WITH DIABETES: ICD-10-CM

## 2022-02-21 NOTE — TELEPHONE ENCOUNTER
Care Due:                  Date            Visit Type   Department     Provider  --------------------------------------------------------------------------------                                SAME DAY -                              ESTABLISHED   [facility]  Last Visit: 09-      PATIENT      MEDICINE       Chanel Robersno  Next Visit: None Scheduled  None         None Found                                                            Last  Test          Frequency    Reason                     Performed    Due Date  --------------------------------------------------------------------------------    HBA1C.......  6 months...  TRESIBA, empagliflozin...  09- 03-    Powered by [employer]. Reference number: [id number].   2/21/2022 9:24:01 AM CST

## 2022-02-22 RX ORDER — LISINOPRIL 20 MG/1
TABLET ORAL
Qty: 45 TABLET | Refills: 1 | Status: SHIPPED | OUTPATIENT
Start: 2022-02-22 | End: 2022-08-17

## 2022-02-22 NOTE — TELEPHONE ENCOUNTER
Refill Authorization Note   Price Noguera  is requesting a refill authorization.  Brief Assessment and Rationale for Refill:  Approve     Medication Therapy Plan:           Comments:   --->Care Gap information included below if applicable.       Requested Prescriptions   Pending Prescriptions Disp Refills    lisinopriL (PRINIVIL,ZESTRIL) 20 MG tablet [Pharmacy Med Name: LISINOPRIL 20 MG TABLET] 45 tablet 1     Sig: TAKE 0.5 TABLETS BY MOUTH ONCE DAILY.       Cardiovascular:  ACE Inhibitors Passed - 2/21/2022  9:23 AM        Passed - Patient is at least 18 years old        Passed - Last BP in normal range within 360 days     BP Readings from Last 1 Encounters:   12/09/21 119/76               Passed - Valid encounter within last 15 months     Recent Visits  Date Type Provider Dept   09/08/21 Office Visit Chanel Roberson MD [facility]   06/03/21 Office Visit Chanel Roberson MD [facility]   03/31/21 Office Visit Chanel Roberson MD [facility]   12/07/20 Office Visit Chanel Roberson MD [facility]   06/09/20 Office Visit Chanel Roberson MD [facility]   Showing recent visits within past 720 days and meeting all other requirements  Future Appointments  No visits were found meeting these conditions.  Showing future appointments within next 150 days and meeting all other requirements                Passed - Cr is 1.39 or below and within 360 days     Lab Results   Component Value Date    CREATININE 0.9 09/08/2021    CREATININE 0.8 12/16/2020    CREATININE 0.8 12/15/2020              Passed - K is 5.2 or below and within 360 days     Potassium   Date Value Ref Range Status   09/08/2021 4.2 3.5 - 5.1 mmol/L Final   12/16/2020 3.7 3.5 - 5.1 mmol/L Final   12/15/2020 3.8 3.5 - 5.1 mmol/L Final              Passed - eGFR within 360 days     Lab Results   Component Value Date    EGFRNONAA >60.0 09/08/2021    EGFRNONAA >60 12/16/2020    EGFRNONAA >60 12/15/2020                     Appointments  past 12m or future 3m with PCP    Date Provider   Last Visit   9/8/2021 Chanel Roberson MD   Next Visit   Visit date not found Chanel Roberson MD   ED visits in past 90 days: 0     Note composed:4:24 PM 02/22/2022

## 2022-03-30 DIAGNOSIS — E11.9 TYPE 2 DIABETES MELLITUS WITHOUT COMPLICATION: ICD-10-CM

## 2022-04-01 DIAGNOSIS — E11.65 UNCONTROLLED TYPE 2 DIABETES MELLITUS WITH HYPERGLYCEMIA: ICD-10-CM

## 2022-04-01 NOTE — TELEPHONE ENCOUNTER
No new care gaps identified.  Powered by [employer]. Reference number: [id number].   4/01/2022 11:22:54 AM CDT

## 2022-04-01 NOTE — TELEPHONE ENCOUNTER
This Rx Request does not qualify for assessment with the ORC   Please review protocol details and the Care Due Message for extra clinical information    Reasons Rx Request may be deferred:  Patient has been seen in the ED/Hospital since the last PCP visit    Note composed:12:05 PM 04/01/2022

## 2022-04-01 NOTE — TELEPHONE ENCOUNTER
No new care gaps identified.  Powered by [employer]. Reference number: [id number].   4/01/2022 1:16:29 AM CDT

## 2022-04-01 NOTE — TELEPHONE ENCOUNTER
This Rx Request does not qualify for assessment with the OR   Please review protocol details and the Care Due Message for extra clinical information    Reasons Rx Request may be deferred:  Labs/Vitals overdue  Patient has been seen in the ED/Hospital since the last PCP visit    Note composed:9:35 AM 04/01/2022

## 2022-04-02 DIAGNOSIS — G47.00 INSOMNIA, UNSPECIFIED TYPE: ICD-10-CM

## 2022-04-02 NOTE — TELEPHONE ENCOUNTER
No new care gaps identified.  Powered by [employer]. Reference number: [id number].   4/02/2022 7:32:58 AM CDT

## 2022-04-03 NOTE — TELEPHONE ENCOUNTER
This Rx Request does not qualify for assessment with the ORC   Please review protocol details and the Care Due Message for extra clinical information    Reasons Rx Request may be deferred:  Patient has been seen in the ED/Hospital since the last PCP visit    Note composed:9:36 PM 04/02/2022

## 2022-04-04 ENCOUNTER — PATIENT MESSAGE (OUTPATIENT)
Dept: ADMINISTRATIVE | Facility: HOSPITAL | Age: 62
End: 2022-04-04
Payer: COMMERCIAL

## 2022-04-04 RX ORDER — TRAZODONE HYDROCHLORIDE 50 MG/1
TABLET ORAL
Qty: 90 TABLET | Refills: 3 | Status: SHIPPED | OUTPATIENT
Start: 2022-04-04 | End: 2022-11-05 | Stop reason: SDUPTHER

## 2022-04-04 RX ORDER — EMPAGLIFLOZIN 25 MG/1
TABLET, FILM COATED ORAL
Qty: 90 TABLET | Refills: 3 | Status: SHIPPED | OUTPATIENT
Start: 2022-04-04 | End: 2022-10-13 | Stop reason: SDUPTHER

## 2022-04-04 RX ORDER — PEN NEEDLE, DIABETIC 32GX 5/32"
NEEDLE, DISPOSABLE MISCELLANEOUS
Qty: 100 EACH | Refills: 11 | Status: SHIPPED | OUTPATIENT
Start: 2022-04-04 | End: 2022-11-05 | Stop reason: SDUPTHER

## 2022-05-01 DIAGNOSIS — I63.9 CEREBROVASCULAR ACCIDENT (CVA), UNSPECIFIED MECHANISM: ICD-10-CM

## 2022-05-01 NOTE — TELEPHONE ENCOUNTER
Care Due:                  Date            Visit Type   Department     Provider  --------------------------------------------------------------------------------                                SAME DAY -                              ESTABLISHED   [facility]  Last Visit: 09-      PATIENT      MEDICINE       Chanel Roberson  Next Visit: None Scheduled  None         None Found                                                            Last  Test          Frequency    Reason                     Performed    Due Date  --------------------------------------------------------------------------------    HBA1C.......  6 months...  QUIQUE JAY.......  09- 03-    Powered by [employer] by [employer]. Reference number: [id number].   5/01/2022 8:18:16 AM CDT

## 2022-05-02 NOTE — TELEPHONE ENCOUNTER
Refill Routing Note   Medication(s) are not appropriate for processing by [facility] for the following reason(s):      - Patient has been seen in the ED/Hospital since the last PCP visit    ORC action(s):  Route Medication-related problems identified: Requires labs        Medication reconciliation completed: No     Appointments  past 12m or future 3m with PCP    Date Provider   Last Visit   9/8/2021 Chanel Roberson MD   Next Visit   Visit date not found Chanel Roberson MD   ED visits in past 90 days: 0        Note composed:11:01 AM 05/02/2022

## 2022-05-03 RX ORDER — ATORVASTATIN CALCIUM 80 MG/1
TABLET, FILM COATED ORAL
Qty: 90 TABLET | Refills: 3 | Status: SHIPPED | OUTPATIENT
Start: 2022-05-03 | End: 2022-11-05 | Stop reason: SDUPTHER

## 2022-05-13 NOTE — ASSESSMENT & PLAN NOTE
-High dose statin     Refill request for rosuvastatin 10mg nightly  Last refilled 2/10/22 #90 0 refills  LOV 1/6/22  NOV 12/5/22  Last CMP 7/2/21, Lipid 7/2/21    Refilled per protocol

## 2022-05-22 RX ORDER — NORTRIPTYLINE HYDROCHLORIDE 25 MG/1
CAPSULE ORAL
Qty: 90 CAPSULE | Refills: 1 | Status: SHIPPED | OUTPATIENT
Start: 2022-05-22 | End: 2022-10-17

## 2022-05-22 NOTE — TELEPHONE ENCOUNTER
Refill Authorization Note   Price Noguera  is requesting a refill authorization.  Brief Assessment and Rationale for Refill:  Approve     Medication Therapy Plan:       Medication Reconciliation Completed: No   Comments:     No Care Gaps recommended.     Note composed:10:22 AM 05/22/2022

## 2022-05-22 NOTE — TELEPHONE ENCOUNTER
No new care gaps identified.  [facility] Embedded Care Gaps. Reference number: [id number]. 5/22/2022   7:22:11 AM YAZANT

## 2022-05-31 ENCOUNTER — PATIENT MESSAGE (OUTPATIENT)
Dept: ADMINISTRATIVE | Facility: HOSPITAL | Age: 62
End: 2022-05-31
Payer: COMMERCIAL

## 2022-06-05 DIAGNOSIS — E11.65 UNCONTROLLED TYPE 2 DIABETES MELLITUS WITH HYPERGLYCEMIA: ICD-10-CM

## 2022-06-05 NOTE — TELEPHONE ENCOUNTER
Care Due:                  Date            Visit Type   Department     Provider  --------------------------------------------------------------------------------                                SAME DAY -                              ESTABLISHED   [facility]  Last Visit: 09-      PATIENT      MEDICINE       Chanel Roberson  Next Visit: None Scheduled  None         None Found                                                            Last  Test          Frequency    Reason                     Performed    Due Date  --------------------------------------------------------------------------------    Office Visit  12 months..  QUIQUE JAY,        09- 09-                             atorvastatin, lisinopriL,                             nortriptyline, traZODone.    [facility] Embedded Care Gaps. Reference number: [id number]. 6/05/2022   10:57:20 AM CDT

## 2022-06-06 ENCOUNTER — PATIENT MESSAGE (OUTPATIENT)
Dept: ADMINISTRATIVE | Facility: HOSPITAL | Age: 62
End: 2022-06-06
Payer: COMMERCIAL

## 2022-06-06 RX ORDER — INSULIN DEGLUDEC 100 U/ML
INJECTION, SOLUTION SUBCUTANEOUS
Qty: 4 PEN | Refills: 3 | Status: SHIPPED | OUTPATIENT
Start: 2022-06-06 | End: 2022-11-05 | Stop reason: SDUPTHER

## 2022-06-06 NOTE — TELEPHONE ENCOUNTER
Refill Routing Note   Medication(s) are not appropriate for processing by [facility] for the following reason(s):      - Required laboratory values are outdated  - Patient has been seen in the ED/Hospital since the last PCP visit    [facility] action(s):  Route Medication-related problems identified: Requires labs        Medication reconciliation completed: No     Appointments  past 12m or future 3m with PCP    Date Provider   Last Visit   9/8/2021 Chanel Roberson MD   Next Visit   Visit date not found Chanel Roberson MD   ED visits in past 90 days: 0        Note composed:9:55 AM 06/06/2022

## 2022-06-10 ENCOUNTER — TELEPHONE (OUTPATIENT)
Dept: FAMILY MEDICINE | Facility: CLINIC | Age: 62
End: 2022-06-10
Payer: COMMERCIAL

## 2022-06-10 NOTE — TELEPHONE ENCOUNTER
Returned call and spoke to Minal regarding patient paperwork for pre-op clearance. Informed Minal that Dr. Roberson is out today and will be back on Monday. Fax number given to Minal to fax form over for  Dr. Roberson review.

## 2022-06-10 NOTE — TELEPHONE ENCOUNTER
----- Message from Jeremy Quintana sent at 6/9/2022  9:42 AM CDT -----  Contact: Minal at [phone]  Type: Needs Medical Advice  Who Called:  Minal at [facility] Call Back Number: [phone] fax [phone]  Additional Information: Minal is calling the office to see if the clearance form was rec'd that was faxed on yesterday. Please call back and advise.

## 2022-06-17 ENCOUNTER — TELEPHONE (OUTPATIENT)
Dept: FAMILY MEDICINE | Facility: CLINIC | Age: 62
End: 2022-06-17
Payer: COMMERCIAL

## 2022-06-17 NOTE — TELEPHONE ENCOUNTER
Returned call and spoke to patient regarding surgery clearance (Dental procedure) . Informed patient that paperwork was never fax over and that I've try reaching out to Minal with [facility], no answer will call back on Monday to have paperwork fax over.

## 2022-06-17 NOTE — TELEPHONE ENCOUNTER
----- Message from Kim Kelley sent at 6/17/2022  2:01 PM CDT -----  Regarding: Call back  Who Called: Angelia Stephen          What is the reason for the call: calling in regards to a clearance they state patient has been trying to get for over a month. States office closes at 3pm today and would liek a response from someone. Patient stated if he doesn't hear back from someone as well he is going to have to come down to office. He would like a call back as well ([phone]). Please contact          Can patient be contacted on [employer]: No         Call back number: [phone]

## 2022-06-20 ENCOUNTER — TELEPHONE (OUTPATIENT)
Dept: FAMILY MEDICINE | Facility: CLINIC | Age: 62
End: 2022-06-20
Payer: COMMERCIAL

## 2022-06-20 NOTE — TELEPHONE ENCOUNTER
----- Message from Cristal Hollis sent at 6/20/2022  2:52 PM CDT -----  Type: Needs Medical Advice    Who Called: Smita with [facility]  Best Call Back Number: [phone]  Inquiry/Question: wants to make sure that you received the fax for dental clearance. The pt is calling there yelling at him saying they haven't called here about the clearance and they did speak to someone here. They are trying to get the status of it because the pt is being very irate about them not having it yet.      Thank you~

## 2022-06-21 ENCOUNTER — TELEPHONE (OUTPATIENT)
Dept: FAMILY MEDICINE | Facility: CLINIC | Age: 62
End: 2022-06-21
Payer: COMMERCIAL

## 2022-06-21 NOTE — TELEPHONE ENCOUNTER
----- Message from Lorena Wakefield sent at 6/21/2022 10:40 AM CDT -----  Patient is calling again regarding the dental clearance for [facility].  He said he is in a lot of pain and has been trying to get this done for one month.  He has 2 teeth that needs to be extracted.  He wants the clearance sent back to [facility] ASAP.  Please notify him when it has been sent at [phone].  He said he is desperate, he said please, please, please take care of it.  Thank you!

## 2022-06-21 NOTE — TELEPHONE ENCOUNTER
Returned patients call in regards to dental clearance for his surgery. Advised patient he will need to be seen in clinic for a surgery clearance appointment. Patient stated he has been calling for months to get this taken care. Advised him I could get him scheduled for first thing tomorrow morning. He continued to yell and scream at me stating he only wanted to speak with Dr Roberson and I do not know what I am talking about because he was told an appointment is not needed. Asked patient to stop yelling at me and he continued. Politely told patient I would forward his message to Dr Roberson.

## 2022-06-21 NOTE — TELEPHONE ENCOUNTER
Called and spoke with Smita at [facility]. Advised her that a direct message has been sent to the provider , and Smita was given the correct fax number to fax the surgery clearance forms to Dr Roberson.   verbalzied understanding.

## 2022-06-21 NOTE — TELEPHONE ENCOUNTER
----- Message from Matti Sanabria sent at 6/21/2022 11:57 AM CDT -----  Type: Needs Medical Advice  Who Called:  Patient    Best Call Back Number: [phone]  Additional Information:  Patient states that he has been waiting for a callback regarding his severe pain and needing his paperwork filled out.  Patient states that he would like to speak to Dr. Roberson personally.

## 2022-06-27 ENCOUNTER — TELEPHONE (OUTPATIENT)
Dept: FAMILY MEDICINE | Facility: CLINIC | Age: 62
End: 2022-06-27
Payer: COMMERCIAL

## 2022-06-27 ENCOUNTER — OFFICE VISIT (OUTPATIENT)
Dept: FAMILY MEDICINE | Facility: CLINIC | Age: 62
End: 2022-06-27
Payer: COMMERCIAL

## 2022-06-27 VITALS
DIASTOLIC BLOOD PRESSURE: 74 MMHG | HEART RATE: 89 BPM | SYSTOLIC BLOOD PRESSURE: 118 MMHG | HEIGHT: 69 IN | BODY MASS INDEX: 25.21 KG/M2 | WEIGHT: 170.19 LBS | TEMPERATURE: 98 F | OXYGEN SATURATION: 97 %

## 2022-06-27 DIAGNOSIS — I25.10 CORONARY ARTERY DISEASE INVOLVING NATIVE CORONARY ARTERY OF NATIVE HEART WITHOUT ANGINA PECTORIS: Primary | ICD-10-CM

## 2022-06-27 DIAGNOSIS — Z95.5 HISTORY OF HEART ARTERY STENT: ICD-10-CM

## 2022-06-27 DIAGNOSIS — Z79.01 ANTICOAGULANT LONG-TERM USE: ICD-10-CM

## 2022-06-27 PROCEDURE — 4010F PR ACE/ARB THEARPY RXD/TAKEN: ICD-10-PCS | Mod: CPTII,S$GLB,, | Performed by: PHYSICIAN ASSISTANT

## 2022-06-27 PROCEDURE — 3008F BODY MASS INDEX DOCD: CPT | Mod: CPTII,S$GLB,, | Performed by: PHYSICIAN ASSISTANT

## 2022-06-27 PROCEDURE — 1159F MED LIST DOCD IN RCRD: CPT | Mod: CPTII,S$GLB,, | Performed by: PHYSICIAN ASSISTANT

## 2022-06-27 PROCEDURE — 99999 PR PBB SHADOW E&M-EST. PATIENT-LVL V: ICD-10-PCS | Mod: PBBFAC,,, | Performed by: PHYSICIAN ASSISTANT

## 2022-06-27 PROCEDURE — 3078F DIAST BP <80 MM HG: CPT | Mod: CPTII,S$GLB,, | Performed by: PHYSICIAN ASSISTANT

## 2022-06-27 PROCEDURE — 99999 PR PBB SHADOW E&M-EST. PATIENT-LVL V: CPT | Mod: PBBFAC,,, | Performed by: PHYSICIAN ASSISTANT

## 2022-06-27 PROCEDURE — 4010F ACE/ARB THERAPY RXD/TAKEN: CPT | Mod: CPTII,S$GLB,, | Performed by: PHYSICIAN ASSISTANT

## 2022-06-27 PROCEDURE — 99213 PR OFFICE/OUTPT VISIT, EST, LEVL III, 20-29 MIN: ICD-10-PCS | Mod: S$GLB,,, | Performed by: PHYSICIAN ASSISTANT

## 2022-06-27 PROCEDURE — 1160F RVW MEDS BY RX/DR IN RCRD: CPT | Mod: CPTII,S$GLB,, | Performed by: PHYSICIAN ASSISTANT

## 2022-06-27 PROCEDURE — 3072F PR LOW RISK FOR RETINOPATHY: ICD-10-PCS | Mod: CPTII,S$GLB,, | Performed by: PHYSICIAN ASSISTANT

## 2022-06-27 PROCEDURE — 3074F SYST BP LT 130 MM HG: CPT | Mod: CPTII,S$GLB,, | Performed by: PHYSICIAN ASSISTANT

## 2022-06-27 PROCEDURE — 99213 OFFICE O/P EST LOW 20 MIN: CPT | Mod: S$GLB,,, | Performed by: PHYSICIAN ASSISTANT

## 2022-06-27 PROCEDURE — 1159F PR MEDICATION LIST DOCUMENTED IN MEDICAL RECORD: ICD-10-PCS | Mod: CPTII,S$GLB,, | Performed by: PHYSICIAN ASSISTANT

## 2022-06-27 PROCEDURE — 1160F PR REVIEW ALL MEDS BY PRESCRIBER/CLIN PHARMACIST DOCUMENTED: ICD-10-PCS | Mod: CPTII,S$GLB,, | Performed by: PHYSICIAN ASSISTANT

## 2022-06-27 PROCEDURE — 3074F PR MOST RECENT SYSTOLIC BLOOD PRESSURE < 130 MM HG: ICD-10-PCS | Mod: CPTII,S$GLB,, | Performed by: PHYSICIAN ASSISTANT

## 2022-06-27 PROCEDURE — 3078F PR MOST RECENT DIASTOLIC BLOOD PRESSURE < 80 MM HG: ICD-10-PCS | Mod: CPTII,S$GLB,, | Performed by: PHYSICIAN ASSISTANT

## 2022-06-27 PROCEDURE — 3008F PR BODY MASS INDEX (BMI) DOCUMENTED: ICD-10-PCS | Mod: CPTII,S$GLB,, | Performed by: PHYSICIAN ASSISTANT

## 2022-06-27 PROCEDURE — 3072F LOW RISK FOR RETINOPATHY: CPT | Mod: CPTII,S$GLB,, | Performed by: PHYSICIAN ASSISTANT

## 2022-06-27 NOTE — TELEPHONE ENCOUNTER
Confirmed w/ [facility] that note stating pt can hold plavix will be sufficient for scheduling dental procedure.  Form with hold plavix note faxed to [facility] @[phone].

## 2022-06-27 NOTE — TELEPHONE ENCOUNTER
Pt in clinic stating that he needs an additional note documenting that it is ok to hold blood thinners before dental sx.  Last office visit was 9/8/2021 where pt was advised to come in for 3 month f/u pt stated he didn't come because his status didn't change.  Offered a office visit for Sx clearance w/ [facility] for today @3:40pm; appt accepted.

## 2022-06-27 NOTE — TELEPHONE ENCOUNTER
----- Message from Erna Eduyoav sent at 6/27/2022  7:35 AM CDT -----  Contact: self  Type:  Same Day Appointment Request    Caller is requesting a same day appointment.  Caller declined first available appointment listed below.      Name of Caller:  patient   When is the first available appointment?  August   Symptoms:  needs dental clearance he is back home now and needs to be seen today  Best Call Back Number:  [phone] (home)   Additional Information:   thanks

## 2022-06-30 PROBLEM — Z79.01 ANTICOAGULANT LONG-TERM USE: Status: ACTIVE | Noted: 2022-06-30

## 2022-07-01 NOTE — PROGRESS NOTES
Subjective:       Patient ID: Price Noguera is a 61 y.o. male.    Chief Complaint: cleared for dental surgery    HPI   Pt. Having oral surgery  Has already been cleared for the surgery by Dr. Rboerson  Needs note to DC Plavix prior to the surgery  Pt. Has hx of coronary artery stent  Pt. Is free of sx   Review of Systems   Constitutional: Negative.  Negative for activity change, appetite change, chills, diaphoresis, fatigue, fever and unexpected weight change.   HENT: Positive for dental problem.    Eyes: Negative.    Respiratory: Negative.  Negative for cough and shortness of breath.    Cardiovascular: Negative.  Negative for chest pain and leg swelling.   Gastrointestinal: Negative.    Endocrine: Negative.    Genitourinary: Negative.    Musculoskeletal: Negative.    Integumentary:  Negative for rash. Negative.   Neurological: Negative.          Objective:      Physical Exam  Vitals reviewed.   Constitutional:       General: He is not in acute distress.     Appearance: Normal appearance. He is normal weight. He is not ill-appearing, toxic-appearing or diaphoretic.   Eyes:      General: No scleral icterus.     Conjunctiva/sclera: Conjunctivae normal.   Skin:     General: Skin is warm and dry.      Findings: No rash.   Neurological:      Mental Status: He is alert.         Assessment:       Problem List Items Addressed This Visit     Coronary artery disease - Primary    History of heart artery stent    Anticoagulant long-term use          Plan:       Price was seen today for cleared for dental surgery.    Diagnoses and all orders for this visit:    Coronary artery disease involving native coronary artery of native heart without angina pectoris    History of heart artery stent    Anticoagulant long-term use    pt. Instructed to stop Plavix 5 days prior to dental surgery and restart the day after surgery  Note provided

## 2022-08-24 ENCOUNTER — PATIENT MESSAGE (OUTPATIENT)
Dept: ADMINISTRATIVE | Facility: HOSPITAL | Age: 62
End: 2022-08-24
Payer: COMMERCIAL

## 2022-09-05 ENCOUNTER — PATIENT MESSAGE (OUTPATIENT)
Dept: ADMINISTRATIVE | Facility: HOSPITAL | Age: 62
End: 2022-09-05
Payer: COMMERCIAL

## 2022-09-15 DIAGNOSIS — E78.5 HYPERLIPIDEMIA ASSOCIATED WITH TYPE 2 DIABETES MELLITUS: ICD-10-CM

## 2022-09-15 DIAGNOSIS — E11.69 HYPERLIPIDEMIA ASSOCIATED WITH TYPE 2 DIABETES MELLITUS: ICD-10-CM

## 2022-09-15 DIAGNOSIS — E11.9 TYPE 2 DIABETES MELLITUS WITHOUT COMPLICATION: ICD-10-CM

## 2022-09-19 ENCOUNTER — PATIENT MESSAGE (OUTPATIENT)
Dept: ADMINISTRATIVE | Facility: HOSPITAL | Age: 62
End: 2022-09-19
Payer: COMMERCIAL

## 2022-09-28 ENCOUNTER — PATIENT MESSAGE (OUTPATIENT)
Dept: ADMINISTRATIVE | Facility: HOSPITAL | Age: 62
End: 2022-09-28
Payer: COMMERCIAL

## 2022-09-28 ENCOUNTER — PATIENT OUTREACH (OUTPATIENT)
Dept: ADMINISTRATIVE | Facility: HOSPITAL | Age: 62
End: 2022-09-28
Payer: COMMERCIAL

## 2022-09-28 NOTE — PROGRESS NOTES
A1C gap report review with patient outreach.    [facility] and [facility] reviewed. No HM items found.

## 2022-10-01 NOTE — TELEPHONE ENCOUNTER
----- Message from Kristen Lucero sent at 1/18/2019  4:08 PM CST -----  Contact: Aline with [facility]   Calling in regards to needles for Insulin and pt has discount card for needles so he can get it for free and Pt is at the Pharmacy waiting  and please advise. [phone] and fax # [phone]     
Spoke with pharmacy. Pharmacist states that the discount card patient had needed to have specific brand of test strips. Wanted to verify that it was ok to change brand of test strips. Minal stated it was ok to change brand.  
Yes - the patient is able to be screened

## 2022-10-06 ENCOUNTER — PATIENT OUTREACH (OUTPATIENT)
Dept: ADMINISTRATIVE | Facility: HOSPITAL | Age: 62
End: 2022-10-06
Payer: COMMERCIAL

## 2022-10-06 NOTE — PROGRESS NOTES
"Attempted to outreach patient regarding overdue/ due HM via "[facility]". No response after a week. Now sending outreach via mail out letter.      "

## 2022-10-06 NOTE — LETTER
October 6, 2022    Price Noguera  [address]  [place] [address]             [facility]  [address]  [place] [address]  Phone: [phone] Hi,  I hope you are well. According to our records you are due for a Hgb A1C lab test. If you have had this done elsewhere, please let us know and we will request a copy of your report to update your [facility] record. Would you like me to schedule this for you? If so, please give us a call at [phone] and we will be happy to help you get this scheduled.      Thanks and have a good day!      Elo Rodriguez LPN Clinical Care Coordinator  [facility] [address] [facility] [place] [address]  [phone] (phone)  [phone] (fax)

## 2022-10-10 ENCOUNTER — TELEPHONE (OUTPATIENT)
Dept: FAMILY MEDICINE | Facility: CLINIC | Age: 62
End: 2022-10-10
Payer: COMMERCIAL

## 2022-10-10 NOTE — TELEPHONE ENCOUNTER
Returned patients call in regards to missed placed medications. No answer , left voicemail to return call.

## 2022-10-10 NOTE — TELEPHONE ENCOUNTER
----- Message from Otilio Rhodes sent at 10/10/2022  9:41 AM CDT -----  Type: Patient Call Back         Who called: Pt          What is the request in detail: Pt called in regarding missed placing med's . Pt states that he lost med's when he was helping his brother move          Can the clinic reply by [facility]?no          Would the patient rather a call back or a response via [facility]? Call back          Best call back number:[phone] (mobile)          Additional Information:           Thank You

## 2022-10-10 NOTE — TELEPHONE ENCOUNTER
----- Message from [facility] sent at 10/10/2022  2:29 PM CDT -----  Type:  Needs Medical Advice    Who Called: Pt   Would the patient rather a call back or a response via [facility]? Callback   Best Call Back Number: [phone]  Additional Information: Pt requesting callback from office to have diabetes medication sent in again to pharmacy. Pt states he lost his medicine helping his brother move and is completely now. .

## 2022-10-11 DIAGNOSIS — E11.65 UNCONTROLLED TYPE 2 DIABETES MELLITUS WITH HYPERGLYCEMIA: ICD-10-CM

## 2022-10-11 NOTE — TELEPHONE ENCOUNTER
----- Message from Dania Rai sent at 10/11/2022 10:41 AM CDT -----  Contact: Patient  Type: Patient Call Back         Who called: Patient         What is the request in detail: returning missed call from nurse regarding misplacing his Rx; states he needs a call back asap; please advise         Best call back number: [phone]         Additional Information:   [id number] - [place], [place] - [address] AT [facility]  [address]  [place] [address]  Phone: [phone] Fax: [phone]             Thank You

## 2022-10-13 NOTE — TELEPHONE ENCOUNTER
No new care gaps identified.  [facility] Embedded Care Gaps. Reference number: [id number]. 10/13/2022   9:19:38 AM YAZANT

## 2022-10-17 RX ORDER — NORTRIPTYLINE HYDROCHLORIDE 25 MG/1
CAPSULE ORAL
Qty: 90 CAPSULE | Refills: 0 | Status: SHIPPED | OUTPATIENT
Start: 2022-10-17 | End: 2022-11-05 | Stop reason: SDUPTHER

## 2022-10-17 NOTE — TELEPHONE ENCOUNTER
Care Due:                  Date            Visit Type   Department     Provider  --------------------------------------------------------------------------------                                SAME DAY -                              ESTABLISHED   [facility]  Last Visit: 09-      PATIENT      MEDICINE       Chanel Roberson  Next Visit: None Scheduled  None         None Found                                                            Last  Test          Frequency    Reason                     Performed    Due Date  --------------------------------------------------------------------------------    Office Visit  12 months..  TRESIBA, atorvastatin,     09- 09-                             empagliflozin,                             lisinopriL,                             nortriptyline, traZODone.    CMP.........  12 months..  TRESIBA, atorvastatin,     09- 09-                             empagliflozin, lisinopriL    HBA1C.......  6 months...  TRESIBA, empagliflozin...  09- 03-    Lipid Panel.  12 months..  atorvastatin.............  09- 09-    [facility] Embedded Care Gaps. Reference number: [id number]. 10/17/2022   3:35:16 PM CDT

## 2022-10-18 NOTE — TELEPHONE ENCOUNTER
Refill Decision Note   Price Noguera  is requesting a refill authorization.  Brief Assessment and Rationale for Refill:  Approve    -Medication-Related Problems Identified:   Requires labs  Requires appointment  Medication Therapy Plan:       Medication Reconciliation Completed: No   Comments:     Provider Staff:     Action is required for this patient.   Please see care gap opportunities below in Care Due Message.     Thanks!  [facility]     Appointments      Date Provider   Last Visit   9/8/2021 Chanel Roberson MD   Next Visit   Visit date not found Chanel Roberson MD     Note composed:11:36 PM 10/17/2022           Note composed:11:36 PM 10/17/2022

## 2022-11-04 ENCOUNTER — CLINICAL SUPPORT (OUTPATIENT)
Dept: FAMILY MEDICINE | Facility: CLINIC | Age: 62
End: 2022-11-04
Payer: COMMERCIAL

## 2022-11-04 ENCOUNTER — TELEPHONE (OUTPATIENT)
Dept: FAMILY MEDICINE | Facility: CLINIC | Age: 62
End: 2022-11-04

## 2022-11-04 ENCOUNTER — LAB VISIT (OUTPATIENT)
Dept: LAB | Facility: HOSPITAL | Age: 62
End: 2022-11-04
Attending: FAMILY MEDICINE
Payer: COMMERCIAL

## 2022-11-04 DIAGNOSIS — E78.5 HYPERLIPIDEMIA ASSOCIATED WITH TYPE 2 DIABETES MELLITUS: ICD-10-CM

## 2022-11-04 DIAGNOSIS — E11.69 HYPERLIPIDEMIA ASSOCIATED WITH TYPE 2 DIABETES MELLITUS: ICD-10-CM

## 2022-11-04 DIAGNOSIS — E11.9 TYPE 2 DIABETES MELLITUS WITHOUT COMPLICATION: ICD-10-CM

## 2022-11-04 DIAGNOSIS — Z23 NEED FOR INFLUENZA VACCINATION: Primary | ICD-10-CM

## 2022-11-04 LAB
ALBUMIN SERPL BCP-MCNC: 4.2 G/DL (ref 3.5–5.2)
ALP SERPL-CCNC: 147 U/L (ref 55–135)
ALT SERPL W/O P-5'-P-CCNC: 32 U/L (ref 10–44)
ANION GAP SERPL CALC-SCNC: 10 MMOL/L (ref 8–16)
AST SERPL-CCNC: 43 U/L (ref 10–40)
BILIRUB SERPL-MCNC: 0.5 MG/DL (ref 0.1–1)
BUN SERPL-MCNC: 13 MG/DL (ref 8–23)
CALCIUM SERPL-MCNC: 9.8 MG/DL (ref 8.7–10.5)
CHLORIDE SERPL-SCNC: 106 MMOL/L (ref 95–110)
CHOLEST SERPL-MCNC: 131 MG/DL (ref 120–199)
CHOLEST/HDLC SERPL: 3 {RATIO} (ref 2–5)
CO2 SERPL-SCNC: 24 MMOL/L (ref 23–29)
CREAT SERPL-MCNC: 0.8 MG/DL (ref 0.5–1.4)
EST. GFR  (NO RACE VARIABLE): >60 ML/MIN/1.73 M^2
ESTIMATED AVG GLUCOSE: 126 MG/DL (ref 68–131)
GLUCOSE SERPL-MCNC: 133 MG/DL (ref 70–110)
HBA1C MFR BLD: 6 % (ref 4–5.6)
HDLC SERPL-MCNC: 44 MG/DL (ref 40–75)
HDLC SERPL: 33.6 % (ref 20–50)
LDLC SERPL CALC-MCNC: 75 MG/DL (ref 63–159)
NONHDLC SERPL-MCNC: 87 MG/DL
POTASSIUM SERPL-SCNC: 4.6 MMOL/L (ref 3.5–5.1)
PROT SERPL-MCNC: 7.2 G/DL (ref 6–8.4)
SODIUM SERPL-SCNC: 140 MMOL/L (ref 136–145)
TRIGL SERPL-MCNC: 60 MG/DL (ref 30–150)

## 2022-11-04 PROCEDURE — 90686 FLU VACCINE (QUAD) GREATER THAN OR EQUAL TO 3YO PRESERVATIVE FREE IM: ICD-10-PCS | Mod: S$GLB,,, | Performed by: FAMILY MEDICINE

## 2022-11-04 PROCEDURE — 36415 COLL VENOUS BLD VENIPUNCTURE: CPT | Mod: PO | Performed by: FAMILY MEDICINE

## 2022-11-04 PROCEDURE — 83036 HEMOGLOBIN GLYCOSYLATED A1C: CPT | Performed by: FAMILY MEDICINE

## 2022-11-04 PROCEDURE — 90471 FLU VACCINE (QUAD) GREATER THAN OR EQUAL TO 3YO PRESERVATIVE FREE IM: ICD-10-PCS | Mod: S$GLB,,, | Performed by: FAMILY MEDICINE

## 2022-11-04 PROCEDURE — 90471 IMMUNIZATION ADMIN: CPT | Mod: S$GLB,,, | Performed by: FAMILY MEDICINE

## 2022-11-04 PROCEDURE — 80061 LIPID PANEL: CPT | Performed by: FAMILY MEDICINE

## 2022-11-04 PROCEDURE — 80053 COMPREHEN METABOLIC PANEL: CPT | Performed by: FAMILY MEDICINE

## 2022-11-04 PROCEDURE — 90686 IIV4 VACC NO PRSV 0.5 ML IM: CPT | Mod: S$GLB,,, | Performed by: FAMILY MEDICINE

## 2022-11-04 NOTE — TELEPHONE ENCOUNTER
----- Message from Oracio Gong sent at 11/4/2022  9:59 AM CDT -----  Who Called: pt       What is the reqeust in detail: is requesting a call back ,  also stated he had his AC1  tested on 11/04/2022, also looking to have the following rx refilled . Please advise     metFORMIN (GLUCOPHAGE) 850 MG tablet 180 tablet 3 6/17/2021 6/17/2022 No  Sig - Route: Take 1 tablet (850 mg total) by mouth 2 (two) times daily with meals. - Oral  Sent to pharmacy as: metFORMIN (GLUCOPHAGE) 850 MG tablet  Class: Normal  Order: [id number]       gabapentin (NEURONTIN) 300 MG capsule 90 capsule 11 1/18/2019 1/18/2020 No  Sig - Route: Take 1 capsule (300 mg total) by mouth 3 (three) times daily. 1st week take 1 tab bedtime; 2nd week twice daily; 3rd week three times daily - Oral  Patient taking differently: Take 300 mg by mouth 2 (two) times daily. 1st week take 1 tab bedtime; 2nd week twice daily; 3rd week three times daily       Sent to pharmacy as: gabapentin (NEURONTIN) 300 MG capsule  Class: Normal        [id number] - [place], [place] - [address] AT [place]          Can the clinic reply by [facility]? No       Best Call Back Number:[phone]      Additional Information:stated he has really sick family whom he has been taking care of and has been putting there needs before his , so he trying not to ignore the doctor   , he just has been busy

## 2022-11-05 DIAGNOSIS — R74.8 LIVER ENZYME ELEVATION: Primary | ICD-10-CM

## 2022-11-07 ENCOUNTER — IMMUNIZATION (OUTPATIENT)
Dept: PRIMARY CARE CLINIC | Facility: CLINIC | Age: 62
End: 2022-11-07
Payer: COMMERCIAL

## 2022-11-07 DIAGNOSIS — Z23 NEED FOR VACCINATION: Primary | ICD-10-CM

## 2022-11-07 PROCEDURE — 91312 COVID-19, MRNA, LNP-S, BIVALENT BOOSTER, PF, 30 MCG/0.3 ML DOSE: ICD-10-PCS | Mod: S$GLB,,, | Performed by: FAMILY MEDICINE

## 2022-11-07 PROCEDURE — 91312 COVID-19, MRNA, LNP-S, BIVALENT BOOSTER, PF, 30 MCG/0.3 ML DOSE: CPT | Mod: S$GLB,,, | Performed by: FAMILY MEDICINE

## 2022-11-07 PROCEDURE — 0124A COVID-19, MRNA, LNP-S, BIVALENT BOOSTER, PF, 30 MCG/0.3 ML DOSE: ICD-10-PCS | Mod: S$GLB,,, | Performed by: FAMILY MEDICINE

## 2022-11-07 PROCEDURE — 0124A COVID-19, MRNA, LNP-S, BIVALENT BOOSTER, PF, 30 MCG/0.3 ML DOSE: CPT | Mod: S$GLB,,, | Performed by: FAMILY MEDICINE

## 2022-11-07 RX ORDER — GABAPENTIN 600 MG/1
600 TABLET ORAL 3 TIMES DAILY
Qty: 810 TABLET | Refills: 1 | Status: SHIPPED | OUTPATIENT
Start: 2022-11-07 | End: 2023-06-14 | Stop reason: SDUPTHER

## 2022-11-09 DIAGNOSIS — E11.9 TYPE 2 DIABETES MELLITUS WITHOUT COMPLICATION: ICD-10-CM

## 2022-11-14 ENCOUNTER — PATIENT MESSAGE (OUTPATIENT)
Dept: ADMINISTRATIVE | Facility: HOSPITAL | Age: 62
End: 2022-11-14
Payer: COMMERCIAL

## 2023-01-05 ENCOUNTER — TELEPHONE (OUTPATIENT)
Dept: FAMILY MEDICINE | Facility: CLINIC | Age: 63
End: 2023-01-05
Payer: COMMERCIAL

## 2023-01-05 NOTE — TELEPHONE ENCOUNTER
----- Message from Margaret Mcallister sent at 1/4/2023  9:20 AM CST -----  Regarding: pt called  Name of Who is Calling: LILLY SUMMERS [[id number]]      What is the request in detail: pt is requesting a call from the nurse. He states that Dr Roberson provided information to [facility] that he no longer needed the shot to treat his diabetes. Please advise       Can the clinic reply by [facility]: No      What Number to Call Back if not [facility]: [phone]

## 2023-01-05 NOTE — TELEPHONE ENCOUNTER
Returned patients call in regards to needing to discuss medications. No answer , left voicemail to return call.

## 2023-03-08 ENCOUNTER — TELEPHONE (OUTPATIENT)
Dept: OPTOMETRY | Facility: CLINIC | Age: 63
End: 2023-03-08
Payer: COMMERCIAL

## 2023-03-08 NOTE — TELEPHONE ENCOUNTER
Tried calling pt to let him know that we are out of network for his vision insurance.    No answer & unable to leave message due to voice mailbox not set up.

## 2023-03-09 ENCOUNTER — OFFICE VISIT (OUTPATIENT)
Dept: OPTOMETRY | Facility: CLINIC | Age: 63
End: 2023-03-09
Payer: COMMERCIAL

## 2023-03-09 DIAGNOSIS — H25.13 NUCLEAR SCLEROSIS, BILATERAL: ICD-10-CM

## 2023-03-09 DIAGNOSIS — E11.9 DIABETES MELLITUS TYPE 2 WITHOUT RETINOPATHY: Primary | ICD-10-CM

## 2023-03-09 DIAGNOSIS — H52.7 REFRACTIVE ERROR: ICD-10-CM

## 2023-03-09 PROCEDURE — 99999 PR PBB SHADOW E&M-EST. PATIENT-LVL III: CPT | Mod: PBBFAC,,, | Performed by: OPTOMETRIST

## 2023-03-09 PROCEDURE — 1159F MED LIST DOCD IN RCRD: CPT | Mod: CPTII,S$GLB,, | Performed by: OPTOMETRIST

## 2023-03-09 PROCEDURE — 2023F PR DILATED RETINAL EXAM W/O EVID OF RETINOPATHY: ICD-10-PCS | Mod: CPTII,S$GLB,, | Performed by: OPTOMETRIST

## 2023-03-09 PROCEDURE — 4010F ACE/ARB THERAPY RXD/TAKEN: CPT | Mod: CPTII,S$GLB,, | Performed by: OPTOMETRIST

## 2023-03-09 PROCEDURE — 92014 PR EYE EXAM, EST PATIENT,COMPREHESV: ICD-10-PCS | Mod: S$GLB,,, | Performed by: OPTOMETRIST

## 2023-03-09 PROCEDURE — 92014 COMPRE OPH EXAM EST PT 1/>: CPT | Mod: S$GLB,,, | Performed by: OPTOMETRIST

## 2023-03-09 PROCEDURE — 2023F DILAT RTA XM W/O RTNOPTHY: CPT | Mod: CPTII,S$GLB,, | Performed by: OPTOMETRIST

## 2023-03-09 PROCEDURE — 99999 PR PBB SHADOW E&M-EST. PATIENT-LVL III: ICD-10-PCS | Mod: PBBFAC,,, | Performed by: OPTOMETRIST

## 2023-03-09 PROCEDURE — 1160F RVW MEDS BY RX/DR IN RCRD: CPT | Mod: CPTII,S$GLB,, | Performed by: OPTOMETRIST

## 2023-03-09 PROCEDURE — 4010F PR ACE/ARB THEARPY RXD/TAKEN: ICD-10-PCS | Mod: CPTII,S$GLB,, | Performed by: OPTOMETRIST

## 2023-03-09 PROCEDURE — 1160F PR REVIEW ALL MEDS BY PRESCRIBER/CLIN PHARMACIST DOCUMENTED: ICD-10-PCS | Mod: CPTII,S$GLB,, | Performed by: OPTOMETRIST

## 2023-03-09 PROCEDURE — 1159F PR MEDICATION LIST DOCUMENTED IN MEDICAL RECORD: ICD-10-PCS | Mod: CPTII,S$GLB,, | Performed by: OPTOMETRIST

## 2023-03-09 NOTE — PROGRESS NOTES
HPI    Pt here today for annual DM exam.    States blurred vision at near only,   distance vision good.  Declines refraction today due to out of network for vision insurance.        Denies any headaches or eye pain.    Hemoglobin A1C       Date                     Value               Ref Range             Status                11/04/2022               6.0 (H)             4.0 - 5.6 %           Final                 09/08/2021               6.2 (H)             4.0 - 5.6 %           Final                 12/14/2020               6.7 (H)             4.0 - 5.6 %           Final            BSL controlled    (-) allergies / dry eyes  (-) gtts  (-) floaters or light flashes        Last edited by Morales Mcnair, OD on 3/9/2023 10:07 AM.            Assessment /Plan     For exam results, see Encounter Report.    Diabetes mellitus type 2 without retinopathy    Nuclear sclerosis, bilateral    Refractive error      1. Diabetes mellitus type 2 without retinopathy  Discussed possible ocular affects of uncontrolled blood sugar with patient. Recommended continued strong blood sugar control and continued care with PCP. Monitor yearly.     2. Nuclear sclerosis, bilateral  Mild, not yet significant. Discussed possible ocular affects of cataracts. Acceptable BCVA OU. Discussed treatment options. Surgery not recommended at this time. Monitor yearly.     3. Refractive error  Declines refraction, prefers to go to in-network [facility]emed provider

## 2023-05-19 DIAGNOSIS — Z79.4 TYPE 2 DIABETES MELLITUS WITH HYPERGLYCEMIA, WITH LONG-TERM CURRENT USE OF INSULIN: ICD-10-CM

## 2023-05-19 DIAGNOSIS — E11.65 TYPE 2 DIABETES MELLITUS WITH HYPERGLYCEMIA, WITH LONG-TERM CURRENT USE OF INSULIN: ICD-10-CM

## 2023-05-19 DIAGNOSIS — G47.00 INSOMNIA, UNSPECIFIED TYPE: ICD-10-CM

## 2023-05-19 NOTE — TELEPHONE ENCOUNTER
Care Due:                  Date            Visit Type   Department     Provider  --------------------------------------------------------------------------------                                SAME DAY -                              ESTABLISHED   [facility]  Last Visit: 09-      PATIENT      MEDICINE       Chanel Roberson  Next Visit: None Scheduled  None         None Found                                                            Last  Test          Frequency    Reason                     Performed    Due Date  --------------------------------------------------------------------------------    Office Visit  12 months..  atorvastatin,              09- 09-                             empagliflozin,                             lisinopriL, metFORMIN,                             nortriptyline, traZODone.    HBA1C.......  6 months...  empagliflozin, metFORMIN.  11- 05-    [facility] Embedded Care Due Messages. Reference number: [id number].   5/19/2023 5:43:30 PM CDT

## 2023-05-22 RX ORDER — METFORMIN HYDROCHLORIDE 850 MG/1
TABLET ORAL
Qty: 180 TABLET | Refills: 0 | Status: SHIPPED | OUTPATIENT
Start: 2023-05-22 | End: 2023-06-14

## 2023-05-22 RX ORDER — TRAZODONE HYDROCHLORIDE 50 MG/1
TABLET ORAL
Qty: 90 TABLET | Refills: 0 | Status: SHIPPED | OUTPATIENT
Start: 2023-05-22 | End: 2023-06-14

## 2023-05-22 RX ORDER — NORTRIPTYLINE HYDROCHLORIDE 25 MG/1
CAPSULE ORAL
Qty: 90 CAPSULE | Refills: 0 | Status: SHIPPED | OUTPATIENT
Start: 2023-05-22 | End: 2023-06-14 | Stop reason: SDUPTHER

## 2023-05-24 DIAGNOSIS — E11.9 TYPE 2 DIABETES MELLITUS WITHOUT COMPLICATION: ICD-10-CM

## 2023-05-29 ENCOUNTER — PATIENT MESSAGE (OUTPATIENT)
Dept: ADMINISTRATIVE | Facility: HOSPITAL | Age: 63
End: 2023-05-29
Payer: COMMERCIAL

## 2023-06-14 ENCOUNTER — LAB VISIT (OUTPATIENT)
Dept: LAB | Facility: HOSPITAL | Age: 63
End: 2023-06-14
Attending: FAMILY MEDICINE
Payer: COMMERCIAL

## 2023-06-14 ENCOUNTER — OFFICE VISIT (OUTPATIENT)
Dept: FAMILY MEDICINE | Facility: CLINIC | Age: 63
End: 2023-06-14
Payer: COMMERCIAL

## 2023-06-14 VITALS
BODY MASS INDEX: 23.67 KG/M2 | RESPIRATION RATE: 18 BRPM | HEIGHT: 69 IN | SYSTOLIC BLOOD PRESSURE: 110 MMHG | DIASTOLIC BLOOD PRESSURE: 70 MMHG | HEART RATE: 87 BPM | OXYGEN SATURATION: 96 % | TEMPERATURE: 98 F | WEIGHT: 159.81 LBS

## 2023-06-14 DIAGNOSIS — E11.69 HYPERLIPIDEMIA ASSOCIATED WITH TYPE 2 DIABETES MELLITUS: ICD-10-CM

## 2023-06-14 DIAGNOSIS — E11.9 ENCOUNTER FOR DIABETIC FOOT EXAM: ICD-10-CM

## 2023-06-14 DIAGNOSIS — I69.322 DYSARTHRIA AS LATE EFFECT OF CEREBELLAR CEREBROVASCULAR ACCIDENT (CVA): ICD-10-CM

## 2023-06-14 DIAGNOSIS — Z86.73 HISTORY OF CVA (CEREBROVASCULAR ACCIDENT) WITHOUT RESIDUAL DEFICITS: ICD-10-CM

## 2023-06-14 DIAGNOSIS — Z79.4 TYPE 2 DIABETES MELLITUS WITH DIABETIC NEUROPATHY, WITH LONG-TERM CURRENT USE OF INSULIN: ICD-10-CM

## 2023-06-14 DIAGNOSIS — K21.9 GASTROESOPHAGEAL REFLUX DISEASE WITHOUT ESOPHAGITIS: ICD-10-CM

## 2023-06-14 DIAGNOSIS — E11.40 TYPE 2 DIABETES MELLITUS WITH DIABETIC NEUROPATHY, WITH LONG-TERM CURRENT USE OF INSULIN: ICD-10-CM

## 2023-06-14 DIAGNOSIS — E78.5 HYPERLIPIDEMIA ASSOCIATED WITH TYPE 2 DIABETES MELLITUS: ICD-10-CM

## 2023-06-14 DIAGNOSIS — R74.8 ELEVATED LIVER ENZYMES: ICD-10-CM

## 2023-06-14 DIAGNOSIS — I25.10 CORONARY ARTERY DISEASE INVOLVING NATIVE CORONARY ARTERY OF NATIVE HEART WITHOUT ANGINA PECTORIS: ICD-10-CM

## 2023-06-14 DIAGNOSIS — I15.2 HYPERTENSION ASSOCIATED WITH DIABETES: Primary | ICD-10-CM

## 2023-06-14 DIAGNOSIS — E11.65 UNCONTROLLED TYPE 2 DIABETES MELLITUS WITH HYPERGLYCEMIA: ICD-10-CM

## 2023-06-14 DIAGNOSIS — I63.9 CEREBROVASCULAR ACCIDENT (CVA), UNSPECIFIED MECHANISM: ICD-10-CM

## 2023-06-14 DIAGNOSIS — Z79.01 ANTICOAGULANT LONG-TERM USE: ICD-10-CM

## 2023-06-14 DIAGNOSIS — N40.0 BENIGN PROSTATIC HYPERPLASIA, UNSPECIFIED WHETHER LOWER URINARY TRACT SYMPTOMS PRESENT: ICD-10-CM

## 2023-06-14 DIAGNOSIS — L60.0 INGROWN TOENAIL OF BOTH FEET: ICD-10-CM

## 2023-06-14 DIAGNOSIS — I69.951 HEMIPARESIS OF RIGHT DOMINANT SIDE AS LATE EFFECT OF CEREBROVASCULAR DISEASE, UNSPECIFIED CEREBROVASCULAR DISEASE TYPE: ICD-10-CM

## 2023-06-14 DIAGNOSIS — E11.59 HYPERTENSION ASSOCIATED WITH DIABETES: Primary | ICD-10-CM

## 2023-06-14 LAB
ALBUMIN SERPL BCP-MCNC: 4 G/DL (ref 3.5–5.2)
ALP SERPL-CCNC: 120 U/L (ref 55–135)
ALT SERPL W/O P-5'-P-CCNC: 22 U/L (ref 10–44)
ANION GAP SERPL CALC-SCNC: 9 MMOL/L (ref 8–16)
AST SERPL-CCNC: 27 U/L (ref 10–40)
BASOPHILS # BLD AUTO: 0.06 K/UL (ref 0–0.2)
BASOPHILS NFR BLD: 0.9 % (ref 0–1.9)
BILIRUB SERPL-MCNC: 0.5 MG/DL (ref 0.1–1)
BUN SERPL-MCNC: 11 MG/DL (ref 8–23)
CALCIUM SERPL-MCNC: 9.9 MG/DL (ref 8.7–10.5)
CHLORIDE SERPL-SCNC: 104 MMOL/L (ref 95–110)
CHOLEST SERPL-MCNC: 130 MG/DL (ref 120–199)
CHOLEST/HDLC SERPL: 3 {RATIO} (ref 2–5)
CO2 SERPL-SCNC: 25 MMOL/L (ref 23–29)
CREAT SERPL-MCNC: 0.8 MG/DL (ref 0.5–1.4)
DIFFERENTIAL METHOD: ABNORMAL
EOSINOPHIL # BLD AUTO: 0.6 K/UL (ref 0–0.5)
EOSINOPHIL NFR BLD: 8.3 % (ref 0–8)
ERYTHROCYTE [DISTWIDTH] IN BLOOD BY AUTOMATED COUNT: 14.5 % (ref 11.5–14.5)
EST. GFR  (NO RACE VARIABLE): >60 ML/MIN/1.73 M^2
ESTIMATED AVG GLUCOSE: 134 MG/DL (ref 68–131)
GLUCOSE SERPL-MCNC: 119 MG/DL (ref 70–110)
HAV IGM SERPL QL IA: NORMAL
HBA1C MFR BLD: 6.3 % (ref 4–5.6)
HBV CORE IGM SERPL QL IA: NORMAL
HBV SURFACE AG SERPL QL IA: NORMAL
HCT VFR BLD AUTO: 43.4 % (ref 40–54)
HCV AB SERPL QL IA: NORMAL
HDLC SERPL-MCNC: 43 MG/DL (ref 40–75)
HDLC SERPL: 33.1 % (ref 20–50)
HGB BLD-MCNC: 13.7 G/DL (ref 14–18)
IMM GRANULOCYTES # BLD AUTO: 0.02 K/UL (ref 0–0.04)
IMM GRANULOCYTES NFR BLD AUTO: 0.3 % (ref 0–0.5)
LDLC SERPL CALC-MCNC: 76.6 MG/DL (ref 63–159)
LYMPHOCYTES # BLD AUTO: 2.2 K/UL (ref 1–4.8)
LYMPHOCYTES NFR BLD: 32.1 % (ref 18–48)
MCH RBC QN AUTO: 30.2 PG (ref 27–31)
MCHC RBC AUTO-ENTMCNC: 31.6 G/DL (ref 32–36)
MCV RBC AUTO: 96 FL (ref 82–98)
MONOCYTES # BLD AUTO: 0.7 K/UL (ref 0.3–1)
MONOCYTES NFR BLD: 10.3 % (ref 4–15)
NEUTROPHILS # BLD AUTO: 3.3 K/UL (ref 1.8–7.7)
NEUTROPHILS NFR BLD: 48.1 % (ref 38–73)
NONHDLC SERPL-MCNC: 87 MG/DL
NRBC BLD-RTO: 0 /100 WBC
PLATELET # BLD AUTO: 393 K/UL (ref 150–450)
PMV BLD AUTO: 8.9 FL (ref 9.2–12.9)
POTASSIUM SERPL-SCNC: 4.1 MMOL/L (ref 3.5–5.1)
PROT SERPL-MCNC: 8.2 G/DL (ref 6–8.4)
RBC # BLD AUTO: 4.54 M/UL (ref 4.6–6.2)
SODIUM SERPL-SCNC: 138 MMOL/L (ref 136–145)
TRIGL SERPL-MCNC: 52 MG/DL (ref 30–150)
WBC # BLD AUTO: 6.77 K/UL (ref 3.9–12.7)

## 2023-06-14 PROCEDURE — 99214 PR OFFICE/OUTPT VISIT, EST, LEVL IV, 30-39 MIN: ICD-10-PCS | Mod: S$GLB,,, | Performed by: FAMILY MEDICINE

## 2023-06-14 PROCEDURE — 83036 HEMOGLOBIN GLYCOSYLATED A1C: CPT | Performed by: FAMILY MEDICINE

## 2023-06-14 PROCEDURE — 1159F MED LIST DOCD IN RCRD: CPT | Mod: CPTII,S$GLB,, | Performed by: FAMILY MEDICINE

## 2023-06-14 PROCEDURE — 85025 COMPLETE CBC W/AUTO DIFF WBC: CPT | Performed by: FAMILY MEDICINE

## 2023-06-14 PROCEDURE — 3074F PR MOST RECENT SYSTOLIC BLOOD PRESSURE < 130 MM HG: ICD-10-PCS | Mod: CPTII,S$GLB,, | Performed by: FAMILY MEDICINE

## 2023-06-14 PROCEDURE — 3074F SYST BP LT 130 MM HG: CPT | Mod: CPTII,S$GLB,, | Performed by: FAMILY MEDICINE

## 2023-06-14 PROCEDURE — 36415 COLL VENOUS BLD VENIPUNCTURE: CPT | Mod: PO | Performed by: FAMILY MEDICINE

## 2023-06-14 PROCEDURE — 1159F PR MEDICATION LIST DOCUMENTED IN MEDICAL RECORD: ICD-10-PCS | Mod: CPTII,S$GLB,, | Performed by: FAMILY MEDICINE

## 2023-06-14 PROCEDURE — 4010F PR ACE/ARB THEARPY RXD/TAKEN: ICD-10-PCS | Mod: CPTII,S$GLB,, | Performed by: FAMILY MEDICINE

## 2023-06-14 PROCEDURE — 80061 LIPID PANEL: CPT | Performed by: FAMILY MEDICINE

## 2023-06-14 PROCEDURE — 1160F PR REVIEW ALL MEDS BY PRESCRIBER/CLIN PHARMACIST DOCUMENTED: ICD-10-PCS | Mod: CPTII,S$GLB,, | Performed by: FAMILY MEDICINE

## 2023-06-14 PROCEDURE — 3008F PR BODY MASS INDEX (BMI) DOCUMENTED: ICD-10-PCS | Mod: CPTII,S$GLB,, | Performed by: FAMILY MEDICINE

## 2023-06-14 PROCEDURE — 3078F DIAST BP <80 MM HG: CPT | Mod: CPTII,S$GLB,, | Performed by: FAMILY MEDICINE

## 2023-06-14 PROCEDURE — 4010F ACE/ARB THERAPY RXD/TAKEN: CPT | Mod: CPTII,S$GLB,, | Performed by: FAMILY MEDICINE

## 2023-06-14 PROCEDURE — 80074 ACUTE HEPATITIS PANEL: CPT | Performed by: FAMILY MEDICINE

## 2023-06-14 PROCEDURE — 1160F RVW MEDS BY RX/DR IN RCRD: CPT | Mod: CPTII,S$GLB,, | Performed by: FAMILY MEDICINE

## 2023-06-14 PROCEDURE — 99999 PR PBB SHADOW E&M-EST. PATIENT-LVL IV: ICD-10-PCS | Mod: PBBFAC,,, | Performed by: FAMILY MEDICINE

## 2023-06-14 PROCEDURE — 99214 OFFICE O/P EST MOD 30 MIN: CPT | Mod: S$GLB,,, | Performed by: FAMILY MEDICINE

## 2023-06-14 PROCEDURE — 80053 COMPREHEN METABOLIC PANEL: CPT | Performed by: FAMILY MEDICINE

## 2023-06-14 PROCEDURE — 3078F PR MOST RECENT DIASTOLIC BLOOD PRESSURE < 80 MM HG: ICD-10-PCS | Mod: CPTII,S$GLB,, | Performed by: FAMILY MEDICINE

## 2023-06-14 PROCEDURE — 3008F BODY MASS INDEX DOCD: CPT | Mod: CPTII,S$GLB,, | Performed by: FAMILY MEDICINE

## 2023-06-14 PROCEDURE — 99999 PR PBB SHADOW E&M-EST. PATIENT-LVL IV: CPT | Mod: PBBFAC,,, | Performed by: FAMILY MEDICINE

## 2023-06-14 RX ORDER — SULFAMETHOXAZOLE AND TRIMETHOPRIM 800; 160 MG/1; MG/1
1 TABLET ORAL 2 TIMES DAILY
Qty: 20 TABLET | Refills: 0 | Status: SHIPPED | OUTPATIENT
Start: 2023-06-14 | End: 2023-07-05

## 2023-06-14 RX ORDER — METFORMIN HYDROCHLORIDE 500 MG/1
1000 TABLET, EXTENDED RELEASE ORAL
Qty: 180 TABLET | Refills: 3 | Status: SHIPPED | OUTPATIENT
Start: 2023-06-14 | End: 2024-06-13

## 2023-06-14 RX ORDER — GABAPENTIN 600 MG/1
600 TABLET ORAL 3 TIMES DAILY
Qty: 270 TABLET | Refills: 3 | Status: SHIPPED | OUTPATIENT
Start: 2023-06-14

## 2023-06-14 RX ORDER — INSULIN DEGLUDEC 100 U/ML
18 INJECTION, SOLUTION SUBCUTANEOUS DAILY
Qty: 4 PEN | Refills: 0 | Status: ON HOLD | COMMUNITY
Start: 2023-06-14 | End: 2023-11-07 | Stop reason: HOSPADM

## 2023-06-14 RX ORDER — NORTRIPTYLINE HYDROCHLORIDE 25 MG/1
CAPSULE ORAL
Qty: 90 CAPSULE | Refills: 3 | Status: SHIPPED | OUTPATIENT
Start: 2023-06-14

## 2023-06-14 NOTE — PROGRESS NOTES
Subjective:       Patient ID: Price Noguera is a 62 y.o. male.    Chief Complaint: Annual Exam    HPI  Review of Systems   Constitutional:  Negative for fatigue and unexpected weight change.   Respiratory:  Negative for chest tightness and shortness of breath.    Cardiovascular:  Negative for chest pain, palpitations and leg swelling.   Gastrointestinal:  Negative for abdominal pain.   Musculoskeletal:  Negative for arthralgias.   Neurological:  Negative for dizziness, syncope, light-headedness and headaches.     Patient Active Problem List   Diagnosis    Type 2 diabetes mellitus with neurologic complication, with long-term current use of insulin    Coronary artery disease    History of heart artery stent    Hypertension associated with diabetes    Hyponatremia    Hyperlipidemia associated with type 2 diabetes mellitus    Irritable bowel syndrome with diarrhea    Gastroesophageal reflux disease without esophagitis    Onychomycosis    Benign essential tremor    Dysphagia    History of colon polyps    Small vessel stroke    BPH (benign prostatic hyperplasia)    Cerebrovascular accident (CVA)    Pressure injury of contiguous region involving back and buttock, stage 2    Anticoagulant long-term use    History of CVA (cerebrovascular accident) without residual deficits    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)    Hemiparesis of right dominant side as late effect of cerebrovascular disease     Patient is here for a chronic conditions follow up.    Reviewed labs 11/22 A1c 6.0 . Lipids at goal. AST and alk phos elevated.  Has been having swelling, pain and pus draining from both great toenails. Lost toenail right great toe and left is loose.  Sacral ulcer is resolved    Last visit with me 9/2021 at which time he reported:. Struggling with residual from CVA -memory, pressure ulcer sacrum, dysarthria and chronic right sided hemiparesis.  Nouns and peoples names difficult to recall.  Weight  loss from 185 to 157 (3-8 lbs since last visit) since 6/2020.  Has loss taste for bread and meat. Eating beans mainly.    Mom 93 yo has been helping some with money and food.  Has eligibility for jail at 61 11/2021 and will have monthyl income $1700 which will help a lot.  Has a 4 bedroom house and he does all cleaning and ADLs.  Cannot afford PT outpatient. Uses a cane for support.  Is able to get himself off the floor if he falls.  Declines referral to case [facility] for food stamps        Patient was admitted [facility] 12/13/20 acute ischemic CVA to the left internal capsule imaged on MRI. He experienced dysarthria and right sided weakness of the RUE and RLE. He did not receive tPA or any neurosurgical intervention.     History:  Eye Dr. Mcnair last visit 3/2023 . Eye sight and balance worsening over time.  Fall risk.  Dr. Mcnair mentioned cataracts but not ready for removal yet.      C/o ble weakness jennifer in knees. Gives out on him and falls.  Uses cane or walker.  Has no dizziness/lightheadedness.  No syncope.  referred to PT for gait training 2/20-cannot afford it and does not want COVID exposure.  Doing home exercises and walking daily     GI Dr. Ross EGD done 2/21/19 to complete CP work up-had mild schatzki ring -dilated, gastritis and 4 gastric polyps-bening. h pylori neg  U/s 12/19.  There are 2 small gallbladder polyps.  There are no gallstones and there is no biliary ductal dilatation.  2.  The pancreas, liver and right kidney are normal.  Hida scan 1/22/20 normal   Also seen by GI since last visit for chronic diarrhea. Stool studies ordered and colonoscopy 2/14/20-postponed. Protonix stopped and sx resolved. Cannot afford $200 for copay on colonoscopy      Podiatry Dr. Islas     Cad s/p stent in  maker 2012. Card [facility]. No recent sx CP        ENdocrine RON Crockett, now me . On ACE I, lipitor and asa     Urology Dr. Reyes-BPH with LUTS, ED     Pain Dr. Naranjo -diabetic neuropathy     Having trouble  getting to sleep and has been taking his mother's ambien which helped.  RXd trazodone   Objective:      Physical Exam  Vitals and nursing note reviewed.   Constitutional:       Appearance: He is well-developed.   Cardiovascular:      Rate and Rhythm: Normal rate and regular rhythm.      Heart sounds: Normal heart sounds.   Pulmonary:      Effort: Pulmonary effort is normal.      Breath sounds: Normal breath sounds.   Skin:     General: Skin is warm and dry.   Neurological:      Mental Status: He is alert and oriented to person, place, and time.      Comments: Mild dysarthria and slow speech. Intelligible. Walks with circumductive gait with cane         Assessment:       1. Hypertension associated with diabetes    2. Hyperlipidemia associated with type 2 diabetes mellitus    3. Cerebrovascular accident (CVA), unspecified mechanism    4. Coronary artery disease involving native coronary artery of native heart without angina pectoris    5. Benign prostatic hyperplasia, unspecified whether lower urinary tract symptoms present    6. Type 2 diabetes mellitus with diabetic neuropathy, with long-term current use of insulin    7. Anticoagulant long-term use    8. Gastroesophageal reflux disease without esophagitis    9. Elevated liver enzymes    10. Encounter for diabetic foot exam    11. History of CVA (cerebrovascular accident) without residual deficits    12. Hemiparesis of right dominant side as late effect of cerebrovascular disease, unspecified cerebrovascular disease type    13. 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)    14. Ingrown toenail of both feet    15. Uncontrolled type 2 diabetes mellitus with hyperglycemia        Plan:         1. Hypertension associated with diabetes  Controlled on current medications.  Continue current medications.      2. Hyperlipidemia associated with type 2 diabetes mellitus  Stable condition.  Continue current medications.  Will adjust based on lab  findings or if condition changes.    - CBC Auto Differential; Future  - Comprehensive Metabolic Panel; Future  - Lipid Panel; Future    3. Cerebrovascular accident (CVA), unspecified mechanism  Chronic residuals. On plavix and asa    4. Coronary artery disease involving native coronary artery of native heart without angina pectoris  Cont card care and monitoring    5. Benign prostatic hyperplasia, unspecified whether lower urinary tract symptoms present  Cont current mgmt    6. Type 2 diabetes mellitus with diabetic neuropathy, with long-term current use of insulin  Change due to size of pill 850 mg and choking hazard to  - Hemoglobin A1C; Future  - Microalbumin/Creatinine Ratio, Urine; Future  - metFORMIN (GLUCOPHAGE-XR) 500 MG ER 24hr tablet; Take 2 tablets (1,000 mg total) by mouth daily with breakfast.  Dispense: 180 tablet; Refill: 3    7. Anticoagulant long-term use  Cont plavix    8. Gastroesophageal reflux disease without esophagitis  Cont current mgmt. Counseled patient on prevention of reflux with changes in diet and behavior.  I recommended avoidance of greasy and spicy foods, caffeine and eating within 3 hours of bedtime.  I counseled the patient to avoid eating large meals and instead eating more frequent small meals.  I also recommended weight loss and elevation of the head of the bed by 6 inches.  If symptoms persist after these changes medication may be needed to control GERD.      9. Elevated liver enzymes  Screen and treat as indicated:    - Hepatitis Panel, Acute; Future    10. Encounter for diabetic foot exam  refer  - Ambulatory referral/consult to Podiatry; Future    11. History of CVA (cerebrovascular accident) without residual deficits  See above    12. Hemiparesis of right dominant side as late effect of cerebrovascular disease, unspecified cerebrovascular disease type  Chronic and stable. Cont fall precautions. Use cane or walker for support    13. Dysarthria as late effect of cerebellar  cerebrovascular accident (CVA)  Chronic and stable    14. Ingrown toenail of both feet  Cleanse wound twice daily.  Apply antibiotic ointment and sterile bandage twice daily and as needed.  Monitor for increased pain, pus, redness and swelling.  If occurs then return to clinic or go to the emergency room if clinic is closed for a recheck.    Refer to podiatry  - sulfamethoxazole-trimethoprim 800-160mg (BACTRIM DS) 800-160 mg Tab; Take 1 tablet by mouth 2 (two) times daily.  Dispense: 20 tablet; Refill: 0    15. Uncontrolled type 2 diabetes mellitus with hyperglycemia  Stable condition.  Continue current medications.  Will adjust based on lab findings or if condition changes.  Consider d/c tresiba  - empagliflozin (JARDIANCE) 25 mg tablet; Take 1 tablet (25 mg total) by mouth once daily.  Dispense: 90 tablet; Refill: 3  - insulin degludec (TRESIBA FLEXTOUCH U-100) 100 unit/mL (3 mL) insulin pen; Inject 18 Units into the skin once daily.  Dispense: 4 pen; Refill: 0      Time spent with patient: 20 minutes    Patient with be reevaluated in 6 months or sooner prn    Greater than 50% of this visit was spent counseling as described in above documentation:Yes

## 2023-07-05 ENCOUNTER — OFFICE VISIT (OUTPATIENT)
Dept: PODIATRY | Facility: CLINIC | Age: 63
End: 2023-07-05
Payer: COMMERCIAL

## 2023-07-05 VITALS — OXYGEN SATURATION: 98 % | HEART RATE: 98 BPM

## 2023-07-05 DIAGNOSIS — L60.9 DISEASE OF NAIL: Primary | ICD-10-CM

## 2023-07-05 DIAGNOSIS — E11.9 ENCOUNTER FOR DIABETIC FOOT EXAM: ICD-10-CM

## 2023-07-05 DIAGNOSIS — E11.49 TYPE II DIABETES MELLITUS WITH NEUROLOGICAL MANIFESTATIONS: ICD-10-CM

## 2023-07-05 PROCEDURE — 4010F PR ACE/ARB THEARPY RXD/TAKEN: ICD-10-PCS | Mod: ,,, | Performed by: PODIATRIST

## 2023-07-05 PROCEDURE — 3061F PR NEG MICROALBUMINURIA RESULT DOCUMENTED/REVIEW: ICD-10-PCS | Mod: ,,, | Performed by: PODIATRIST

## 2023-07-05 PROCEDURE — 3061F NEG MICROALBUMINURIA REV: CPT | Mod: ,,, | Performed by: PODIATRIST

## 2023-07-05 PROCEDURE — 4010F ACE/ARB THERAPY RXD/TAKEN: CPT | Mod: ,,, | Performed by: PODIATRIST

## 2023-07-05 PROCEDURE — 1159F PR MEDICATION LIST DOCUMENTED IN MEDICAL RECORD: ICD-10-PCS | Mod: ,,, | Performed by: PODIATRIST

## 2023-07-05 PROCEDURE — 99203 OFFICE O/P NEW LOW 30 MIN: CPT | Mod: ,,, | Performed by: PODIATRIST

## 2023-07-05 PROCEDURE — 3066F NEPHROPATHY DOC TX: CPT | Mod: ,,, | Performed by: PODIATRIST

## 2023-07-05 PROCEDURE — 3066F PR DOCUMENTATION OF TREATMENT FOR NEPHROPATHY: ICD-10-PCS | Mod: ,,, | Performed by: PODIATRIST

## 2023-07-05 PROCEDURE — 1159F MED LIST DOCD IN RCRD: CPT | Mod: ,,, | Performed by: PODIATRIST

## 2023-07-05 PROCEDURE — 3044F HG A1C LEVEL LT 7.0%: CPT | Mod: ,,, | Performed by: PODIATRIST

## 2023-07-05 PROCEDURE — 1160F RVW MEDS BY RX/DR IN RCRD: CPT | Mod: ,,, | Performed by: PODIATRIST

## 2023-07-05 PROCEDURE — 99203 PR OFFICE/OUTPT VISIT, NEW, LEVL III, 30-44 MIN: ICD-10-PCS | Mod: ,,, | Performed by: PODIATRIST

## 2023-07-05 PROCEDURE — 1160F PR REVIEW ALL MEDS BY PRESCRIBER/CLIN PHARMACIST DOCUMENTED: ICD-10-PCS | Mod: ,,, | Performed by: PODIATRIST

## 2023-07-05 PROCEDURE — 3044F PR MOST RECENT HEMOGLOBIN A1C LEVEL <7.0%: ICD-10-PCS | Mod: ,,, | Performed by: PODIATRIST

## 2023-07-05 NOTE — PROGRESS NOTES
Subjective:     Patient ID: Price Noguera is a 62 y.o. male.    Chief Complaint: Diabetes Mellitus and Toe problem (Patient came here today with a complain of Swelling in bilateral great toes X 6weeks. Pt reports lost toe nail on right big toe 6 weeks ago, completed antibiotic therapy 3 days ago. )    Price is a 62 y.o. male who presents to the clinic for evaluation and treatment of high risk feet. Price has a past medical history of Colon polyp, Coronary artery disease, Diabetes mellitus, GERD (gastroesophageal reflux disease), GERD (gastroesophageal reflux disease), History of colonic polyps, History of heart artery stent, Hypertension, Myocardial infarction, Neuropathy, Stroke, and Tumor. The patient's chief complaint is long, thick toenails. This patient has documented high risk feet requiring routine maintenance secondary to peripheral neuropathy.    PCP: Chanel Roberson MD    Date Last Seen by PCP: 06/14/2023    Current shoe gear:  Affected Foot: Tennis shoes     Unaffected Foot: Tennis shoes    Hemoglobin A1C   Date Value Ref Range Status   06/14/2023 6.3 (H) 4.0 - 5.6 % Final     Comment:     ADA Screening Guidelines:  5.7-6.4%  Consistent with prediabetes  >or=6.5%  Consistent with diabetes    High levels of fetal hemoglobin interfere with the HbA1C  assay. Heterozygous hemoglobin variants (HbS, HgC, etc)do  not significantly interfere with this assay.   However, presence of multiple variants may affect accuracy.     11/04/2022 6.0 (H) 4.0 - 5.6 % Final     Comment:     ADA Screening Guidelines:  5.7-6.4%  Consistent with prediabetes  >or=6.5%  Consistent with diabetes    High levels of fetal hemoglobin interfere with the HbA1C  assay. Heterozygous hemoglobin variants (HbS, HgC, etc)do  not significantly interfere with this assay.   However, presence of multiple variants may affect accuracy.     09/08/2021 6.2 (H) 4.0 - 5.6 % Final     Comment:     ADA Screening Guidelines:  5.7-6.4%  Consistent with  prediabetes  >or=6.5%  Consistent with diabetes    High levels of fetal hemoglobin interfere with the HbA1C  assay. Heterozygous hemoglobin variants (HbS, HgC, etc)do  not significantly interfere with this assay.   However, presence of multiple variants may affect accuracy.         Review of Systems   Constitutional: Negative for chills and fever.   Cardiovascular:  Negative for chest pain and leg swelling.   Respiratory:  Negative for cough and shortness of breath.    Gastrointestinal:  Negative for diarrhea, nausea and vomiting.   Neurological:  Positive for numbness and paresthesias.      Objective:     Physical Exam  Vitals reviewed.   Constitutional:       General: He is not in acute distress.     Appearance: Normal appearance. He is not ill-appearing.   HENT:      Head: Normocephalic.      Nose: Nose normal.   Cardiovascular:      Rate and Rhythm: Normal rate.   Pulmonary:      Effort: Pulmonary effort is normal. No respiratory distress.   Skin:     Capillary Refill: Capillary refill takes 2 to 3 seconds.   Neurological:      Mental Status: He is alert and oriented to person, place, and time.   Psychiatric:         Mood and Affect: Mood normal.         Behavior: Behavior normal.         Thought Content: Thought content normal.     Dermatologic:  Evidence of previous avulsion right 1st digit nail plate with no signs of infection present today, evidence of previous possible trauma and fungal changes noted to the left 1st digit nail plate, all other nails are within normal limits of length today, no open lesions noted bilateral foot, no rashes noted bilateral foot, no interdigital maceration noted bilateral foot   Neurologic: Light touch sensation intact bilateral lower extremity, protective sensation diminished to the distal digits bilateral foot, positive paresthesias reported   Musculoskeletal:  5/5 muscle strength noted bilateral foot, ankle joint range of motion is reduced without pain, no pain and  tenderness with palpation bilateral 1st digit   Vascular: DP and PT pulses palpable 1/4 bilateral foot, capillary fill time less 3 seconds to distal aspect of the digits bilateral foot    Assessment:      Encounter Diagnoses   Name Primary?    Encounter for diabetic foot exam     Disease of nail Yes    Type II diabetes mellitus with neurological manifestations      Plan:     Price was seen today for diabetes mellitus and toe problem.    Diagnoses and all orders for this visit:    Disease of nail    Encounter for diabetic foot exam  -     Ambulatory referral/consult to Podiatry    Type II diabetes mellitus with neurological manifestations      I counseled the patient on his conditions, their implications and medical management.          1. Patient was examined and evaluated.    2. Patient was advised to continue with daily foot monitoring.  Patient will continue proper glycemic control, lowering hemoglobin A1c, and adherence to diabetic medication regimen  3. Discussed patient etiology of fungal nail changes.  Discussed OTC topical conservative treatments with the patient such as Vicks vapor rub in tea tree oil daily applications.  Briefly discussed risks and benefits of oral Lamisil therapy.    4. Patient was made aware of regrowth potential with right 1st digit nail plate.  Patient was made aware that since the nail has been removed can grow back abnormally.  Patient will monitor its regrowth.    5. Patient was advised to continue with comfortable shoe gear both inside and outside the home.  Patient will decrease the amount of barefoot walking and limit use flip-flops  6. Discussed diabetic neuropathy with the patient.  Patient was encouraged to continue with gabapentin.  Patient will consider possible increase in dosage or frequency over time if symptoms worsen.  7. Patient will follow-up in 3 months or p.r.n. for complaints

## 2023-08-28 DIAGNOSIS — Z79.4 TYPE 2 DIABETES MELLITUS WITH HYPERGLYCEMIA, WITH LONG-TERM CURRENT USE OF INSULIN: ICD-10-CM

## 2023-08-28 DIAGNOSIS — G47.00 INSOMNIA, UNSPECIFIED TYPE: ICD-10-CM

## 2023-08-28 DIAGNOSIS — E11.65 TYPE 2 DIABETES MELLITUS WITH HYPERGLYCEMIA, WITH LONG-TERM CURRENT USE OF INSULIN: ICD-10-CM

## 2023-08-28 RX ORDER — METFORMIN HYDROCHLORIDE 850 MG/1
TABLET ORAL
Qty: 180 TABLET | Refills: 0 | OUTPATIENT
Start: 2023-08-28

## 2023-08-28 RX ORDER — TRAZODONE HYDROCHLORIDE 50 MG/1
50 TABLET ORAL NIGHTLY
Qty: 90 TABLET | Refills: 0 | OUTPATIENT
Start: 2023-08-28

## 2023-08-28 NOTE — TELEPHONE ENCOUNTER
Refill Decision Note   Price Noguera  is requesting a refill authorization.  Brief Assessment and Rationale for Refill:  Quick Discontinue     Medication Therapy Plan:  Metformin changed to [id number] and trazodone dc'd (6/14/23); Quick dc      Comments:     Note composed:2:01 PM 08/28/2023

## 2023-08-28 NOTE — TELEPHONE ENCOUNTER
No care due was identified.  [facility] Embedded Care Due Messages. Reference number: [id number].   8/28/2023 12:24:26 AM CDT

## 2023-08-31 ENCOUNTER — OFFICE VISIT (OUTPATIENT)
Dept: URGENT CARE | Facility: CLINIC | Age: 63
End: 2023-08-31
Payer: COMMERCIAL

## 2023-08-31 VITALS
HEIGHT: 69 IN | OXYGEN SATURATION: 99 % | WEIGHT: 164 LBS | HEART RATE: 71 BPM | TEMPERATURE: 97 F | DIASTOLIC BLOOD PRESSURE: 70 MMHG | SYSTOLIC BLOOD PRESSURE: 110 MMHG | RESPIRATION RATE: 16 BRPM | BODY MASS INDEX: 24.29 KG/M2

## 2023-08-31 DIAGNOSIS — L02.02: ICD-10-CM

## 2023-08-31 DIAGNOSIS — L02.415 ABSCESS OF RIGHT LOWER LEG: Primary | ICD-10-CM

## 2023-08-31 DIAGNOSIS — Z86.39 HISTORY OF TYPE 2 DIABETES MELLITUS: ICD-10-CM

## 2023-08-31 PROCEDURE — 10061 I&D ABSCESS COMP/MULTIPLE: CPT | Mod: S$GLB,,, | Performed by: NURSE PRACTITIONER

## 2023-08-31 PROCEDURE — 10061 INCISION & DRAINAGE: ICD-10-PCS | Mod: S$GLB,,, | Performed by: NURSE PRACTITIONER

## 2023-08-31 PROCEDURE — 99204 OFFICE O/P NEW MOD 45 MIN: CPT | Mod: 25,S$GLB,, | Performed by: NURSE PRACTITIONER

## 2023-08-31 PROCEDURE — 99204 PR OFFICE/OUTPT VISIT, NEW, LEVL IV, 45-59 MIN: ICD-10-PCS | Mod: 25,S$GLB,, | Performed by: NURSE PRACTITIONER

## 2023-08-31 RX ORDER — DOXYCYCLINE 100 MG/1
100 CAPSULE ORAL EVERY 12 HOURS
Qty: 20 CAPSULE | Refills: 0 | Status: SHIPPED | OUTPATIENT
Start: 2023-08-31 | End: 2023-09-09 | Stop reason: SDUPTHER

## 2023-08-31 RX ORDER — MUPIROCIN 20 MG/G
OINTMENT TOPICAL 3 TIMES DAILY
Qty: 22 G | Refills: 0 | Status: SHIPPED | OUTPATIENT
Start: 2023-08-31 | End: 2023-09-07

## 2023-08-31 NOTE — PATIENT INSTRUCTIONS
Start doxycycline and take course as prescribed.  Take each dose with a large glass of water and do not lie flat for 30 minutes.  Avoid prolonged sun exposure.  If it is necessary for you to be in the sun for lengthy amount of time you must use sunscreen with an SPF of 50 or higher to prevent sunburn.       Keep dressing clean dry and intact until follow-up appointment  If dressing is removed may apply mupirocin ointment and cover with Telfa nonstick dressing and paper tape or adhesive bandage  May take tylenol or motrin over the counter for pain.  A culture of the wound contents has been sent for evaluation, someone will contact you with these results if there is a need for antibiotic change  Do not get wound wet.  Do not submerge in water, do not allow contact with tap water.  May use saline or wound cleanser to clean the wound  Return to this clinic 09/03/2023 for re-evaluation and packing change  If you develop severe pain, f worsening foul discharge, severe redness or fever associated with this wound go immediately to the nearest ER  Wash scalp wound with clean, soapy water 2 times daily  Apply mupirocin ointment 2-3 x daily and with dressing changes

## 2023-08-31 NOTE — PROGRESS NOTES
"Subjective:      Patient ID: Price Noguera is a 62 y.o. male.    Vitals:  height is 5' 9" (1.753 m) and weight is 74.4 kg (164 lb). His oral temperature is 97.2 °F (36.2 °C). His blood pressure is 110/70 and his pulse is 71. His respiration is 16 and oxygen saturation is 99%.     Chief Complaint: Mass    62-year-old male seen today for multiple skin lesions.  There is a pustule on his right forehead that he states has been there for approximately 3 weeks he states it waxes and wanes.  He will squeeze it and and it will improve, however it comes back in a few days.  He also has a red painful area to his right posterior calf that has been there approximately the same time.  He states that has been draining foul-smelling drainage.  Last tdap 7/2019    Mass  Pertinent negatives include no chest pain, chills, fever, nausea or vomiting.     Constitution: Negative for chills and fever.   Cardiovascular:  Negative for chest pain, palpitations and sob on exertion.   Respiratory:  Negative for shortness of breath.    Gastrointestinal:  Negative for nausea and vomiting.   Skin:  Positive for lesion and abscess.   Neurological:  Negative for dizziness, light-headedness, passing out, disorientation and altered mental status.   Psychiatric/Behavioral:  Negative for altered mental status, disorientation and confusion.       Objective:     Physical Exam   Constitutional: He is oriented to person, place, and time. He appears well-developed. He is cooperative.  Non-toxic appearance. He does not appear ill. No distress.   HENT:   Head: Normocephalic and atraumatic.   Ears:   Right Ear: External ear normal.   Left Ear: External ear normal.   Nose: Nose normal.   Mouth/Throat: Oropharynx is clear and moist and mucous membranes are normal. Mucous membranes are moist.   Eyes: Conjunctivae and lids are normal. No scleral icterus.   Neck: Trachea normal and phonation normal. Neck supple.   Cardiovascular: Normal rate, regular rhythm, " normal heart sounds and normal pulses.   Pulmonary/Chest: Effort normal and breath sounds normal. No respiratory distress.   Abdominal: Normal appearance.   Musculoskeletal:         General: No deformity.   Neurological: no focal deficit. He is alert and oriented to person, place, and time. He has normal strength and normal reflexes. No sensory deficit.   Skin: Skin is warm, dry, intact and not diaphoretic. Capillary refill takes 2 to 3 seconds.        Psychiatric: His speech is normal and behavior is normal. Judgment and thought content normal.   Nursing note and vitals reviewed.      Assessment:     1. Abscess of right lower leg    2. Furuncle of forehead    3. History of type 2 diabetes mellitus        Plan:       Abscess of right lower leg  -     mupirocin (BACTROBAN) 2 % ointment; Apply topically 3 (three) times daily. for 7 days  Dispense: 22 g; Refill: 0  -     doxycycline (VIBRAMYCIN) 100 MG Cap; Take 1 capsule (100 mg total) by mouth every 12 (twelve) hours. for 10 days  Dispense: 20 capsule; Refill: 0  -     CULTURE, AEROBIC  (SPECIFY SOURCE)  -     Incision & Drainage    Furuncle of forehead  -     mupirocin (BACTROBAN) 2 % ointment; Apply topically 3 (three) times daily. for 7 days  Dispense: 22 g; Refill: 0  -     doxycycline (VIBRAMYCIN) 100 MG Cap; Take 1 capsule (100 mg total) by mouth every 12 (twelve) hours. for 10 days  Dispense: 20 capsule; Refill: 0    History of type 2 diabetes mellitus      I have discussed the physical exam findings with the patient. We discussed symptom monitoring, conservative care methods, medication use, and follow up orders. The patient verbalized understanding and agreement with the plan of care.

## 2023-08-31 NOTE — PROCEDURES
"Incision & Drainage    Date/Time: 8/31/2023 9:25 AM    Performed by: Cale Treadwell Jr., FNP-C  Authorized by: Cale Treadwell Jr., SEAN-JOSELINE    Time out: Immediately prior to procedure a "time out" was called to verify the correct patient, procedure, equipment, support staff and site/side marked as required.    Consent Done?:  Yes (Verbal)    Type:  Abscess  Body area:  Lower extremity  Location details:  Right leg  Anesthesia:  Local infiltration  Local anesthetic: Lidocaine 1% without epinephrine  Risk factor:  Underlying major vessel, underlying major nerve and coagulopathy  Scalpel size:  11  Incision type:  Single straight  Incision depth: subcutaneous    Complexity:  Complex  Drainage:  Purulent and serosanguinous  Drainage amount:  Moderate  Wound treatment:  Incision, expression of material and wound packed  Packing material:  1/4 in iodoform gauze  Patient tolerance:  Patient tolerated the procedure well with no immediate complications  Pain Assessment: 1    After procedure wound was dressed with mupirocin ointment, Telfa dressing and secured with paper tape.    "

## 2023-09-03 ENCOUNTER — OFFICE VISIT (OUTPATIENT)
Dept: URGENT CARE | Facility: CLINIC | Age: 63
End: 2023-09-03
Payer: COMMERCIAL

## 2023-09-03 VITALS
WEIGHT: 164 LBS | RESPIRATION RATE: 16 BRPM | HEIGHT: 69 IN | HEART RATE: 60 BPM | BODY MASS INDEX: 24.29 KG/M2 | DIASTOLIC BLOOD PRESSURE: 65 MMHG | TEMPERATURE: 97 F | OXYGEN SATURATION: 98 % | SYSTOLIC BLOOD PRESSURE: 106 MMHG

## 2023-09-03 DIAGNOSIS — Z51.89 WOUND CHECK, ABSCESS: Primary | ICD-10-CM

## 2023-09-03 PROCEDURE — 99024 PR POST-OP FOLLOW-UP VISIT: ICD-10-PCS | Mod: S$GLB,,,

## 2023-09-03 PROCEDURE — 99024 POSTOP FOLLOW-UP VISIT: CPT | Mod: S$GLB,,,

## 2023-09-03 NOTE — PROGRESS NOTES
"Subjective:      Patient ID: Price Noguera is a 62 y.o. male.    Vitals:  height is 5' 9" (1.753 m) and weight is 74.4 kg (164 lb). His oral temperature is 97 °F (36.1 °C). His blood pressure is 106/65 and his pulse is 60. His respiration is 16 and oxygen saturation is 98%.     Chief Complaint: Wound Check    Mr. Noguera, past medical history of CVA, presents to clinic for wound re-evaluation.  He had a abscess incision and drainage performed on 8/31/23.  Original dressing, and packing in place.  Malodorous drainage from wound present.  Periwound skin sloughing and erythema.  Indurated.  Packing was removed from wound, irrigated with diluted Betadine approximately 250 mL.  Wound was repacked with quarter-inch iodoform, mupirocin ointment was placed within nonadherent dressing, and then wrapped with Coban.  Patient was instructed to return to clinic in 48 hours for wound re-evaluation.    Wound Check  He was originally treated 3 to 5 days ago. Previous treatment included I&D of abscess and oral antibiotics. There has been colored discharge from the wound. Redness Status: Unable to specify. Swelling Status: Unable to specify. The pain has improved. He has no difficulty moving the affected extremity or digit.       Constitution: Negative for fever.   Skin:  Positive for wound (Drainage), skin thickening/induration and abscess.      Objective:     Physical Exam   Constitutional: He is oriented to person, place, and time. He appears well-developed. He is cooperative.  Non-toxic appearance. He appears ill (Unkept). No distress. normal  HENT:   Head: Normocephalic and atraumatic.   Ears:   Right Ear: Hearing and external ear normal.   Left Ear: Hearing and external ear normal.   Nose: Nose normal. No mucosal edema or nasal deformity. No epistaxis. Right sinus exhibits no maxillary sinus tenderness and no frontal sinus tenderness. Left sinus exhibits no maxillary sinus tenderness and no frontal sinus tenderness. "   Mouth/Throat: Uvula is midline, oropharynx is clear and moist and mucous membranes are normal. Mucous membranes are moist. No trismus in the jaw. Normal dentition. No uvula swelling. No oropharyngeal exudate. Oropharynx is clear.   Eyes: Conjunctivae and lids are normal. Pupils are equal, round, and reactive to light. Extraocular movement intact   Neck: Trachea normal and phonation normal. Neck supple.   Cardiovascular: Normal rate, regular rhythm, normal heart sounds and normal pulses.   Pulmonary/Chest: Effort normal and breath sounds normal.   Abdominal: Normal appearance.   Musculoskeletal: Normal range of motion.         General: Normal range of motion.   Lymphadenopathy:     He has no cervical adenopathy.   Neurological: no focal deficit. He is alert and oriented to person, place, and time. He exhibits normal muscle tone.   Skin: Skin is warm, dry, intact, not diaphoretic and abscessed. Capillary refill takes 2 to 3 seconds.        Psychiatric: His speech is normal and behavior is normal. Mood, judgment and thought content normal.   Nursing note and vitals reviewed.        Assessment:     1. Wound check, abscess        Plan:       Wound check, abscess      Continue antibiotics wound care as discussed return in 48 hours for wound re-evaluation and repacking if necessary

## 2023-09-03 NOTE — PATIENT INSTRUCTIONS
Return to clinic in 48 hours for wound repacking and re-evaluation.  Continue to care for wound, redressing wound every 12 hours.  Apply mupirocin ointment previously discussed.  Do not use hydrogen peroxide.  Do not attempt to express any material from wound.

## 2023-09-04 LAB
BACTERIA SPEC CULT: ABNORMAL
MICROORGANISM/AGENT SPEC: ABNORMAL
OTHER ANTIBIOTIC SUSC ISLT: ABNORMAL

## 2023-09-06 ENCOUNTER — OFFICE VISIT (OUTPATIENT)
Dept: URGENT CARE | Facility: CLINIC | Age: 63
End: 2023-09-06
Payer: COMMERCIAL

## 2023-09-06 VITALS
HEART RATE: 62 BPM | HEIGHT: 69 IN | OXYGEN SATURATION: 100 % | DIASTOLIC BLOOD PRESSURE: 74 MMHG | BODY MASS INDEX: 24.47 KG/M2 | WEIGHT: 165.19 LBS | RESPIRATION RATE: 16 BRPM | TEMPERATURE: 97 F | SYSTOLIC BLOOD PRESSURE: 109 MMHG

## 2023-09-06 DIAGNOSIS — Z51.89 WOUND CHECK, ABSCESS: Primary | ICD-10-CM

## 2023-09-06 PROCEDURE — 99024 PR POST-OP FOLLOW-UP VISIT: ICD-10-PCS | Mod: S$GLB,,,

## 2023-09-06 PROCEDURE — 99024 POSTOP FOLLOW-UP VISIT: CPT | Mod: S$GLB,,,

## 2023-09-06 NOTE — PROGRESS NOTES
"Subjective:      Patient ID: Price Noguera is a 62 y.o. male.    Vitals:  height is 5' 9" (1.753 m) and weight is 74.9 kg (165 lb 3.2 oz). His oral temperature is 97 °F (36.1 °C). His blood pressure is 109/74 and his pulse is 62. His respiration is 16 and oxygen saturation is 100%.     Chief Complaint: Follow-up    Mr. Noguera, history of CVA, presents to clinic for wound care follow up.  Patient initially had abscess I&D performed on 08/31/2023.  Had a repeat evaluation on abscess on 09/03/23 with repacking.  Today wound was re-evaluated, see images uploaded below, wound was irrigated with diluted Betadine proximally 150 mL.  No expression of any material.  Wound is still indurated, erythema improving.  Wound was repacked with quarter-inch iodoform, mupirocin ointment placed with a nonadherent dressing, and paper tape.  Patient was instructed to follow up back in clinic in 72 hours for re-evaluation.    Follow-up  This is a new problem. The current episode started in the past 7 days.       Skin:  Positive for wound, skin thickening/induration, erythema and abscess.   Hematologic/Lymphatic: Positive for easy bruising/bleeding. Bruises/bleeds easily.      Objective:     Physical Exam   Constitutional: He is oriented to person, place, and time. He appears well-developed. He is cooperative. normal  HENT:   Head: Normocephalic and atraumatic.   Ears:   Right Ear: Hearing and external ear normal.   Left Ear: Hearing and external ear normal.   Nose: Nose normal. No mucosal edema or nasal deformity. No epistaxis. Right sinus exhibits no maxillary sinus tenderness and no frontal sinus tenderness. Left sinus exhibits no maxillary sinus tenderness and no frontal sinus tenderness.   Mouth/Throat: Uvula is midline, oropharynx is clear and moist and mucous membranes are normal. Mucous membranes are moist. No trismus in the jaw. Normal dentition. No uvula swelling. Oropharynx is clear.   Eyes: Conjunctivae and lids are normal. " Pupils are equal, round, and reactive to light. Extraocular movement intact   Neck: Trachea normal and phonation normal. Neck supple.   Cardiovascular: Normal rate, regular rhythm, normal heart sounds and normal pulses.   Pulmonary/Chest: Effort normal and breath sounds normal.   Abdominal: Normal appearance. Soft. flat abdomen   Musculoskeletal: Normal range of motion.         General: Normal range of motion.   Neurological: no focal deficit. He is alert, oriented to person, place, and time and at baseline. He exhibits normal muscle tone.   Skin: Skin is warm, dry and intact. Capillary refill takes 2 to 3 seconds. erythema        Psychiatric: His speech is normal and behavior is normal. Mood, judgment and thought content normal.   Nursing note and vitals reviewed.            Assessment:     1. Wound check, abscess        Plan:       Wound check, abscess      Return to clinic in 72 hours for re-evaluation.  Outpatient wound care to follow

## 2023-09-06 NOTE — PATIENT INSTRUCTIONS
Do not let wound soak.  Follow-up in clinic in 72 hours for wound re-evaluation, and packing removal.  Continue taking antibiotics as prescribed

## 2023-09-09 ENCOUNTER — OFFICE VISIT (OUTPATIENT)
Dept: URGENT CARE | Facility: CLINIC | Age: 63
End: 2023-09-09
Payer: COMMERCIAL

## 2023-09-09 VITALS
BODY MASS INDEX: 24.14 KG/M2 | OXYGEN SATURATION: 97 % | HEIGHT: 69 IN | SYSTOLIC BLOOD PRESSURE: 108 MMHG | DIASTOLIC BLOOD PRESSURE: 74 MMHG | HEART RATE: 65 BPM | TEMPERATURE: 98 F | WEIGHT: 163 LBS

## 2023-09-09 DIAGNOSIS — L02.02: ICD-10-CM

## 2023-09-09 DIAGNOSIS — L02.415 ABSCESS OF RIGHT LOWER LEG: Primary | ICD-10-CM

## 2023-09-09 PROCEDURE — 99214 OFFICE O/P EST MOD 30 MIN: CPT | Mod: S$GLB,,, | Performed by: NURSE PRACTITIONER

## 2023-09-09 PROCEDURE — 99214 PR OFFICE/OUTPT VISIT, EST, LEVL IV, 30-39 MIN: ICD-10-PCS | Mod: S$GLB,,, | Performed by: NURSE PRACTITIONER

## 2023-09-09 RX ORDER — DOXYCYCLINE 100 MG/1
100 CAPSULE ORAL EVERY 12 HOURS
Qty: 20 CAPSULE | Refills: 0 | Status: SHIPPED | OUTPATIENT
Start: 2023-09-09 | End: 2023-09-19

## 2023-09-09 NOTE — PROGRESS NOTES
"Subjective:      Patient ID: Price Noguera is a 62 y.o. male.    Vitals:  height is 5' 9" (1.753 m) and weight is 73.9 kg (163 lb). His oral temperature is 97.5 °F (36.4 °C). His blood pressure is 108/74 and his pulse is 65. His oxygen saturation is 97%.     Chief Complaint: Wound Check    Pt presents today with open wound on right leg.  Original visit 8/31/23 for I and d and packing, has been on doxycycline since that time, most recent visit was 9/6/23. Improving, he says pain has significantly improved. Last day of doxycycline is today. Has not heard from wound care yet.      Constitution: Negative.   Respiratory: Negative.     Gastrointestinal: Negative.    Skin:  Positive for wound.      Objective:     Physical Exam   Constitutional: He is oriented to person, place, and time.   Cardiovascular: Normal rate.   Pulmonary/Chest: Effort normal. No respiratory distress.   Abdominal: Normal appearance.   Neurological: He is alert and oriented to person, place, and time.   Psychiatric: His behavior is normal. Mood normal.       Assessment:     1. Abscess of right lower leg    2. Furuncle of forehead        Plan:     Packing removed, no drainage, still some induration and erythema, extend doxycyline, repacked wound, dressing applied, referral to [facility], phone number given to patient to call to schedule, return here on Tuesday for recheck if have not been seen by wound care by that time.   Abscess of right lower leg  -     Ambulatory referral/consult to Wound Clinic  -     doxycycline (VIBRAMYCIN) 100 MG Cap; Take 1 capsule (100 mg total) by mouth every 12 (twelve) hours. for 10 days  Dispense: 20 capsule; Refill: 0    Furuncle of forehead                      "

## 2023-09-11 ENCOUNTER — TELEPHONE (OUTPATIENT)
Dept: URGENT CARE | Facility: CLINIC | Age: 63
End: 2023-09-11
Payer: COMMERCIAL

## 2023-09-11 NOTE — TELEPHONE ENCOUNTER
----- Message from Ilene Esquivel NP sent at 9/5/2023  8:12 AM CDT -----  Please notify that the wound culture grew bacteria that is sensitive to the antibiotic given at visit so treatment was adequate.  Follow up with PCP if symptoms do not improve.

## 2023-09-12 ENCOUNTER — TELEPHONE (OUTPATIENT)
Dept: FAMILY MEDICINE | Facility: CLINIC | Age: 63
End: 2023-09-12
Payer: COMMERCIAL

## 2023-09-12 ENCOUNTER — OFFICE VISIT (OUTPATIENT)
Dept: URGENT CARE | Facility: CLINIC | Age: 63
End: 2023-09-12
Payer: COMMERCIAL

## 2023-09-12 VITALS
HEIGHT: 69 IN | TEMPERATURE: 97 F | OXYGEN SATURATION: 98 % | HEART RATE: 67 BPM | DIASTOLIC BLOOD PRESSURE: 72 MMHG | SYSTOLIC BLOOD PRESSURE: 110 MMHG | RESPIRATION RATE: 18 BRPM | WEIGHT: 165 LBS | BODY MASS INDEX: 24.44 KG/M2

## 2023-09-12 DIAGNOSIS — E11.65 TYPE 2 DIABETES MELLITUS WITH HYPERGLYCEMIA, WITH LONG-TERM CURRENT USE OF INSULIN: ICD-10-CM

## 2023-09-12 DIAGNOSIS — Z51.89 WOUND CHECK, ABSCESS: Primary | ICD-10-CM

## 2023-09-12 DIAGNOSIS — E78.5 HYPERLIPIDEMIA ASSOCIATED WITH TYPE 2 DIABETES MELLITUS: Primary | ICD-10-CM

## 2023-09-12 DIAGNOSIS — E11.65 UNCONTROLLED TYPE 2 DIABETES MELLITUS WITH HYPERGLYCEMIA: ICD-10-CM

## 2023-09-12 DIAGNOSIS — Z79.4 TYPE 2 DIABETES MELLITUS WITH HYPERGLYCEMIA, WITH LONG-TERM CURRENT USE OF INSULIN: ICD-10-CM

## 2023-09-12 DIAGNOSIS — E11.69 HYPERLIPIDEMIA ASSOCIATED WITH TYPE 2 DIABETES MELLITUS: Primary | ICD-10-CM

## 2023-09-12 LAB — GLUCOSE SERPL-MCNC: 78 MG/DL (ref 70–110)

## 2023-09-12 PROCEDURE — 99213 OFFICE O/P EST LOW 20 MIN: CPT | Mod: S$GLB,,, | Performed by: PHYSICIAN ASSISTANT

## 2023-09-12 PROCEDURE — 82962 GLUCOSE BLOOD TEST: CPT | Mod: ,,, | Performed by: PHYSICIAN ASSISTANT

## 2023-09-12 PROCEDURE — 99213 PR OFFICE/OUTPT VISIT, EST, LEVL III, 20-29 MIN: ICD-10-PCS | Mod: S$GLB,,, | Performed by: PHYSICIAN ASSISTANT

## 2023-09-12 PROCEDURE — 82962 POCT GLUCOSE, HAND-HELD DEVICE: ICD-10-PCS | Mod: ,,, | Performed by: PHYSICIAN ASSISTANT

## 2023-09-12 RX ORDER — LANCETS 28 GAUGE
1 EACH MISCELLANEOUS
Qty: 400 EACH | Refills: 5 | Status: SHIPPED | OUTPATIENT
Start: 2023-09-12

## 2023-09-12 RX ORDER — DEXTROSE 4 G
TABLET,CHEWABLE ORAL
Qty: 1 EACH | Refills: 0 | Status: SHIPPED | OUTPATIENT
Start: 2023-09-12
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
## 2023-09-12 NOTE — TELEPHONE ENCOUNTER
Rx sent for glucose meter and supplies.  Labs looked good 6/23.  Needs fasting labs and f/u but can be with NP.  Please schedule within 3 months

## 2023-09-12 NOTE — TELEPHONE ENCOUNTER
----- Message from Azra Asher PA-C sent at 9/12/2023  8:37 AM CDT -----  Dr. Roberson,      I saw your patient, Mr. Thrasher today at [facility] for a follow up for a wound on his right lower extremity. He insisted/demanded that I message you to inform you that he is currently off his insulin. His glucose here today was 78. I told him I did not feel comfortable resuming his insulin at this time. He was very hostile when I suggested he use the portal or reach out to make an appointment. I told him that I would send you a message in regards to his insulin and again recommended he follow up with you. Thank you

## 2023-09-12 NOTE — PATIENT INSTRUCTIONS
Continue to keep the wound clean and dry.  Follow up with your primary care provider for further management of your diabetes.  Your glucose today was 78.  They need to monitor you to see whether you need to be restarted on your insulin.
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
## 2023-09-12 NOTE — TELEPHONE ENCOUNTER
Called pt to inform him that I was able to get him an appt with Dr. Roberson on 9/26/23. Pt declined appt. Pt stated he has to take care of his mother in [place]. Offered pt another appt date, pt declined.

## 2023-09-12 NOTE — TELEPHONE ENCOUNTER
Before resuming insulin or calling in a new Rx for him I recommend we get a new meter.  I recommend he check twice a day and record.  Make follow up with previsit fasting labs within 4 weeks with me or SHAUNNA

## 2023-09-13 NOTE — TELEPHONE ENCOUNTER
Per my conversation with the pt yesterday;    Called pt to inform him that I was able to get him an appt with Dr. Roberson on 9/26/23. Pt declined appt. Pt stated he has to take care of his mother in [place]. Offered pt another appt date, pt declined.

## 2023-11-04 ENCOUNTER — HOSPITAL ENCOUNTER (INPATIENT)
Facility: HOSPITAL | Age: 63
LOS: 3 days | Discharge: HOME HEALTH | DRG: 871 | End: 2023-11-07
Attending: EMERGENCY MEDICINE | Admitting: INTERNAL MEDICINE
Payer: COMMERCIAL

## 2023-11-04 DIAGNOSIS — N30.01 ACUTE CYSTITIS WITH HEMATURIA: ICD-10-CM

## 2023-11-04 DIAGNOSIS — I95.9 HYPOTENSION: ICD-10-CM

## 2023-11-04 DIAGNOSIS — A41.9 SEVERE SEPSIS: Primary | ICD-10-CM

## 2023-11-04 DIAGNOSIS — R65.20 SEVERE SEPSIS: Primary | ICD-10-CM

## 2023-11-04 DIAGNOSIS — J18.9 PNEUMONIA OF RIGHT LUNG DUE TO INFECTIOUS ORGANISM, UNSPECIFIED PART OF LUNG: ICD-10-CM

## 2023-11-04 DIAGNOSIS — R07.9 CHEST PAIN: ICD-10-CM

## 2023-11-04 PROBLEM — N30.00 ACUTE CYSTITIS: Status: ACTIVE | Noted: 2023-11-04

## 2023-11-04 PROBLEM — J96.01 ACUTE RESPIRATORY FAILURE WITH HYPOXIA: Status: ACTIVE | Noted: 2023-11-04

## 2023-11-04 PROBLEM — J69.0 PNEUMONITIS, ASPIRATION: Status: ACTIVE | Noted: 2023-11-04

## 2023-11-04 LAB
ALBUMIN SERPL BCP-MCNC: 4.1 G/DL (ref 3.5–5.2)
ALLENS TEST: NORMAL
ALP SERPL-CCNC: 141 U/L (ref 55–135)
ALT SERPL W/O P-5'-P-CCNC: 16 U/L (ref 10–44)
ANION GAP SERPL CALC-SCNC: 11 MMOL/L (ref 8–16)
AST SERPL-CCNC: 18 U/L (ref 10–40)
BACTERIA #/AREA URNS HPF: ABNORMAL /HPF
BASOPHILS # BLD AUTO: 0.08 K/UL (ref 0–0.2)
BASOPHILS NFR BLD: 0.5 % (ref 0–1.9)
BILIRUB SERPL-MCNC: 1 MG/DL (ref 0.1–1)
BILIRUB UR QL STRIP: NEGATIVE
BUN SERPL-MCNC: 9 MG/DL (ref 8–23)
CALCIUM SERPL-MCNC: 9.7 MG/DL (ref 8.7–10.5)
CHLORIDE SERPL-SCNC: 101 MMOL/L (ref 95–110)
CLARITY UR: CLEAR
CO2 SERPL-SCNC: 26 MMOL/L (ref 23–29)
COLOR UR: YELLOW
CREAT SERPL-MCNC: 0.9 MG/DL (ref 0.5–1.4)
DELSYS: NORMAL
DIFFERENTIAL METHOD: ABNORMAL
EOSINOPHIL # BLD AUTO: 0.3 K/UL (ref 0–0.5)
EOSINOPHIL NFR BLD: 1.8 % (ref 0–8)
ERYTHROCYTE [DISTWIDTH] IN BLOOD BY AUTOMATED COUNT: 15.1 % (ref 11.5–14.5)
EST. GFR  (NO RACE VARIABLE): >60 ML/MIN/1.73 M^2
GLUCOSE SERPL-MCNC: 165 MG/DL (ref 70–110)
GLUCOSE UR QL STRIP: ABNORMAL
HCT VFR BLD AUTO: 35.9 % (ref 40–54)
HGB BLD-MCNC: 12.4 G/DL (ref 14–18)
HGB UR QL STRIP: ABNORMAL
IMM GRANULOCYTES # BLD AUTO: 0.07 K/UL (ref 0–0.04)
IMM GRANULOCYTES NFR BLD AUTO: 0.4 % (ref 0–0.5)
INFLUENZA A, MOLECULAR: NEGATIVE
INFLUENZA B, MOLECULAR: NEGATIVE
KETONES UR QL STRIP: ABNORMAL
LACTATE SERPL-SCNC: 0.8 MMOL/L (ref 0.5–2.2)
LDH SERPL L TO P-CCNC: 1.35 MMOL/L (ref 0.5–2.2)
LEUKOCYTE ESTERASE UR QL STRIP: ABNORMAL
LYMPHOCYTES # BLD AUTO: 2.7 K/UL (ref 1–4.8)
LYMPHOCYTES NFR BLD: 15.4 % (ref 18–48)
MCH RBC QN AUTO: 33.6 PG (ref 27–31)
MCHC RBC AUTO-ENTMCNC: 34.5 G/DL (ref 32–36)
MCV RBC AUTO: 97 FL (ref 82–98)
MICROSCOPIC COMMENT: ABNORMAL
MODE: NORMAL
MONOCYTES # BLD AUTO: 1.1 K/UL (ref 0.3–1)
MONOCYTES NFR BLD: 6.5 % (ref 4–15)
NEUTROPHILS # BLD AUTO: 13.2 K/UL (ref 1.8–7.7)
NEUTROPHILS NFR BLD: 75.4 % (ref 38–73)
NITRITE UR QL STRIP: NEGATIVE
NRBC BLD-RTO: 0 /100 WBC
PH UR STRIP: 7 [PH] (ref 5–8)
PLATELET # BLD AUTO: 315 K/UL (ref 150–450)
PMV BLD AUTO: 8.5 FL (ref 9.2–12.9)
POCT GLUCOSE: 137 MG/DL (ref 70–110)
POCT GLUCOSE: 165 MG/DL (ref 70–110)
POTASSIUM SERPL-SCNC: 3.6 MMOL/L (ref 3.5–5.1)
PROT SERPL-MCNC: 7.9 G/DL (ref 6–8.4)
PROT UR QL STRIP: NEGATIVE
RBC # BLD AUTO: 3.69 M/UL (ref 4.6–6.2)
RBC #/AREA URNS HPF: 8 /HPF (ref 0–4)
SAMPLE: NORMAL
SARS-COV-2 RDRP RESP QL NAA+PROBE: NEGATIVE
SITE: NORMAL
SODIUM SERPL-SCNC: 138 MMOL/L (ref 136–145)
SP GR UR STRIP: 1.03 (ref 1–1.03)
SPECIMEN SOURCE: NORMAL
URN SPEC COLLECT METH UR: ABNORMAL
UROBILINOGEN UR STRIP-ACNC: NEGATIVE EU/DL
WBC # BLD AUTO: 17.45 K/UL (ref 3.9–12.7)
WBC #/AREA URNS HPF: 25 /HPF (ref 0–5)
YEAST URNS QL MICRO: ABNORMAL

## 2023-11-04 PROCEDURE — 99900035 HC TECH TIME PER 15 MIN (STAT)

## 2023-11-04 PROCEDURE — 25000003 PHARM REV CODE 250: Performed by: NURSE PRACTITIONER

## 2023-11-04 PROCEDURE — 80053 COMPREHEN METABOLIC PANEL: CPT | Performed by: EMERGENCY MEDICINE

## 2023-11-04 PROCEDURE — 63600175 PHARM REV CODE 636 W HCPCS: Performed by: NURSE PRACTITIONER

## 2023-11-04 PROCEDURE — 94640 AIRWAY INHALATION TREATMENT: CPT

## 2023-11-04 PROCEDURE — 25000242 PHARM REV CODE 250 ALT 637 W/ HCPCS: Performed by: NURSE PRACTITIONER

## 2023-11-04 PROCEDURE — 81000 URINALYSIS NONAUTO W/SCOPE: CPT | Performed by: EMERGENCY MEDICINE

## 2023-11-04 PROCEDURE — 36415 COLL VENOUS BLD VENIPUNCTURE: CPT | Performed by: EMERGENCY MEDICINE

## 2023-11-04 PROCEDURE — 11000001 HC ACUTE MED/SURG PRIVATE ROOM

## 2023-11-04 PROCEDURE — 93010 EKG 12-LEAD: ICD-10-PCS | Mod: ,,, | Performed by: INTERNAL MEDICINE

## 2023-11-04 PROCEDURE — U0002 COVID-19 LAB TEST NON-CDC: HCPCS | Performed by: EMERGENCY MEDICINE

## 2023-11-04 PROCEDURE — 93010 ELECTROCARDIOGRAM REPORT: CPT | Mod: ,,, | Performed by: INTERNAL MEDICINE

## 2023-11-04 PROCEDURE — 87077 CULTURE AEROBIC IDENTIFY: CPT | Performed by: EMERGENCY MEDICINE

## 2023-11-04 PROCEDURE — 85025 COMPLETE CBC W/AUTO DIFF WBC: CPT | Performed by: EMERGENCY MEDICINE

## 2023-11-04 PROCEDURE — 83605 ASSAY OF LACTIC ACID: CPT

## 2023-11-04 PROCEDURE — 25000003 PHARM REV CODE 250: Performed by: EMERGENCY MEDICINE

## 2023-11-04 PROCEDURE — 87147 CULTURE TYPE IMMUNOLOGIC: CPT | Performed by: EMERGENCY MEDICINE

## 2023-11-04 PROCEDURE — 87502 INFLUENZA DNA AMP PROBE: CPT | Performed by: EMERGENCY MEDICINE

## 2023-11-04 PROCEDURE — 94761 N-INVAS EAR/PLS OXIMETRY MLT: CPT

## 2023-11-04 PROCEDURE — 87040 BLOOD CULTURE FOR BACTERIA: CPT | Performed by: EMERGENCY MEDICINE

## 2023-11-04 PROCEDURE — 96375 TX/PRO/DX INJ NEW DRUG ADDON: CPT

## 2023-11-04 PROCEDURE — 82962 GLUCOSE BLOOD TEST: CPT

## 2023-11-04 PROCEDURE — 63600175 PHARM REV CODE 636 W HCPCS: Performed by: EMERGENCY MEDICINE

## 2023-11-04 PROCEDURE — 96360 HYDRATION IV INFUSION INIT: CPT | Mod: 59

## 2023-11-04 PROCEDURE — 99285 EMERGENCY DEPT VISIT HI MDM: CPT | Mod: 25

## 2023-11-04 PROCEDURE — 83605 ASSAY OF LACTIC ACID: CPT | Performed by: EMERGENCY MEDICINE

## 2023-11-04 PROCEDURE — 87186 SC STD MICRODIL/AGAR DIL: CPT | Performed by: EMERGENCY MEDICINE

## 2023-11-04 PROCEDURE — 27000221 HC OXYGEN, UP TO 24 HOURS

## 2023-11-04 PROCEDURE — 94664 DEMO&/EVAL PT USE INHALER: CPT

## 2023-11-04 PROCEDURE — 93005 ELECTROCARDIOGRAM TRACING: CPT

## 2023-11-04 PROCEDURE — 87086 URINE CULTURE/COLONY COUNT: CPT | Performed by: EMERGENCY MEDICINE

## 2023-11-04 PROCEDURE — 96365 THER/PROPH/DIAG IV INF INIT: CPT

## 2023-11-04 RX ORDER — LANOLIN ALCOHOL/MO/W.PET/CERES
800 CREAM (GRAM) TOPICAL
Status: DISCONTINUED | OUTPATIENT
Start: 2023-11-04 | End: 2023-11-07 | Stop reason: HOSPADM

## 2023-11-04 RX ORDER — SIMETHICONE 80 MG
1 TABLET,CHEWABLE ORAL 4 TIMES DAILY PRN
Status: DISCONTINUED | OUTPATIENT
Start: 2023-11-04 | End: 2023-11-07 | Stop reason: HOSPADM

## 2023-11-04 RX ORDER — MAG HYDROX/ALUMINUM HYD/SIMETH 200-200-20
30 SUSPENSION, ORAL (FINAL DOSE FORM) ORAL 4 TIMES DAILY PRN
Status: DISCONTINUED | OUTPATIENT
Start: 2023-11-04 | End: 2023-11-07 | Stop reason: HOSPADM

## 2023-11-04 RX ORDER — SODIUM CHLORIDE 0.9 % (FLUSH) 0.9 %
10 SYRINGE (ML) INJECTION EVERY 12 HOURS PRN
Status: DISCONTINUED | OUTPATIENT
Start: 2023-11-04 | End: 2023-11-07 | Stop reason: HOSPADM

## 2023-11-04 RX ORDER — SODIUM,POTASSIUM PHOSPHATES 280-250MG
2 POWDER IN PACKET (EA) ORAL
Status: DISCONTINUED | OUTPATIENT
Start: 2023-11-04 | End: 2023-11-07 | Stop reason: HOSPADM

## 2023-11-04 RX ORDER — IBUPROFEN 200 MG
24 TABLET ORAL
Status: DISCONTINUED | OUTPATIENT
Start: 2023-11-04 | End: 2023-11-07 | Stop reason: HOSPADM

## 2023-11-04 RX ORDER — INSULIN ASPART 100 [IU]/ML
1-10 INJECTION, SOLUTION INTRAVENOUS; SUBCUTANEOUS
Status: DISCONTINUED | OUTPATIENT
Start: 2023-11-04 | End: 2023-11-07 | Stop reason: HOSPADM

## 2023-11-04 RX ORDER — AMOXICILLIN 250 MG
1 CAPSULE ORAL 2 TIMES DAILY
Status: DISCONTINUED | OUTPATIENT
Start: 2023-11-04 | End: 2023-11-07 | Stop reason: HOSPADM

## 2023-11-04 RX ORDER — ATORVASTATIN CALCIUM 40 MG/1
80 TABLET, FILM COATED ORAL DAILY
Status: DISCONTINUED | OUTPATIENT
Start: 2023-11-05 | End: 2023-11-07 | Stop reason: HOSPADM

## 2023-11-04 RX ORDER — TALC
6 POWDER (GRAM) TOPICAL NIGHTLY PRN
Status: DISCONTINUED | OUTPATIENT
Start: 2023-11-04 | End: 2023-11-07 | Stop reason: HOSPADM

## 2023-11-04 RX ORDER — ACETAMINOPHEN 325 MG/1
650 TABLET ORAL EVERY 8 HOURS PRN
Status: DISCONTINUED | OUTPATIENT
Start: 2023-11-04 | End: 2023-11-07 | Stop reason: HOSPADM

## 2023-11-04 RX ORDER — IPRATROPIUM BROMIDE AND ALBUTEROL SULFATE 2.5; .5 MG/3ML; MG/3ML
3 SOLUTION RESPIRATORY (INHALATION) EVERY 4 HOURS
Status: DISCONTINUED | OUTPATIENT
Start: 2023-11-04 | End: 2023-11-04

## 2023-11-04 RX ORDER — NALOXONE HCL 0.4 MG/ML
0.02 VIAL (ML) INJECTION
Status: DISCONTINUED | OUTPATIENT
Start: 2023-11-04 | End: 2023-11-07 | Stop reason: HOSPADM

## 2023-11-04 RX ORDER — SODIUM CHLORIDE 9 MG/ML
INJECTION, SOLUTION INTRAVENOUS CONTINUOUS
Status: DISCONTINUED | OUTPATIENT
Start: 2023-11-04 | End: 2023-11-07 | Stop reason: HOSPADM

## 2023-11-04 RX ORDER — IPRATROPIUM BROMIDE AND ALBUTEROL SULFATE 2.5; .5 MG/3ML; MG/3ML
3 SOLUTION RESPIRATORY (INHALATION) EVERY 6 HOURS
Status: DISCONTINUED | OUTPATIENT
Start: 2023-11-05 | End: 2023-11-07 | Stop reason: HOSPADM

## 2023-11-04 RX ORDER — ONDANSETRON 2 MG/ML
4 INJECTION INTRAMUSCULAR; INTRAVENOUS EVERY 8 HOURS PRN
Status: DISCONTINUED | OUTPATIENT
Start: 2023-11-04 | End: 2023-11-07 | Stop reason: HOSPADM

## 2023-11-04 RX ORDER — CLOPIDOGREL BISULFATE 75 MG/1
75 TABLET ORAL DAILY
Status: DISCONTINUED | OUTPATIENT
Start: 2023-11-05 | End: 2023-11-07 | Stop reason: HOSPADM

## 2023-11-04 RX ORDER — GLUCAGON 1 MG
1 KIT INJECTION
Status: DISCONTINUED | OUTPATIENT
Start: 2023-11-04 | End: 2023-11-07 | Stop reason: HOSPADM

## 2023-11-04 RX ORDER — IBUPROFEN 200 MG
16 TABLET ORAL
Status: DISCONTINUED | OUTPATIENT
Start: 2023-11-04 | End: 2023-11-07 | Stop reason: HOSPADM

## 2023-11-04 RX ADMIN — VANCOMYCIN HYDROCHLORIDE 1750 MG: 1 INJECTION, POWDER, LYOPHILIZED, FOR SOLUTION INTRAVENOUS at 02:11

## 2023-11-04 RX ADMIN — IPRATROPIUM BROMIDE AND ALBUTEROL SULFATE 3 ML: 2.5; .5 SOLUTION RESPIRATORY (INHALATION) at 08:11

## 2023-11-04 RX ADMIN — SODIUM CHLORIDE 2244 ML: 9 INJECTION, SOLUTION INTRAVENOUS at 12:11

## 2023-11-04 RX ADMIN — PIPERACILLIN AND TAZOBACTAM 4.5 G: 4; .5 INJECTION, POWDER, LYOPHILIZED, FOR SOLUTION INTRAVENOUS; PARENTERAL at 01:11

## 2023-11-04 RX ADMIN — DOCUSATE SODIUM AND SENNOSIDES 1 TABLET: 8.6; 5 TABLET, FILM COATED ORAL at 09:11

## 2023-11-04 RX ADMIN — ACETAMINOPHEN 650 MG: 325 TABLET ORAL at 06:11

## 2023-11-04 RX ADMIN — PIPERACILLIN AND TAZOBACTAM 4.5 G: 4; .5 INJECTION, POWDER, LYOPHILIZED, FOR SOLUTION INTRAVENOUS; PARENTERAL at 09:11

## 2023-11-04 RX ADMIN — IPRATROPIUM BROMIDE AND ALBUTEROL SULFATE 3 ML: 2.5; .5 SOLUTION RESPIRATORY (INHALATION) at 11:11

## 2023-11-04 RX ADMIN — SODIUM CHLORIDE: 9 INJECTION, SOLUTION INTRAVENOUS at 08:11

## 2023-11-04 NOTE — ASSESSMENT & PLAN NOTE
This patient does have evidence of infective focus  My overall impression is severe sepsis.  Source: Respiratory and Urinary Tract  Antibiotics given-   Antibiotics (72h ago, onward)    None        Latest lactate reviewed-  Recent Labs   Lab 11/04/23  1507   LACTATE 0.8     Organ dysfunction indicated by Acute respiratory failure    Fluid challenge Actual Body weight- Patient will receive 30ml/kg actual body weight to calculate fluid bolus for treatment of septic shock.     Post- resuscitation assessment Yes Perfusion exam was performed within 6 hours of septic shock presentation after bolus shows Adequate tissue perfusion assessed by non-invasive monitoring       Will Not start Pressors- Levophed for MAP of 65  Source control achieved by: IV antibiotics

## 2023-11-04 NOTE — ED PROVIDER NOTES
Encounter Date: 11/4/2023       History     Chief Complaint   Patient presents with    Weakness     Ill x 12 days  , emesis  , rt. Sided chest pain      Chief complaint:  Hurting on the right side of my body    HPI:  63-year-old male presents with pain to the right arm, leg, right chest wall and right abdomen that began this morning.He has past medical history for CVA, CAD, hyponatremia and diabetes  Past medical history is significant for a CVA, MI, hypertension, diabetes, GERD and spinal cord tumor      Review of patient's allergies indicates:   Allergen Reactions    Protonix [pantoprazole]      Nausea, diarrhea     Past Medical History:   Diagnosis Date    Colon polyp     Coronary artery disease     Diabetes mellitus     GERD (gastroesophageal reflux disease)     GERD (gastroesophageal reflux disease)     History of colonic polyps     History of heart artery stent     Hypertension     Myocardial infarction     Neuropathy     Stroke     Tumor     BENIGN TUMOR ON BACK THAT COMPRESSED SPINAL CHORD THAT CAUSED PAIN-BUT REMOVED.      Past Surgical History:   Procedure Laterality Date    BACK SURGERY      COLONOSCOPY  04/07/2014    done in [place], [place]; polyps removed per pt report    COLONOSCOPY N/A 12/9/2021    Procedure: COLONOSCOPY;  Surgeon: Nino Ross MD;  Location: [facility];  Service: Endoscopy;  Laterality: N/A;    CORONARY ANGIOPLASTY WITH STENT PLACEMENT      ESOPHAGOGASTRODUODENOSCOPY N/A 2/21/2019    Procedure: EGD (ESOPHAGOGASTRODUODENOSCOPY);  Surgeon: Nino Ross MD;  Location: [facility];  Service: Endoscopy;  Laterality: N/A;    ESOPHAGOGASTRODUODENOSCOPY N/A 12/9/2021    Procedure: EGD (ESOPHAGOGASTRODUODENOSCOPY);  Surgeon: Nino Ross MD;  Location: [facility];  Service: Endoscopy;  Laterality: N/A;    TUMOR REMOVAL  05/16/2016    Back in skin    UPPER GASTROINTESTINAL ENDOSCOPY  02/21/2019    Dr. Ross; mild schatizki ring-dilated; gastritis; gastric polyps; bx negative      Family History   Problem Relation Age of Onset    Arthritis Mother     Pacemaker/defibrilator Mother     Bursitis Mother     Chronic back pain Mother     Cancer Father     Hypertension Father     No Known Problems Sister     Congenital heart disease Brother     Cancer Paternal Grandmother     Alcohol abuse Brother     No Known Problems Brother     Stomach cancer Paternal Aunt     Colon cancer Paternal Uncle     Glaucoma Neg Hx     Macular degeneration Neg Hx     Retinal detachment Neg Hx      Social History     Tobacco Use    Smoking status: Never     Passive exposure: Never    Smokeless tobacco: Never   Substance Use Topics    Alcohol use: No    Drug use: No     Review of Systems   Constitutional:  Negative for activity change, appetite change, chills, fatigue and fever.   Eyes:  Negative for visual disturbance.   Respiratory:  Negative for apnea and shortness of breath.    Cardiovascular:  Positive for chest pain. Negative for palpitations.   Gastrointestinal:  Positive for abdominal pain. Negative for abdominal distention.   Genitourinary:  Negative for difficulty urinating.   Musculoskeletal:  Positive for myalgias. Negative for neck pain.   Skin:  Negative for pallor and rash.   Neurological:  Negative for headaches.   Hematological:  Does not bruise/bleed easily.   Psychiatric/Behavioral:  Negative for agitation.        Physical Exam     Initial Vitals [11/04/23 1115]   BP Pulse Resp Temp SpO2   (!) 74/46 86 18 98.1 °F (36.7 °C) 96 %      MAP       --         Physical Exam    Nursing note and vitals reviewed.  Constitutional: He appears well-developed and well-nourished.   HENT:   Head: Normocephalic and atraumatic.   Eyes: Conjunctivae are normal.   Neck: Neck supple.   Normal range of motion.  Cardiovascular:  Normal rate, regular rhythm and normal heart sounds.     Exam reveals no gallop and no friction rub.       No murmur heard.  Pulmonary/Chest: Breath sounds normal. No respiratory distress. He has  no wheezes. He has no rhonchi. He has no rales.   Abdominal: Abdomen is soft. He exhibits no distension. There is no abdominal tenderness.   Musculoskeletal:         General: Normal range of motion.      Cervical back: Normal range of motion and neck supple.     Neurological: He is oriented to person, place, and time.   Answers questions slowly but appropriately   Skin: Skin is warm and dry.   Psychiatric: He has a normal mood and affect.         ED Course   Critical Care    Date/Time: 11/4/2023 11:09 AM    Performed by: Etienne Barker III, MD  Authorized by: Etienne Barker III, MD  Direct patient critical care time: 120 minutes  Total critical care time (exclusive of procedural time) : 120 minutes  Critical care was necessary to treat or prevent imminent or life-threatening deterioration of the following conditions: sepsis.  Critical care was time spent personally by me on the following activities: discussions with primary provider, examination of patient, evaluation of patient's response to treatment, obtaining history from patient or surrogate, ordering and performing treatments and interventions, ordering and review of laboratory studies, ordering and review of radiographic studies, pulse oximetry, re-evaluation of patient's condition and review of old charts.        Labs Reviewed   CBC W/ AUTO DIFFERENTIAL - Abnormal; Notable for the following components:       Result Value    WBC 17.45 (*)     RBC 3.69 (*)     Hemoglobin 12.4 (*)     Hematocrit 35.9 (*)     MCH 33.6 (*)     RDW 15.1 (*)     MPV 8.5 (*)     Gran # (ANC) 13.2 (*)     Immature Grans (Abs) 0.07 (*)     Mono # 1.1 (*)     Gran % 75.4 (*)     Lymph % 15.4 (*)     All other components within normal limits   COMPREHENSIVE METABOLIC PANEL - Abnormal; Notable for the following components:    Glucose 165 (*)     Alkaline Phosphatase 141 (*)     All other components within normal limits   URINALYSIS, REFLEX TO URINE CULTURE - Abnormal; Notable for  the following components:    Glucose, UA 4+ (*)     Ketones, UA 1+ (*)     Occult Blood UA 3+ (*)     Leukocytes, UA 2+ (*)     All other components within normal limits    Narrative:     Specimen Source->Urine   URINALYSIS MICROSCOPIC - Abnormal; Notable for the following components:    RBC, UA 8 (*)     WBC, UA 25 (*)     Bacteria Many (*)     All other components within normal limits    Narrative:     Specimen Source->Urine   POCT GLUCOSE - Abnormal; Notable for the following components:    POCT Glucose 165 (*)     All other components within normal limits   INFLUENZA A & B BY MOLECULAR   CULTURE, BLOOD   CULTURE, BLOOD   CULTURE, URINE   SARS-COV-2 RNA AMPLIFICATION, QUAL   LACTIC ACID, PLASMA   ISTAT LACTATE   POCT GLUCOSE MONITORING CONTINUOUS     EKG Readings: (Independently Interpreted)   Rhythm: Normal Sinus Rhythm. Heart Rate: 92. Ectopy: No Ectopy. Conduction: RBBB. ST Segments: Normal ST Segments. T Waves: Normal. Axis: Normal. Clinical Impression: Normal Sinus Rhythm       Imaging Results              X-Ray Chest AP Portable (Final result)  Result time 11/04/23 13:32:19      Final result by Renee Interiano MD (11/04/23 13:32:19)                   Impression:      1. Suggestion of faint ground-glass airspace opacity within the right midlung zone.  Developing pneumonitis is not excluded.  2. Linear atelectasis at the left lung base.      Electronically signed by: Aguilar Interiano MD  Date:    11/04/2023  Time:    13:32               Narrative:    EXAMINATION:  XR CHEST AP PORTABLE    CLINICAL HISTORY:  Sepsis;    TECHNIQUE:  A single portable upright AP chest radiograph was acquired.    COMPARISON:  Chest x-ray-01/08/2019    FINDINGS:  The cardiac silhouette is not enlarged.  No central pulmonary vascular congestion.  There is faint ground-glass airspace opacity in the right midlung zone.  Developing pneumonitis is not excluded.  Please correlate clinically.  There is linear atelectasis present at the  left lung base.  No pleural fluid or pneumothorax.  There is degenerative change of the left acromioclavicular joint.                                       Medications   vancomycin (VANCOCIN) 1,750 mg in dextrose 5 % (D5W) 500 mL IVPB (1,750 mg Intravenous New Bag 11/4/23 1411)   sodium chloride 0.9% bolus 2,244 mL 2,244 mL (0 mLs Intravenous Stopped 11/4/23 1313)   piperacillin-tazobactam (ZOSYN) 4.5 g in dextrose 5 % in water (D5W) 100 mL IVPB (MB+) (0 g Intravenous Stopped 11/4/23 1321)     Medical Decision Making  63-year-old male presents with generalized weakness with pain to the entire right side of his body.  Chest x-ray independently interpreted by me suggest a right lower lobe infiltrate.  He is has hypoxia with pneumonia very likely.  He also has significant pyuria and bacteriuria.  He is empirically treated with vancomycin and Zosyn.  He was hypotensive suggesting sepsis.  He responded to crystalloid bolus.  He will be admitted for blood pressure monitoring and IV antibiotics.    Amount and/or Complexity of Data Reviewed  Labs: ordered.  Radiology: ordered.    Risk  Decision regarding hospitalization.                               Clinical Impression:   Final diagnoses:  [I95.9] Hypotension  [A41.9, R65.20] Severe sepsis (Primary)  [J18.9] Pneumonia of right lung due to infectious organism, unspecified part of lung  [N30.01] Acute cystitis with hematuria        ED Disposition Condition    Admit Stable                Etienne Barker III, MD  11/04/23 4196

## 2023-11-04 NOTE — ED NOTES
Pt presented to ED for Rt sided pain and then once rooming he became very weak, and began to pass out. He was clammy, sats 70%. Placed on non-re breather since no Venti mask and continued decrease in sats. Will cont to monitor. Placed on full cardiac monitoring and EKG obtained. Pt is answering questions but weak.

## 2023-11-04 NOTE — ASSESSMENT & PLAN NOTE
Chronic, uncontrolled -hypotensive on arrival.  Latest blood pressure and vitals reviewed-     Temp:  [98.1 °F (36.7 °C)]   Pulse:  [81-89]   Resp:  [18]   BP: ()/(46-69)   SpO2:  [96 %-100 %] .   Home meds for hypertension were reviewed and noted below-  Hypertension Medications             lisinopriL (PRINIVIL,ZESTRIL) 20 MG tablet TAKE 1/2 TABLET BY MOUTH EVERY DAY          While in the hospital, will manage blood pressure as follows; Adjust home antihypertensive regimen as follows- hold 2/2 hypotension and resume when clinically appropriate    Will utilize p.r.n. blood pressure medication only if patient's blood pressure greater than 180/110 and he develops symptoms such as worsening chest pain or shortness of breath.

## 2023-11-04 NOTE — ASSESSMENT & PLAN NOTE
Patient with known CAD s/p stent placement, which is controlled Will continue ASA, Plavix and Statin and monitor for S/Sx of angina/ACS. Continue to monitor on telemetry.

## 2023-11-04 NOTE — ASSESSMENT & PLAN NOTE
IV vancomycin, IV zosyn  Nebulizer treatments  Supplemental oxygen as needed  Sputum/blood cultures

## 2023-11-04 NOTE — PHARMACY MED REC
"Admission Medication History     The home medication history was taken by James Lopez.    You may go to "Admission" then "Reconcile Home Medications" tabs to review and/or act upon these items.     The home medication list has been updated by the Pharmacy department.   Please read ALL comments highlighted in yellow.   Please address this information as you see fit.    Feel free to contact us if you have any questions or require assistance.      The medications listed below were removed from the home medication list. Please reorder if appropriate:  Patient reports no longer taking the following medication(s):  Docusate 240 mg  Ondansetron 4 mg    Medications listed below were obtained from: Patient/family and Analytic software- [employer]  No current facility-administered medications on file prior to encounter.     Current Outpatient Medications on File Prior to Encounter   Medication Sig Dispense Refill    atorvastatin (LIPITOR) 80 MG tablet TAKE 1 TABLET (80 MG TOTAL) BY MOUTH ONCE DAILY. 90 tablet 3    clopidogreL (PLAVIX) 75 mg tablet TAKE 1 TABLET BY MOUTH EVERY DAY (Patient taking differently: Take 75 mg by mouth once daily.) 90 tablet 3    empagliflozin (JARDIANCE) 25 mg tablet Take 1 tablet (25 mg total) by mouth once daily. 90 tablet 3    gabapentin (NEURONTIN) 600 MG tablet Take 1 tablet (600 mg total) by mouth 3 (three) times daily. 270 tablet 3    lisinopriL (PRINIVIL,ZESTRIL) 20 MG tablet TAKE 1/2 TABLET BY MOUTH EVERY DAY (Patient taking differently: Take 10 mg by mouth once daily. TAKE 1/2 TABLET BY MOUTH EVERY DAY) 45 tablet 3    metFORMIN (GLUCOPHAGE-XR) 500 MG ER 24hr tablet Take 2 tablets (1,000 mg total) by mouth daily with breakfast. 180 tablet 3    nortriptyline (PAMELOR) 25 MG capsule TAKE 1 CAPSULE BY MOUTH EVERY DAY IN THE EVENING (Patient taking differently: Take 25 mg by mouth every evening. TAKE 1 CAPSULE BY MOUTH EVERY DAY IN THE EVENING) 90 capsule 3    blood sugar diagnostic Strp 1 each " "by Misc.(Non-Drug; Combo Route) route 4 (four) times daily before meals and nightly. 200 each 5    blood-glucose meter (TRUE METRIX GLUCOSE METER) Misc Alejandro metrix or similar covered by insurance 1 each 0    insulin degludec (TRESIBA FLEXTOUCH U-100) 100 unit/mL (3 mL) insulin pen Inject 18 Units into the skin once daily. 4 pen 0    lancets 28 gauge Misc 1 lancet  by Misc.(Non-Drug; Combo Route) route 4 (four) times daily before meals and nightly. 400 each 5    pen needle, diabetic (BD CHRISTIANO 2ND GEN PEN NEEDLE) 32 gauge x 5/32" Ndle Inject daily 100 each 0    sildenafiL (VIAGRA) 100 MG tablet TAKE 1/2 TO 1 TABLET BY MOUTH 1 HOUR PRIOR TO INTERCOURSE AS NEEDED ERECTILE DYSFUNCTION 6 tablet 0    [DISCONTINUED] docusate calcium (SURFAK) 240 mg capsule Take 1 capsule (240 mg total) by mouth once daily. 90 capsule 0    [DISCONTINUED] ondansetron (ZOFRAN-ODT) 4 MG TbDL Take 1 tablet (4 mg total) by mouth every 6 (six) hours as needed. 10 tablet 1             James Lopez  EXT 1925                 .          "

## 2023-11-04 NOTE — SUBJECTIVE & OBJECTIVE
Past Medical History:   Diagnosis Date    Colon polyp     Coronary artery disease     Diabetes mellitus     GERD (gastroesophageal reflux disease)     GERD (gastroesophageal reflux disease)     History of colonic polyps     History of heart artery stent     Hypertension     Myocardial infarction     Neuropathy     Stroke     Tumor     BENIGN TUMOR ON BACK THAT COMPRESSED SPINAL CHORD THAT CAUSED PAIN-BUT REMOVED.        Past Surgical History:   Procedure Laterality Date    BACK SURGERY      COLONOSCOPY  04/07/2014    done in [place], [place]; polyps removed per pt report    COLONOSCOPY N/A 12/9/2021    Procedure: COLONOSCOPY;  Surgeon: Nino Ross MD;  Location: [facility] END[facility];  Service: Endoscopy;  Laterality: N/A;    CORONARY ANGIOPLASTY WITH STENT PLACEMENT      ESOPHAGOGASTRODUODENOSCOPY N/A 2/21/2019    Procedure: EGD (ESOPHAGOGASTRODUODENOSCOPY);  Surgeon: Nino Ross MD;  Location: [facility] END[facility];  Service: Endoscopy;  Laterality: N/A;    ESOPHAGOGASTRODUODENOSCOPY N/A 12/9/2021    Procedure: EGD (ESOPHAGOGASTRODUODENOSCOPY);  Surgeon: Nino Ross MD;  Location: [facility] END[facility];  Service: Endoscopy;  Laterality: N/A;    TUMOR REMOVAL  05/16/2016    Back in skin    UPPER GASTROINTESTINAL ENDOSCOPY  02/21/2019    Dr. Ross; mild schatizki ring-dilated; gastritis; gastric polyps; bx negative       Review of patient's allergies indicates:   Allergen Reactions    Protonix [pantoprazole]      Nausea, diarrhea       No current facility-administered medications on file prior to encounter.     Current Outpatient Medications on File Prior to Encounter   Medication Sig    atorvastatin (LIPITOR) 80 MG tablet TAKE 1 TABLET (80 MG TOTAL) BY MOUTH ONCE DAILY.    clopidogreL (PLAVIX) 75 mg tablet TAKE 1 TABLET BY MOUTH EVERY DAY (Patient taking differently: Take 75 mg by mouth once daily.)    empagliflozin (JARDIANCE) 25 mg tablet Take 1 tablet (25 mg total) by mouth once daily.    gabapentin (NEURONTIN) 600 MG tablet Take 1  "tablet (600 mg total) by mouth 3 (three) times daily.    lisinopriL (PRINIVIL,ZESTRIL) 20 MG tablet TAKE 1/2 TABLET BY MOUTH EVERY DAY (Patient taking differently: Take 10 mg by mouth once daily. TAKE 1/2 TABLET BY MOUTH EVERY DAY)    metFORMIN (GLUCOPHAGE-XR) 500 MG ER 24hr tablet Take 2 tablets (1,000 mg total) by mouth daily with breakfast.    nortriptyline (PAMELOR) 25 MG capsule TAKE 1 CAPSULE BY MOUTH EVERY DAY IN THE EVENING (Patient taking differently: Take 25 mg by mouth every evening. TAKE 1 CAPSULE BY MOUTH EVERY DAY IN THE EVENING)    blood sugar diagnostic Strp 1 each by Misc.(Non-Drug; Combo Route) route 4 (four) times daily before meals and nightly.    blood-glucose meter (TRUE METRIX GLUCOSE METER) Misc Alejandro metrix or similar covered by insurance    insulin degludec (TRESIBA FLEXTOUCH U-100) 100 unit/mL (3 mL) insulin pen Inject 18 Units into the skin once daily.    lancets 28 gauge Misc 1 lancet  by Misc.(Non-Drug; Combo Route) route 4 (four) times daily before meals and nightly.    pen needle, diabetic (BD CHRISTIANO 2ND GEN PEN NEEDLE) 32 gauge x 5/32" Ndle Inject daily    sildenafiL (VIAGRA) 100 MG tablet TAKE 1/2 TO 1 TABLET BY MOUTH 1 HOUR PRIOR TO INTERCOURSE AS NEEDED ERECTILE DYSFUNCTION    [DISCONTINUED] docusate calcium (SURFAK) 240 mg capsule Take 1 capsule (240 mg total) by mouth once daily.    [DISCONTINUED] ondansetron (ZOFRAN-ODT) 4 MG TbDL Take 1 tablet (4 mg total) by mouth every 6 (six) hours as needed.     Family History       Problem Relation (Age of Onset)    Alcohol abuse Brother    Arthritis Mother    Bursitis Mother    Cancer Father, Paternal Grandmother    Chronic back pain Mother    Colon cancer Paternal Uncle    Congenital heart disease Brother    Hypertension Father    No Known Problems Sister, Brother    Pacemaker/defibrilator Mother    Stomach cancer Paternal Aunt          Tobacco Use    Smoking status: Never     Passive exposure: Never    Smokeless tobacco: Never   Substance " and Sexual Activity    Alcohol use: No    Drug use: No    Sexual activity: Not Currently     Review of Systems   Constitutional:  Positive for fever. Negative for chills.   HENT:  Negative for sore throat and trouble swallowing.    Respiratory:  Positive for cough and shortness of breath.    Cardiovascular:  Positive for chest pain (anterior right side). Negative for palpitations.   Gastrointestinal:  Positive for nausea and vomiting. Negative for abdominal pain and diarrhea.   Genitourinary:  Negative for dysuria and hematuria.   Musculoskeletal:  Positive for myalgias. Negative for neck pain.   Skin:  Negative for rash and wound.   Neurological:  Positive for weakness. Negative for syncope.   Psychiatric/Behavioral:  Negative for agitation and confusion.    All other systems reviewed and are negative.    Objective:     Vital Signs (Most Recent):  Temp: 98.1 °F (36.7 °C) (11/04/23 1115)  Pulse: 81 (11/04/23 1702)  Resp: 18 (11/04/23 1702)  BP: 120/69 (11/04/23 1702)  SpO2: 100 % (11/04/23 1702) Vital Signs (24h Range):  Temp:  [98.1 °F (36.7 °C)] 98.1 °F (36.7 °C)  Pulse:  [81-89] 81  Resp:  [18] 18  SpO2:  [96 %-100 %] 100 %  BP: ()/(46-69) 120/69     Weight: 74.8 kg (165 lb)  Body mass index is 24.37 kg/m².     Physical Exam  Constitutional:       General: He is not in acute distress.     Appearance: He is well-developed and normal weight. He is ill-appearing.   HENT:      Head: Normocephalic and atraumatic.   Eyes:      Conjunctiva/sclera: Conjunctivae normal.      Pupils: Pupils are equal, round, and reactive to light.   Cardiovascular:      Rate and Rhythm: Normal rate and regular rhythm.      Pulses: Normal pulses.      Heart sounds: Normal heart sounds.   Pulmonary:      Effort: Pulmonary effort is normal. No respiratory distress.      Breath sounds: Normal breath sounds.   Chest:      Comments: Reproducible chest pain right mid-chest  Abdominal:      General: Bowel sounds are normal. There is no  distension.      Palpations: Abdomen is soft.      Tenderness: There is no abdominal tenderness.   Genitourinary:     Comments: Has urinal at bedside; dark yellow urine noted  Musculoskeletal:         General: Normal range of motion.      Cervical back: Normal range of motion and neck supple.      Right lower leg: No edema.      Left lower leg: No edema.   Skin:     General: Skin is warm and dry.      Coloration: Skin is pale.   Neurological:      General: No focal deficit present.      Mental Status: He is alert and oriented to person, place, and time.   Psychiatric:         Mood and Affect: Mood normal.         Behavior: Behavior normal.         Thought Content: Thought content normal.              CRANIAL NERVES     CN III, IV, VI   Pupils are equal, round, and reactive to light.       Significant Labs: All pertinent labs within the past 24 hours have been reviewed.  CBC:   Recent Labs   Lab 11/04/23  1131   WBC 17.45*   HGB 12.4*   HCT 35.9*        CMP:   Recent Labs   Lab 11/04/23  1131      K 3.6      CO2 26   *   BUN 9   CREATININE 0.9   CALCIUM 9.7   PROT 7.9   ALBUMIN 4.1   BILITOT 1.0   ALKPHOS 141*   AST 18   ALT 16   ANIONGAP 11     Lactic Acid:   Recent Labs   Lab 11/04/23  1507   LACTATE 0.8     Urine Studies:   Recent Labs   Lab 11/04/23  1253   COLORU Yellow   APPEARANCEUA Clear   PHUR 7.0   SPECGRAV 1.030   PROTEINUA Negative   GLUCUA 4+*   KETONESU 1+*   BILIRUBINUA Negative   OCCULTUA 3+*   NITRITE Negative   UROBILINOGEN Negative   LEUKOCYTESUR 2+*   RBCUA 8*   WBCUA 25*   BACTERIA Many*       Significant Imaging: I have reviewed all pertinent imaging results/findings within the past 24 hours.  CXR:  1. Suggestion of faint ground-glass airspace opacity within the right midlung zone.  Developing pneumonitis is not excluded.  2. Linear atelectasis at the left lung base.

## 2023-11-04 NOTE — HPI
Price Noguera is a 63-year-old male with past medical history significant for hypertension, coronary artery disease, type 2 diabetes, GERD, and CVA who presented to the emergency department with a 12 day history low-grade fevers, productive cough, and nausea and vomiting.  Over the last few days he has developed right-sided chest pain.  He reports that he lives home alone and he thought that he could take care of himself but his symptoms have steadily become worse.  He has been using over-the-counter cold medicines without relief.  ED workup is significant for WBC 17 K. On arrival he was hypoxic requiring 15 L supplemental oxygen which has since been weaned down to 5 L. He denies home oxygen use or use of CPAP or BiPAP.  He also presented with hypotension and was given the IV sepsis bolus with improvement in blood pressure.  Chest x-ray significant for developing pneumonitis to the right middle lobe.  He is also positive for UTI.  IV vancomycin and IV Zosyn were administered in the emergency department.  His nausea and vomiting have improved and he is feeling hungry at this point in time.  He is admitted to the service of hospital Medicine for continued monitoring and treatment.

## 2023-11-04 NOTE — ASSESSMENT & PLAN NOTE
Patient's FSGs are controlled on current medication regimen.  Last A1c reviewed-   Lab Results   Component Value Date    HGBA1C 6.3 (H) 06/14/2023     Most recent fingerstick glucose reviewed-   Recent Labs   Lab 11/04/23  1124   POCTGLUCOSE 165*     Current correctional scale  Medium  Maintain anti-hyperglycemic dose as follows-   Antihyperglycemics (From admission, onward)    SSI        Hold Oral hypoglycemics while patient is in the hospital.

## 2023-11-04 NOTE — H&P
[facility]  History & Physical    Patient Name: Price Noguera  MRN: [id number]  Patient Class: IP- Inpatient  Admission Date: 11/4/2023  Attending Physician: Lashon Perez MD   Primary Care Provider: Chanel Roberson MD         Patient information was obtained from patient and ER records.     Subjective:     Principal Problem:Severe sepsis    Chief Complaint:   Chief Complaint   Patient presents with    Weakness     Ill x 12 days  , emesis  , rt. Sided chest pain         HPI: Price Noguera is a 63-year-old male with past medical history significant for hypertension, coronary artery disease, type 2 diabetes, GERD, and CVA who presented to the emergency department with a 12 day history low-grade fevers, productive cough, and nausea and vomiting.  Over the last few days he has developed right-sided chest pain.  He reports that he lives home alone and he thought that he could take care of himself but his symptoms have steadily become worse.  He has been using over-the-counter cold medicines without relief.  ED workup is significant for WBC 17 K. On arrival he was hypoxic requiring 15 L supplemental oxygen which has since been weaned down to 5 L. He denies home oxygen use or use of CPAP or BiPAP.  He also presented with hypotension and was given the IV sepsis bolus with improvement in blood pressure.  Chest x-ray significant for developing pneumonitis to the right middle lobe.  He is also positive for UTI.  IV vancomycin and IV Zosyn were administered in the emergency department.  His nausea and vomiting have improved and he is feeling hungry at this point in time.  He is admitted to the service of hospital Medicine for continued monitoring and treatment.      Past Medical History:   Diagnosis Date    Colon polyp     Coronary artery disease     Diabetes mellitus     GERD (gastroesophageal reflux disease)     GERD (gastroesophageal reflux disease)     History of colonic polyps      History of heart artery stent     Hypertension     Myocardial infarction     Neuropathy     Stroke     Tumor     BENIGN TUMOR ON BACK THAT COMPRESSED SPINAL CHORD THAT CAUSED PAIN-BUT REMOVED.        Past Surgical History:   Procedure Laterality Date    BACK SURGERY      COLONOSCOPY  04/07/2014    done in [place], [place]; polyps removed per pt report    COLONOSCOPY N/A 12/9/2021    Procedure: COLONOSCOPY;  Surgeon: Nino Ross MD;  Location: [facility] ENDO;  Service: Endoscopy;  Laterality: N/A;    CORONARY ANGIOPLASTY WITH STENT PLACEMENT      ESOPHAGOGASTRODUODENOSCOPY N/A 2/21/2019    Procedure: EGD (ESOPHAGOGASTRODUODENOSCOPY);  Surgeon: Nino Ross MD;  Location: [facility] ENDO;  Service: Endoscopy;  Laterality: N/A;    ESOPHAGOGASTRODUODENOSCOPY N/A 12/9/2021    Procedure: EGD (ESOPHAGOGASTRODUODENOSCOPY);  Surgeon: Nino Ross MD;  Location: [facility] ENDO;  Service: Endoscopy;  Laterality: N/A;    TUMOR REMOVAL  05/16/2016    Back in skin    UPPER GASTROINTESTINAL ENDOSCOPY  02/21/2019    Dr. Ross; mild schatizki ring-dilated; gastritis; gastric polyps; bx negative       Review of patient's allergies indicates:   Allergen Reactions    Protonix [pantoprazole]      Nausea, diarrhea       No current facility-administered medications on file prior to encounter.     Current Outpatient Medications on File Prior to Encounter   Medication Sig    atorvastatin (LIPITOR) 80 MG tablet TAKE 1 TABLET (80 MG TOTAL) BY MOUTH ONCE DAILY.    clopidogreL (PLAVIX) 75 mg tablet TAKE 1 TABLET BY MOUTH EVERY DAY (Patient taking differently: Take 75 mg by mouth once daily.)    empagliflozin (JARDIANCE) 25 mg tablet Take 1 tablet (25 mg total) by mouth once daily.    gabapentin (NEURONTIN) 600 MG tablet Take 1 tablet (600 mg total) by mouth 3 (three) times daily.    lisinopriL (PRINIVIL,ZESTRIL) 20 MG tablet TAKE 1/2 TABLET BY MOUTH EVERY DAY (Patient taking differently: Take 10 mg by mouth once daily. TAKE  "1/2 TABLET BY MOUTH EVERY DAY)    metFORMIN (GLUCOPHAGE-XR) 500 MG ER 24hr tablet Take 2 tablets (1,000 mg total) by mouth daily with breakfast.    nortriptyline (PAMELOR) 25 MG capsule TAKE 1 CAPSULE BY MOUTH EVERY DAY IN THE EVENING (Patient taking differently: Take 25 mg by mouth every evening. TAKE 1 CAPSULE BY MOUTH EVERY DAY IN THE EVENING)    blood sugar diagnostic Strp 1 each by Misc.(Non-Drug; Combo Route) route 4 (four) times daily before meals and nightly.    blood-glucose meter (TRUE METRIX GLUCOSE METER) Misc Alejandro metrix or similar covered by insurance    insulin degludec (TRESIBA FLEXTOUCH U-100) 100 unit/mL (3 mL) insulin pen Inject 18 Units into the skin once daily.    lancets 28 gauge Misc 1 lancet  by Misc.(Non-Drug; Combo Route) route 4 (four) times daily before meals and nightly.    pen needle, diabetic (BD CHRISTIANO 2ND GEN PEN NEEDLE) 32 gauge x 5/32" Ndle Inject daily    sildenafiL (VIAGRA) 100 MG tablet TAKE 1/2 TO 1 TABLET BY MOUTH 1 HOUR PRIOR TO INTERCOURSE AS NEEDED ERECTILE DYSFUNCTION    [DISCONTINUED] docusate calcium (SURFAK) 240 mg capsule Take 1 capsule (240 mg total) by mouth once daily.    [DISCONTINUED] ondansetron (ZOFRAN-ODT) 4 MG TbDL Take 1 tablet (4 mg total) by mouth every 6 (six) hours as needed.     Family History       Problem Relation (Age of Onset)    Alcohol abuse Brother    Arthritis Mother    Bursitis Mother    Cancer Father, Paternal Grandmother    Chronic back pain Mother    Colon cancer Paternal Uncle    Congenital heart disease Brother    Hypertension Father    No Known Problems Sister, Brother    Pacemaker/defibrilator Mother    Stomach cancer Paternal Aunt          Tobacco Use    Smoking status: Never     Passive exposure: Never    Smokeless tobacco: Never   Substance and Sexual Activity    Alcohol use: No    Drug use: No    Sexual activity: Not Currently     Review of Systems   Constitutional:  Positive for fever. Negative for chills.   HENT:  " Negative for sore throat and trouble swallowing.    Respiratory:  Positive for cough and shortness of breath.    Cardiovascular:  Positive for chest pain (anterior right side). Negative for palpitations.   Gastrointestinal:  Positive for nausea and vomiting. Negative for abdominal pain and diarrhea.   Genitourinary:  Negative for dysuria and hematuria.   Musculoskeletal:  Positive for myalgias. Negative for neck pain.   Skin:  Negative for rash and wound.   Neurological:  Positive for weakness. Negative for syncope.   Psychiatric/Behavioral:  Negative for agitation and confusion.    All other systems reviewed and are negative.    Objective:     Vital Signs (Most Recent):  Temp: 98.1 °F (36.7 °C) (11/04/23 1115)  Pulse: 81 (11/04/23 1702)  Resp: 18 (11/04/23 1702)  BP: 120/69 (11/04/23 1702)  SpO2: 100 % (11/04/23 1702) Vital Signs (24h Range):  Temp:  [98.1 °F (36.7 °C)] 98.1 °F (36.7 °C)  Pulse:  [81-89] 81  Resp:  [18] 18  SpO2:  [96 %-100 %] 100 %  BP: ()/(46-69) 120/69     Weight: 74.8 kg (165 lb)  Body mass index is 24.37 kg/m².     Physical Exam  Constitutional:       General: He is not in acute distress.     Appearance: He is well-developed and normal weight. He is ill-appearing.   HENT:      Head: Normocephalic and atraumatic.   Eyes:      Conjunctiva/sclera: Conjunctivae normal.      Pupils: Pupils are equal, round, and reactive to light.   Cardiovascular:      Rate and Rhythm: Normal rate and regular rhythm.      Pulses: Normal pulses.      Heart sounds: Normal heart sounds.   Pulmonary:      Effort: Pulmonary effort is normal. No respiratory distress.      Breath sounds: Normal breath sounds.   Chest:      Comments: Reproducible chest pain right mid-chest  Abdominal:      General: Bowel sounds are normal. There is no distension.      Palpations: Abdomen is soft.      Tenderness: There is no abdominal tenderness.   Genitourinary:     Comments: Has urinal at bedside; dark yellow urine  noted  Musculoskeletal:         General: Normal range of motion.      Cervical back: Normal range of motion and neck supple.      Right lower leg: No edema.      Left lower leg: No edema.   Skin:     General: Skin is warm and dry.      Coloration: Skin is pale.   Neurological:      General: No focal deficit present.      Mental Status: He is alert and oriented to person, place, and time.   Psychiatric:         Mood and Affect: Mood normal.         Behavior: Behavior normal.         Thought Content: Thought content normal.              CRANIAL NERVES     CN III, IV, VI   Pupils are equal, round, and reactive to light.       Significant Labs: All pertinent labs within the past 24 hours have been reviewed.  CBC:   Recent Labs   Lab 11/04/23  1131   WBC 17.45*   HGB 12.4*   HCT 35.9*        CMP:   Recent Labs   Lab 11/04/23  1131      K 3.6      CO2 26   *   BUN 9   CREATININE 0.9   CALCIUM 9.7   PROT 7.9   ALBUMIN 4.1   BILITOT 1.0   ALKPHOS 141*   AST 18   ALT 16   ANIONGAP 11     Lactic Acid:   Recent Labs   Lab 11/04/23  1507   LACTATE 0.8     Urine Studies:   Recent Labs   Lab 11/04/23  1253   COLORU Yellow   APPEARANCEUA Clear   PHUR 7.0   SPECGRAV 1.030   PROTEINUA Negative   GLUCUA 4+*   KETONESU 1+*   BILIRUBINUA Negative   OCCULTUA 3+*   NITRITE Negative   UROBILINOGEN Negative   LEUKOCYTESUR 2+*   RBCUA 8*   WBCUA 25*   BACTERIA Many*       Significant Imaging: I have reviewed all pertinent imaging results/findings within the past 24 hours.  CXR:  1. Suggestion of faint ground-glass airspace opacity within the right midlung zone.  Developing pneumonitis is not excluded.  2. Linear atelectasis at the left lung base.    Assessment/Plan:     * Severe sepsis  This patient does have evidence of infective focus  My overall impression is severe sepsis.  Source: Respiratory and Urinary Tract  Antibiotics given-   Antibiotics (72h ago, onward)    None        Latest lactate reviewed-  Recent  Labs   Lab 11/04/23  1507   LACTATE 0.8     Organ dysfunction indicated by Acute respiratory failure    Fluid challenge Actual Body weight- Patient will receive 30ml/kg actual body weight to calculate fluid bolus for treatment of septic shock.     Post- resuscitation assessment Yes Perfusion exam was performed within 6 hours of septic shock presentation after bolus shows Adequate tissue perfusion assessed by non-invasive monitoring       Will Not start Pressors- Levophed for MAP of 65  Source control achieved by: IV antibiotics    Pneumonitis, aspiration  IV vancomycin, IV zosyn  Nebulizer treatments  Supplemental oxygen as needed  Sputum/blood cultures      Acute respiratory failure with hypoxia  Patient with Hypoxic Respiratory failure which is Acute.  he is not on home oxygen. Supplemental oxygen was provided and noted- Oxygen Concentration (%):  [100] 100    .   Signs/symptoms of respiratory failure include- tachypnea, increased work of breathing and use of accessory muscles. Contributing diagnoses includes - Aspiration and Pneumonia Labs and images were reviewed. Patient Has not had a recent ABG. Will treat underlying causes and adjust management of respiratory failure as follows- continue supplemental oxygen as needed and treat source.    Acute cystitis  IV vancomycin/IV zosyn  Follow culture      BPH (benign prostatic hyperplasia)  Monitor-not on chronic meds      Gastroesophageal reflux disease without esophagitis  Chronic; continue protonix      Hyperlipidemia associated with type 2 diabetes mellitus   Patient is chronically on statin.will continue for now. Monitor clinically. Last LDL was   Lab Results   Component Value Date    LDLCALC 76.6 06/14/2023            Hypertension associated with diabetes  Chronic, uncontrolled -hypotensive on arrival.  Latest blood pressure and vitals reviewed-     Temp:  [98.1 °F (36.7 °C)]   Pulse:  [81-89]   Resp:  [18]   BP: ()/(46-69)   SpO2:  [96 %-100 %] .   Home  meds for hypertension were reviewed and noted below-  Hypertension Medications             lisinopriL (PRINIVIL,ZESTRIL) 20 MG tablet TAKE 1/2 TABLET BY MOUTH EVERY DAY          While in the hospital, will manage blood pressure as follows; Adjust home antihypertensive regimen as follows- hold 2/2 hypotension and resume when clinically appropriate    Will utilize p.r.n. blood pressure medication only if patient's blood pressure greater than 180/110 and he develops symptoms such as worsening chest pain or shortness of breath.        Coronary artery disease  Patient with known CAD s/p stent placement, which is controlled Will continue ASA, Plavix and Statin and monitor for S/Sx of angina/ACS. Continue to monitor on telemetry.     Type 2 diabetes mellitus with neurologic complication, with long-term current use of insulin  Patient's FSGs are controlled on current medication regimen.  Last A1c reviewed-   Lab Results   Component Value Date    HGBA1C 6.3 (H) 06/14/2023     Most recent fingerstick glucose reviewed-   Recent Labs   Lab 11/04/23  1124   POCTGLUCOSE 165*     Current correctional scale  Medium  Maintain anti-hyperglycemic dose as follows-   Antihyperglycemics (From admission, onward)    SSI        Hold Oral hypoglycemics while patient is in the hospital.          VTE Risk Mitigation (From admission, onward)    SEAN Reyes  Department of Hospital Medicine  Pending sale to [facility] - ED    COMPLETED  Family history non-contributory to current problem

## 2023-11-04 NOTE — ASSESSMENT & PLAN NOTE
Patient is chronically on statin.will continue for now. Monitor clinically. Last LDL was   Lab Results   Component Value Date    LDLCALC 76.6 06/14/2023

## 2023-11-04 NOTE — ASSESSMENT & PLAN NOTE
Patient with Hypoxic Respiratory failure which is Acute.  he is not on home oxygen. Supplemental oxygen was provided and noted- Oxygen Concentration (%):  [100] 100    .   Signs/symptoms of respiratory failure include- tachypnea, increased work of breathing and use of accessory muscles. Contributing diagnoses includes - Aspiration and Pneumonia Labs and images were reviewed. Patient Has not had a recent ABG. Will treat underlying causes and adjust management of respiratory failure as follows- continue supplemental oxygen as needed and treat source.

## 2023-11-04 NOTE — ED NOTES
Called to give report and Charge will have to call back since pt is not assigned. Tele box [id number] and Monitor tech was notified of MRN, Room #, Tele Box #.

## 2023-11-05 LAB
ALBUMIN SERPL BCP-MCNC: 3.4 G/DL (ref 3.5–5.2)
ALP SERPL-CCNC: 114 U/L (ref 55–135)
ALT SERPL W/O P-5'-P-CCNC: 13 U/L (ref 10–44)
ANION GAP SERPL CALC-SCNC: 9 MMOL/L (ref 8–16)
AST SERPL-CCNC: 16 U/L (ref 10–40)
BASOPHILS # BLD AUTO: 0.07 K/UL (ref 0–0.2)
BASOPHILS NFR BLD: 0.4 % (ref 0–1.9)
BILIRUB SERPL-MCNC: 1.2 MG/DL (ref 0.1–1)
BUN SERPL-MCNC: 11 MG/DL (ref 8–23)
CALCIUM SERPL-MCNC: 8.9 MG/DL (ref 8.7–10.5)
CHLORIDE SERPL-SCNC: 106 MMOL/L (ref 95–110)
CO2 SERPL-SCNC: 24 MMOL/L (ref 23–29)
CREAT SERPL-MCNC: 0.8 MG/DL (ref 0.5–1.4)
DIFFERENTIAL METHOD: ABNORMAL
EOSINOPHIL # BLD AUTO: 0.1 K/UL (ref 0–0.5)
EOSINOPHIL NFR BLD: 0.5 % (ref 0–8)
ERYTHROCYTE [DISTWIDTH] IN BLOOD BY AUTOMATED COUNT: 15.1 % (ref 11.5–14.5)
EST. GFR  (NO RACE VARIABLE): >60 ML/MIN/1.73 M^2
GLUCOSE SERPL-MCNC: 126 MG/DL (ref 70–110)
HCT VFR BLD AUTO: 31.7 % (ref 40–54)
HGB BLD-MCNC: 10.5 G/DL (ref 14–18)
IMM GRANULOCYTES # BLD AUTO: 0.15 K/UL (ref 0–0.04)
IMM GRANULOCYTES NFR BLD AUTO: 0.8 % (ref 0–0.5)
LYMPHOCYTES # BLD AUTO: 1.7 K/UL (ref 1–4.8)
LYMPHOCYTES NFR BLD: 9.4 % (ref 18–48)
MAGNESIUM SERPL-MCNC: 1.9 MG/DL (ref 1.6–2.6)
MCH RBC QN AUTO: 32.7 PG (ref 27–31)
MCHC RBC AUTO-ENTMCNC: 33.1 G/DL (ref 32–36)
MCV RBC AUTO: 99 FL (ref 82–98)
MONOCYTES # BLD AUTO: 0.9 K/UL (ref 0.3–1)
MONOCYTES NFR BLD: 5 % (ref 4–15)
NEUTROPHILS # BLD AUTO: 14.9 K/UL (ref 1.8–7.7)
NEUTROPHILS NFR BLD: 83.9 % (ref 38–73)
NRBC BLD-RTO: 0 /100 WBC
PHOSPHATE SERPL-MCNC: 3.2 MG/DL (ref 2.7–4.5)
PLATELET # BLD AUTO: 244 K/UL (ref 150–450)
PMV BLD AUTO: 8.7 FL (ref 9.2–12.9)
POCT GLUCOSE: 117 MG/DL (ref 70–110)
POCT GLUCOSE: 140 MG/DL (ref 70–110)
POCT GLUCOSE: 181 MG/DL (ref 70–110)
POTASSIUM SERPL-SCNC: 3.7 MMOL/L (ref 3.5–5.1)
PROT SERPL-MCNC: 6.8 G/DL (ref 6–8.4)
RBC # BLD AUTO: 3.21 M/UL (ref 4.6–6.2)
SODIUM SERPL-SCNC: 139 MMOL/L (ref 136–145)
VANCOMYCIN TROUGH SERPL-MCNC: 13.3 UG/ML (ref 10–22)
WBC # BLD AUTO: 17.73 K/UL (ref 3.9–12.7)

## 2023-11-05 PROCEDURE — 87070 CULTURE OTHR SPECIMN AEROBIC: CPT | Performed by: NURSE PRACTITIONER

## 2023-11-05 PROCEDURE — 80202 ASSAY OF VANCOMYCIN: CPT | Performed by: INTERNAL MEDICINE

## 2023-11-05 PROCEDURE — 99900035 HC TECH TIME PER 15 MIN (STAT)

## 2023-11-05 PROCEDURE — 63600175 PHARM REV CODE 636 W HCPCS: Performed by: INTERNAL MEDICINE

## 2023-11-05 PROCEDURE — 63600175 PHARM REV CODE 636 W HCPCS: Performed by: NURSE PRACTITIONER

## 2023-11-05 PROCEDURE — 87205 SMEAR GRAM STAIN: CPT | Performed by: NURSE PRACTITIONER

## 2023-11-05 PROCEDURE — 25000003 PHARM REV CODE 250: Performed by: NURSE PRACTITIONER

## 2023-11-05 PROCEDURE — 83735 ASSAY OF MAGNESIUM: CPT | Performed by: NURSE PRACTITIONER

## 2023-11-05 PROCEDURE — 97161 PT EVAL LOW COMPLEX 20 MIN: CPT

## 2023-11-05 PROCEDURE — 97165 OT EVAL LOW COMPLEX 30 MIN: CPT

## 2023-11-05 PROCEDURE — 80053 COMPREHEN METABOLIC PANEL: CPT | Performed by: NURSE PRACTITIONER

## 2023-11-05 PROCEDURE — 87641 MR-STAPH DNA AMP PROBE: CPT | Performed by: INTERNAL MEDICINE

## 2023-11-05 PROCEDURE — 36415 COLL VENOUS BLD VENIPUNCTURE: CPT | Performed by: INTERNAL MEDICINE

## 2023-11-05 PROCEDURE — 94640 AIRWAY INHALATION TREATMENT: CPT

## 2023-11-05 PROCEDURE — 97116 GAIT TRAINING THERAPY: CPT

## 2023-11-05 PROCEDURE — 25000242 PHARM REV CODE 250 ALT 637 W/ HCPCS: Performed by: INTERNAL MEDICINE

## 2023-11-05 PROCEDURE — 11000001 HC ACUTE MED/SURG PRIVATE ROOM

## 2023-11-05 PROCEDURE — 94664 DEMO&/EVAL PT USE INHALER: CPT

## 2023-11-05 PROCEDURE — 84100 ASSAY OF PHOSPHORUS: CPT | Performed by: NURSE PRACTITIONER

## 2023-11-05 PROCEDURE — 85025 COMPLETE CBC W/AUTO DIFF WBC: CPT | Performed by: NURSE PRACTITIONER

## 2023-11-05 PROCEDURE — 94761 N-INVAS EAR/PLS OXIMETRY MLT: CPT

## 2023-11-05 PROCEDURE — 36415 COLL VENOUS BLD VENIPUNCTURE: CPT | Performed by: NURSE PRACTITIONER

## 2023-11-05 RX ORDER — POLYETHYLENE GLYCOL 3350 17 G/17G
17 POWDER, FOR SOLUTION ORAL DAILY
Status: DISCONTINUED | OUTPATIENT
Start: 2023-11-05 | End: 2023-11-07 | Stop reason: HOSPADM

## 2023-11-05 RX ORDER — ENOXAPARIN SODIUM 100 MG/ML
40 INJECTION SUBCUTANEOUS
Status: DISCONTINUED | OUTPATIENT
Start: 2023-11-05 | End: 2023-11-07 | Stop reason: HOSPADM

## 2023-11-05 RX ADMIN — POLYETHYLENE GLYCOL (3350) 17 G: 17 POWDER, FOR SOLUTION ORAL at 08:11

## 2023-11-05 RX ADMIN — ATORVASTATIN CALCIUM 80 MG: 40 TABLET, FILM COATED ORAL at 08:11

## 2023-11-05 RX ADMIN — PIPERACILLIN AND TAZOBACTAM 4.5 G: 4; .5 INJECTION, POWDER, LYOPHILIZED, FOR SOLUTION INTRAVENOUS; PARENTERAL at 01:11

## 2023-11-05 RX ADMIN — IPRATROPIUM BROMIDE AND ALBUTEROL SULFATE 3 ML: 2.5; .5 SOLUTION RESPIRATORY (INHALATION) at 09:11

## 2023-11-05 RX ADMIN — ENOXAPARIN SODIUM 40 MG: 40 INJECTION SUBCUTANEOUS at 02:11

## 2023-11-05 RX ADMIN — PIPERACILLIN AND TAZOBACTAM 4.5 G: 4; .5 INJECTION, POWDER, LYOPHILIZED, FOR SOLUTION INTRAVENOUS; PARENTERAL at 02:11

## 2023-11-05 RX ADMIN — VANCOMYCIN HYDROCHLORIDE 1250 MG: 1.25 INJECTION, POWDER, LYOPHILIZED, FOR SOLUTION INTRAVENOUS at 02:11

## 2023-11-05 RX ADMIN — IPRATROPIUM BROMIDE AND ALBUTEROL SULFATE 3 ML: 2.5; .5 SOLUTION RESPIRATORY (INHALATION) at 07:11

## 2023-11-05 RX ADMIN — PIPERACILLIN AND TAZOBACTAM 4.5 G: 4; .5 INJECTION, POWDER, LYOPHILIZED, FOR SOLUTION INTRAVENOUS; PARENTERAL at 08:11

## 2023-11-05 RX ADMIN — SODIUM CHLORIDE: 9 INJECTION, SOLUTION INTRAVENOUS at 05:11

## 2023-11-05 RX ADMIN — INSULIN ASPART 2 UNITS: 100 INJECTION, SOLUTION INTRAVENOUS; SUBCUTANEOUS at 04:11

## 2023-11-05 RX ADMIN — DOCUSATE SODIUM AND SENNOSIDES 1 TABLET: 8.6; 5 TABLET, FILM COATED ORAL at 08:11

## 2023-11-05 RX ADMIN — CLOPIDOGREL BISULFATE 75 MG: 75 TABLET, FILM COATED ORAL at 08:11

## 2023-11-05 RX ADMIN — VANCOMYCIN HYDROCHLORIDE 1250 MG: 1.25 INJECTION, POWDER, LYOPHILIZED, FOR SOLUTION INTRAVENOUS at 03:11

## 2023-11-05 NOTE — ASSESSMENT & PLAN NOTE
This patient does have evidence of infective focus  My overall impression is severe sepsis.  Source: Respiratory and Urinary Tract  Antibiotics given-   Antibiotics (72h ago, onward)    Start     Stop Route Frequency Ordered    11/05/23 0230  vancomycin 1,250 mg in dextrose 5 % (D5W) 250 mL IVPB (Vial-Mate)         -- IV Every 12 hours (non-standard times) 11/04/23 2007 11/04/23 2000  piperacillin-tazobactam (ZOSYN) 4.5 g in dextrose 5 % in water (D5W) 100 mL IVPB (MB+)         -- IV Every 6 hours (non-standard times) 11/04/23 1947 11/04/23 1947  vancomycin - pharmacy to dose  (vancomycin IVPB (PEDS and ADULTS))        See Hyperspace for full Linked Orders Report.    -- IV pharmacy to manage frequency 11/04/23 1947        Latest lactate reviewed-  Recent Labs   Lab 11/04/23  1507   LACTATE 0.8     Organ dysfunction indicated by Acute respiratory failure    Fluid challenge Actual Body weight- Patient will receive 30ml/kg actual body weight to calculate fluid bolus for treatment of septic shock.     Post- resuscitation assessment Yes Perfusion exam was performed within 6 hours of septic shock presentation after bolus shows Adequate tissue perfusion assessed by non-invasive monitoring     Check MRSA nasal screen.  Will Not start Pressors- Levophed for MAP of 65  Source control achieved by: IV antibiotics

## 2023-11-05 NOTE — NURSING
Nurses Note -- 4 Eyes      11/4/2023   11:49 PM      Skin assessed during: Admit      [x] No Altered Skin Integrity Present    [x]Prevention Measures Documented      [] Yes- Altered Skin Integrity Present or Discovered   [] LDA Added if Not in Epic (Describe Wound)   [] New Altered Skin Integrity was Present on Admit and Documented in LDA   [] Wound Image Taken    Wound Care Consulted? No    Attending Nurse:  Columba Ferrera LPN    Second RN/Staff Member:   Marquez Goldberg RN

## 2023-11-05 NOTE — PLAN OF CARE
Plan of care reviewed with patient. Patient verbalized complete understanding. Two hour patient rounding maintained throughout shift. All fall precautions maintained. Bed in lowest position, locked, call light within reach. Side rails up x 2. Slip resistant socks maintained. Needs attended to.    Problem: Adult Inpatient Plan of Care  Goal: Plan of Care Review  Outcome: Ongoing, Progressing  Goal: Patient-Specific Goal (Individualized)  Outcome: Ongoing, Progressing  Goal: Absence of Hospital-Acquired Illness or Injury  Outcome: Ongoing, Progressing  Goal: Optimal Comfort and Wellbeing  Outcome: Ongoing, Progressing  Goal: Readiness for Transition of Care  Outcome: Ongoing, Progressing     Problem: Diabetes Comorbidity  Goal: Blood Glucose Level Within Targeted Range  Outcome: Ongoing, Progressing     Problem: Adjustment to Illness (Sepsis/Septic Shock)  Goal: Optimal Coping  Outcome: Ongoing, Progressing     Problem: Bleeding (Sepsis/Septic Shock)  Goal: Absence of Bleeding  Outcome: Ongoing, Progressing     Problem: Glycemic Control Impaired (Sepsis/Septic Shock)  Goal: Blood Glucose Level Within Desired Range  Outcome: Ongoing, Progressing     Problem: Infection Progression (Sepsis/Septic Shock)  Goal: Absence of Infection Signs and Symptoms  Outcome: Ongoing, Progressing     Problem: Nutrition Impaired (Sepsis/Septic Shock)  Goal: Optimal Nutrition Intake  Outcome: Ongoing, Progressing

## 2023-11-05 NOTE — PROGRESS NOTES
[facility] Medicine  Progress Note    Patient Name: Price Noguera  MRN: [id number]  Patient Class: IP- Inpatient   Admission Date: 11/4/2023  Length of Stay: 1 days  Attending Physician: Lashon Perez MD  Primary Care Provider: Chanel Roberson MD        Subjective:     Principal Problem:Severe sepsis        HPI:  Price Noguera is a 63-year-old male with past medical history significant for hypertension, coronary artery disease, type 2 diabetes, GERD, and CVA who presented to the emergency department with a 12 day history low-grade fevers, productive cough, and nausea and vomiting.  Over the last few days he has developed right-sided chest pain.  He reports that he lives home alone and he thought that he could take care of himself but his symptoms have steadily become worse.  He has been using over-the-counter cold medicines without relief.  ED workup is significant for WBC 17 K. On arrival he was hypoxic requiring 15 L supplemental oxygen which has since been weaned down to 5 L. He denies home oxygen use or use of CPAP or BiPAP.  He also presented with hypotension and was given the IV sepsis bolus with improvement in blood pressure.  Chest x-ray significant for developing pneumonitis to the right middle lobe.  He is also positive for UTI.  IV vancomycin and IV Zosyn were administered in the emergency department.  His nausea and vomiting have improved and he is feeling hungry at this point in time.  He is admitted to the service of hospital Medicine for continued monitoring and treatment.      Overview/Hospital Course:  No notes on file    Interval History:  Patient looking and feeling better.  Currently afebrile.  Patient has been weaned off supplemental oxygen.  No focal subjective complaints reported.  Denies any significant cough or expectoration.  Right lower pleuritic chest pain on deep inspiration reported.  No abdominal pain, nausea or vomiting or any urinary  difficulties.    Review of Systems   Constitutional:  Positive for fever. Negative for chills.   HENT:  Negative for sore throat and trouble swallowing.    Respiratory:  Positive for cough and shortness of breath.    Cardiovascular:  Positive for chest pain (anterior right side). Negative for palpitations.   Gastrointestinal:  Positive for nausea and vomiting. Negative for abdominal pain and diarrhea.   Genitourinary:  Negative for dysuria and hematuria.   Musculoskeletal:  Positive for myalgias. Negative for neck pain.   Skin:  Negative for rash and wound.   Neurological:  Positive for weakness. Negative for syncope.   Psychiatric/Behavioral:  Negative for agitation and confusion.    All other systems reviewed and are negative.    Objective:     Vital Signs (Most Recent):  Temp: 98.5 °F (36.9 °C) (11/05/23 0739)  Pulse: 96 (11/05/23 0911)  Resp: 18 (11/05/23 0911)  BP: 129/65 (11/05/23 0739)  SpO2: 98 % (11/05/23 0911) Vital Signs (24h Range):  Temp:  [98 °F (36.7 °C)-101.2 °F (38.4 °C)] 98.5 °F (36.9 °C)  Pulse:  [] 96  Resp:  [17-19] 18  SpO2:  [95 %-100 %] 98 %  BP: ()/(52-74) 129/65     Weight: 72.1 kg (158 lb 15.2 oz)  Body mass index is 23.47 kg/m².    Intake/Output Summary (Last 24 hours) at 11/5/2023 1122  Last data filed at 11/5/2023 0613  Gross per 24 hour   Intake 2655.84 ml   Output 3175 ml   Net -519.16 ml         Physical Exam  Constitutional:       General: He is not in acute distress.     Appearance: He is well-developed and normal weight. He is ill-appearing.   HENT:      Head: Normocephalic and atraumatic.   Eyes:      Conjunctiva/sclera: Conjunctivae normal.      Pupils: Pupils are equal, round, and reactive to light.   Cardiovascular:      Rate and Rhythm: Normal rate and regular rhythm.      Pulses: Normal pulses.      Heart sounds: Normal heart sounds.   Pulmonary:      Effort: Pulmonary effort is normal. No respiratory distress.      Breath sounds: Normal breath sounds.   Chest:       Comments: Reproducible chest pain right mid-chest  Abdominal:      General: Bowel sounds are normal. There is no distension.      Palpations: Abdomen is soft.      Tenderness: There is no abdominal tenderness.   Genitourinary:     Comments: Has urinal at bedside; dark yellow urine noted  Musculoskeletal:         General: Normal range of motion.      Cervical back: Normal range of motion and neck supple.      Right lower leg: No edema.      Left lower leg: No edema.   Skin:     General: Skin is warm and dry.      Coloration: Skin is pale.   Neurological:      General: No focal deficit present.      Mental Status: He is alert and oriented to person, place, and time.      Comments: Chronic right facial drooping and right upper and lower extremity hemiparesis from old CVA 2 years ago.  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   Psychiatric:         Mood and Affect: Mood normal.         Behavior: Behavior normal.         Thought Content: Thought content normal.             Significant Labs: All pertinent labs within the past 24 hours have been reviewed.  CBC:   Recent Labs   Lab 11/04/23  1131 11/05/23  0447   WBC 17.45* 17.73*   HGB 12.4* 10.5*   HCT 35.9* 31.7*    244     CMP:   Recent Labs   Lab 11/04/23  1131 11/05/23  0447    139   K 3.6 3.7    106   CO2 26 24   * 126*   BUN 9 11   CREATININE 0.9 0.8   CALCIUM 9.7 8.9   PROT 7.9 6.8   ALBUMIN 4.1 3.4*   BILITOT 1.0 1.2*   ALKPHOS 141* 114   AST 18 16   ALT 16 13   ANIONGAP 11 9     Lactic Acid:   Recent Labs   Lab 11/04/23  1507   LACTATE 0.8     Urine Culture:   Recent Labs   Lab 11/04/23  1253   LABURIN No growth to date     Urine Studies:   Recent Labs   Lab 11/04/23  1253   COLORU Yellow   APPEARANCEUA Clear   PHUR 7.0   SPECGRAV 1.030   PROTEINUA Negative   GLUCUA 4+*   KETONESU 1+*   BILIRUBINUA Negative   OCCULTUA 3+*   NITRITE Negative   UROBILINOGEN Negative   LEUKOCYTESUR 2+*   RBCUA  8*   WBCUA 25*   BACTERIA Many*     Microbiology Results (last 7 days)       Procedure Component Value Units Date/Time    MRSA Screen by PCR [[id number]]     Order Status: No result Specimen: Nasopharyngeal Swab from Nasal     Urine culture [[id number]] Collected: 11/04/23 1253    Order Status: Completed Specimen: Urine Updated: 11/05/23 0822     Urine Culture, Routine No growth to date    Narrative:      Specimen Source->Urine    Culture, Respiratory with Gram Stain [[id number]]     Order Status: No result Specimen: Respiratory     Blood culture x two cultures. Draw prior to antibiotics. [[id number]] Collected: 11/04/23 1149    Order Status: Completed Specimen: Blood Updated: 11/04/23 2117     Blood Culture, Routine No Growth to date    Narrative:      Aerobic and anaerobic    Blood culture x two cultures. Draw prior to antibiotics. [[id number]] Collected: 11/04/23 1149    Order Status: Completed Specimen: Blood Updated: 11/04/23 2117     Blood Culture, Routine No Growth to date    Narrative:      Aerobic and anaerobic    Influenza A & B by Molecular [[id number]] Collected: 11/04/23 1146    Order Status: Completed Specimen: Nasopharyngeal Swab Updated: 11/04/23 1311     Influenza A, Molecular Negative     Influenza B, Molecular Negative     Flu A & B Source NP          Significant Imaging:   CXR:  1. Suggestion of faint ground-glass airspace opacity within the right midlung zone.  Developing pneumonitis is not excluded.  2. Linear atelectasis at the left lung base.      Assessment/Plan:      * Severe sepsis  This patient does have evidence of infective focus  My overall impression is severe sepsis.  Source: Respiratory and Urinary Tract  Antibiotics given-   Antibiotics (72h ago, onward)    Start     Stop Route Frequency Ordered    11/05/23 0230  vancomycin 1,250 mg in dextrose 5 % (D5W) 250 mL IVPB (Vial-Mate)         -- IV Every 12 hours (non-standard times) 11/04/23 2007 11/04/23 2000  piperacillin-tazobactam  (ZOSYN) 4.5 g in dextrose 5 % in water (D5W) 100 mL IVPB (MB+)         -- IV Every 6 hours (non-standard times) 11/04/23 1947 11/04/23 1947  vancomycin - pharmacy to dose  (vancomycin IVPB (PEDS and ADULTS))        See Hyperspace for full Linked Orders Report.    -- IV pharmacy to manage frequency 11/04/23 1947        Latest lactate reviewed-  Recent Labs   Lab 11/04/23  1507   LACTATE 0.8     Organ dysfunction indicated by Acute respiratory failure    Fluid challenge Actual Body weight- Patient will receive 30ml/kg actual body weight to calculate fluid bolus for treatment of septic shock.     Post- resuscitation assessment Yes Perfusion exam was performed within 6 hours of septic shock presentation after bolus shows Adequate tissue perfusion assessed by non-invasive monitoring     Check MRSA nasal screen.  Will Not start Pressors- Levophed for MAP of 65  Source control achieved by: IV antibiotics    Acute respiratory failure with hypoxia  Resolved. Patient with Hypoxic Respiratory failure which is Acute.  he is not on home oxygen. Supplemental oxygen was provided and noted- Oxygen Concentration (%):  [100] 100    .   Signs/symptoms of respiratory failure include- tachypnea, increased work of breathing and use of accessory muscles. Contributing diagnoses includes - Aspiration and Pneumonia Labs and images were reviewed. Patient Has not had a recent ABG. Will treat underlying causes and adjust management of respiratory failure as follows- continue supplemental oxygen as needed and treat source.    Pneumonitis, aspiration  IV vancomycin, IV zosyn  Nebulizer treatments  Supplemental oxygen as needed  Sputum/blood cultures  SLP evaluation.     Acute cystitis  IV vancomycin/IV zosyn  Follow culture      BPH (benign prostatic hyperplasia)  Monitor-not on chronic meds      Gastroesophageal reflux disease without esophagitis  Chronic; continue protonix      Hyperlipidemia associated with type 2 diabetes mellitus    Patient is chronically on statin.will continue for now. Monitor clinically. Last LDL was   Lab Results   Component Value Date    LDLCALC 76.6 06/14/2023            Hypertension associated with diabetes  Chronic, uncontrolled -hypotensive on arrival.  Latest blood pressure and vitals reviewed-     Temp:  [98 °F (36.7 °C)-101.2 °F (38.4 °C)]   Pulse:  []   Resp:  [17-19]   BP: ()/(52-74)   SpO2:  [95 %-100 %] .   Home meds for hypertension were reviewed and noted below-  Hypertension Medications             lisinopriL (PRINIVIL,ZESTRIL) 20 MG tablet TAKE 1/2 TABLET BY MOUTH EVERY DAY          While in the hospital, will manage blood pressure as follows; Adjust home antihypertensive regimen as follows- hold 2/2 hypotension and resume when clinically appropriate    Will utilize p.r.n. blood pressure medication only if patient's blood pressure greater than 180/110 and he develops symptoms such as worsening chest pain or shortness of breath.        Coronary artery disease  Patient with known CAD s/p stent placement, which is controlled Will continue ASA, Plavix and Statin and monitor for S/Sx of angina/ACS. Continue to monitor on telemetry.     Type 2 diabetes mellitus with neurologic complication, with long-term current use of insulin  Patient's FSGs are controlled on current medication regimen.  Last A1c reviewed-   Lab Results   Component Value Date    HGBA1C 6.3 (H) 06/14/2023     Most recent fingerstick glucose reviewed-   Recent Labs   Lab 11/04/23  2101   POCTGLUCOSE 137*     Current correctional scale  Medium  Maintain anti-hyperglycemic dose as follows-   Antihyperglycemics (From admission, onward)    SSI        Hold Oral hypoglycemics while patient is in the hospital.        Start PT and OT.  VTE Risk Mitigation (From admission, onward)         Ordered     enoxaparin injection 40 mg  Every 24 hours (non-standard times)         11/05/23 1128     IP VTE LOW RISK PATIENT  Once         11/04/23 1947      Place sequential compression device  Until discontinued         11/04/23 1947                Discharge Planning   ANH:  11/7/23    Code Status: Full Code   Is the patient medically ready for discharge?:     Reason for patient still in hospital (select all that apply): Patient trending condition and Consult recommendations  Discharge Plan A: Home                  Lashon Perez MD  Department of Hospital Medicine   [facility]/Surg

## 2023-11-05 NOTE — PLAN OF CARE
Goals to be met by 11-5-23  1. Patient will perform grooming with mod I  2. Patient will perform UE dressing with mod I  3. Patient will perform LE dressing with SBA  4. Patient will perform toileting with SBA

## 2023-11-05 NOTE — PLAN OF CARE
Problem: Physical Therapy  Goal: Physical Therapy Goal  Description: Goals to be met by: 11/15/2023     Patient will increase functional independence with mobility by performin). Supine to sit with Modified Mcintosh  2). Sit to supine with Modified Mcintosh  3). Sit to stand transfer with Modified Mcintosh  4). Gait  x > 150 feet with Modified Mcintosh using Single-point Cane .     Outcome: Ongoing, Progressing

## 2023-11-05 NOTE — SUBJECTIVE & OBJECTIVE
Interval History:  Patient looking and feeling better.  Currently afebrile.  Patient has been weaned off supplemental oxygen.  No focal subjective complaints reported.  Denies any significant cough or expectoration.  Right lower pleuritic chest pain on deep inspiration reported.  No abdominal pain, nausea or vomiting or any urinary difficulties.    Review of Systems   Constitutional:  Positive for fever. Negative for chills.   HENT:  Negative for sore throat and trouble swallowing.    Respiratory:  Positive for cough and shortness of breath.    Cardiovascular:  Positive for chest pain (anterior right side). Negative for palpitations.   Gastrointestinal:  Positive for nausea and vomiting. Negative for abdominal pain and diarrhea.   Genitourinary:  Negative for dysuria and hematuria.   Musculoskeletal:  Positive for myalgias. Negative for neck pain.   Skin:  Negative for rash and wound.   Neurological:  Positive for weakness. Negative for syncope.   Psychiatric/Behavioral:  Negative for agitation and confusion.    All other systems reviewed and are negative.    Objective:     Vital Signs (Most Recent):  Temp: 98.5 °F (36.9 °C) (11/05/23 0739)  Pulse: 96 (11/05/23 0911)  Resp: 18 (11/05/23 0911)  BP: 129/65 (11/05/23 0739)  SpO2: 98 % (11/05/23 0911) Vital Signs (24h Range):  Temp:  [98 °F (36.7 °C)-101.2 °F (38.4 °C)] 98.5 °F (36.9 °C)  Pulse:  [] 96  Resp:  [17-19] 18  SpO2:  [95 %-100 %] 98 %  BP: ()/(52-74) 129/65     Weight: 72.1 kg (158 lb 15.2 oz)  Body mass index is 23.47 kg/m².    Intake/Output Summary (Last 24 hours) at 11/5/2023 1122  Last data filed at 11/5/2023 0613  Gross per 24 hour   Intake 2655.84 ml   Output 3175 ml   Net -519.16 ml         Physical Exam  Constitutional:       General: He is not in acute distress.     Appearance: He is well-developed and normal weight. He is ill-appearing.   HENT:      Head: Normocephalic and atraumatic.   Eyes:      Conjunctiva/sclera: Conjunctivae  normal.      Pupils: Pupils are equal, round, and reactive to light.   Cardiovascular:      Rate and Rhythm: Normal rate and regular rhythm.      Pulses: Normal pulses.      Heart sounds: Normal heart sounds.   Pulmonary:      Effort: Pulmonary effort is normal. No respiratory distress.      Breath sounds: Normal breath sounds.   Chest:      Comments: Reproducible chest pain right mid-chest  Abdominal:      General: Bowel sounds are normal. There is no distension.      Palpations: Abdomen is soft.      Tenderness: There is no abdominal tenderness.   Genitourinary:     Comments: Has urinal at bedside; dark yellow urine noted  Musculoskeletal:         General: Normal range of motion.      Cervical back: Normal range of motion and neck supple.      Right lower leg: No edema.      Left lower leg: No edema.   Skin:     General: Skin is warm and dry.      Coloration: Skin is pale.   Neurological:      General: No focal deficit present.      Mental Status: He is alert and oriented to person, place, and time.      Comments: Chronic right facial drooping and right upper and lower extremity hemiparesis from old CVA 2 years ago.  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   Psychiatric:         Mood and Affect: Mood normal.         Behavior: Behavior normal.         Thought Content: Thought content normal.             Significant Labs: All pertinent labs within the past 24 hours have been reviewed.  CBC:   Recent Labs   Lab 11/04/23  1131 11/05/23  0447   WBC 17.45* 17.73*   HGB 12.4* 10.5*   HCT 35.9* 31.7*    244     CMP:   Recent Labs   Lab 11/04/23  1131 11/05/23  0447    139   K 3.6 3.7    106   CO2 26 24   * 126*   BUN 9 11   CREATININE 0.9 0.8   CALCIUM 9.7 8.9   PROT 7.9 6.8   ALBUMIN 4.1 3.4*   BILITOT 1.0 1.2*   ALKPHOS 141* 114   AST 18 16   ALT 16 13   ANIONGAP 11 9     Lactic Acid:   Recent Labs   Lab 11/04/23  1507   LACTATE 0.8     Urine Culture:    Recent Labs   Lab 11/04/23  1253   LABURIN No growth to date     Urine Studies:   Recent Labs   Lab 11/04/23  1253   COLORU Yellow   APPEARANCEUA Clear   PHUR 7.0   SPECGRAV 1.030   PROTEINUA Negative   GLUCUA 4+*   KETONESU 1+*   BILIRUBINUA Negative   OCCULTUA 3+*   NITRITE Negative   UROBILINOGEN Negative   LEUKOCYTESUR 2+*   RBCUA 8*   WBCUA 25*   BACTERIA Many*     Microbiology Results (last 7 days)       Procedure Component Value Units Date/Time    MRSA Screen by PCR [[id number]]     Order Status: No result Specimen: Nasopharyngeal Swab from Nasal     Urine culture [[id number]] Collected: 11/04/23 1253    Order Status: Completed Specimen: Urine Updated: 11/05/23 0822     Urine Culture, Routine No growth to date    Narrative:      Specimen Source->Urine    Culture, Respiratory with Gram Stain [[id number]]     Order Status: No result Specimen: Respiratory     Blood culture x two cultures. Draw prior to antibiotics. [[id number]] Collected: 11/04/23 1149    Order Status: Completed Specimen: Blood Updated: 11/04/23 2117     Blood Culture, Routine No Growth to date    Narrative:      Aerobic and anaerobic    Blood culture x two cultures. Draw prior to antibiotics. [[id number]] Collected: 11/04/23 1149    Order Status: Completed Specimen: Blood Updated: 11/04/23 2117     Blood Culture, Routine No Growth to date    Narrative:      Aerobic and anaerobic    Influenza A & B by Molecular [[id number]] Collected: 11/04/23 1146    Order Status: Completed Specimen: Nasopharyngeal Swab Updated: 11/04/23 1311     Influenza A, Molecular Negative     Influenza B, Molecular Negative     Flu A & B Source NP          Significant Imaging:   CXR:  1. Suggestion of faint ground-glass airspace opacity within the right midlung zone.  Developing pneumonitis is not excluded.  2. Linear atelectasis at the left lung base.

## 2023-11-05 NOTE — ASSESSMENT & PLAN NOTE
Patient's FSGs are controlled on current medication regimen.  Last A1c reviewed-   Lab Results   Component Value Date    HGBA1C 6.3 (H) 06/14/2023     Most recent fingerstick glucose reviewed-   Recent Labs   Lab 11/04/23 2101   POCTGLUCOSE 137*     Current correctional scale  Medium  Maintain anti-hyperglycemic dose as follows-   Antihyperglycemics (From admission, onward)    SSI        Hold Oral hypoglycemics while patient is in the hospital.

## 2023-11-05 NOTE — ASSESSMENT & PLAN NOTE
Chronic, uncontrolled -hypotensive on arrival.  Latest blood pressure and vitals reviewed-     Temp:  [98 °F (36.7 °C)-101.2 °F (38.4 °C)]   Pulse:  []   Resp:  [17-19]   BP: ()/(52-74)   SpO2:  [95 %-100 %] .   Home meds for hypertension were reviewed and noted below-  Hypertension Medications             lisinopriL (PRINIVIL,ZESTRIL) 20 MG tablet TAKE 1/2 TABLET BY MOUTH EVERY DAY          While in the hospital, will manage blood pressure as follows; Adjust home antihypertensive regimen as follows- hold 2/2 hypotension and resume when clinically appropriate    Will utilize p.r.n. blood pressure medication only if patient's blood pressure greater than 180/110 and he develops symptoms such as worsening chest pain or shortness of breath.

## 2023-11-05 NOTE — PROGRESS NOTES
"Pharmacokinetic Initial Assessment: IV Vancomycin    Assessment/Plan:    Initiate intravenous vancomycin with loading dose of 1750 mg once followed by a maintenance dose of vancomycin 1250mg IV every 12 hours  Desired empiric serum trough concentration is 15 to 20 mcg/mL  Draw vancomycin trough level 60 min prior to third dose on 11/5 at approximately 1330  Pharmacy will continue to follow and monitor vancomycin.      Please contact pharmacy at extension [phone] with any questions regarding this assessment.     Thank you for the consult,   Rusty Goodman       Patient brief summary:  Price Nogurea is a 63 y.o. male initiated on antimicrobial therapy with IV Vancomycin for treatment of suspected lower respiratory infection    Drug Allergies:   Review of patient's allergies indicates:   Allergen Reactions    Protonix [pantoprazole]      Nausea, diarrhea       Actual Body Weight:   74.8 kg    Renal Function:   Estimated Creatinine Clearance: 82.9 mL/min (based on SCr of 0.9 mg/dL).,     Dialysis Method (if applicable):  N/A    CBC (last 72 hours):  Recent Labs   Lab Result Units 11/04/23  1131   WBC K/uL 17.45*   Hemoglobin g/dL 12.4*   Hematocrit % 35.9*   Platelets K/uL 315   Gran % % 75.4*   Lymph % % 15.4*   Mono % % 6.5   Eosinophil % % 1.8   Basophil % % 0.5   Differential Method  Automated       Metabolic Panel (last 72 hours):  Recent Labs   Lab Result Units 11/04/23  1131 11/04/23  1253   Sodium mmol/L 138  --    Potassium mmol/L 3.6  --    Chloride mmol/L 101  --    CO2 mmol/L 26  --    Glucose mg/dL 165*  --    Glucose, UA   --  4+*   BUN mg/dL 9  --    Creatinine mg/dL 0.9  --    Albumin g/dL 4.1  --    Total Bilirubin mg/dL 1.0  --    Alkaline Phosphatase U/L 141*  --    AST U/L 18  --    ALT U/L 16  --        Drug levels (last 3 results):  No results for input(s): "VANCOMYCINRA", "VANCORANDOM", "VANCOMYCINPE", "VANCOPEAK", "VANCOMYCINTR", "VANCOTROUGH" in the last 72 hours.    Microbiologic Results:  Microbiology " Results (last 7 days)       Procedure Component Value Units Date/Time    Blood culture x two cultures. Draw prior to antibiotics. [[id number]] Collected: 11/04/23 1149    Order Status: Sent Specimen: Blood Updated: 11/04/23 1445    Blood culture x two cultures. Draw prior to antibiotics. [[id number]] Collected: 11/04/23 1149    Order Status: Sent Specimen: Blood Updated: 11/04/23 1445    Urine culture [[id number]] Collected: 11/04/23 1253    Order Status: No result Specimen: Urine Updated: 11/04/23 1344    Influenza A & B by Molecular [[id number]] Collected: 11/04/23 1146    Order Status: Completed Specimen: Nasopharyngeal Swab Updated: 11/04/23 1311     Influenza A, Molecular Negative     Influenza B, Molecular Negative     Flu A & B Source NP

## 2023-11-05 NOTE — PLAN OF CARE
Problem: Adult Inpatient Plan of Care  Goal: Plan of Care Review  Outcome: Ongoing, Progressing  Goal: Patient-Specific Goal (Individualized)  Outcome: Ongoing, Progressing  Goal: Absence of Hospital-Acquired Illness or Injury  Outcome: Ongoing, Progressing  Goal: Optimal Comfort and Wellbeing  Outcome: Ongoing, Progressing  Goal: Readiness for Transition of Care  Outcome: Ongoing, Progressing     Problem: Diabetes Comorbidity  Goal: Blood Glucose Level Within Targeted Range  Outcome: Ongoing, Progressing     Problem: Adjustment to Illness (Sepsis/Septic Shock)  Goal: Optimal Coping  Outcome: Ongoing, Progressing     Problem: Bleeding (Sepsis/Septic Shock)  Goal: Absence of Bleeding  Outcome: Ongoing, Progressing     Problem: Glycemic Control Impaired (Sepsis/Septic Shock)  Goal: Blood Glucose Level Within Desired Range  Outcome: Ongoing, Progressing     Problem: Infection Progression (Sepsis/Septic Shock)  Goal: Absence of Infection Signs and Symptoms  Outcome: Ongoing, Progressing     Problem: Nutrition Impaired (Sepsis/Septic Shock)  Goal: Optimal Nutrition Intake  Outcome: Ongoing, Progressing    POC reviewed with pt, verbalized understanding. Patient is alert and oriented x 4, able to make needs known. Continuous cardiac monitoring in place as ordered. A-febrile. ABX administered as scheduled. Meds given per MAR. IVF infusing as ordered. BG monitored closely, no coverage needed. Repositions self independently. No complaints of pain or discomfort. IS at bedside, instructed on use and return demonstration performed. Purposeful hourly/q2hr rounding done during shift to promote patient safety. NAD noted. Safety maintained with side rails up x3, bed wheels locked, bed in lowest position, bed alarm set, slip resistant socks maintained, call light in reach. Patient educated to call for assistance when needed, verbalized understanding. Pt remains free of falls. No further needs expressed at this time. Will continue  to monitor.

## 2023-11-05 NOTE — PLAN OF CARE
[facility]/Surg  Initial Discharge Assessment       Primary Care Provider: Chanel Roberson MD    Admission Diagnosis: Severe sepsis [A41.9, R65.20]    Admission Date: 11/4/2023  Expected Discharge Date:       Spoke to pt at bedside to complete dc assessment. Verified facesheet. Lives alone. PCP Karol. Pharmacy [facility]. DME listed. Needs new glucometer. Denies hd/hh/coumadin. Pt without recent admit. May need a ride home- no family to help. CM to follow for dc needs    Transition of Care Barriers: No family/friends to help    Payor: BLUE CROSS BLUE SHIELD / Plan: BCBS ALL OUT OF STATE / Product Type: PPO /     Extended Emergency Contact Information  Primary Emergency Contact: Petty Rios  Mobile Phone: [phone]  Relation: Sister  Preferred language: English   needed? No  Secondary Emergency Contact: Breanna Noguera   [place]  Mobile Phone: [phone]  Relation: Mother  Preferred language: English   needed? No    Discharge Plan A: Home  Discharge Plan B: Home      [id number] [place], [place] - [address] AT [facility]  [address]  [place] [address]  Phone: [phone] Fax: [phone]      Initial Assessment (most recent)       Adult Discharge Assessment - 11/05/23 1101          Discharge Assessment    Assessment Type Discharge Planning Assessment     Confirmed/corrected address, phone number and insurance Yes     Confirmed Demographics Correct on Facesheet     Source of Information patient     People in Home alone     Do you expect to return to your current living situation? Yes     Do you have help at home or someone to help you manage your care at home? No     Prior to hospitilization cognitive status: Unable to Assess     Current cognitive status: Alert/Oriented     Walking or Climbing Stairs ambulation difficulty, requires equipment     Dressing/Bathing bathing difficulty, requires equipment     Equipment  Currently Used at Home wheelchair;walker, rolling;shower chair;cane, straight     Readmission within 30 days? No     Patient currently being followed by outpatient case management? No     Do you currently have service(s) that help you manage your care at home? No     Do you take prescription medications? Yes     Do you have prescription coverage? Yes     Do you have any problems affording any of your prescribed medications? No     Is the patient taking medications as prescribed? yes     How do you get to doctors appointments? car, drives self     Are you on dialysis? No     Do you take coumadin? No     DME Needed Upon Discharge  none     Discharge Plan discussed with: Patient     Transition of Care Barriers No family/friends to help     Discharge Plan A Home     Discharge Plan B Home

## 2023-11-05 NOTE — PROGRESS NOTES
Pharmacokinetic Assessment Follow Up: IV Vancomycin    Vancomycin serum concentration assessment(s):    The trough level was drawn correctly and can be used to guide therapy at this time. The measurement is below the desired definitive target range of 15 to 20 mcg/mL.    Vancomycin Regimen Plan:    Continue regimen to Vancomycin 1250 mg IV every 12 hours with next serum trough concentration measured at 1330 prior to 3rd dose on 11/6/23.    Drug levels (last 3 results):  Recent Labs   Lab Result Units 11/05/23  1314   Vancomycin-Trough ug/mL 13.3       Pharmacy will continue to follow and monitor vancomycin.    Please contact pharmacy at extension [phone] for questions regarding this assessment.    Thank you for the consult,   Ayden Lomas, PharmD  [phone]         Patient brief summary:  Price Noguera is a 63 y.o. male initiated on antimicrobial therapy with IV Vancomycin for treatment of lower respiratory infection    The patient's current regimen is vancomycin 1250 mg every 12 hours.    Drug Allergies:   Review of patient's allergies indicates:   Allergen Reactions    Protonix [pantoprazole]      Nausea, diarrhea       Actual Body Weight:   72.1 kg (158 lb 15.2 oz)    Renal Function:   Estimated Creatinine Clearance: 93.3 mL/min (based on SCr of 0.8 mg/dL).,     Dialysis Method (if applicable):  N/A    CBC (last 72 hours):  Recent Labs   Lab Result Units 11/04/23  1131 11/05/23  0447   WBC K/uL 17.45* 17.73*   Hemoglobin g/dL 12.4* 10.5*   Hematocrit % 35.9* 31.7*   Platelets K/uL 315 244   Gran % % 75.4* 83.9*   Lymph % % 15.4* 9.4*   Mono % % 6.5 5.0   Eosinophil % % 1.8 0.5   Basophil % % 0.5 0.4   Differential Method  Automated Automated       Metabolic Panel (last 72 hours):  Recent Labs   Lab Result Units 11/04/23  1131 11/04/23  1253 11/05/23  0447   Sodium mmol/L 138  --  139   Potassium mmol/L 3.6  --  3.7   Chloride mmol/L 101  --  106   CO2 mmol/L 26  --  24   Glucose mg/dL 165*  --  126*   Glucose,  UA   --  4+*  --    BUN mg/dL 9  --  11   Creatinine mg/dL 0.9  --  0.8   Albumin g/dL 4.1  --  3.4*   Total Bilirubin mg/dL 1.0  --  1.2*   Alkaline Phosphatase U/L 141*  --  114   AST U/L 18  --  16   ALT U/L 16  --  13   Magnesium mg/dL  --   --  1.9   Phosphorus mg/dL  --   --  3.2       Vancomycin Administrations:  vancomycin given in the last 96 hours                     vancomycin 1,250 mg in dextrose 5 % (D5W) 250 mL IVPB (Vial-Mate) (mg) 1,250 mg New Bag 11/05/23 0204    vancomycin (VANCOCIN) 1,750 mg in dextrose 5 % (D5W) 500 mL IVPB (mg) 1,750 mg New Bag 11/04/23 1411                    Microbiologic Results:  Microbiology Results (last 7 days)       Procedure Component Value Units Date/Time    MRSA Screen by PCR [[id number]] Collected: 11/05/23 1129    Order Status: Sent Specimen: Nasopharyngeal Swab from Nasal Updated: 11/05/23 1357    Urine culture [[id number]] Collected: 11/04/23 1253    Order Status: Completed Specimen: Urine Updated: 11/05/23 0822     Urine Culture, Routine No growth to date    Narrative:      Specimen Source->Urine    Culture, Respiratory with Gram Stain [[id number]]     Order Status: No result Specimen: Respiratory     Blood culture x two cultures. Draw prior to antibiotics. [[id number]] Collected: 11/04/23 1149    Order Status: Completed Specimen: Blood Updated: 11/04/23 2117     Blood Culture, Routine No Growth to date    Narrative:      Aerobic and anaerobic    Blood culture x two cultures. Draw prior to antibiotics. [[id number]] Collected: 11/04/23 1149    Order Status: Completed Specimen: Blood Updated: 11/04/23 2117     Blood Culture, Routine No Growth to date    Narrative:      Aerobic and anaerobic    Influenza A & B by Molecular [[id number]] Collected: 11/04/23 1146    Order Status: Completed Specimen: Nasopharyngeal Swab Updated: 11/04/23 1311     Influenza A, Molecular Negative     Influenza B, Molecular Negative     Flu A & B Source NP

## 2023-11-05 NOTE — PT/OT/SLP EVAL
Physical Therapy Evaluation    Patient Name:  Price Noguera   MRN:  [id number]    Recommendations:     Discharge Recommendations: Low Intensity Therapy   Discharge Equipment Recommendations: none     Assessment:     Price Noguera is a 63 y.o. male admitted with a medical diagnosis of Severe sepsis.  He presents with the following impairments/functional limitations: weakness, impaired endurance, impaired balance, gait instability, impaired functional mobility, decreased coordination, decreased upper extremity function  .    Rehab Prognosis: Good; patient would benefit from acute skilled PT services to address these deficits and reach maximum level of function.    Recent Surgery: * No surgery found *      Plan:     During this hospitalization, patient to be seen 6 x/week to address the identified rehab impairments via gait training, therapeutic activities, therapeutic exercises and progress toward the following goals:    Plan of Care Expires:  11/15/23    Subjective     Patient/Family Comments/goals: patient states that he falls monthly, sometimes on stairs    Living Environment:  Alone in house 3 steps (with rail) to enter  Prior to admission, patients level of function was has used cane x few years since stroke.  Equipment used at home: cane, straight.  DME owned (not currently used): rolling walker.  Upon discharge, patient will have assistance from lives alone.    Objective:     Communicated with nurse (Cecilia) prior to session.  Patient found up in chair with peripheral IV, telemetry  upon PT entry to room.    General Precautions: Standard, fall  Orthopedic Precautions:N/A   Braces: N/A  Respiratory Status: Room air    Functional Mobility training:  Bed Mobility: N/T due to found in chair  Transfers:     Sit to Stand:  contact guard assistance with straight cane and x 2 reps  Gait: 15' x 1, 80' x 1 (slow) with SC with CGA (and assist for IV pole)      AM-PAC 6 CLICK MOBILITY  Total Score:18       Treatment &  Education:  Mobility training as above with cues for technique  SEATED: ankle arom (rec'd to perform independently)    Patient left reclined in chair with all lines intact, call button in reach, chair alarm on, and charge RN (Cristal) notified.    GOALS:   Multidisciplinary Problems       Physical Therapy Goals          Problem: Physical Therapy    Goal Priority Disciplines Outcome Goal Variances Interventions   Physical Therapy Goal     PT, PT/OT Ongoing, Progressing     Description: Goals to be met by: 11/15/2023     Patient will increase functional independence with mobility by performin). Supine to sit with Modified Silex  2). Sit to supine with Modified Silex  3). Sit to stand transfer with Modified Silex  4). Gait  x > 150 feet with Modified Silex using Single-point Cane .                          History:     Past Medical History:   Diagnosis Date    Colon polyp     Coronary artery disease     Diabetes mellitus     GERD (gastroesophageal reflux disease)     GERD (gastroesophageal reflux disease)     History of colonic polyps     History of heart artery stent     Hypertension     Myocardial infarction     Neuropathy     Stroke     Tumor     BENIGN TUMOR ON BACK THAT COMPRESSED SPINAL CHORD THAT CAUSED PAIN-BUT REMOVED.        Past Surgical History:   Procedure Laterality Date    BACK SURGERY      COLONOSCOPY  2014    done in [place], [place]; polyps removed per pt report    COLONOSCOPY N/A 2021    Procedure: COLONOSCOPY;  Surgeon: Nino Ross MD;  Location: [facility];  Service: Endoscopy;  Laterality: N/A;    CORONARY ANGIOPLASTY WITH STENT PLACEMENT      ESOPHAGOGASTRODUODENOSCOPY N/A 2019    Procedure: EGD (ESOPHAGOGASTRODUODENOSCOPY);  Surgeon: Nino Ross MD;  Location: [facility];  Service: Endoscopy;  Laterality: N/A;    ESOPHAGOGASTRODUODENOSCOPY N/A 2021    Procedure: EGD (ESOPHAGOGASTRODUODENOSCOPY);  Surgeon: Nino Ross MD;  Location:  [facility];  Service: Endoscopy;  Laterality: N/A;    TUMOR REMOVAL  05/16/2016    Back in skin    UPPER GASTROINTESTINAL ENDOSCOPY  02/21/2019    Dr. Ross; mild schatizki ring-dilated; gastritis; gastric polyps; bx negative       Time Tracking:     PT Received On: 11/05/23  PT Start Time: 0921     PT Stop Time: 0943  PT Total Time (min): 22 min     Billable Minutes: Evaluation 10 and Gait Training 12      11/05/2023

## 2023-11-05 NOTE — PT/OT/SLP EVAL
Occupational Therapy   Evaluation    Name: Price Noguera  MRN: [id number]  Admitting Diagnosis: Severe sepsis  Recent Surgery: * No surgery found *      Recommendations:     Discharge Recommendations: Moderate Intensity Therapy  Discharge Equipment Recommendations:  none (as of eval)  Barriers to discharge:  None    Assessment:     Price Noguera is a 63 y.o. male with a medical diagnosis of Severe sepsis.  He presents with decreased functional endurance and ADL status. Performance deficits affecting function: impaired endurance, impaired self care skills.      Rehab Prognosis: Good; patient would benefit from acute skilled OT services to address these deficits and reach maximum level of function.       Plan:     Patient to be seen 3 x/week to address the above listed problems via self-care/home management, therapeutic activities, therapeutic exercises  Plan of Care Expires: 11/12/23  Plan of Care Reviewed with: patient    Subjective     Chief Complaint: gets tired with ADL and functional mobility and transfers  Patient/Family Comments/goals: return to PLOF and prior living situation    Occupational Profile:  Living Environment: 1 story house; tile surfaces  Previous level of function: fully independent, driving, does not work, cares for mother out of state every 2 weeks or so  Roles and Routines: see above  Equipment Used at Home: none  Assistance upon Discharge: family    Pain/Comfort:  Pain Rating 1: 0/10    Patients cultural, spiritual, Mosque conflicts given the current situation: yes    Objective:     Communicated with: nurse villagomez prior to session.  Patient found up in chair with peripheral IV, SCD, telemetry upon OT entry to room.    General Precautions: Standard, fall, other (see comments) (see epic)  Orthopedic Precautions:    Braces:    Respiratory Status: Room air    Occupational Performance:    Bed Mobility:    Not tested    Functional Mobility/Transfers:  Patient completed Sit <> Stand Transfer  with minimum assistance  with  hand-held assist       Activities of Daily Living:  Grooming: stand by assistance required  Upper Body Dressing: stand by assistance required  Lower Body Dressing: minimum assistance required  Toileting: minimum assistance required    Cognitive/Visual Perceptual:  Cognitive/Psychosocial Skills:     -       Oriented to: Person, Place, Time, and Situation   -       Follows Commands/attention:Follows one-step commands  -       Communication: clear/fluent  -       Memory: No Deficits noted  -       Safety awareness/insight to disability: intact   -       Mood/Affect/Coping skills/emotional control: Appropriate to situation  Visual/Perceptual:      -Intact seems to be the case, wears glasses    Physical Exam:  Balance:    -       sit static good sit dynamic good stand static fair + stand dynamic fair +  Postural examination/scapula alignment:    -       Rounded shoulders  Skin integrity: Visible skin intact  Edema:  None noted  Sensation:    -       Intact  Motor Planning:    -       wnl  Dominant hand:    -       right  Upper Extremity Range of Motion:     -       Right Upper Extremity: WFL  -       Left Upper Extremity: WNL  Upper Extremity Strength:    -       Right Upper Extremity: WFL  Fine Motor Coordination:    -       Intact  Gross motor coordination:   WFL  Left UR strength WNL    Says had old CVA some time ago leading to decreased strength and mobility of right arm  AMPAC 6 Click ADL:  AMPAC Total Score: 18    Treatment & Education:  Evaluation, role of OT    Patient left up in chair with all lines intact, call button in reach, and chair alarm on    GOALS:   Multidisciplinary Problems       Occupational Therapy Goals          Problem: Occupational Therapy    Goal Priority Disciplines Outcome Interventions   Occupational Therapy Goal     OT, PT/OT     Description: Goals to be met by 11-5-23  1. Patient will perform grooming with mod I  2. Patient will perform UE dressing with mod  I  3. Patient will perform LE dressing with SBA  4. Patient will perform toileting with SBA                       History:     Past Medical History:   Diagnosis Date    Colon polyp     Coronary artery disease     Diabetes mellitus     GERD (gastroesophageal reflux disease)     GERD (gastroesophageal reflux disease)     History of colonic polyps     History of heart artery stent     Hypertension     Myocardial infarction     Neuropathy     Stroke     Tumor     BENIGN TUMOR ON BACK THAT COMPRESSED SPINAL CHORD THAT CAUSED PAIN-BUT REMOVED.          Past Surgical History:   Procedure Laterality Date    BACK SURGERY      COLONOSCOPY  04/07/2014    done in [place], [place]; polyps removed per pt report    COLONOSCOPY N/A 12/9/2021    Procedure: COLONOSCOPY;  Surgeon: Nino Ross MD;  Location: [facility];  Service: Endoscopy;  Laterality: N/A;    CORONARY ANGIOPLASTY WITH STENT PLACEMENT      ESOPHAGOGASTRODUODENOSCOPY N/A 2/21/2019    Procedure: EGD (ESOPHAGOGASTRODUODENOSCOPY);  Surgeon: Nino Ross MD;  Location: [facility];  Service: Endoscopy;  Laterality: N/A;    ESOPHAGOGASTRODUODENOSCOPY N/A 12/9/2021    Procedure: EGD (ESOPHAGOGASTRODUODENOSCOPY);  Surgeon: Nino Ross MD;  Location: [facility];  Service: Endoscopy;  Laterality: N/A;    TUMOR REMOVAL  05/16/2016    Back in skin    UPPER GASTROINTESTINAL ENDOSCOPY  02/21/2019    Dr. Ross; mild schatizki ring-dilated; gastritis; gastric polyps; bx negative       Time Tracking:     OT Date of Treatment: 11/05/23  OT Start Time: 1000  OT Stop Time: 1015  OT Total Time (min): 15 min    Billable Minutes:Evaluation      11/5/2023

## 2023-11-05 NOTE — ASSESSMENT & PLAN NOTE
IV vancomycin, IV zosyn  Nebulizer treatments  Supplemental oxygen as needed  Sputum/blood cultures  SLP evaluation.

## 2023-11-05 NOTE — RESPIRATORY THERAPY
11/05/23 0911   Patient Assessment/Suction   Level of Consciousness (AVPU) alert   Respiratory Effort Normal;Unlabored   Expansion/Accessory Muscles/Retractions expansion symmetric;no retractions;no use of accessory muscles   All Lung Fields Breath Sounds Anterior:;Lateral:;diminished   Rhythm/Pattern, Respiratory depth regular;pattern regular;unlabored;no shortness of breath reported   Cough Frequency infrequent   Cough Type fair;nonproductive   PRE-TX-O2   Device (Oxygen Therapy) room air   SpO2 98 %   Pulse Oximetry Type Intermittent   $ Pulse Oximetry - Multiple Charge Pulse Oximetry - Multiple   Pulse 96   Resp 18   Positioning Sitting in chair   Aerosol Therapy   $ Aerosol Therapy Charges Aerosol Treatment   Respiratory Treatment Status (SVN) given   Treatment Route (SVN) mask;oxygen   Patient Position (SVN) sitting in chair   Post Treatment Assessment (SVN) breath sounds unchanged   Signs of Intolerance (SVN) none   Breath Sounds Post-Respiratory Treatment   Post-treatment Heart Rate (beats/min) 96   Post-treatment Resp Rate (breaths/min) 18   Vibratory PEP Therapy   $ Vibratory PEP Charges Aerobika Therapy   $ Vibratory PEP Tech Time Charges 15 min   Type (PEP Therapy) vibratory/oscillatory   Device (PEP Therapy) flutter   Route (PEP Therapy) mouthpiece   Breaths per Cycle (PEP Therapy) 10   Cycles (PEP Therapy) 1   Post Treatment Assessment (PEP) breath sounds unchanged   Signs of Intolerance (PEP Therapy) none

## 2023-11-05 NOTE — ASSESSMENT & PLAN NOTE
Resolved. Patient with Hypoxic Respiratory failure which is Acute.  he is not on home oxygen. Supplemental oxygen was provided and noted- Oxygen Concentration (%):  [100] 100    .   Signs/symptoms of respiratory failure include- tachypnea, increased work of breathing and use of accessory muscles. Contributing diagnoses includes - Aspiration and Pneumonia Labs and images were reviewed. Patient Has not had a recent ABG. Will treat underlying causes and adjust management of respiratory failure as follows- continue supplemental oxygen as needed and treat source.

## 2023-11-06 LAB
ALBUMIN SERPL BCP-MCNC: 3.1 G/DL (ref 3.5–5.2)
ALP SERPL-CCNC: 105 U/L (ref 55–135)
ALT SERPL W/O P-5'-P-CCNC: 12 U/L (ref 10–44)
ANION GAP SERPL CALC-SCNC: 11 MMOL/L (ref 8–16)
AST SERPL-CCNC: 14 U/L (ref 10–40)
BASOPHILS # BLD AUTO: 0.08 K/UL (ref 0–0.2)
BASOPHILS NFR BLD: 0.5 % (ref 0–1.9)
BILIRUB SERPL-MCNC: 1.1 MG/DL (ref 0.1–1)
BUN SERPL-MCNC: 12 MG/DL (ref 8–23)
CALCIUM SERPL-MCNC: 8.8 MG/DL (ref 8.7–10.5)
CHLORIDE SERPL-SCNC: 104 MMOL/L (ref 95–110)
CO2 SERPL-SCNC: 23 MMOL/L (ref 23–29)
CREAT SERPL-MCNC: 0.8 MG/DL (ref 0.5–1.4)
DIFFERENTIAL METHOD: ABNORMAL
EOSINOPHIL # BLD AUTO: 0.1 K/UL (ref 0–0.5)
EOSINOPHIL NFR BLD: 0.8 % (ref 0–8)
ERYTHROCYTE [DISTWIDTH] IN BLOOD BY AUTOMATED COUNT: 15.2 % (ref 11.5–14.5)
EST. GFR  (NO RACE VARIABLE): >60 ML/MIN/1.73 M^2
GLUCOSE SERPL-MCNC: 149 MG/DL (ref 70–110)
HCT VFR BLD AUTO: 29.4 % (ref 40–54)
HGB BLD-MCNC: 9.7 G/DL (ref 14–18)
IMM GRANULOCYTES # BLD AUTO: 0.11 K/UL (ref 0–0.04)
IMM GRANULOCYTES NFR BLD AUTO: 0.7 % (ref 0–0.5)
LYMPHOCYTES # BLD AUTO: 2.3 K/UL (ref 1–4.8)
LYMPHOCYTES NFR BLD: 13.8 % (ref 18–48)
MAGNESIUM SERPL-MCNC: 2 MG/DL (ref 1.6–2.6)
MCH RBC QN AUTO: 32.9 PG (ref 27–31)
MCHC RBC AUTO-ENTMCNC: 33 G/DL (ref 32–36)
MCV RBC AUTO: 100 FL (ref 82–98)
MONOCYTES # BLD AUTO: 0.9 K/UL (ref 0.3–1)
MONOCYTES NFR BLD: 5.3 % (ref 4–15)
MRSA SCREEN BY PCR: NEGATIVE
NEUTROPHILS # BLD AUTO: 13 K/UL (ref 1.8–7.7)
NEUTROPHILS NFR BLD: 78.9 % (ref 38–73)
NRBC BLD-RTO: 0 /100 WBC
PHOSPHATE SERPL-MCNC: 3.1 MG/DL (ref 2.7–4.5)
PLATELET # BLD AUTO: 223 K/UL (ref 150–450)
PMV BLD AUTO: 9 FL (ref 9.2–12.9)
POCT GLUCOSE: 151 MG/DL (ref 70–110)
POCT GLUCOSE: 154 MG/DL (ref 70–110)
POTASSIUM SERPL-SCNC: 3.5 MMOL/L (ref 3.5–5.1)
PROCALCITONIN SERPL IA-MCNC: 0.24 NG/ML
PROT SERPL-MCNC: 6.6 G/DL (ref 6–8.4)
RBC # BLD AUTO: 2.95 M/UL (ref 4.6–6.2)
SODIUM SERPL-SCNC: 138 MMOL/L (ref 136–145)
VANCOMYCIN TROUGH SERPL-MCNC: 16.5 UG/ML (ref 10–22)
WBC # BLD AUTO: 16.5 K/UL (ref 3.9–12.7)

## 2023-11-06 PROCEDURE — 25000003 PHARM REV CODE 250: Performed by: NURSE PRACTITIONER

## 2023-11-06 PROCEDURE — 92526 ORAL FUNCTION THERAPY: CPT

## 2023-11-06 PROCEDURE — 11000001 HC ACUTE MED/SURG PRIVATE ROOM

## 2023-11-06 PROCEDURE — 97116 GAIT TRAINING THERAPY: CPT | Mod: CQ

## 2023-11-06 PROCEDURE — 80202 ASSAY OF VANCOMYCIN: CPT | Performed by: INTERNAL MEDICINE

## 2023-11-06 PROCEDURE — 94664 DEMO&/EVAL PT USE INHALER: CPT

## 2023-11-06 PROCEDURE — 36415 COLL VENOUS BLD VENIPUNCTURE: CPT | Performed by: INTERNAL MEDICINE

## 2023-11-06 PROCEDURE — 94640 AIRWAY INHALATION TREATMENT: CPT

## 2023-11-06 PROCEDURE — 63600175 PHARM REV CODE 636 W HCPCS: Performed by: NURSE PRACTITIONER

## 2023-11-06 PROCEDURE — 92610 EVALUATE SWALLOWING FUNCTION: CPT

## 2023-11-06 PROCEDURE — 85025 COMPLETE CBC W/AUTO DIFF WBC: CPT | Performed by: NURSE PRACTITIONER

## 2023-11-06 PROCEDURE — 84100 ASSAY OF PHOSPHORUS: CPT | Performed by: NURSE PRACTITIONER

## 2023-11-06 PROCEDURE — 94761 N-INVAS EAR/PLS OXIMETRY MLT: CPT

## 2023-11-06 PROCEDURE — 80053 COMPREHEN METABOLIC PANEL: CPT | Performed by: NURSE PRACTITIONER

## 2023-11-06 PROCEDURE — 25000003 PHARM REV CODE 250: Performed by: INTERNAL MEDICINE

## 2023-11-06 PROCEDURE — 99900035 HC TECH TIME PER 15 MIN (STAT)

## 2023-11-06 PROCEDURE — 97530 THERAPEUTIC ACTIVITIES: CPT | Mod: CQ

## 2023-11-06 PROCEDURE — 83735 ASSAY OF MAGNESIUM: CPT | Performed by: NURSE PRACTITIONER

## 2023-11-06 PROCEDURE — 84145 PROCALCITONIN (PCT): CPT | Performed by: INTERNAL MEDICINE

## 2023-11-06 PROCEDURE — 25000242 PHARM REV CODE 250 ALT 637 W/ HCPCS: Performed by: INTERNAL MEDICINE

## 2023-11-06 PROCEDURE — 63600175 PHARM REV CODE 636 W HCPCS: Performed by: INTERNAL MEDICINE

## 2023-11-06 RX ORDER — NORTRIPTYLINE HYDROCHLORIDE 25 MG/1
25 CAPSULE ORAL NIGHTLY
Status: DISCONTINUED | OUTPATIENT
Start: 2023-11-06 | End: 2023-11-07 | Stop reason: HOSPADM

## 2023-11-06 RX ADMIN — VANCOMYCIN HYDROCHLORIDE 1250 MG: 1.25 INJECTION, POWDER, LYOPHILIZED, FOR SOLUTION INTRAVENOUS at 02:11

## 2023-11-06 RX ADMIN — PIPERACILLIN AND TAZOBACTAM 4.5 G: 4; .5 INJECTION, POWDER, LYOPHILIZED, FOR SOLUTION INTRAVENOUS; PARENTERAL at 08:11

## 2023-11-06 RX ADMIN — DOCUSATE SODIUM AND SENNOSIDES 1 TABLET: 8.6; 5 TABLET, FILM COATED ORAL at 08:11

## 2023-11-06 RX ADMIN — MELATONIN TAB 3 MG 6 MG: 3 TAB at 08:11

## 2023-11-06 RX ADMIN — PIPERACILLIN AND TAZOBACTAM 4.5 G: 4; .5 INJECTION, POWDER, LYOPHILIZED, FOR SOLUTION INTRAVENOUS; PARENTERAL at 01:11

## 2023-11-06 RX ADMIN — PIPERACILLIN AND TAZOBACTAM 4.5 G: 4; .5 INJECTION, POWDER, LYOPHILIZED, FOR SOLUTION INTRAVENOUS; PARENTERAL at 02:11

## 2023-11-06 RX ADMIN — IPRATROPIUM BROMIDE AND ALBUTEROL SULFATE 3 ML: 2.5; .5 SOLUTION RESPIRATORY (INHALATION) at 12:11

## 2023-11-06 RX ADMIN — IPRATROPIUM BROMIDE AND ALBUTEROL SULFATE 3 ML: 2.5; .5 SOLUTION RESPIRATORY (INHALATION) at 01:11

## 2023-11-06 RX ADMIN — IPRATROPIUM BROMIDE AND ALBUTEROL SULFATE 3 ML: 2.5; .5 SOLUTION RESPIRATORY (INHALATION) at 07:11

## 2023-11-06 RX ADMIN — ATORVASTATIN CALCIUM 80 MG: 40 TABLET, FILM COATED ORAL at 08:11

## 2023-11-06 RX ADMIN — ENOXAPARIN SODIUM 40 MG: 40 INJECTION SUBCUTANEOUS at 12:11

## 2023-11-06 RX ADMIN — INSULIN ASPART 2 UNITS: 100 INJECTION, SOLUTION INTRAVENOUS; SUBCUTANEOUS at 11:11

## 2023-11-06 RX ADMIN — NORTRIPTYLINE HYDROCHLORIDE 25 MG: 25 CAPSULE ORAL at 08:11

## 2023-11-06 RX ADMIN — INSULIN ASPART 2 UNITS: 100 INJECTION, SOLUTION INTRAVENOUS; SUBCUTANEOUS at 04:11

## 2023-11-06 RX ADMIN — SODIUM CHLORIDE: 9 INJECTION, SOLUTION INTRAVENOUS at 11:11

## 2023-11-06 RX ADMIN — CLOPIDOGREL BISULFATE 75 MG: 75 TABLET, FILM COATED ORAL at 08:11

## 2023-11-06 NOTE — PROGRESS NOTES
Pharmacokinetic Assessment Follow Up: IV Vancomycin    Vancomycin serum concentration assessment(s):    The trough level was drawn correctly and can be used to guide therapy at this time. The measurement is within the desired definitive target range of 15 to 20 mcg/mL.    Vancomycin Regimen Plan:    Continue regimen to Vancomycin 1250 mg IV every 12 hours with next serum trough concentration measured at approximately 0130 prior to 4th dose on 11/8/23    Drug levels (last 3 results):  Recent Labs   Lab Result Units 11/05/23  1314 11/06/23  1306   Vancomycin-Trough ug/mL 13.3 16.5       Pharmacy will continue to follow and monitor vancomycin.    Please contact pharmacy at extension [phone] for questions regarding this assessment.    Thank you for the consult,   Christi Yeboah       Patient brief summary:  Price Noguera is a 63 y.o. male initiated on antimicrobial therapy with IV Vancomycin for treatment of lower respiratory infection    The patient's current regimen is Vancomycin 1250 mg every 12 hours.    Drug Allergies:   Review of patient's allergies indicates:   Allergen Reactions    Protonix [pantoprazole]      Nausea, diarrhea       Actual Body Weight:   72.1 kg    Renal Function:   Estimated Creatinine Clearance: 93.3 mL/min (based on SCr of 0.8 mg/dL).,     Dialysis Method (if applicable):  N/A    CBC (last 72 hours):  Recent Labs   Lab Result Units 11/04/23  1131 11/05/23  0447 11/06/23  0400   WBC K/uL 17.45* 17.73* 16.50*   Hemoglobin g/dL 12.4* 10.5* 9.7*   Hematocrit % 35.9* 31.7* 29.4*   Platelets K/uL 315 244 223   Gran % % 75.4* 83.9* 78.9*   Lymph % % 15.4* 9.4* 13.8*   Mono % % 6.5 5.0 5.3   Eosinophil % % 1.8 0.5 0.8   Basophil % % 0.5 0.4 0.5   Differential Method  Automated Automated Automated       Metabolic Panel (last 72 hours):  Recent Labs   Lab Result Units 11/04/23  1131 11/04/23  1253 11/05/23  0447 11/06/23  0400   Sodium mmol/L 138  --  139 138   Potassium mmol/L 3.6  --  3.7 3.5    Chloride mmol/L 101  --  106 104   CO2 mmol/L 26  --  24 23   Glucose mg/dL 165*  --  126* 149*   Glucose, UA   --  4+*  --   --    BUN mg/dL 9  --  11 12   Creatinine mg/dL 0.9  --  0.8 0.8   Albumin g/dL 4.1  --  3.4* 3.1*   Total Bilirubin mg/dL 1.0  --  1.2* 1.1*   Alkaline Phosphatase U/L 141*  --  114 105   AST U/L 18  --  16 14   ALT U/L 16  --  13 12   Magnesium mg/dL  --   --  1.9 2.0   Phosphorus mg/dL  --   --  3.2 3.1       Vancomycin Administrations:  vancomycin given in the last 96 hours                     vancomycin 1,250 mg in dextrose 5 % (D5W) 250 mL IVPB (Vial-Mate) ()  Restarted 11/06/23 0314     1,250 mg New Bag  0247     1,250 mg New Bag 11/05/23 1525     1,250 mg New Bag  0204    vancomycin (VANCOCIN) 1,750 mg in dextrose 5 % (D5W) 500 mL IVPB (mg) 1,750 mg New Bag 11/04/23 1411                    Microbiologic Results:  Microbiology Results (last 7 days)       Procedure Component Value Units Date/Time    Culture, Respiratory with Gram Stain [[id number]] Collected: 11/05/23 1406    Order Status: Completed Specimen: Respiratory from Sputum Updated: 11/06/23 0813     Respiratory Culture No Growth     Gram Stain (Respiratory) <10 epithelial cells per low power field.     Gram Stain (Respiratory) Rare WBC's     Gram Stain (Respiratory) Rare Gram positive cocci    Urine culture [[id number]]  (Abnormal) Collected: 11/04/23 1253    Order Status: Completed Specimen: Urine Updated: 11/06/23 0759     Urine Culture, Routine STAPHYLOCOCCUS AUREUS  >100,000cfu/ml  Susceptibility pending      Narrative:      Specimen Source->Urine    Blood culture x two cultures. Draw prior to antibiotics. [[id number]] Collected: 11/04/23 1149    Order Status: Completed Specimen: Blood Updated: 11/05/23 1632     Blood Culture, Routine No Growth to date      No Growth to date    Narrative:      Aerobic and anaerobic    Blood culture x two cultures. Draw prior to antibiotics. [[id number]] Collected: 11/04/23 1149    Order  Status: Completed Specimen: Blood Updated: 11/05/23 1632     Blood Culture, Routine No Growth to date      No Growth to date    Narrative:      Aerobic and anaerobic    MRSA Screen by PCR [[id number]] Collected: 11/05/23 1129    Order Status: Sent Specimen: Nasopharyngeal Swab from Nasal Updated: 11/05/23 1357    Influenza A & B by Molecular [[id number]] Collected: 11/04/23 1146    Order Status: Completed Specimen: Nasopharyngeal Swab Updated: 11/04/23 1311     Influenza A, Molecular Negative     Influenza B, Molecular Negative     Flu A & B Source NP

## 2023-11-06 NOTE — CARE UPDATE
11/06/23 0702   Patient Assessment/Suction   Level of Consciousness (AVPU) alert   Respiratory Effort Normal   Expansion/Accessory Muscles/Retractions expansion symmetric   JAILENE Breath Sounds clear   LLL Breath Sounds diminished   RUL Breath Sounds clear   RML Breath Sounds clear   RLL Breath Sounds diminished   Rhythm/Pattern, Respiratory pattern regular   Cough Frequency no cough   PRE-TX-O2   Device (Oxygen Therapy) room air   SpO2 98 %   Pulse Oximetry Type Intermittent   $ Pulse Oximetry - Multiple Charge Pulse Oximetry - Multiple   Pulse 79   Resp 16   Aerosol Therapy   $ Aerosol Therapy Charges Aerosol Treatment   Daily Review of Necessity (SVN) completed   Respiratory Treatment Status (SVN) given   Treatment Route (SVN) mask   Patient Position (SVN) semi-Patel's   Signs of Intolerance (SVN) none   Breath Sounds Post-Respiratory Treatment   Throughout All Fields Post-Treatment All Fields   Throughout All Fields Post-Treatment aeration increased   Post-treatment Heart Rate (beats/min) 80   Post-treatment Resp Rate (breaths/min) 16   Vibratory PEP Therapy   $ Vibratory PEP Charges Aerobika Therapy   $ Vibratory PEP Tech Time Charges 15 min   Daily Review of Necessity (PEP Therapy) completed   Type (PEP Therapy) vibratory/oscillatory   Device (PEP Therapy) flutter   Route (PEP Therapy) mouthpiece   Breaths per Cycle (PEP Therapy) 10   Cycles (PEP Therapy) 1   Settings (PEP Therapy) PEP 5   Signs of Intolerance (PEP Therapy) none

## 2023-11-06 NOTE — PLAN OF CARE
Problem: SLP  Goal: SLP Goal  Description: 1. Pt will tolerate LRD w/o overt s/s aspiration during >90% of PO intake  2. Pt will  complete dysphagia education  3. Pt will demonstrate use of and recall of swallowing precautions during>90% of PO intake    Outcome: Ongoing, Progressing     CSE completed. No overt s/s aspiration; however, s/s pharyngeal dysphagia observed. Recs for MBSS declined by pt. Recs for speech therapy agreed upon during admit; pt declining once d'c 2/2 financial struggle. Rec IDDSI 6- soft and bite sized diet w/ thin liquids. ST to follow.

## 2023-11-06 NOTE — PLAN OF CARE
During MDR's pt found staff in urine and awaitng  MRSA nasal swab. 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 ANH updated.    11/06/23 1195   Discharge Reassessment   Assessment Type Discharge Planning Reassessment   Did the patient's condition or plan change since previous assessment? No   Discharge Plan discussed with: Patient   Communicated ANH with patient/caregiver Yes   Discharge Plan A Home with family   Discharge Plan B Home with family;Home Health   Why the patient remains in the hospital Requires continued medical care

## 2023-11-06 NOTE — PROGRESS NOTES
[facility] Medicine  Progress Note    Patient Name: Price Noguera  MRN: [id number]  Patient Class: IP- Inpatient   Admission Date: 11/4/2023  Length of Stay: 2 days  Attending Physician: Lashon Perez MD  Primary Care Provider: Chanel Roberson MD        Subjective:     Principal Problem:Severe sepsis        HPI:  Price Noguera is a 63-year-old male with past medical history significant for hypertension, coronary artery disease, type 2 diabetes, GERD, and CVA who presented to the emergency department with a 12 day history low-grade fevers, productive cough, and nausea and vomiting.  Over the last few days he has developed right-sided chest pain.  He reports that he lives home alone and he thought that he could take care of himself but his symptoms have steadily become worse.  He has been using over-the-counter cold medicines without relief.  ED workup is significant for WBC 17 K. On arrival he was hypoxic requiring 15 L supplemental oxygen which has since been weaned down to 5 L. He denies home oxygen use or use of CPAP or BiPAP.  He also presented with hypotension and was given the IV sepsis bolus with improvement in blood pressure.  Chest x-ray significant for developing pneumonitis to the right middle lobe.  He is also positive for UTI.  IV vancomycin and IV Zosyn were administered in the emergency department.  His nausea and vomiting have improved and he is feeling hungry at this point in time.  He is admitted to the service of hospital Medicine for continued monitoring and treatment.      Overview/Hospital Course:  No notes on file    Interval History:  Patient looking and feeling better. Patient's fever curve is better. Feeling stronger.  Patient has been weaned off supplemental oxygen.  No focal subjective complaints reported.  Denies any significant cough or expectoration. No abdominal pain, nausea or vomiting or any urinary difficulties.    Review of Systems   Constitutional:   Positive for fever. Negative for chills.   HENT:  Negative for sore throat and trouble swallowing.    Respiratory:  Positive for cough and shortness of breath.    Cardiovascular:  Positive for chest pain (anterior right side). Negative for palpitations.   Gastrointestinal:  Positive for nausea and vomiting. Negative for abdominal pain and diarrhea.   Genitourinary:  Negative for dysuria and hematuria.   Musculoskeletal:  Positive for myalgias. Negative for neck pain.   Skin:  Negative for rash and wound.   Neurological:  Positive for weakness. Negative for syncope.   Psychiatric/Behavioral:  Negative for agitation and confusion.    All other systems reviewed and are negative.    Objective:     Vital Signs (Most Recent):  Temp: 97.7 °F (36.5 °C) (11/06/23 0731)  Pulse: 83 (11/06/23 0731)  Resp: 18 (11/06/23 0731)  BP: 122/62 (11/06/23 0731)  SpO2: 98 % (11/06/23 0731) Vital Signs (24h Range):  Temp:  [96.5 °F (35.8 °C)-99 °F (37.2 °C)] 97.7 °F (36.5 °C)  Pulse:  [79-99] 83  Resp:  [16-20] 18  SpO2:  [95 %-98 %] 98 %  BP: (114-141)/(62-68) 122/62     Weight: 72.1 kg (158 lb 15.2 oz)  Body mass index is 23.47 kg/m².    Intake/Output Summary (Last 24 hours) at 11/6/2023 0852  Last data filed at 11/6/2023 0518  Gross per 24 hour   Intake 3609.88 ml   Output 3225 ml   Net 384.88 ml           Physical Exam  Constitutional:       General: He is not in acute distress.     Appearance: He is well-developed and normal weight. He is ill-appearing.   HENT:      Head: Normocephalic and atraumatic.   Eyes:      Conjunctiva/sclera: Conjunctivae normal.      Pupils: Pupils are equal, round, and reactive to light.   Cardiovascular:      Rate and Rhythm: Normal rate and regular rhythm.      Pulses: Normal pulses.      Heart sounds: Normal heart sounds.   Pulmonary:      Effort: Pulmonary effort is normal. No respiratory distress.      Breath sounds: Normal breath sounds.   Chest:      Comments: Reproducible chest pain right  mid-chest  Abdominal:      General: Bowel sounds are normal. There is no distension.      Palpations: Abdomen is soft.      Tenderness: There is no abdominal tenderness.   Genitourinary:     Comments: Has urinal at bedside; dark yellow urine noted  Musculoskeletal:         General: Normal range of motion.      Cervical back: Normal range of motion and neck supple.      Right lower leg: No edema.      Left lower leg: No edema.   Skin:     General: Skin is warm and dry.      Coloration: Skin is pale.   Neurological:      General: No focal deficit present.      Mental Status: He is alert and oriented to person, place, and time.      Comments: Chronic right facial drooping and right upper and lower extremity hemiparesis from old CVA 2 years ago.  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   Psychiatric:         Mood and Affect: Mood normal.         Behavior: Behavior normal.         Thought Content: Thought content normal.             Significant Labs: All pertinent labs within the past 24 hours have been reviewed.  CBC:   Recent Labs   Lab 11/04/23  1131 11/05/23 0447 11/06/23  0400   WBC 17.45* 17.73* 16.50*   HGB 12.4* 10.5* 9.7*   HCT 35.9* 31.7* 29.4*    244 223       CMP:   Recent Labs   Lab 11/04/23  1131 11/05/23  0447 11/06/23  0400    139 138   K 3.6 3.7 3.5    106 104   CO2 26 24 23   * 126* 149*   BUN 9 11 12   CREATININE 0.9 0.8 0.8   CALCIUM 9.7 8.9 8.8   PROT 7.9 6.8 6.6   ALBUMIN 4.1 3.4* 3.1*   BILITOT 1.0 1.2* 1.1*   ALKPHOS 141* 114 105   AST 18 16 14   ALT 16 13 12   ANIONGAP 11 9 11       Lactic Acid:   Recent Labs   Lab 11/04/23  1507   LACTATE 0.8       Urine Culture:   Recent Labs   Lab 11/04/23  1253   LABURIN STAPHYLOCOCCUS AUREUS  >100,000cfu/ml  Susceptibility pending  *       Urine Studies:   Recent Labs   Lab 11/04/23  1253   COLORU Yellow   APPEARANCEUA Clear   PHUR 7.0   SPECGRAV 1.030   PROTEINUA Negative   GLUCUA 4+*    KETONESU 1+*   BILIRUBINUA Negative   OCCULTUA 3+*   NITRITE Negative   UROBILINOGEN Negative   LEUKOCYTESUR 2+*   RBCUA 8*   WBCUA 25*   BACTERIA Many*       Microbiology Results (last 7 days)       Procedure Component Value Units Date/Time    Culture, Respiratory with Gram Stain [[id number]] Collected: 11/05/23 1406    Order Status: Completed Specimen: Respiratory from Sputum Updated: 11/06/23 0813     Respiratory Culture No Growth     Gram Stain (Respiratory) <10 epithelial cells per low power field.     Gram Stain (Respiratory) Rare WBC's     Gram Stain (Respiratory) Rare Gram positive cocci    Urine culture [[id number]]  (Abnormal) Collected: 11/04/23 1253    Order Status: Completed Specimen: Urine Updated: 11/06/23 0759     Urine Culture, Routine STAPHYLOCOCCUS AUREUS  >100,000cfu/ml  Susceptibility pending      Narrative:      Specimen Source->Urine    Blood culture x two cultures. Draw prior to antibiotics. [[id number]] Collected: 11/04/23 1149    Order Status: Completed Specimen: Blood Updated: 11/05/23 1632     Blood Culture, Routine No Growth to date      No Growth to date    Narrative:      Aerobic and anaerobic    Blood culture x two cultures. Draw prior to antibiotics. [[id number]] Collected: 11/04/23 1149    Order Status: Completed Specimen: Blood Updated: 11/05/23 1632     Blood Culture, Routine No Growth to date      No Growth to date    Narrative:      Aerobic and anaerobic    MRSA Screen by PCR [[id number]] Collected: 11/05/23 1129    Order Status: Sent Specimen: Nasopharyngeal Swab from Nasal Updated: 11/05/23 1357    Influenza A & B by Molecular [[id number]] Collected: 11/04/23 1146    Order Status: Completed Specimen: Nasopharyngeal Swab Updated: 11/04/23 1311     Influenza A, Molecular Negative     Influenza B, Molecular Negative     Flu A & B Source NP          Significant Imaging:   CXR:  1. Suggestion of faint ground-glass airspace opacity within the right midlung zone.  Developing  pneumonitis is not excluded.  2. Linear atelectasis at the left lung base.      Assessment/Plan:      * Severe sepsis  This patient does have evidence of infective focus  My overall impression is severe sepsis.  Source: Respiratory and Urinary Tract  Antibiotics given-   Antibiotics (72h ago, onward)    Start     Stop Route Frequency Ordered    11/05/23 0230  vancomycin 1,250 mg in dextrose 5 % (D5W) 250 mL IVPB (Vial-Mate)         -- IV Every 12 hours (non-standard times) 11/04/23 2007 11/04/23 2000  piperacillin-tazobactam (ZOSYN) 4.5 g in dextrose 5 % in water (D5W) 100 mL IVPB (MB+)         -- IV Every 6 hours (non-standard times) 11/04/23 1947 11/04/23 1947  vancomycin - pharmacy to dose  (vancomycin IVPB (PEDS and ADULTS))        See Hyperspace for full Linked Orders Report.    -- IV pharmacy to manage frequency 11/04/23 1947        Latest lactate reviewed-  Recent Labs   Lab 11/04/23  1507   LACTATE 0.8     Organ dysfunction indicated by Acute respiratory failure    Fluid challenge Actual Body weight- Patient will receive 30ml/kg actual body weight to calculate fluid bolus for treatment of septic shock.     Post- resuscitation assessment Yes Perfusion exam was performed within 6 hours of septic shock presentation after bolus shows Adequate tissue perfusion assessed by non-invasive monitoring     Check MRSA nasal screen.  Will Not start Pressors- Levophed for MAP of 65  Source control achieved by: IV antibiotics    Acute respiratory failure with hypoxia  Resolved. Patient with Hypoxic Respiratory failure which is Acute.  he is not on home oxygen. Supplemental oxygen was provided and noted- Oxygen Concentration (%):  [100] 100    .   Signs/symptoms of respiratory failure include- tachypnea, increased work of breathing and use of accessory muscles. Contributing diagnoses includes - Aspiration and Pneumonia Labs and images were reviewed. Patient Has not had a recent ABG. Will treat underlying causes and  adjust management of respiratory failure as follows- continue supplemental oxygen as needed and treat source.    Pneumonitis, aspiration  IV vancomycin, IV zosyn  Nebulizer treatments  Supplemental oxygen as needed  Sputum/blood cultures  SLP evaluation.     Acute cystitis  IV vancomycin/IV zosyn  Follow culture      BPH (benign prostatic hyperplasia)  Monitor-not on chronic meds      Gastroesophageal reflux disease without esophagitis  Chronic; continue protonix      Hyperlipidemia associated with type 2 diabetes mellitus   Patient is chronically on statin.will continue for now. Monitor clinically. Last LDL was   Lab Results   Component Value Date    LDLCALC 76.6 06/14/2023            Hypertension associated with diabetes  Chronic, uncontrolled -hypotensive on arrival.  Latest blood pressure and vitals reviewed-     Temp:  [98 °F (36.7 °C)-101.2 °F (38.4 °C)]   Pulse:  []   Resp:  [17-19]   BP: ()/(52-74)   SpO2:  [95 %-100 %] .   Home meds for hypertension were reviewed and noted below-  Hypertension Medications             lisinopriL (PRINIVIL,ZESTRIL) 20 MG tablet TAKE 1/2 TABLET BY MOUTH EVERY DAY          While in the hospital, will manage blood pressure as follows; Adjust home antihypertensive regimen as follows- hold 2/2 hypotension and resume when clinically appropriate    Will utilize p.r.n. blood pressure medication only if patient's blood pressure greater than 180/110 and he develops symptoms such as worsening chest pain or shortness of breath.        Coronary artery disease  Patient with known CAD s/p stent placement, which is controlled Will continue ASA, Plavix and Statin and monitor for S/Sx of angina/ACS. Continue to monitor on telemetry.     Type 2 diabetes mellitus with neurologic complication, with long-term current use of insulin  Patient's FSGs are controlled on current medication regimen.  Last A1c reviewed-   Lab Results   Component Value Date    HGBA1C 6.3 (H) 06/14/2023     Most  recent fingerstick glucose reviewed-   Recent Labs   Lab 11/04/23  2101   POCTGLUCOSE 137*     Current correctional scale  Medium  Maintain anti-hyperglycemic dose as follows-   Antihyperglycemics (From admission, onward)    SSI        Hold Oral hypoglycemics while patient is in the hospital.        D/W SW regarding DC planning.   VTE Risk Mitigation (From admission, onward)         Ordered     enoxaparin injection 40 mg  Every 24 hours (non-standard times)         11/05/23 1128     IP VTE LOW RISK PATIENT  Once         11/04/23 1947     Place sequential compression device  Until discontinued         11/04/23 1947                Discharge Planning   ANH: 11/7/2023     Code Status: Full Code   Is the patient medically ready for discharge?:     Reason for patient still in hospital (select all that apply): Patient trending condition and Consult recommendations  Discharge Plan A: Home                  Lashon Perez MD  Department of Hospital Medicine   [facility]/Surg

## 2023-11-06 NOTE — PLAN OF CARE
Problem: Physical Therapy  Goal: Physical Therapy Goal  Description: Goals to be met by: 11/15/2023     Patient will increase functional independence with mobility by performin). Supine to sit with Modified [place]  2). Sit to supine with Modified [place]  3). Sit to stand transfer with Modified [place]  4). Gait  x > 150 feet with Modified [place] using Single-point Cane .     Outcome: Ongoing, Progressing   Ambulate with SPC and assistance for safety.

## 2023-11-06 NOTE — PT/OT/SLP EVAL
"Speech Language Pathology Evaluation  Bedside Swallow    Patient Name:  Price Noguera   MRN:  [id number]  Admitting Diagnosis: Severe sepsis    Recommendations:                 General Recommendations:  Dysphagia therapy  Diet recommendations:  Soft & Bite Sized Diet - IDDSI Level 6, Thin liquids - IDDSI Level 0   Aspiration Precautions:  Effortful swallow w/ complex textures, 1 bite/sip at a time, Alternating bites/sips, HOB to 90 degrees, Meds whole 1 at a time, Remain upright 30 minutes post meal, and Small bites/sips   General Precautions: Standard, aspiration  Communication strategies:  provide increased time to answer    Assessment:     Price Noguera is a 63 y.o. male with an SLP diagnosis of Dysphagia and Dysarthria.  He presents with residual mild R sided corner droop and slow speech rate. He also present w/ decreased laryngeal excursion and globus sensation. No overt s/s aspiration noted across trials today, though silent aspiration cannot be ruled out at bedside. He reports the need for swallowing precautions that he has independently incorporated to avoid choking; he avoids dry, crumbly and tough textures. He does not report speech therapy after his CVA 2/2 what he reports as decreased access to resources and financial struggle. Recs for MBSS provided, w/ pt ultimately declining 2/2 inability to access/afford therapy to address swallowing deficits once discharged. Rec IDDSI 6- soft and bite sized diet w/ thin liquids and use of swallowing precautions listed above. ST to follow during admit re diet, precautions, and dysphagia education.    History:   Per H&P:    "HPI: Price Noguera is a 63-year-old male with past medical history significant for hypertension, coronary artery disease, type 2 diabetes, GERD, and CVA who presented to the emergency department with a 12 day history low-grade fevers, productive cough, and nausea and vomiting.  Over the last few days he has developed right-sided chest pain.  He " "reports that he lives home alone and he thought that he could take care of himself but his symptoms have steadily become worse.  He has been using over-the-counter cold medicines without relief.  ED workup is significant for WBC 17 K. On arrival he was hypoxic requiring 15 L supplemental oxygen which has since been weaned down to 5 L. He denies home oxygen use or use of CPAP or BiPAP.  He also presented with hypotension and was given the IV sepsis bolus with improvement in blood pressure.  Chest x-ray significant for developing pneumonitis to the right middle lobe.  He is also positive for UTI.  IV vancomycin and IV Zosyn were administered in the emergency department.  His nausea and vomiting have improved and he is feeling hungry at this point in time.  He is admitted to the service of hospital Medicine for continued monitoring and treatment."    Past Medical History:   Diagnosis Date    Colon polyp     Coronary artery disease     Diabetes mellitus     GERD (gastroesophageal reflux disease)     GERD (gastroesophageal reflux disease)     History of colonic polyps     History of heart artery stent     Hypertension     Myocardial infarction     Neuropathy     Stroke     Tumor     BENIGN TUMOR ON BACK THAT COMPRESSED SPINAL CHORD THAT CAUSED PAIN-BUT REMOVED.        Past Surgical History:   Procedure Laterality Date    BACK SURGERY      COLONOSCOPY  04/07/2014    done in [place], [place]; polyps removed per pt report    COLONOSCOPY N/A 12/9/2021    Procedure: COLONOSCOPY;  Surgeon: Nino Ross MD;  Location: [facility];  Service: Endoscopy;  Laterality: N/A;    CORONARY ANGIOPLASTY WITH STENT PLACEMENT      ESOPHAGOGASTRODUODENOSCOPY N/A 2/21/2019    Procedure: EGD (ESOPHAGOGASTRODUODENOSCOPY);  Surgeon: Nino Ross MD;  Location: [facility];  Service: Endoscopy;  Laterality: N/A;    ESOPHAGOGASTRODUODENOSCOPY N/A 12/9/2021    Procedure: EGD (ESOPHAGOGASTRODUODENOSCOPY);  Surgeon: Nino Ross MD;  Location: " "[facility];  Service: Endoscopy;  Laterality: N/A;    TUMOR REMOVAL  05/16/2016    Back in skin    UPPER GASTROINTESTINAL ENDOSCOPY  02/21/2019    Dr. Ross; mild schatizki ring-dilated; gastritis; gastric polyps; bx negative       Social History: Patient lives alone.    Prior Intubation HX:  none this admit    Modified Barium Swallow: none found in epic or reported by pt    Chest X-Rays:   Imaging Results              X-Ray Chest AP Portable (Final result)  Result time 11/04/23 13:32:19      Final result by Renee Interiano MD (11/04/23 13:32:19)                   Impression:      1. Suggestion of faint ground-glass airspace opacity within the right midlung zone.  Developing pneumonitis is not excluded.  2. Linear atelectasis at the left lung base.      Electronically signed by: Aguilar Interiano MD  Date:    11/04/2023  Time:    13:32               Narrative:    EXAMINATION:  XR CHEST AP PORTABLE    CLINICAL HISTORY:  Sepsis;    TECHNIQUE:  A single portable upright AP chest radiograph was acquired.    COMPARISON:  Chest x-ray-01/08/2019    FINDINGS:  The cardiac silhouette is not enlarged.  No central pulmonary vascular congestion.  There is faint ground-glass airspace opacity in the right midlung zone.  Developing pneumonitis is not excluded.  Please correlate clinically.  There is linear atelectasis present at the left lung base.  No pleural fluid or pneumothorax.  There is degenerative change of the left acromioclavicular joint.                                      Prior diet: soft and bite sized, per pt's description. He reports avoidance of meat and dry, crumbly textures 2/2 choking. He cuts pills in half or thirds when larger in order to avoid choking.    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    Subjective     Pt awake laying in bed. Agreeable to CSE.  Patient goals: "I would love to get the help I need, but I can't do things that I am unable " "to afford"     Pain/Comfort:       Respiratory Status: Room air    Objective:   Pt awake, alert, oriented x4. He was able to follow directives and engage in discussion of hx and POC adequately. He reports swallowing and speech change after CVA. He describes difficulty w/ swallowing w/ the following characteristics: exhaustion when eating, decreased endurance ~1/2 way through meal of tough/regular textures, avoidance of meats and dry/crumbly textures, need for cutting meds in 1/2-1/3 pieces, choking, globus sensation, and need for swallowing precautions (alternating bites/sips, small/soft pieces).    Oral Musculature Evaluation  Oral Musculature: facial asymmetry present (mild R corner droop)  Dentition: present and adequate  Secretion Management: adequate  Mucosal Quality: adequate  Mandibular Strength and Mobility: WFL  Oral Labial Strength and Mobility: impaired retraction, functional pursing, functional seal  Lingual Strength and Mobility: impaired protrusion, functional lateral movement, functional anterior elevation, functional strength  Velar Elevation: WFL  Buccal Strength and Mobility: WFL  Volitional Cough: elicited; adequate, strong  Volitional Swallow: palpated; decreased laryngeal excursion  Voice Prior to PO Intake: clear    Bedside Swallow Eval:   Consistencies Assessed:  Thin liquids water via cup and straw  Puree tsp bites applesauce  Mixed consistencies tsp bites diced peaches in thin juice  Solids cracker      Oral Phase:   Slowed mastication  Oral clearance WFL  Timely oral transport    Pharyngeal Phase:   decreased hyolaryngeal excursion to palpation  globus sensation    Compensatory Strategies  Liquid wash- pt reports relief of globus sensation    Treatment: Pt educated re purpose of evaluation, role of SLP, results of evaluation, and recs. Extensive ed re pharyngeal dysphagia, aspiration PNE, benefits of MBSS, benefits of ST, need for modified diet, and need for swallowing precautions. Pt " expressed understanding of recs; however, he declined MBSS and ST at d'c. Recs shared w/ nursing, dietician, and MD.      Goals:   Multidisciplinary Problems       SLP Goals          Problem: SLP    Goal Priority Disciplines Outcome   SLP Goal     SLP Ongoing, Progressing   Description: 1. Pt will tolerate LRD w/o overt s/s aspiration during >90% of PO intake  2. Pt will  complete dysphagia education  3. Pt will demonstrate use of and recall of swallowing precautions during>90% of PO intake                         Plan:     Patient to be seen:  3 x/week   Plan of Care expires:     Plan of Care reviewed with:  patient, other (see comments) (nursing, MD)   SLP Follow-Up:  Yes       Discharge recommendations:  Low Intensity Therapy   Barriers to Discharge:  None    Time Tracking:     SLP Treatment Date:   11/06/23  Speech Start Time:  0945  Speech Stop Time:  1019     Speech Total Time (min):  34 min    Billable Minutes: Treatment Swallowing Dysfunction 15 min and Eval Swallow and Oral Function 19 min    11/06/2023

## 2023-11-06 NOTE — SUBJECTIVE & OBJECTIVE
Interval History:  Patient looking and feeling better. Patient's fever curve is better. Feeling stronger.  Patient has been weaned off supplemental oxygen.  No focal subjective complaints reported.  Denies any significant cough or expectoration. No abdominal pain, nausea or vomiting or any urinary difficulties.    Review of Systems   Constitutional:  Positive for fever. Negative for chills.   HENT:  Negative for sore throat and trouble swallowing.    Respiratory:  Positive for cough and shortness of breath.    Cardiovascular:  Positive for chest pain (anterior right side). Negative for palpitations.   Gastrointestinal:  Positive for nausea and vomiting. Negative for abdominal pain and diarrhea.   Genitourinary:  Negative for dysuria and hematuria.   Musculoskeletal:  Positive for myalgias. Negative for neck pain.   Skin:  Negative for rash and wound.   Neurological:  Positive for weakness. Negative for syncope.   Psychiatric/Behavioral:  Negative for agitation and confusion.    All other systems reviewed and are negative.    Objective:     Vital Signs (Most Recent):  Temp: 97.7 °F (36.5 °C) (11/06/23 0731)  Pulse: 83 (11/06/23 0731)  Resp: 18 (11/06/23 0731)  BP: 122/62 (11/06/23 0731)  SpO2: 98 % (11/06/23 0731) Vital Signs (24h Range):  Temp:  [96.5 °F (35.8 °C)-99 °F (37.2 °C)] 97.7 °F (36.5 °C)  Pulse:  [79-99] 83  Resp:  [16-20] 18  SpO2:  [95 %-98 %] 98 %  BP: (114-141)/(62-68) 122/62     Weight: 72.1 kg (158 lb 15.2 oz)  Body mass index is 23.47 kg/m².    Intake/Output Summary (Last 24 hours) at 11/6/2023 0852  Last data filed at 11/6/2023 0518  Gross per 24 hour   Intake 3609.88 ml   Output 3225 ml   Net 384.88 ml           Physical Exam  Constitutional:       General: He is not in acute distress.     Appearance: He is well-developed and normal weight. He is ill-appearing.   HENT:      Head: Normocephalic and atraumatic.   Eyes:      Conjunctiva/sclera: Conjunctivae normal.      Pupils: Pupils are equal,  round, and reactive to light.   Cardiovascular:      Rate and Rhythm: Normal rate and regular rhythm.      Pulses: Normal pulses.      Heart sounds: Normal heart sounds.   Pulmonary:      Effort: Pulmonary effort is normal. No respiratory distress.      Breath sounds: Normal breath sounds.   Chest:      Comments: Reproducible chest pain right mid-chest  Abdominal:      General: Bowel sounds are normal. There is no distension.      Palpations: Abdomen is soft.      Tenderness: There is no abdominal tenderness.   Genitourinary:     Comments: Has urinal at bedside; dark yellow urine noted  Musculoskeletal:         General: Normal range of motion.      Cervical back: Normal range of motion and neck supple.      Right lower leg: No edema.      Left lower leg: No edema.   Skin:     General: Skin is warm and dry.      Coloration: Skin is pale.   Neurological:      General: No focal deficit present.      Mental Status: He is alert and oriented to person, place, and time.      Comments: Chronic right facial drooping and right upper and lower extremity hemiparesis from old CVA 2 years ago.  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   Psychiatric:         Mood and Affect: Mood normal.         Behavior: Behavior normal.         Thought Content: Thought content normal.             Significant Labs: All pertinent labs within the past 24 hours have been reviewed.  CBC:   Recent Labs   Lab 11/04/23  1131 11/05/23  0447 11/06/23  0400   WBC 17.45* 17.73* 16.50*   HGB 12.4* 10.5* 9.7*   HCT 35.9* 31.7* 29.4*    244 223       CMP:   Recent Labs   Lab 11/04/23  1131 11/05/23  0447 11/06/23  0400    139 138   K 3.6 3.7 3.5    106 104   CO2 26 24 23   * 126* 149*   BUN 9 11 12   CREATININE 0.9 0.8 0.8   CALCIUM 9.7 8.9 8.8   PROT 7.9 6.8 6.6   ALBUMIN 4.1 3.4* 3.1*   BILITOT 1.0 1.2* 1.1*   ALKPHOS 141* 114 105   AST 18 16 14   ALT 16 13 12   ANIONGAP 11 9 11       Lactic  Acid:   Recent Labs   Lab 11/04/23  1507   LACTATE 0.8       Urine Culture:   Recent Labs   Lab 11/04/23  1253   LABURIN STAPHYLOCOCCUS AUREUS  >100,000cfu/ml  Susceptibility pending  *       Urine Studies:   Recent Labs   Lab 11/04/23  1253   COLORU Yellow   APPEARANCEUA Clear   PHUR 7.0   SPECGRAV 1.030   PROTEINUA Negative   GLUCUA 4+*   KETONESU 1+*   BILIRUBINUA Negative   OCCULTUA 3+*   NITRITE Negative   UROBILINOGEN Negative   LEUKOCYTESUR 2+*   RBCUA 8*   WBCUA 25*   BACTERIA Many*       Microbiology Results (last 7 days)       Procedure Component Value Units Date/Time    Culture, Respiratory with Gram Stain [[id number]] Collected: 11/05/23 1406    Order Status: Completed Specimen: Respiratory from Sputum Updated: 11/06/23 0813     Respiratory Culture No Growth     Gram Stain (Respiratory) <10 epithelial cells per low power field.     Gram Stain (Respiratory) Rare WBC's     Gram Stain (Respiratory) Rare Gram positive cocci    Urine culture [[id number]]  (Abnormal) Collected: 11/04/23 1253    Order Status: Completed Specimen: Urine Updated: 11/06/23 0759     Urine Culture, Routine STAPHYLOCOCCUS AUREUS  >100,000cfu/ml  Susceptibility pending      Narrative:      Specimen Source->Urine    Blood culture x two cultures. Draw prior to antibiotics. [[id number]] Collected: 11/04/23 1149    Order Status: Completed Specimen: Blood Updated: 11/05/23 1632     Blood Culture, Routine No Growth to date      No Growth to date    Narrative:      Aerobic and anaerobic    Blood culture x two cultures. Draw prior to antibiotics. [[id number]] Collected: 11/04/23 1149    Order Status: Completed Specimen: Blood Updated: 11/05/23 1632     Blood Culture, Routine No Growth to date      No Growth to date    Narrative:      Aerobic and anaerobic    MRSA Screen by PCR [[id number]] Collected: 11/05/23 1129    Order Status: Sent Specimen: Nasopharyngeal Swab from Nasal Updated: 11/05/23 1357    Influenza A & B by Molecular [[id number]]  Collected: 11/04/23 1146    Order Status: Completed Specimen: Nasopharyngeal Swab Updated: 11/04/23 1311     Influenza A, Molecular Negative     Influenza B, Molecular Negative     Flu A & B Source NP          Significant Imaging:   CXR:  1. Suggestion of faint ground-glass airspace opacity within the right midlung zone.  Developing pneumonitis is not excluded.  2. Linear atelectasis at the left lung base.

## 2023-11-06 NOTE — CONSULTS
Food & Nutrition                                                           Education     Diet Education: 15 minutes  Time Spent: IDDSI 6, diabetic diet  Learners:  Patient        Nutrition Education provided with handouts: yes, in pt discharge packet        Comments: Patient admitted with pneumonitis, possible aspiration and cystitis. Consult for diabetes education. Diabetes is controlled with Tresiba and metformin at home ( A1C 6.3). Pt was seen by SLP for worsening swallowing/chewing trouble and she recommended IDDSI 6 textures, thin liquids. He shows signs of dysphagis, she says, but has coping methods that he has been using and could tolerate regular textures but D6 is better for him and safer.     I attempted to discuss pts diet and education needs, however pt dislikes our food so much that he was preoccupied with the service. I explained what SLP recommended and we worked together to come up with options that the pt may eat. He has been avoiding meats at home because of his limited chewing ability. I discussed that we will be carb counting for him as well, on a diabetic diet. Pt is familiar with this, but is not amenable to further education at this time. Verbalizes no diabetic needs for home. He mentioned to SLP that he has a very limited income.      Assessment: NFPE  done 11/26/23, no significant wasting noted, on the thin side. Skin is pale. Slow weight loss, fluctuations also noted. UBW ~ 170 lb 1.5 yr ago. Will add Boost supplements.  All questions and concerns answered. Dietitian's contact information provided.         Follow-Up: yes     Please Re-consult as needed           Thanks!

## 2023-11-06 NOTE — PT/OT/SLP PROGRESS
Physical Therapy Treatment    Patient Name:  Price Noguera   MRN:  [id number]    Recommendations:     Discharge Recommendations: Low Intensity Therapy  Discharge Equipment Recommendations: none  Barriers to discharge: None    Assessment:     Price Noguera is a 63 y.o. male admitted with a medical diagnosis of Severe sepsis.  He presents with the following impairments/functional limitations: impaired endurance, impaired self care skills, gait instability . Awake, alert, supine in bed.  Agreed to mobilize.  Requires A for safety with mobility , unsteady.  Transferred EOB with CGA, assistance required to manage lines.  Sit  > stand with CGA with SPC.  Ambulated 10' , requested to use urinal able to do so in standing with Min A for balance.  Ambulated to restroom following on / off commode with CGA.  Required set up for hygiene post BM.  Ambulated 100' x 2 with SPC and Min A for safety( unsteady during turns) seated rest between each .     Rehab Prognosis: Good; patient would benefit from acute skilled PT services to address these deficits and reach maximum level of function.    Recent Surgery: * No surgery found *      Plan:     During this hospitalization, patient to be seen 6 x/week to address the identified rehab impairments via gait training, therapeutic activities, therapeutic exercises and progress toward the following goals:    Plan of Care Expires:  11/15/23    Subjective     Chief Complaint: frequent urination.  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  Pain/Comfort:  Pain Rating 1: 0/10      Objective:     Communicated with nurse Brooks prior to session.  Patient found supine with bed alarm, peripheral IV, telemetry, SCD upon PT entry to room.     General Precautions: Standard, aspiration, fall  Orthopedic Precautions: N/A  Braces: N/A  Respiratory Status: Room air     Functional Mobility:  Bed Mobility:     Rolling Right: contact guard assistance  Supine to Sit: contact guard  assistance  Transfers:     Sit to Stand:  contact guard assistance with straight cane  Gait: 15' + 100' x 2 with SPC and Min A.      AM-PAC 6 CLICK MOBILITY          Treatment & Education:  Ambulated with SPC and Min A for safety. Unsteady during turns.       Patient left up in chair with all lines intact, call button in reach, chair alarm on, and nurse Cecilia notified..    GOALS:   Multidisciplinary Problems       Physical Therapy Goals          Problem: Physical Therapy    Goal Priority Disciplines Outcome Goal Variances Interventions   Physical Therapy Goal     PT, PT/OT Ongoing, Progressing     Description: Goals to be met by: 11/15/2023     Patient will increase functional independence with mobility by performin). Supine to sit with Modified [place]  2). Sit to supine with Modified [place]  3). Sit to stand transfer with Modified [place]  4). Gait  x > 150 feet with Modified [place] using Single-point Cane .                          Time Tracking:     PT Received On: 23  PT Start Time: 1035     PT Stop Time: 1100  PT Total Time (min): 25 min     Billable Minutes: Gait Training 15min and Therapeutic Activity 10min    Treatment Type: Treatment  PT/PTA: PTA     Number of PTA visits since last PT visit: 1     2023

## 2023-11-07 VITALS
RESPIRATION RATE: 16 BRPM | SYSTOLIC BLOOD PRESSURE: 114 MMHG | OXYGEN SATURATION: 99 % | BODY MASS INDEX: 23.54 KG/M2 | TEMPERATURE: 98 F | HEIGHT: 69 IN | WEIGHT: 158.94 LBS | HEART RATE: 84 BPM | DIASTOLIC BLOOD PRESSURE: 60 MMHG

## 2023-11-07 LAB
ALBUMIN SERPL BCP-MCNC: 2.9 G/DL (ref 3.5–5.2)
ALP SERPL-CCNC: 117 U/L (ref 55–135)
ALT SERPL W/O P-5'-P-CCNC: 12 U/L (ref 10–44)
ANION GAP SERPL CALC-SCNC: 9 MMOL/L (ref 8–16)
AST SERPL-CCNC: 18 U/L (ref 10–40)
BACTERIA SPEC AEROBE CULT: NORMAL
BACTERIA UR CULT: ABNORMAL
BASOPHILS # BLD AUTO: 0.07 K/UL (ref 0–0.2)
BASOPHILS NFR BLD: 0.6 % (ref 0–1.9)
BILIRUB SERPL-MCNC: 0.7 MG/DL (ref 0.1–1)
BUN SERPL-MCNC: 10 MG/DL (ref 8–23)
CALCIUM SERPL-MCNC: 8.7 MG/DL (ref 8.7–10.5)
CHLORIDE SERPL-SCNC: 106 MMOL/L (ref 95–110)
CO2 SERPL-SCNC: 22 MMOL/L (ref 23–29)
CREAT SERPL-MCNC: 0.7 MG/DL (ref 0.5–1.4)
DIFFERENTIAL METHOD: ABNORMAL
EOSINOPHIL # BLD AUTO: 0.3 K/UL (ref 0–0.5)
EOSINOPHIL NFR BLD: 2.3 % (ref 0–8)
ERYTHROCYTE [DISTWIDTH] IN BLOOD BY AUTOMATED COUNT: 15 % (ref 11.5–14.5)
EST. GFR  (NO RACE VARIABLE): >60 ML/MIN/1.73 M^2
GLUCOSE SERPL-MCNC: 139 MG/DL (ref 70–110)
GRAM STN SPEC: NORMAL
HCT VFR BLD AUTO: 28.7 % (ref 40–54)
HGB BLD-MCNC: 9.8 G/DL (ref 14–18)
IMM GRANULOCYTES # BLD AUTO: 0.06 K/UL (ref 0–0.04)
IMM GRANULOCYTES NFR BLD AUTO: 0.5 % (ref 0–0.5)
LYMPHOCYTES # BLD AUTO: 1.9 K/UL (ref 1–4.8)
LYMPHOCYTES NFR BLD: 15.8 % (ref 18–48)
MAGNESIUM SERPL-MCNC: 2 MG/DL (ref 1.6–2.6)
MCH RBC QN AUTO: 33.7 PG (ref 27–31)
MCHC RBC AUTO-ENTMCNC: 34.1 G/DL (ref 32–36)
MCV RBC AUTO: 99 FL (ref 82–98)
MONOCYTES # BLD AUTO: 0.8 K/UL (ref 0.3–1)
MONOCYTES NFR BLD: 6.3 % (ref 4–15)
NEUTROPHILS # BLD AUTO: 8.9 K/UL (ref 1.8–7.7)
NEUTROPHILS NFR BLD: 74.5 % (ref 38–73)
NRBC BLD-RTO: 0 /100 WBC
PHOSPHATE SERPL-MCNC: 3.1 MG/DL (ref 2.7–4.5)
PLATELET # BLD AUTO: 234 K/UL (ref 150–450)
PMV BLD AUTO: 9.3 FL (ref 9.2–12.9)
POCT GLUCOSE: 171 MG/DL (ref 70–110)
POCT GLUCOSE: 182 MG/DL (ref 70–110)
POTASSIUM SERPL-SCNC: 3.3 MMOL/L (ref 3.5–5.1)
PROT SERPL-MCNC: 6.6 G/DL (ref 6–8.4)
RBC # BLD AUTO: 2.91 M/UL (ref 4.6–6.2)
SODIUM SERPL-SCNC: 137 MMOL/L (ref 136–145)
WBC # BLD AUTO: 11.93 K/UL (ref 3.9–12.7)

## 2023-11-07 PROCEDURE — 25000003 PHARM REV CODE 250: Performed by: NURSE PRACTITIONER

## 2023-11-07 PROCEDURE — 25000242 PHARM REV CODE 250 ALT 637 W/ HCPCS: Performed by: INTERNAL MEDICINE

## 2023-11-07 PROCEDURE — 83735 ASSAY OF MAGNESIUM: CPT | Performed by: NURSE PRACTITIONER

## 2023-11-07 PROCEDURE — 80053 COMPREHEN METABOLIC PANEL: CPT | Performed by: NURSE PRACTITIONER

## 2023-11-07 PROCEDURE — 99900035 HC TECH TIME PER 15 MIN (STAT)

## 2023-11-07 PROCEDURE — 94664 DEMO&/EVAL PT USE INHALER: CPT

## 2023-11-07 PROCEDURE — 94761 N-INVAS EAR/PLS OXIMETRY MLT: CPT

## 2023-11-07 PROCEDURE — 25000003 PHARM REV CODE 250: Performed by: INTERNAL MEDICINE

## 2023-11-07 PROCEDURE — 85025 COMPLETE CBC W/AUTO DIFF WBC: CPT | Performed by: NURSE PRACTITIONER

## 2023-11-07 PROCEDURE — 84100 ASSAY OF PHOSPHORUS: CPT | Performed by: NURSE PRACTITIONER

## 2023-11-07 PROCEDURE — 36415 COLL VENOUS BLD VENIPUNCTURE: CPT | Performed by: NURSE PRACTITIONER

## 2023-11-07 PROCEDURE — 94640 AIRWAY INHALATION TREATMENT: CPT

## 2023-11-07 PROCEDURE — 97116 GAIT TRAINING THERAPY: CPT | Mod: CQ

## 2023-11-07 PROCEDURE — 63600175 PHARM REV CODE 636 W HCPCS: Performed by: NURSE PRACTITIONER

## 2023-11-07 RX ORDER — DOXYCYCLINE HYCLATE 100 MG
100 TABLET ORAL EVERY 12 HOURS
Status: DISCONTINUED | OUTPATIENT
Start: 2023-11-07 | End: 2023-11-07 | Stop reason: HOSPADM

## 2023-11-07 RX ADMIN — CLOPIDOGREL BISULFATE 75 MG: 75 TABLET, FILM COATED ORAL at 09:11

## 2023-11-07 RX ADMIN — ATORVASTATIN CALCIUM 80 MG: 40 TABLET, FILM COATED ORAL at 09:11

## 2023-11-07 RX ADMIN — VANCOMYCIN HYDROCHLORIDE 1250 MG: 1.25 INJECTION, POWDER, LYOPHILIZED, FOR SOLUTION INTRAVENOUS at 02:11

## 2023-11-07 RX ADMIN — IPRATROPIUM BROMIDE AND ALBUTEROL SULFATE 3 ML: 2.5; .5 SOLUTION RESPIRATORY (INHALATION) at 12:11

## 2023-11-07 RX ADMIN — POTASSIUM BICARBONATE 35 MEQ: 391 TABLET, EFFERVESCENT ORAL at 09:11

## 2023-11-07 RX ADMIN — IPRATROPIUM BROMIDE AND ALBUTEROL SULFATE 3 ML: 2.5; .5 SOLUTION RESPIRATORY (INHALATION) at 01:11

## 2023-11-07 RX ADMIN — PIPERACILLIN AND TAZOBACTAM 4.5 G: 4; .5 INJECTION, POWDER, LYOPHILIZED, FOR SOLUTION INTRAVENOUS; PARENTERAL at 01:11

## 2023-11-07 RX ADMIN — SODIUM CHLORIDE: 9 INJECTION, SOLUTION INTRAVENOUS at 09:11

## 2023-11-07 RX ADMIN — DOXYCYCLINE HYCLATE 100 MG: 100 TABLET, COATED ORAL at 11:11

## 2023-11-07 RX ADMIN — POTASSIUM BICARBONATE 35 MEQ: 391 TABLET, EFFERVESCENT ORAL at 06:11

## 2023-11-07 RX ADMIN — IPRATROPIUM BROMIDE AND ALBUTEROL SULFATE 3 ML: 2.5; .5 SOLUTION RESPIRATORY (INHALATION) at 07:11

## 2023-11-07 RX ADMIN — PIPERACILLIN AND TAZOBACTAM 4.5 G: 4; .5 INJECTION, POWDER, LYOPHILIZED, FOR SOLUTION INTRAVENOUS; PARENTERAL at 09:11

## 2023-11-07 NOTE — PLAN OF CARE
11/17/23 8:30 am Hospital pcp follow up appt added to AVS with RON Recio    11/07/23 1001   Discharge Assessment   Assessment Type Discharge Planning Reassessment

## 2023-11-07 NOTE — PT/OT/SLP PROGRESS
Physical Therapy Treatment    Patient Name:  Price Noguera   MRN:  [id number]    Recommendations:     Discharge Recommendations: Low Intensity Therapy  Discharge Equipment Recommendations: none  Barriers to discharge: None    Assessment:     Priec Noguera is a 63 y.o. male admitted with a medical diagnosis of Severe sepsis.  He presents with the following impairments/functional limitations: impaired endurance, impaired self care skills, gait instability, impaired balance . Supine in bed, agreed to mobilize.  Reports feeling better overall. Using urinal in bed without difficulty as he reports frequent urination due to fluids.  Transferred EOB with SBA.  Sit > stand with Min A , with SPC. Ambulated 10' unsteady , reaching for walls / furniture.  Agreed to use rw ( states he has one at [facility] as well as a rollator).  Ambulated 250' with rw and CGA for safety, better balance noted.    Rehab Prognosis: Good; patient would benefit from acute skilled PT services to address these deficits and reach maximum level of function.    Recent Surgery: * No surgery found *      Plan:     During this hospitalization, patient to be seen 6 x/week to address the identified rehab impairments via gait training, therapeutic activities, therapeutic exercises and progress toward the following goals:    Plan of Care Expires:  11/15/23    Subjective     Chief Complaint: frequent urination ( due to IV fluids).  Patient/Family Comments/goals: to return home  Pain/Comfort:  Pain Rating 1: 0/10      Objective:     Communicated with nurse Esteban prior to session.  Patient found supine with bed alarm, peripheral IV, telemetry upon PT entry to room.     General Precautions: Standard, aspiration, fall  Orthopedic Precautions: N/A  Braces: N/A  Respiratory Status: Room air     Functional Mobility:  Bed Mobility:     Rolling Right: stand by assistance  Supine to Sit: stand by assistance  Transfers:     Sit to Stand:  minimum assistance with straight  cane  Gait: 10' with SC ( unsteady), 250' with rw and CGA      AM-PAC 6 CLICK MOBILITY          Treatment & Education:  Ambulated with rw and CGA for safety.     Patient left up in chair with all lines intact, call button in reach, chair alarm on, and nurse Eulalia notified..    GOALS:   Multidisciplinary Problems       Physical Therapy Goals          Problem: Physical Therapy    Goal Priority Disciplines Outcome Goal Variances Interventions   Physical Therapy Goal     PT, PT/OT Ongoing, Progressing     Description: Goals to be met by: 11/15/2023     Patient will increase functional independence with mobility by performin). Supine to sit with Modified [place]  2). Sit to supine with Modified [place]  3). Sit to stand transfer with Modified [place]  4). Gait  x > 150 feet with Modified [place] using Single-point Cane .                          Time Tracking:     PT Received On: 23  PT Start Time: 1020     PT Stop Time: 1035  PT Total Time (min): 15 min     Billable Minutes: Gait Training 15min    Treatment Type: Treatment  PT/PTA: PTA     Number of PTA visits since last PT visit: 2     2023

## 2023-11-07 NOTE — PT/OT/SLP PROGRESS
Occupational Therapy      Patient Name:  Price Noguera   MRN:  [id number]    Patient not seen today secondary to Patient unwilling to participate, Other (Comment) (Pt is expecting discharge; Upset because he just got the news that his mother will be passing soon. Wants to rest in quiet. Nurse Esteban advised.). Will follow-up.    11/7/2023

## 2023-11-07 NOTE — PLAN OF CARE
Plan of care reviewed with patient. Verbalized understanding. IV intact and patent with fluids infusing. No signs of redness or swelling. Tele in place and being monitored. Glucose monitored and covered as needed with sliding scale insulin. Safety maintained. Call light in reach and instructed to call for assistance. Will continue to monitor.

## 2023-11-07 NOTE — PLAN OF CARE
Problem: Adult Inpatient Plan of Care  Goal: Plan of Care Review  Outcome: Met  Goal: Patient-Specific Goal (Individualized)  Outcome: Met  Goal: Absence of Hospital-Acquired Illness or Injury  Outcome: Met  Goal: Optimal Comfort and Wellbeing  Outcome: Met  Goal: Readiness for Transition of Care  Outcome: Met     Problem: Diabetes Comorbidity  Goal: Blood Glucose Level Within Targeted Range  Outcome: Met     Problem: Adjustment to Illness (Sepsis/Septic Shock)  Goal: Optimal Coping  Outcome: Met     Problem: Bleeding (Sepsis/Septic Shock)  Goal: Absence of Bleeding  Outcome: Met     Problem: Glycemic Control Impaired (Sepsis/Septic Shock)  Goal: Blood Glucose Level Within Desired Range  Outcome: Met     Problem: Infection Progression (Sepsis/Septic Shock)  Goal: Absence of Infection Signs and Symptoms  Outcome: Met     Problem: Nutrition Impaired (Sepsis/Septic Shock)  Goal: Optimal Nutrition Intake  Outcome: Met     Pt cleared for discharge. Awaiting transport from [facility] to transport home.

## 2023-11-07 NOTE — PT/OT/SLP PROGRESS
"Speech Language Pathology      Price Noguera  MRN: [id number]    Patient not seen today secondary to Patient unwilling to participate. Pt refusing ST services 2/2 "I cannot afford your services. You need to leave and do not come back. I will not change my mind." Pt to be discharged from ST.    "

## 2023-11-07 NOTE — PLAN OF CARE
Pt denied by [facility] due to being at [facility] for private payors - CM sent to multiple [facility] agencies for review.    11/07/23 1032   Post-Acute Status   Post-Acute Authorization Home Health   Home Health Status Referrals Sent

## 2023-11-07 NOTE — PROGRESS NOTES
Pt d/c education provided, pt verbalized understanding. PIV and Tele removed. Discharge instructions and personal belongings in hand. Pt transported via wheelchair to [facility] cab for transport home.

## 2023-11-07 NOTE — PROGRESS NOTES
Price Noguera [id number] is a 63 y.o. male who has been consulted for vancomycin dosing.    Pharmacy consult for vancomycin dosing is no longer required.  Vancomycin was discontinued.    Thank you for allowing us to participate in this patient's care.     Christi eYboah Mai, PharmD

## 2023-11-07 NOTE — PLAN OF CARE
Pt denied by [facility] . [facility] , and [facility] . CM called [facility] at [phone] to get an update on pts acceptance. She stated that is will take her a few hours to run benefits but she will call me back. Pt is likely going to deny HH services due to cost so CM is going to clear pt for discharge and will continue to work on HH acceptance.    11/07/23 1240   Post-Acute Status   Post-Acute Authorization Home Health   Home Health Status Referrals Sent

## 2023-11-07 NOTE — PLAN OF CARE
HH packet and orders sent to [facility] to review for acceptance via careport. CM following.    11/07/23 1009   Post-Acute Status   Post-Acute Authorization Home Health   Home Health Status Referrals Sent

## 2023-11-07 NOTE — DISCHARGE SUMMARY
[facility]  Discharge Summary      Patient Name: Price Noguera  MRN: [id number]  [facility]: [id number]  Patient Class: IP- Inpatient  Admission Date: 11/4/2023  Hospital Length of Stay: 3 days  Discharge Date and Time:  11/07/2023 10:08 AM  Attending Physician: Lashon Perez MD   Discharging Provider: Lashon Perez MD  Primary Care Provider: Chanel Roberson MD    Primary Care Team: Networked reference to record PCT     HPI:   Price Noguera is a 63-year-old male with past medical history significant for hypertension, coronary artery disease, type 2 diabetes, GERD, and CVA who presented to the emergency department with a 12 day history low-grade fevers, productive cough, and nausea and vomiting.  Over the last few days he has developed right-sided chest pain.  He reports that he lives home alone and he thought that he could take care of himself but his symptoms have steadily become worse.  He has been using over-the-counter cold medicines without relief.  ED workup is significant for WBC 17 K. On arrival he was hypoxic requiring 15 L supplemental oxygen which has since been weaned down to 5 L. He denies home oxygen use or use of CPAP or BiPAP.  He also presented with hypotension and was given the IV sepsis bolus with improvement in blood pressure.  Chest x-ray significant for developing pneumonitis to the right middle lobe.  He is also positive for UTI.  IV vancomycin and IV Zosyn were administered in the emergency department.  His nausea and vomiting have improved and he is feeling hungry at this point in time.  He is admitted to the service of hospital Medicine for continued monitoring and treatment.      * No surgery found *      Hospital Course:   Patient was admitted to Hospital Medicine and was maintained on tele monitoring.  Patient was treated with IV fluid hydration, intravenous antibiotic therapy for sepsis.  Patient's fever has resolved.  Urine cultures grew staph aureus  species.  Antibiotic therapy adjusted according to sensitivity results.  Patient's symptoms have improved.  Patient is feeling stronger.  Patient prefers to be discharged today as he has to take care of his mother at home.  Home health services, home PT and ST has been arranged.  Patient was encouraged to improve oral protein intake.  Patient will have a surveillance CBC and CMP checked next week.  Patient to complete remaining antibiotic therapy as per direction.  Patient will follow-up with primary care physician next week as outpatient.    Goals of Care Treatment Preferences:  Code Status: Full Code      Consults:   Consults (From admission, onward)          Status Ordering Provider     Inpatient consult to Diabetes educator  Once        Provider:  (Not yet assigned)    Completed KENJI WHITLEY     Case Management/  Once        Provider:  (Not yet assigned)    Completed MORIAH PEDERSON     Pharmacy to dose Vancomycin consult  Once        Provider:  (Not yet assigned)   See Keira for full Linked Orders Report.    Acknowledged MORIAH PEDERSON          Microbiology Results (last 7 days)       Procedure Component Value Units Date/Time    Culture, Respiratory with Gram Stain [[id number]] Collected: 11/05/23 1406    Order Status: Completed Specimen: Respiratory from Sputum Updated: 11/07/23 0838     Respiratory Culture Reduced normal respiratory erick     Gram Stain (Respiratory) <10 epithelial cells per low power field.     Gram Stain (Respiratory) Rare WBC's     Gram Stain (Respiratory) Rare Gram positive cocci    Urine culture [[id number]]  (Abnormal)  (Susceptibility) Collected: 11/04/23 1253    Order Status: Completed Specimen: Urine Updated: 11/07/23 0730     Urine Culture, Routine STAPHYLOCOCCUS AUREUS  >100,000cfu/ml      Narrative:      Specimen Source->Urine    MRSA Screen by PCR [[id number]] Collected: 11/05/23 1129    Order Status: Completed Specimen: Nasopharyngeal Swab from  Nasal Updated: 11/06/23 1810     MRSA SCREEN BY PCR Negative    Blood culture x two cultures. Draw prior to antibiotics. [[id number]] Collected: 11/04/23 1149    Order Status: Completed Specimen: Blood Updated: 11/06/23 1632     Blood Culture, Routine No Growth to date      No Growth to date      No Growth to date    Narrative:      Aerobic and anaerobic    Blood culture x two cultures. Draw prior to antibiotics. [[id number]] Collected: 11/04/23 1149    Order Status: Completed Specimen: Blood Updated: 11/06/23 1632     Blood Culture, Routine No Growth to date      No Growth to date      No Growth to date    Narrative:      Aerobic and anaerobic    Influenza A & B by Molecular [[id number]] Collected: 11/04/23 1146    Order Status: Completed Specimen: Nasopharyngeal Swab Updated: 11/04/23 1311     Influenza A, Molecular Negative     Influenza B, Molecular Negative     Flu A & B Source NP          CXR:  1. Suggestion of faint ground-glass airspace opacity within the right midlung zone.  Developing pneumonitis is not excluded.  2. Linear atelectasis at the left lung base.    Final Active Diagnoses:    Diagnosis Date Noted POA    PRINCIPAL PROBLEM:  Severe sepsis [A41.9, R65.20] 11/04/2023 Yes    Acute cystitis [N30.00] 11/04/2023 Yes    Pneumonitis, aspiration [J69.0] 11/04/2023 Yes    Acute respiratory failure with hypoxia [J96.01] 11/04/2023 Yes    BPH (benign prostatic hyperplasia) [N40.0] 12/13/2020 Yes    Gastroesophageal reflux disease without esophagitis [K21.9] 01/18/2019 Yes    Hyperlipidemia associated with type 2 diabetes mellitus [E11.69, E78.5] 01/09/2019 Yes    Type 2 diabetes mellitus with neurologic complication, with long-term current use of insulin [E11.49, Z79.4] 01/08/2019 Not Applicable    Coronary artery disease [I25.10] 01/08/2019 Yes    Hypertension associated with diabetes [E11.59, I15.2] 01/08/2019 Yes      Problems Resolved During this Admission:       Discharged Condition:  good    Disposition:     Follow Up:   Follow-up Information       Chanel Roberson MD Follow up on 11/17/2023.    Specialty: Family Medicine  Why: 8:30 am with RON Recio hospital follow up.  Contact information:  [place] [id number]  [phone]               [place] Follow up.    Specialties: Physical Therapy, Home Health Services  Why: Home Health  Contact information:  [address]  [address]  [place] [address]  [phone]                           Patient Instructions:      Ambulatory referral/consult to Home Health   Standing Status: Future   Referral Priority: Routine Referral Type: Home Health Care   Referral Reason: Specialty Services Required   Requested Specialty: Home Health Services   Number of Visits Requested: 1     Diet Cardiac     Diet diabetic   Order Comments: Glucerna can twice daily     Notify your health care provider if you experience any of the following:  temperature >100.4     Notify your health care provider if you experience any of the following:  persistent nausea and vomiting or diarrhea     Notify your health care provider if you experience any of the following:  severe uncontrolled pain     Notify your health care provider if you experience any of the following:  redness, tenderness, or signs of infection (pain, swelling, redness, odor or green/yellow discharge around incision site)     Notify your health care provider if you experience any of the following:  difficulty breathing or increased cough     Notify your health care provider if you experience any of the following:  severe persistent headache     Notify your health care provider if you experience any of the following:  worsening rash     Notify your health care provider if you experience any of the following:  persistent dizziness, light-headedness, or visual disturbances     Notify your health care provider if you experience any of the following:  increased confusion or  weakness     Activity as tolerated   Order Comments: Fall precautions       Significant Diagnostic Studies: Labs:   CMP   Recent Labs   Lab 11/06/23  0400 11/07/23  0424    137   K 3.5 3.3*    106   CO2 23 22*   * 139*   BUN 12 10   CREATININE 0.8 0.7   CALCIUM 8.8 8.7   PROT 6.6 6.6   ALBUMIN 3.1* 2.9*   BILITOT 1.1* 0.7   ALKPHOS 105 117   AST 14 18   ALT 12 12   ANIONGAP 11 9   , CBC   Recent Labs   Lab 11/06/23  0400 11/07/23  0424   WBC 16.50* 11.93   HGB 9.7* 9.8*   HCT 29.4* 28.7*    234    and INR   Lab Results   Component Value Date    INR 1.0 12/13/2020    INR 0.9 12/13/2020     Microbiology:   Blood Culture   Lab Results   Component Value Date    LABBLOO No Growth to date 11/04/2023    LABBLOO No Growth to date 11/04/2023    LABBLOO No Growth to date 11/04/2023    LABBLOO No Growth to date 11/04/2023    LABBLOO No Growth to date 11/04/2023    LABBLOO No Growth to date 11/04/2023    and Urine Culture    Lab Results   Component Value Date    LABURIN STAPHYLOCOCCUS AUREUS  >100,000cfu/ml   (A) 11/04/2023       Pending Diagnostic Studies:       None           Medications:  Reconciled Home Medications:      Medication List        CHANGE how you take these medications      clopidogreL 75 mg tablet  Commonly known as: PLAVIX  TAKE 1 TABLET BY MOUTH EVERY DAY  What changed: when to take this     lisinopriL 20 MG tablet  Commonly known as: PRINIVIL,ZESTRIL  TAKE 1/2 TABLET BY MOUTH EVERY DAY  What changed:   how much to take  how to take this  when to take this     nortriptyline 25 MG capsule  Commonly known as: PAMELOR  TAKE 1 CAPSULE BY MOUTH EVERY DAY IN THE EVENING  What changed:   how much to take  how to take this  when to take this            CONTINUE taking these medications      atorvastatin 80 MG tablet  Commonly known as: LIPITOR  TAKE 1 TABLET (80 MG TOTAL) BY MOUTH ONCE DAILY.     blood sugar diagnostic Strp  1 each by Misc.(Non-Drug; Combo Route) route 4 (four) times daily  "before meals and nightly.     blood-glucose meter Misc  Commonly known as: TRUE METRIX GLUCOSE METER  Alejandro metrix or similar covered by insurance     empagliflozin 25 mg tablet  Commonly known as: JARDIANCE  Take 1 tablet (25 mg total) by mouth once daily.     gabapentin 600 MG tablet  Commonly known as: NEURONTIN  Take 1 tablet (600 mg total) by mouth 3 (three) times daily.     lancets 28 gauge Misc  1 lancet  by Misc.(Non-Drug; Combo Route) route 4 (four) times daily before meals and nightly.     metFORMIN 500 MG ER 24hr tablet  Commonly known as: GLUCOPHAGE-XR  Take 2 tablets (1,000 mg total) by mouth daily with breakfast.     pen needle, diabetic 32 gauge x 5/32" Ndle  Commonly known as: BD CHRISTIANO 2ND GEN PEN NEEDLE  Inject daily            STOP taking these medications      insulin degludec 100 unit/mL (3 mL) insulin pen  Commonly known as: TRESIBA FLEXTOUCH U-100     sildenafiL 100 MG tablet  Commonly known as: VIAGRA              Indwelling Lines/Drains at time of discharge:   Lines/Drains/Airways       None                   Time spent on the discharge of patient: 34 minutes         Lashon Perez MD  Department of Hospital Medicine  [facility]/Surg    Addendum 10:15 am:  Doxycycline 100 mg PO BID x 7 days called in to patient's pharmacy.   "

## 2023-11-07 NOTE — PLAN OF CARE
Problem: Physical Therapy  Goal: Physical Therapy Goal  Description: Goals to be met by: 11/15/2023     Patient will increase functional independence with mobility by performin). Supine to sit with Modified [place]  2). Sit to supine with Modified [place]  3). Sit to stand transfer with Modified [place]  4). Gait  x > 150 feet with Modified [place] using Single-point Cane .     Outcome: Ongoing, Progressing   Ambulate with assistance for safety.

## 2023-11-07 NOTE — CARE UPDATE
11/07/23 0701   Patient Assessment/Suction   Level of Consciousness (AVPU) alert   Respiratory Effort Unlabored   Expansion/Accessory Muscles/Retractions no use of accessory muscles;no retractions;expansion symmetric   All Lung Fields Breath Sounds Anterior:;Lateral:;diminished   Rhythm/Pattern, Respiratory unlabored   PRE-TX-O2   Device (Oxygen Therapy) room air   SpO2 99 %   Pulse Oximetry Type Intermittent   $ Pulse Oximetry - Multiple Charge Pulse Oximetry - Multiple   Pulse 78   Resp 18   Aerosol Therapy   $ Aerosol Therapy Charges Aerosol Treatment   Daily Review of Necessity (SVN) completed   Respiratory Treatment Status (SVN) given   Treatment Route (SVN) mask   Patient Position (SVN) HOB elevated   Post Treatment Assessment (SVN) breath sounds unchanged   Signs of Intolerance (SVN) none   Breath Sounds Post-Respiratory Treatment   Throughout All Fields Post-Treatment All Fields   Throughout All Fields Post-Treatment no change   Post-treatment Heart Rate (beats/min) 77   Post-treatment Resp Rate (breaths/min) 18   Vibratory PEP Therapy   $ Vibratory PEP Charges Aerobika Therapy   $ Vibratory PEP Tech Time Charges 15 min   Daily Review of Necessity (PEP Therapy) completed   Type (PEP Therapy) vibratory/oscillatory   Device (PEP Therapy) flutter   Route (PEP Therapy) mouthpiece   Breaths per Cycle (PEP Therapy) 10   Cycles (PEP Therapy) 1   Settings (PEP Therapy) PEP 5   Patient Position (PEP Therapy) HOB elevated   Post Treatment Assessment (PEP) breath sounds unchanged   Signs of Intolerance (PEP Therapy) none

## 2023-11-07 NOTE — PLAN OF CARE
The pt is cleared for discharge home from case management and has follow up appts added to his avs.    11/07/23 1244   Final Note   Assessment Type Final Discharge Note   Anticipated Discharge Disposition Home-Health   What phone number can be called within the next 1-3 days to see how you are doing after discharge? [phone]   Hospital Resources/Appts/Education Provided Appointments scheduled and added to AVS   Post-Acute Status   Post-Acute Authorization Home Health   Home Health Status Referrals Sent   Patient choice form signed by patient/caregiver List with quality metrics by geographic area provided   Discharge Delays None known at this time

## 2023-11-08 ENCOUNTER — TELEPHONE (OUTPATIENT)
Dept: HEPATOLOGY | Facility: HOSPITAL | Age: 63
End: 2023-11-08

## 2023-11-08 RX ORDER — DOXYCYCLINE HYCLATE 100 MG
100 TABLET ORAL 2 TIMES DAILY
Qty: 14 TABLET | Refills: 0 | Status: SHIPPED | OUTPATIENT
Start: 2023-11-08

## 2023-11-08 NOTE — TELEPHONE ENCOUNTER
Patient electronic prescription did not go through. Doxycycline 100 mg PO BID x 7 14 tablets total. Medication resent electronically to to patient preferred pharmacy.

## 2023-11-08 NOTE — PLAN OF CARE
Accepted [facility] [phone]- Discharge plan closed in [place].    11/08/23 0808   Post-Acute Status   Post-Acute Authorization Home Health   Home Health Status Set-up Complete/Auth obtained

## 2023-11-09 LAB
BACTERIA BLD CULT: NORMAL
BACTERIA BLD CULT: NORMAL

## 2023-11-13 LAB — POCT GLUCOSE: 133 MG/DL (ref 70–110)

## 2023-11-14 ENCOUNTER — OFFICE VISIT (OUTPATIENT)
Dept: FAMILY MEDICINE | Facility: CLINIC | Age: 63
End: 2023-11-14
Payer: COMMERCIAL

## 2023-11-14 VITALS
HEART RATE: 104 BPM | HEIGHT: 69 IN | DIASTOLIC BLOOD PRESSURE: 70 MMHG | SYSTOLIC BLOOD PRESSURE: 110 MMHG | WEIGHT: 157.19 LBS | OXYGEN SATURATION: 94 % | BODY MASS INDEX: 23.28 KG/M2 | RESPIRATION RATE: 18 BRPM

## 2023-11-14 DIAGNOSIS — Z09 HOSPITAL DISCHARGE FOLLOW-UP: Primary | ICD-10-CM

## 2023-11-14 DIAGNOSIS — J18.9 PNEUMONIA OF RIGHT LUNG DUE TO INFECTIOUS ORGANISM, UNSPECIFIED PART OF LUNG: ICD-10-CM

## 2023-11-14 DIAGNOSIS — A49.01 STAPH AUREUS INFECTION: ICD-10-CM

## 2023-11-14 PROCEDURE — 3078F PR MOST RECENT DIASTOLIC BLOOD PRESSURE < 80 MM HG: ICD-10-PCS | Mod: CPTII,S$GLB,, | Performed by: STUDENT IN AN ORGANIZED HEALTH CARE EDUCATION/TRAINING PROGRAM

## 2023-11-14 PROCEDURE — 3044F PR MOST RECENT HEMOGLOBIN A1C LEVEL <7.0%: ICD-10-PCS | Mod: CPTII,S$GLB,, | Performed by: STUDENT IN AN ORGANIZED HEALTH CARE EDUCATION/TRAINING PROGRAM

## 2023-11-14 PROCEDURE — 3066F PR DOCUMENTATION OF TREATMENT FOR NEPHROPATHY: ICD-10-PCS | Mod: CPTII,S$GLB,, | Performed by: STUDENT IN AN ORGANIZED HEALTH CARE EDUCATION/TRAINING PROGRAM

## 2023-11-14 PROCEDURE — 3078F DIAST BP <80 MM HG: CPT | Mod: CPTII,S$GLB,, | Performed by: STUDENT IN AN ORGANIZED HEALTH CARE EDUCATION/TRAINING PROGRAM

## 2023-11-14 PROCEDURE — 3044F HG A1C LEVEL LT 7.0%: CPT | Mod: CPTII,S$GLB,, | Performed by: STUDENT IN AN ORGANIZED HEALTH CARE EDUCATION/TRAINING PROGRAM

## 2023-11-14 PROCEDURE — 99999 PR PBB SHADOW E&M-EST. PATIENT-LVL V: CPT | Mod: PBBFAC,,, | Performed by: STUDENT IN AN ORGANIZED HEALTH CARE EDUCATION/TRAINING PROGRAM

## 2023-11-14 PROCEDURE — 99214 OFFICE O/P EST MOD 30 MIN: CPT | Mod: S$GLB,,, | Performed by: STUDENT IN AN ORGANIZED HEALTH CARE EDUCATION/TRAINING PROGRAM

## 2023-11-14 PROCEDURE — 99214 PR OFFICE/OUTPT VISIT, EST, LEVL IV, 30-39 MIN: ICD-10-PCS | Mod: S$GLB,,, | Performed by: STUDENT IN AN ORGANIZED HEALTH CARE EDUCATION/TRAINING PROGRAM

## 2023-11-14 PROCEDURE — 3008F BODY MASS INDEX DOCD: CPT | Mod: CPTII,S$GLB,, | Performed by: STUDENT IN AN ORGANIZED HEALTH CARE EDUCATION/TRAINING PROGRAM

## 2023-11-14 PROCEDURE — 3061F NEG MICROALBUMINURIA REV: CPT | Mod: CPTII,S$GLB,, | Performed by: STUDENT IN AN ORGANIZED HEALTH CARE EDUCATION/TRAINING PROGRAM

## 2023-11-14 PROCEDURE — 3074F PR MOST RECENT SYSTOLIC BLOOD PRESSURE < 130 MM HG: ICD-10-PCS | Mod: CPTII,S$GLB,, | Performed by: STUDENT IN AN ORGANIZED HEALTH CARE EDUCATION/TRAINING PROGRAM

## 2023-11-14 PROCEDURE — 3066F NEPHROPATHY DOC TX: CPT | Mod: CPTII,S$GLB,, | Performed by: STUDENT IN AN ORGANIZED HEALTH CARE EDUCATION/TRAINING PROGRAM

## 2023-11-14 PROCEDURE — 4010F ACE/ARB THERAPY RXD/TAKEN: CPT | Mod: CPTII,S$GLB,, | Performed by: STUDENT IN AN ORGANIZED HEALTH CARE EDUCATION/TRAINING PROGRAM

## 2023-11-14 PROCEDURE — 3008F PR BODY MASS INDEX (BMI) DOCUMENTED: ICD-10-PCS | Mod: CPTII,S$GLB,, | Performed by: STUDENT IN AN ORGANIZED HEALTH CARE EDUCATION/TRAINING PROGRAM

## 2023-11-14 PROCEDURE — 1159F PR MEDICATION LIST DOCUMENTED IN MEDICAL RECORD: ICD-10-PCS | Mod: CPTII,S$GLB,, | Performed by: STUDENT IN AN ORGANIZED HEALTH CARE EDUCATION/TRAINING PROGRAM

## 2023-11-14 PROCEDURE — 4010F PR ACE/ARB THEARPY RXD/TAKEN: ICD-10-PCS | Mod: CPTII,S$GLB,, | Performed by: STUDENT IN AN ORGANIZED HEALTH CARE EDUCATION/TRAINING PROGRAM

## 2023-11-14 PROCEDURE — 1160F RVW MEDS BY RX/DR IN RCRD: CPT | Mod: CPTII,S$GLB,, | Performed by: STUDENT IN AN ORGANIZED HEALTH CARE EDUCATION/TRAINING PROGRAM

## 2023-11-14 PROCEDURE — 99999 PR PBB SHADOW E&M-EST. PATIENT-LVL V: ICD-10-PCS | Mod: PBBFAC,,, | Performed by: STUDENT IN AN ORGANIZED HEALTH CARE EDUCATION/TRAINING PROGRAM

## 2023-11-14 PROCEDURE — 1111F PR DISCHARGE MEDS RECONCILED W/ CURRENT OUTPATIENT MED LIST: ICD-10-PCS | Mod: CPTII,S$GLB,, | Performed by: STUDENT IN AN ORGANIZED HEALTH CARE EDUCATION/TRAINING PROGRAM

## 2023-11-14 PROCEDURE — 3061F PR NEG MICROALBUMINURIA RESULT DOCUMENTED/REVIEW: ICD-10-PCS | Mod: CPTII,S$GLB,, | Performed by: STUDENT IN AN ORGANIZED HEALTH CARE EDUCATION/TRAINING PROGRAM

## 2023-11-14 PROCEDURE — 3074F SYST BP LT 130 MM HG: CPT | Mod: CPTII,S$GLB,, | Performed by: STUDENT IN AN ORGANIZED HEALTH CARE EDUCATION/TRAINING PROGRAM

## 2023-11-14 PROCEDURE — 1160F PR REVIEW ALL MEDS BY PRESCRIBER/CLIN PHARMACIST DOCUMENTED: ICD-10-PCS | Mod: CPTII,S$GLB,, | Performed by: STUDENT IN AN ORGANIZED HEALTH CARE EDUCATION/TRAINING PROGRAM

## 2023-11-14 PROCEDURE — 1111F DSCHRG MED/CURRENT MED MERGE: CPT | Mod: CPTII,S$GLB,, | Performed by: STUDENT IN AN ORGANIZED HEALTH CARE EDUCATION/TRAINING PROGRAM

## 2023-11-14 PROCEDURE — 1159F MED LIST DOCD IN RCRD: CPT | Mod: CPTII,S$GLB,, | Performed by: STUDENT IN AN ORGANIZED HEALTH CARE EDUCATION/TRAINING PROGRAM

## 2023-11-14 RX ORDER — DOXYCYCLINE 100 MG/1
100 CAPSULE ORAL 2 TIMES DAILY
COMMUNITY
Start: 2023-11-08 | End: 2024-02-27

## 2023-11-14 NOTE — PROGRESS NOTES
"[facility] MEDICINE CLINIC NOTE    Patient Name: Price Noguera  YOB: 1960    PRESENTING HISTORY     History of Present Illness:  Mr. Price Noguera is a 63 y.o. male here for hospital f/u.     Recent issues with LE cellulitis and underwent serial debridement of infected ulceration at .     Then presented to ED with gradual fevers, chest pain, dyspnea. Found to have RML pneumonia attributed to aspiration.   Urine culture grew s.aureus.     Right sided chest pain is gradually improving.   Breathing feels better.   Strength gradually improving.       ROS      OBJECTIVE:   Vital Signs:  Vitals:    11/14/23 0927   BP: 110/70   Pulse: 104   Resp: 18   SpO2: (!) 94%   Weight: 71.3 kg (157 lb 3 oz)   Height: 5' 9" (1.753 m)         Physical Exam  Vitals and nursing note reviewed.   Constitutional:       Appearance: He is not diaphoretic.   HENT:      Head: Normocephalic and atraumatic.      Right Ear: External ear normal.      Left Ear: External ear normal.   Eyes:      General: No scleral icterus.     Conjunctiva/sclera: Conjunctivae normal.      Pupils: Pupils are equal, round, and reactive to light.   Neck:      Thyroid: No thyromegaly.   Cardiovascular:      Rate and Rhythm: Normal rate and regular rhythm.      Heart sounds: Normal heart sounds. No murmur heard.  Pulmonary:      Effort: Pulmonary effort is normal. No respiratory distress.      Breath sounds: Normal breath sounds. No wheezing or rales.   Musculoskeletal:         General: No tenderness or deformity. Normal range of motion.      Cervical back: Normal range of motion and neck supple.   Lymphadenopathy:      Cervical: No cervical adenopathy.   Skin:     General: Skin is warm and dry.      Findings: No erythema or rash.   Neurological:      Mental Status: He is alert and oriented to person, place, and time.      Gait: Gait is intact.   Psychiatric:         Mood and Affect: Mood and affect normal.         Cognition and Memory: Memory " normal.         Judgment: Judgment normal.         ASSESSMENT & PLAN:     Hospital discharge follow-up    Staph aureus infection  -     Echo; Future  -     X-Ray Chest PA And Lateral; Future; Expected date: 11/14/2023    Pneumonia of right lung due to infectious organism, unspecified part of lung  -     X-Ray Chest PA And Lateral; Future; Expected date: 11/14/2023      I am suspicious of bacteremia as etiology of s.aureus bacteriuria and also as the potential cause of pneumonia rather than aspiration event.   Reviewed timeframe, blood cultures collected prior to abx during hospital stay.   He is no longer fevering. No spinal tenderness or pain suggestive of diskitis, no murmur suggestive of endocarditis.     That said, low bar for further investigation with blood cultures, GUILLERMINA, spinal imaging if pain.   For now. Recheck CXR and TTE in a few weeks and observe.         Petar Tim  Internal Medicine      I spent a total of 32 minutes on the day of the visit.  This includes face-to-face time and non face-to-face time preparing to see the patient (e.g., review of tests) , obtaining and/or reviewing separately obtain history, documenting clinical information in the electronic or other health record, independently interpreting results and communicating results to the patient/family/caregiver, or care coordinator.

## 2023-12-09 DIAGNOSIS — E11.59 HYPERTENSION ASSOCIATED WITH DIABETES: ICD-10-CM

## 2023-12-09 DIAGNOSIS — I15.2 HYPERTENSION ASSOCIATED WITH DIABETES: ICD-10-CM

## 2023-12-09 DIAGNOSIS — E11.65 TYPE 2 DIABETES MELLITUS WITH HYPERGLYCEMIA, WITH LONG-TERM CURRENT USE OF INSULIN: ICD-10-CM

## 2023-12-09 DIAGNOSIS — Z79.4 TYPE 2 DIABETES MELLITUS WITH HYPERGLYCEMIA, WITH LONG-TERM CURRENT USE OF INSULIN: ICD-10-CM

## 2023-12-09 DIAGNOSIS — I63.9 CEREBROVASCULAR ACCIDENT (CVA), UNSPECIFIED MECHANISM: ICD-10-CM

## 2023-12-09 NOTE — TELEPHONE ENCOUNTER
Care Due:                  Date            Visit Type   Department     Provider  --------------------------------------------------------------------------------                                [facility]    Petar Graham Ambar  Last Visit: 11-      FOLLOW UP    MEDICINE       [place]                               -                              PRIMARY      [facility]  Next Visit: 12-      CARE ([facility])   MEDICINE       Chanel Roberson                                                            Last  Test          Frequency    Reason                     Performed    Due Date  --------------------------------------------------------------------------------    HBA1C.......  6 months...  empagliflozin, metFORMIN.  06- 12-    [employer] Embedded Care Due Messages. Reference number: [id number].   12/09/2023 1:00:43 AM CST

## 2023-12-11 RX ORDER — CLOPIDOGREL BISULFATE 75 MG/1
TABLET ORAL
Qty: 90 TABLET | Refills: 0 | Status: SHIPPED | OUTPATIENT
Start: 2023-12-11 | End: 2024-03-18

## 2023-12-11 RX ORDER — LISINOPRIL 20 MG/1
TABLET ORAL
Qty: 45 TABLET | Refills: 0 | Status: SHIPPED | OUTPATIENT
Start: 2023-12-11 | End: 2024-03-18

## 2023-12-11 RX ORDER — ATORVASTATIN CALCIUM 80 MG/1
80 TABLET, FILM COATED ORAL DAILY
Qty: 90 TABLET | Refills: 0 | Status: SHIPPED | OUTPATIENT
Start: 2023-12-11 | End: 2024-03-18

## 2023-12-11 RX ORDER — METFORMIN HYDROCHLORIDE 850 MG/1
TABLET ORAL
Qty: 180 TABLET | Refills: 0 | Status: SHIPPED | OUTPATIENT
Start: 2023-12-11 | End: 2024-03-08

## 2023-12-19 ENCOUNTER — HOSPITAL ENCOUNTER (OUTPATIENT)
Dept: CARDIOLOGY | Facility: HOSPITAL | Age: 63
Discharge: HOME | End: 2023-12-19
Attending: STUDENT IN AN ORGANIZED HEALTH CARE EDUCATION/TRAINING PROGRAM
Payer: COMMERCIAL

## 2023-12-19 ENCOUNTER — CLINICAL SUPPORT (OUTPATIENT)
Dept: INTERNAL MEDICINE | Facility: CLINIC | Age: 63
End: 2023-12-19
Payer: COMMERCIAL

## 2023-12-19 VITALS — WEIGHT: 157 LBS | HEIGHT: 69 IN | BODY MASS INDEX: 23.25 KG/M2

## 2023-12-19 DIAGNOSIS — A49.01 STAPH AUREUS INFECTION: ICD-10-CM

## 2023-12-19 DIAGNOSIS — Z23 NEED FOR VACCINATION: Primary | ICD-10-CM

## 2023-12-19 LAB
AORTIC ROOT ANNULUS: 3.8 CM
AORTIC VALVE CUSP SEPERATION: 1.7 CM
AV MEAN GRADIENT: 3 MMHG
AV PEAK GRADIENT: 5 MMHG
BSA FOR ECHO PROCEDURE: 1.86 M2
CV ECHO LV RWT: 0.5 CM
DOP CALC AO PEAK VEL: 1.11 M/S
DOP CALC AO VTI: 20.4 CM
DOP CALC LVOT AREA: 3.1 CM2
DOP CALC LVOT DIAMETER: 2 CM
E WAVE DECELERATION TIME: 232 MSEC
E/A RATIO: 0.69
E/E' RATIO: 4.73 M/S
ECHO LV POSTERIOR WALL: 1.03 CM (ref 0.6–1.1)
FRACTIONAL SHORTENING: 33 % (ref 28–44)
INTERVENTRICULAR SEPTUM: 0.93 CM (ref 0.6–1.1)
IVRT: 90 MSEC
LEFT ATRIUM SIZE: 3.2 CM
LEFT INTERNAL DIMENSION IN SYSTOLE: 2.77 CM (ref 2.1–4)
LEFT VENTRICLE DIASTOLIC VOLUME INDEX: 41.3 ML/M2
LEFT VENTRICLE DIASTOLIC VOLUME: 76.82 ML
LEFT VENTRICLE MASS INDEX: 71 G/M2
LEFT VENTRICLE SYSTOLIC VOLUME INDEX: 15.5 ML/M2
LEFT VENTRICLE SYSTOLIC VOLUME: 28.78 ML
LEFT VENTRICULAR INTERNAL DIMENSION IN DIASTOLE: 4.16 CM (ref 3.5–6)
LEFT VENTRICULAR MASS: 131.42 G
LV LATERAL E/E' RATIO: 4.33 M/S
LV SEPTAL E/E' RATIO: 5.2 M/S
MV PEAK A VEL: 0.75 M/S
MV PEAK E VEL: 0.52 M/S
MV STENOSIS PRESSURE HALF TIME: 52 MS
MV VALVE AREA P 1/2 METHOD: 4.23 CM2
PISA TR MAX VEL: 2.18 M/S
RA PRESSURE ESTIMATED: 3 MMHG
RIGHT VENTRICULAR END-DIASTOLIC DIMENSION: 1.92 CM
RV TB RVSP: 5 MMHG
TDI LATERAL: 0.12 M/S
TDI SEPTAL: 0.1 M/S
TDI: 0.11 M/S
TR MAX PG: 19 MMHG
TV REST PULMONARY ARTERY PRESSURE: 22 MMHG
Z-SCORE OF LEFT VENTRICULAR DIMENSION IN END DIASTOLE: -2.18
Z-SCORE OF LEFT VENTRICULAR DIMENSION IN END SYSTOLE: -1.12

## 2023-12-19 PROCEDURE — 90471 IMMUNIZATION ADMIN: CPT | Mod: S$GLB,,, | Performed by: FAMILY MEDICINE

## 2023-12-19 PROCEDURE — 90686 IIV4 VACC NO PRSV 0.5 ML IM: CPT | Mod: S$GLB,,, | Performed by: FAMILY MEDICINE

## 2023-12-19 PROCEDURE — 99999 PR PBB SHADOW E&M-EST. PATIENT-LVL I: ICD-10-PCS | Mod: PBBFAC,,,

## 2023-12-19 PROCEDURE — 99999 PR PBB SHADOW E&M-EST. PATIENT-LVL I: CPT | Mod: PBBFAC,,,

## 2023-12-19 PROCEDURE — 93306 ECHO (CUPID ONLY): ICD-10-PCS | Mod: 26,,, | Performed by: INTERNAL MEDICINE

## 2023-12-19 PROCEDURE — 90471 FLU VACCINE (QUAD) GREATER THAN OR EQUAL TO 3YO PRESERVATIVE FREE IM: ICD-10-PCS | Mod: S$GLB,,, | Performed by: FAMILY MEDICINE

## 2023-12-19 PROCEDURE — 93306 TTE W/DOPPLER COMPLETE: CPT | Mod: 26,,, | Performed by: INTERNAL MEDICINE

## 2023-12-19 PROCEDURE — 93306 TTE W/DOPPLER COMPLETE: CPT

## 2023-12-19 PROCEDURE — 90686 FLU VACCINE (QUAD) GREATER THAN OR EQUAL TO 3YO PRESERVATIVE FREE IM: ICD-10-PCS | Mod: S$GLB,,, | Performed by: FAMILY MEDICINE

## 2023-12-19 NOTE — PROGRESS NOTES
Two person identification name, d.o.b with verbal feedback.  Aseptic technique used. Administration influenza  vaccine on R deltoid.  Tolerated well.  VIS 8/6/21  given/mp

## 2023-12-20 ENCOUNTER — HOSPITAL ENCOUNTER (OUTPATIENT)
Dept: RADIOLOGY | Facility: CLINIC | Age: 63
Discharge: HOME | End: 2023-12-20
Attending: STUDENT IN AN ORGANIZED HEALTH CARE EDUCATION/TRAINING PROGRAM
Payer: COMMERCIAL

## 2023-12-20 DIAGNOSIS — J18.9 PNEUMONIA OF RIGHT LUNG DUE TO INFECTIOUS ORGANISM, UNSPECIFIED PART OF LUNG: ICD-10-CM

## 2023-12-20 DIAGNOSIS — R91.1 SOLITARY PULMONARY NODULE: Primary | ICD-10-CM

## 2023-12-20 DIAGNOSIS — A49.01 STAPH AUREUS INFECTION: ICD-10-CM

## 2023-12-20 PROCEDURE — 71046 XR CHEST PA AND LATERAL: ICD-10-PCS | Mod: 26,,, | Performed by: RADIOLOGY

## 2023-12-20 PROCEDURE — 71046 X-RAY EXAM CHEST 2 VIEWS: CPT | Mod: 26,,, | Performed by: RADIOLOGY

## 2023-12-20 PROCEDURE — 71046 X-RAY EXAM CHEST 2 VIEWS: CPT | Mod: TC,FY,PO

## 2024-01-16 ENCOUNTER — HOSPITAL ENCOUNTER (OUTPATIENT)
Dept: RADIOLOGY | Facility: HOSPITAL | Age: 64
Discharge: HOME | End: 2024-01-16
Attending: STUDENT IN AN ORGANIZED HEALTH CARE EDUCATION/TRAINING PROGRAM
Payer: COMMERCIAL

## 2024-01-16 DIAGNOSIS — R91.1 SOLITARY PULMONARY NODULE: ICD-10-CM

## 2024-01-16 DIAGNOSIS — R91.1 INCIDENTAL LUNG NODULE, GREATER THAN OR EQUAL TO 8MM: Primary | ICD-10-CM

## 2024-01-16 PROCEDURE — 71250 CT THORAX DX C-: CPT | Mod: TC,PO

## 2024-01-17 ENCOUNTER — TELEPHONE (OUTPATIENT)
Dept: FAMILY MEDICINE | Facility: CLINIC | Age: 64
End: 2024-01-17
Payer: COMMERCIAL

## 2024-01-17 NOTE — TELEPHONE ENCOUNTER
----- Message from RON Stevens sent at 1/16/2024 12:40 PM CST -----  Please let patient know that there is a nodule in the lung that we need close follow-up on.  I have sent him a my chart message.  Please schedule CT of the chest in 6 months' time

## 2024-01-17 NOTE — TELEPHONE ENCOUNTER
Call placed to patient at contact number [phone], no answer received.  Voicemail unavailable, unable to leave message. Call placed to patient at contact number [phone], call answered by a gentleman who states wrong number for patient.

## 2024-01-19 ENCOUNTER — TELEPHONE (OUTPATIENT)
Dept: FAMILY MEDICINE | Facility: CLINIC | Age: 64
End: 2024-01-19
Payer: COMMERCIAL

## 2024-01-25 ENCOUNTER — TELEPHONE (OUTPATIENT)
Dept: FAMILY MEDICINE | Facility: CLINIC | Age: 64
End: 2024-01-25
Payer: COMMERCIAL

## 2024-01-25 NOTE — TELEPHONE ENCOUNTER
----- Message from Stephany Morin sent at 1/25/2024 11:42 AM CST -----  Regarding: rt call  Contact: pt  Type:  Needs Medical Advice    Who Called: pt  Would the patient rather a call back or a response via [facility]? Call back  Best Call Back Number: [phone]    Additional Information: sts he had a missed call and thinks it was from this office and thinks it was about his results--please advise

## 2024-01-25 NOTE — TELEPHONE ENCOUNTER
Returned patients call in regards to a missed call from our clinic. No answer, left voicemail to return call.

## 2024-01-26 ENCOUNTER — HOSPITAL ENCOUNTER (OUTPATIENT)
Dept: RADIOLOGY | Facility: CLINIC | Age: 64
Discharge: HOME | End: 2024-01-26
Attending: NURSE PRACTITIONER
Payer: COMMERCIAL

## 2024-01-26 ENCOUNTER — TELEPHONE (OUTPATIENT)
Dept: FAMILY MEDICINE | Facility: CLINIC | Age: 64
End: 2024-01-26
Payer: COMMERCIAL

## 2024-01-26 ENCOUNTER — OFFICE VISIT (OUTPATIENT)
Dept: FAMILY MEDICINE | Facility: CLINIC | Age: 64
End: 2024-01-26
Payer: COMMERCIAL

## 2024-01-26 ENCOUNTER — LAB VISIT (OUTPATIENT)
Dept: LAB | Facility: HOSPITAL | Age: 64
End: 2024-01-26
Attending: NURSE PRACTITIONER
Payer: COMMERCIAL

## 2024-01-26 VITALS
DIASTOLIC BLOOD PRESSURE: 68 MMHG | BODY MASS INDEX: 24.03 KG/M2 | OXYGEN SATURATION: 97 % | HEIGHT: 69 IN | WEIGHT: 162.25 LBS | SYSTOLIC BLOOD PRESSURE: 110 MMHG | HEART RATE: 85 BPM | TEMPERATURE: 99 F

## 2024-01-26 DIAGNOSIS — D64.9 ANEMIA, UNSPECIFIED TYPE: ICD-10-CM

## 2024-01-26 DIAGNOSIS — R06.02 SOB (SHORTNESS OF BREATH): ICD-10-CM

## 2024-01-26 DIAGNOSIS — R05.9 COUGH, UNSPECIFIED TYPE: ICD-10-CM

## 2024-01-26 DIAGNOSIS — R06.02 SOB (SHORTNESS OF BREATH): Primary | ICD-10-CM

## 2024-01-26 LAB
ALBUMIN SERPL BCP-MCNC: 4 G/DL (ref 3.5–5.2)
ALP SERPL-CCNC: 110 U/L (ref 55–135)
ALT SERPL W/O P-5'-P-CCNC: 26 U/L (ref 10–44)
ANION GAP SERPL CALC-SCNC: 12 MMOL/L (ref 8–16)
AST SERPL-CCNC: 19 U/L (ref 10–40)
BASOPHILS # BLD AUTO: 0.12 K/UL (ref 0–0.2)
BASOPHILS NFR BLD: 1 % (ref 0–1.9)
BILIRUB SERPL-MCNC: 0.4 MG/DL (ref 0.1–1)
BNP SERPL-MCNC: 12 PG/ML (ref 0–99)
BUN SERPL-MCNC: 8 MG/DL (ref 8–23)
CALCIUM SERPL-MCNC: 9.3 MG/DL (ref 8.7–10.5)
CHLORIDE SERPL-SCNC: 106 MMOL/L (ref 95–110)
CO2 SERPL-SCNC: 25 MMOL/L (ref 23–29)
CREAT SERPL-MCNC: 0.8 MG/DL (ref 0.5–1.4)
DIFFERENTIAL METHOD BLD: ABNORMAL
EOSINOPHIL # BLD AUTO: 0.6 K/UL (ref 0–0.5)
EOSINOPHIL NFR BLD: 4.8 % (ref 0–8)
ERYTHROCYTE [DISTWIDTH] IN BLOOD BY AUTOMATED COUNT: 14.5 % (ref 11.5–14.5)
EST. GFR  (NO RACE VARIABLE): >60 ML/MIN/1.73 M^2
FERRITIN SERPL-MCNC: 152 NG/ML (ref 20–300)
FOLATE SERPL-MCNC: 12.1 NG/ML (ref 4–24)
GLUCOSE SERPL-MCNC: 166 MG/DL (ref 70–110)
HCT VFR BLD AUTO: 37.7 % (ref 40–54)
HGB BLD-MCNC: 12.3 G/DL (ref 14–18)
IMM GRANULOCYTES # BLD AUTO: 0.04 K/UL (ref 0–0.04)
IMM GRANULOCYTES NFR BLD AUTO: 0.3 % (ref 0–0.5)
IRON SERPL-MCNC: 85 UG/DL (ref 45–160)
LYMPHOCYTES # BLD AUTO: 2.2 K/UL (ref 1–4.8)
LYMPHOCYTES NFR BLD: 18.4 % (ref 18–48)
MAGNESIUM SERPL-MCNC: 2 MG/DL (ref 1.6–2.6)
MCH RBC QN AUTO: 34.7 PG (ref 27–31)
MCHC RBC AUTO-ENTMCNC: 32.6 G/DL (ref 32–36)
MCV RBC AUTO: 107 FL (ref 82–98)
MONOCYTES # BLD AUTO: 0.8 K/UL (ref 0.3–1)
MONOCYTES NFR BLD: 6.3 % (ref 4–15)
NEUTROPHILS # BLD AUTO: 8.3 K/UL (ref 1.8–7.7)
NEUTROPHILS NFR BLD: 69.2 % (ref 38–73)
NRBC BLD-RTO: 0 /100 WBC
PLATELET # BLD AUTO: 314 K/UL (ref 150–450)
PMV BLD AUTO: 8.9 FL (ref 9.2–12.9)
POTASSIUM SERPL-SCNC: 3.7 MMOL/L (ref 3.5–5.1)
PROT SERPL-MCNC: 7 G/DL (ref 6–8.4)
RBC # BLD AUTO: 3.54 M/UL (ref 4.6–6.2)
SATURATED IRON: 26 % (ref 20–50)
SODIUM SERPL-SCNC: 143 MMOL/L (ref 136–145)
TOTAL IRON BINDING CAPACITY: 330 UG/DL (ref 250–450)
TRANSFERRIN SERPL-MCNC: 223 MG/DL (ref 200–375)
VIT B12 SERPL-MCNC: <148 PG/ML (ref 210–950)
WBC # BLD AUTO: 11.98 K/UL (ref 3.9–12.7)

## 2024-01-26 PROCEDURE — 1160F RVW MEDS BY RX/DR IN RCRD: CPT | Mod: CPTII,S$GLB,, | Performed by: NURSE PRACTITIONER

## 2024-01-26 PROCEDURE — 82728 ASSAY OF FERRITIN: CPT | Performed by: NURSE PRACTITIONER

## 2024-01-26 PROCEDURE — 3074F SYST BP LT 130 MM HG: CPT | Mod: CPTII,S$GLB,, | Performed by: NURSE PRACTITIONER

## 2024-01-26 PROCEDURE — 3072F LOW RISK FOR RETINOPATHY: CPT | Mod: CPTII,S$GLB,, | Performed by: NURSE PRACTITIONER

## 2024-01-26 PROCEDURE — 99213 OFFICE O/P EST LOW 20 MIN: CPT | Mod: S$GLB,,, | Performed by: NURSE PRACTITIONER

## 2024-01-26 PROCEDURE — 83735 ASSAY OF MAGNESIUM: CPT | Performed by: NURSE PRACTITIONER

## 2024-01-26 PROCEDURE — 71046 X-RAY EXAM CHEST 2 VIEWS: CPT | Mod: TC,FY,PO

## 2024-01-26 PROCEDURE — 36415 COLL VENOUS BLD VENIPUNCTURE: CPT | Mod: PO | Performed by: NURSE PRACTITIONER

## 2024-01-26 PROCEDURE — 80053 COMPREHEN METABOLIC PANEL: CPT | Performed by: NURSE PRACTITIONER

## 2024-01-26 PROCEDURE — 71046 X-RAY EXAM CHEST 2 VIEWS: CPT | Mod: 26,,, | Performed by: RADIOLOGY

## 2024-01-26 PROCEDURE — 83540 ASSAY OF IRON: CPT | Performed by: NURSE PRACTITIONER

## 2024-01-26 PROCEDURE — 3078F DIAST BP <80 MM HG: CPT | Mod: CPTII,S$GLB,, | Performed by: NURSE PRACTITIONER

## 2024-01-26 PROCEDURE — 1159F MED LIST DOCD IN RCRD: CPT | Mod: CPTII,S$GLB,, | Performed by: NURSE PRACTITIONER

## 2024-01-26 PROCEDURE — 82607 VITAMIN B-12: CPT | Performed by: NURSE PRACTITIONER

## 2024-01-26 PROCEDURE — 99999 PR PBB SHADOW E&M-EST. PATIENT-LVL V: CPT | Mod: PBBFAC,,, | Performed by: NURSE PRACTITIONER

## 2024-01-26 PROCEDURE — 3008F BODY MASS INDEX DOCD: CPT | Mod: CPTII,S$GLB,, | Performed by: NURSE PRACTITIONER

## 2024-01-26 PROCEDURE — 82746 ASSAY OF FOLIC ACID SERUM: CPT | Performed by: NURSE PRACTITIONER

## 2024-01-26 PROCEDURE — 85025 COMPLETE CBC W/AUTO DIFF WBC: CPT | Performed by: NURSE PRACTITIONER

## 2024-01-26 PROCEDURE — 83880 ASSAY OF NATRIURETIC PEPTIDE: CPT | Performed by: NURSE PRACTITIONER

## 2024-01-26 RX ORDER — ALBUTEROL SULFATE 90 UG/1
2 AEROSOL, METERED RESPIRATORY (INHALATION) EVERY 6 HOURS PRN
Qty: 18 G | Refills: 0 | Status: SHIPPED | OUTPATIENT
Start: 2024-01-26 | End: 2025-01-25

## 2024-01-26 RX ORDER — GUAIFENESIN 600 MG/1
1200 TABLET, EXTENDED RELEASE ORAL 2 TIMES DAILY
Qty: 40 TABLET | Refills: 0 | Status: SHIPPED | OUTPATIENT
Start: 2024-01-26 | End: 2024-02-05

## 2024-01-26 RX ORDER — AZITHROMYCIN 250 MG/1
TABLET, FILM COATED ORAL
Qty: 6 TABLET | Refills: 0 | Status: SHIPPED | OUTPATIENT
Start: 2024-01-26 | End: 2024-02-27

## 2024-01-26 RX ORDER — METHYLPREDNISOLONE 4 MG/1
TABLET ORAL
Qty: 21 EACH | Refills: 0 | Status: SHIPPED | OUTPATIENT
Start: 2024-01-26 | End: 2024-02-16
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
## 2024-01-26 NOTE — TELEPHONE ENCOUNTER
----- Message from Sugar Stout, Patient Care Assistant sent at 1/25/2024  1:38 PM CST -----  Regarding: returning call  Contact: pt  Type:  Patient Returning Call    Who Called:  pt     Who Left Message for Patient:  Aisha Avila    Does the patient know what this is regarding?:  yes     Best Call Back Number:  [phone] (home)     Additional Information:  please call to advise. Thanks!

## 2024-01-29 ENCOUNTER — OFFICE VISIT (OUTPATIENT)
Dept: PULMONOLOGY | Facility: CLINIC | Age: 64
End: 2024-01-29
Payer: COMMERCIAL

## 2024-01-29 VITALS
BODY MASS INDEX: 24.03 KG/M2 | OXYGEN SATURATION: 97 % | WEIGHT: 162.25 LBS | SYSTOLIC BLOOD PRESSURE: 123 MMHG | HEART RATE: 75 BPM | DIASTOLIC BLOOD PRESSURE: 71 MMHG | HEIGHT: 69 IN

## 2024-01-29 DIAGNOSIS — R06.02 SOB (SHORTNESS OF BREATH): ICD-10-CM

## 2024-01-29 DIAGNOSIS — J68.0 PNEUMONITIS DUE TO FUMES: Primary | ICD-10-CM

## 2024-01-29 DIAGNOSIS — J45.21 MILD INTERMITTENT REACTIVE AIRWAY DISEASE WITH ACUTE EXACERBATION: ICD-10-CM

## 2024-01-29 DIAGNOSIS — E53.8 VITAMIN B 12 DEFICIENCY: Primary | ICD-10-CM

## 2024-01-29 PROCEDURE — 1159F MED LIST DOCD IN RCRD: CPT | Mod: CPTII,S$GLB,, | Performed by: INTERNAL MEDICINE

## 2024-01-29 PROCEDURE — 99999 PR PBB SHADOW E&M-EST. PATIENT-LVL IV: CPT | Mod: PBBFAC,,, | Performed by: INTERNAL MEDICINE

## 2024-01-29 PROCEDURE — 3074F SYST BP LT 130 MM HG: CPT | Mod: CPTII,S$GLB,, | Performed by: INTERNAL MEDICINE

## 2024-01-29 PROCEDURE — 3008F BODY MASS INDEX DOCD: CPT | Mod: CPTII,S$GLB,, | Performed by: INTERNAL MEDICINE

## 2024-01-29 PROCEDURE — 99214 OFFICE O/P EST MOD 30 MIN: CPT | Mod: S$GLB,,, | Performed by: INTERNAL MEDICINE

## 2024-01-29 PROCEDURE — 3072F LOW RISK FOR RETINOPATHY: CPT | Mod: CPTII,S$GLB,, | Performed by: INTERNAL MEDICINE

## 2024-01-29 PROCEDURE — 3078F DIAST BP <80 MM HG: CPT | Mod: CPTII,S$GLB,, | Performed by: INTERNAL MEDICINE

## 2024-01-29 RX ORDER — CHOLECALCIFEROL (VITAMIN D3) 25 MCG
1 TABLET,CHEWABLE ORAL DAILY
Qty: 30 CAPSULE | Refills: 5 | Status: SHIPPED | OUTPATIENT
Start: 2024-01-29
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
## 2024-02-05 PROBLEM — A41.9 SEVERE SEPSIS: Status: RESOLVED | Noted: 2023-11-04 | Resolved: 2024-02-05

## 2024-02-05 PROBLEM — J96.01 ACUTE RESPIRATORY FAILURE WITH HYPOXIA: Status: RESOLVED | Noted: 2023-11-04 | Resolved: 2024-02-05

## 2024-02-05 PROBLEM — R65.20 SEVERE SEPSIS: Status: RESOLVED | Noted: 2023-11-04 | Resolved: 2024-02-05

## 2024-02-27 ENCOUNTER — OFFICE VISIT (OUTPATIENT)
Dept: PULMONOLOGY | Facility: CLINIC | Age: 64
End: 2024-02-27
Payer: COMMERCIAL

## 2024-02-27 ENCOUNTER — HOSPITAL ENCOUNTER (OUTPATIENT)
Dept: RADIOLOGY | Facility: HOSPITAL | Age: 64
Discharge: HOME | End: 2024-02-27
Attending: INTERNAL MEDICINE
Payer: COMMERCIAL

## 2024-02-27 VITALS
DIASTOLIC BLOOD PRESSURE: 74 MMHG | HEART RATE: 65 BPM | OXYGEN SATURATION: 99 % | HEIGHT: 69 IN | BODY MASS INDEX: 24.03 KG/M2 | WEIGHT: 162.25 LBS | SYSTOLIC BLOOD PRESSURE: 111 MMHG

## 2024-02-27 DIAGNOSIS — J45.21 MILD INTERMITTENT REACTIVE AIRWAY DISEASE WITH ACUTE EXACERBATION: ICD-10-CM

## 2024-02-27 DIAGNOSIS — J68.0 PNEUMONITIS DUE TO FUMES: ICD-10-CM

## 2024-02-27 DIAGNOSIS — J68.0 PNEUMONITIS DUE TO FUMES: Primary | ICD-10-CM

## 2024-02-27 PROBLEM — J69.0 PNEUMONITIS, ASPIRATION: Status: RESOLVED | Noted: 2023-11-04 | Resolved: 2024-02-27

## 2024-02-27 PROCEDURE — 99214 OFFICE O/P EST MOD 30 MIN: CPT | Mod: S$GLB,,, | Performed by: INTERNAL MEDICINE

## 2024-02-27 PROCEDURE — 3072F LOW RISK FOR RETINOPATHY: CPT | Mod: CPTII,S$GLB,, | Performed by: INTERNAL MEDICINE

## 2024-02-27 PROCEDURE — 71046 X-RAY EXAM CHEST 2 VIEWS: CPT | Mod: 26,,, | Performed by: RADIOLOGY

## 2024-02-27 PROCEDURE — 3008F BODY MASS INDEX DOCD: CPT | Mod: CPTII,S$GLB,, | Performed by: INTERNAL MEDICINE

## 2024-02-27 PROCEDURE — 99999 PR PBB SHADOW E&M-EST. PATIENT-LVL III: CPT | Mod: PBBFAC,,, | Performed by: INTERNAL MEDICINE

## 2024-02-27 PROCEDURE — 3078F DIAST BP <80 MM HG: CPT | Mod: CPTII,S$GLB,, | Performed by: INTERNAL MEDICINE

## 2024-02-27 PROCEDURE — 4010F ACE/ARB THERAPY RXD/TAKEN: CPT | Mod: CPTII,S$GLB,, | Performed by: INTERNAL MEDICINE

## 2024-02-27 PROCEDURE — 71046 X-RAY EXAM CHEST 2 VIEWS: CPT | Mod: TC

## 2024-02-27 PROCEDURE — 3074F SYST BP LT 130 MM HG: CPT | Mod: CPTII,S$GLB,, | Performed by: INTERNAL MEDICINE

## 2024-02-27 PROCEDURE — 1159F MED LIST DOCD IN RCRD: CPT | Mod: CPTII,S$GLB,, | Performed by: INTERNAL MEDICINE
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
## 2024-02-27 NOTE — PATIENT INSTRUCTIONS
Continued reactive airways causes ongoing cough/congestion- this should get better over time  Continue to avoid exposures such as dust, soot, mold  Continue inhaler airsupra as needed  Follow up with PCP recommended  Please call if your symptoms worsen or need further evaluation of lungs in the future

## 2024-02-28 ENCOUNTER — OFFICE VISIT (OUTPATIENT)
Dept: FAMILY MEDICINE | Facility: CLINIC | Age: 64
End: 2024-02-28
Payer: COMMERCIAL

## 2024-02-28 VITALS
HEIGHT: 69 IN | RESPIRATION RATE: 16 BRPM | DIASTOLIC BLOOD PRESSURE: 60 MMHG | HEART RATE: 64 BPM | SYSTOLIC BLOOD PRESSURE: 110 MMHG | OXYGEN SATURATION: 99 % | WEIGHT: 162.69 LBS | BODY MASS INDEX: 24.09 KG/M2

## 2024-02-28 DIAGNOSIS — E53.8 B12 DEFICIENCY: Primary | ICD-10-CM

## 2024-02-28 DIAGNOSIS — Z79.4 TYPE 2 DIABETES MELLITUS WITH HYPERGLYCEMIA, WITH LONG-TERM CURRENT USE OF INSULIN: ICD-10-CM

## 2024-02-28 DIAGNOSIS — E11.65 TYPE 2 DIABETES MELLITUS WITH HYPERGLYCEMIA, WITH LONG-TERM CURRENT USE OF INSULIN: ICD-10-CM

## 2024-02-28 DIAGNOSIS — D64.9 ANEMIA, UNSPECIFIED TYPE: ICD-10-CM

## 2024-02-28 PROCEDURE — 4010F ACE/ARB THERAPY RXD/TAKEN: CPT | Mod: CPTII,S$GLB,, | Performed by: STUDENT IN AN ORGANIZED HEALTH CARE EDUCATION/TRAINING PROGRAM

## 2024-02-28 PROCEDURE — 3078F DIAST BP <80 MM HG: CPT | Mod: CPTII,S$GLB,, | Performed by: STUDENT IN AN ORGANIZED HEALTH CARE EDUCATION/TRAINING PROGRAM

## 2024-02-28 PROCEDURE — 99999 PR PBB SHADOW E&M-EST. PATIENT-LVL V: CPT | Mod: PBBFAC,,, | Performed by: STUDENT IN AN ORGANIZED HEALTH CARE EDUCATION/TRAINING PROGRAM

## 2024-02-28 PROCEDURE — 1159F MED LIST DOCD IN RCRD: CPT | Mod: CPTII,S$GLB,, | Performed by: STUDENT IN AN ORGANIZED HEALTH CARE EDUCATION/TRAINING PROGRAM

## 2024-02-28 PROCEDURE — 3074F SYST BP LT 130 MM HG: CPT | Mod: CPTII,S$GLB,, | Performed by: STUDENT IN AN ORGANIZED HEALTH CARE EDUCATION/TRAINING PROGRAM

## 2024-02-28 PROCEDURE — 1160F RVW MEDS BY RX/DR IN RCRD: CPT | Mod: CPTII,S$GLB,, | Performed by: STUDENT IN AN ORGANIZED HEALTH CARE EDUCATION/TRAINING PROGRAM

## 2024-02-28 PROCEDURE — 99213 OFFICE O/P EST LOW 20 MIN: CPT | Mod: 25,S$GLB,, | Performed by: STUDENT IN AN ORGANIZED HEALTH CARE EDUCATION/TRAINING PROGRAM

## 2024-02-28 PROCEDURE — 3008F BODY MASS INDEX DOCD: CPT | Mod: CPTII,S$GLB,, | Performed by: STUDENT IN AN ORGANIZED HEALTH CARE EDUCATION/TRAINING PROGRAM

## 2024-02-28 PROCEDURE — 96372 THER/PROPH/DIAG INJ SC/IM: CPT | Mod: S$GLB,,, | Performed by: STUDENT IN AN ORGANIZED HEALTH CARE EDUCATION/TRAINING PROGRAM

## 2024-02-28 PROCEDURE — 3072F LOW RISK FOR RETINOPATHY: CPT | Mod: CPTII,S$GLB,, | Performed by: STUDENT IN AN ORGANIZED HEALTH CARE EDUCATION/TRAINING PROGRAM

## 2024-02-28 RX ORDER — CYANOCOBALAMIN 1000 UG/ML
1000 INJECTION, SOLUTION INTRAMUSCULAR; SUBCUTANEOUS ONCE
Status: COMPLETED | OUTPATIENT
Start: 2024-02-28 | End: 2024-02-28

## 2024-02-28 RX ADMIN — CYANOCOBALAMIN 1000 MCG: 1000 INJECTION, SOLUTION INTRAMUSCULAR; SUBCUTANEOUS at 08:02

## 2024-02-28 NOTE — PROGRESS NOTES
Identified pts name and ,, B12 inj administered IM to right deltoid pt tolerated well. Aseptic technique maintained. Pain scale 0 out of 10 on pain scale. Pt  waited in clinic for 15 minutes after injection was given.

## 2024-02-28 NOTE — PROGRESS NOTES
"[facility] MEDICINE CLINIC NOTE    Patient Name: Price Noguera  YOB: 1960    PRESENTING HISTORY     History of Present Illness:  Mr. Price Noguera is a 63 y.o. male here for routine f/u.     Hospitalized outside of system in [place] for 4 days with pneumonia.   Has seen pulm since then.   CXr has resolved.   Symptoms improved.   Possibly element of pneumonitis related to smoke exposure.     Found to have B12 deficiency and anemia at previous OV. He did not get the message to start B12 supplementation. Has been on long term metformin for T2DM.         ROS      OBJECTIVE:   Vital Signs:  Vitals:    02/28/24 0731   BP: 110/60   Pulse: 64   Resp: 16   SpO2: 99%   Weight: 73.8 kg (162 lb 11.2 oz)   Height: 5' 9" (1.753 m)       Physical Exam  Vitals and nursing note reviewed.   Constitutional:       Appearance: He is not diaphoretic.   HENT:      Head: Normocephalic and atraumatic.      Right Ear: External ear normal.      Left Ear: External ear normal.   Eyes:      General: No scleral icterus.     Conjunctiva/sclera: Conjunctivae normal.      Pupils: Pupils are equal, round, and reactive to light.   Neck:      Thyroid: No thyromegaly.   Cardiovascular:      Rate and Rhythm: Normal rate and regular rhythm.      Heart sounds: Normal heart sounds. No murmur heard.  Pulmonary:      Effort: Pulmonary effort is normal. No respiratory distress.      Breath sounds: Normal breath sounds. No wheezing or rales.   Musculoskeletal:         General: No tenderness or deformity. Normal range of motion.      Cervical back: Normal range of motion and neck supple.      Right lower leg: No edema.      Left lower leg: No edema.   Lymphadenopathy:      Cervical: No cervical adenopathy.   Skin:     General: Skin is warm and dry.      Findings: No erythema or rash.   Neurological:      Mental Status: He is alert and oriented to person, place, and time.      Gait: Gait is intact.   Psychiatric:         Mood and Affect: " Mood and affect normal.         Cognition and Memory: Memory normal.         Judgment: Judgment normal.         ASSESSMENT & PLAN:     B12 deficiency  -     cyanocobalamin injection 1,000 mcg  -     Vitamin B12 Deficiency Panel; Future; Expected date: 08/28/2024    Anemia, unspecified type  -     CBC W/ AUTO DIFFERENTIAL; Future; Expected date: 05/28/2024    Type 2 diabetes mellitus with hyperglycemia, with long-term current use of insulin  -     COMPREHENSIVE METABOLIC PANEL; Future; Expected date: 05/28/2024  -     HEMOGLOBIN A1C; Future; Expected date: 05/28/2024     B12 replacement.   3 mo recheck with routine labs.    Petar Tim  Internal Medicine

## 2024-03-13 ENCOUNTER — PATIENT MESSAGE (OUTPATIENT)
Dept: ADMINISTRATIVE | Facility: HOSPITAL | Age: 64
End: 2024-03-13
Payer: COMMERCIAL

## 2024-05-06 NOTE — PLAN OF CARE
01/09/19 1242   Final Note   Assessment Type Final Discharge Note   Anticipated Discharge Disposition Home   What phone number can be called within the next 1-3 days to see how you are doing after discharge? ([phone])   Hospital Follow Up  Appt(s) scheduled? Yes      3 = A little assistance

## 2024-06-26 DIAGNOSIS — E11.9 TYPE 2 DIABETES MELLITUS WITHOUT COMPLICATION: ICD-10-CM

## 2024-07-31 ENCOUNTER — TELEPHONE (OUTPATIENT)
Dept: FAMILY MEDICINE | Facility: CLINIC | Age: 64
End: 2024-07-31
Payer: COMMERCIAL

## 2024-07-31 DIAGNOSIS — R91.1 SOLITARY PULMONARY NODULE: Primary | ICD-10-CM

## 2024-07-31 NOTE — TELEPHONE ENCOUNTER
Call placed to patient x's 3. No answer received. Voicemail not set up, unable to leave message.      [phone] --no answer, voicemail unavailable  [phone] --call answered by a person who states wrong number       Message sent to [facility] requesting assistance with obtaining authorization for CT Scan.

## 2024-07-31 NOTE — TELEPHONE ENCOUNTER
Patient has CT Scan scheduled for the date of 8-5-24.  CT Scan was ordered in January to be performed in 6 months to follow up on a lung nodule.  Patient indicates he spoke with insurance and was advised a prior authorization is needed.  Requesting assistance obtaining authorization for procedure.  Please advise. Thank you.

## 2024-07-31 NOTE — TELEPHONE ENCOUNTER
Nicolette Wright Staff     ----- Message from Nicolette Wright sent at 7/31/2024  1:22 PM CDT -----  Type : Patient Call      Who Called : Patient      Does the patient know what this is regarding?: Pt called in stating he spoke with [facility] and was informed that he's needing a PA for his CT apt that's on 8/5 ; Pt provider phone number for [facility] PA department; number listed below ; please advise      Would the patient rather a call back or a response via [facility]? Call        Best Call Back Number: [phone]            Additional Information:  [facility] PA Number - [phone]

## 2024-07-31 NOTE — TELEPHONE ENCOUNTER
Please see attached message below.  CT Chest performed 1-16-24 with the following results:     RON Stevens  1/16/2024 12:40 PM CST       Please let patient know that there is a nodule in the lung that we need close follow-up on.  I have sent him a my chart message.  Please schedule CT of the chest in 6 months' james       Please place order for follow up CT of the chest.  There is no order on file currently. Thank you.

## 2024-07-31 NOTE — TELEPHONE ENCOUNTER
Adrian, Rachel Tim Forest Staff     ----- Message from Rachel Campbell sent at 7/31/2024 11:56 AM CDT -----  Type:  Needs Medical Advice    Who Called: pt    Symptoms (please be specific): na     How long has patient had these symptoms:  na    Pharmacy name and phone #:  na    Would the patient rather a call back or a response via [facility]? Call back    Best Call Back Number: [phone]      Additional Information: pt said Dr Tim did a CT about 6/7 months ago and told him he needed to have one repeated mid year possibility of cancer. So he asking if he still needs that and if so can an order be out in for it      Please call Back to advise. Thanks!

## 2024-08-01 NOTE — TELEPHONE ENCOUNTER
Patient returned call to office. Call transferred directly to writer per patient access representative Liz Rivera.  Writer advised a message was sent to [facility] requesting assistance obtaining authorization for CT Scan.  Patient verbalized understanding.

## 2024-08-28 ENCOUNTER — LAB VISIT (OUTPATIENT)
Dept: LAB | Facility: HOSPITAL | Age: 64
End: 2024-08-28
Attending: STUDENT IN AN ORGANIZED HEALTH CARE EDUCATION/TRAINING PROGRAM
Payer: COMMERCIAL

## 2024-08-28 ENCOUNTER — HOSPITAL ENCOUNTER (OUTPATIENT)
Dept: RADIOLOGY | Facility: HOSPITAL | Age: 64
Discharge: HOME | End: 2024-08-28
Attending: STUDENT IN AN ORGANIZED HEALTH CARE EDUCATION/TRAINING PROGRAM
Payer: COMMERCIAL

## 2024-08-28 DIAGNOSIS — E11.9 TYPE 2 DIABETES MELLITUS WITHOUT COMPLICATION: ICD-10-CM

## 2024-08-28 DIAGNOSIS — R91.1 SOLITARY PULMONARY NODULE: ICD-10-CM

## 2024-08-28 LAB
ALBUMIN/CREAT UR: 5.7 UG/MG (ref 0–30)
CREAT UR-MCNC: 79.6 MG/DL (ref 23–375)
MICROALBUMIN UR DL<=1MG/L-MCNC: 4.5 UG/ML

## 2024-08-28 PROCEDURE — 71250 CT THORAX DX C-: CPT | Mod: TC

## 2024-08-28 PROCEDURE — 82570 ASSAY OF URINE CREATININE: CPT | Performed by: FAMILY MEDICINE

## 2024-08-28 PROCEDURE — 71250 CT THORAX DX C-: CPT | Mod: 26,,, | Performed by: RADIOLOGY

## 2024-08-30 DIAGNOSIS — E53.8 B12 DEFICIENCY: Primary | ICD-10-CM

## 2024-08-30 RX ORDER — NAPROXEN SODIUM 220 MG
TABLET ORAL
Qty: 10 EACH | Refills: 0 | Status: SHIPPED | OUTPATIENT
Start: 2024-08-30 | End: 2025-03-26

## 2024-08-30 RX ORDER — CYANOCOBALAMIN 1000 UG/ML
INJECTION, SOLUTION INTRAMUSCULAR; SUBCUTANEOUS
Qty: 10 ML | Refills: 0 | Status: SHIPPED | OUTPATIENT
Start: 2024-08-30 | End: 2025-03-26

## 2024-09-01 NOTE — TELEPHONE ENCOUNTER
Care Due:                  Date            Visit Type   Department     Provider  --------------------------------------------------------------------------------                                EP -                              PRIMARY      [facility]    Petar Graham Ambar  Last Visit: 02-      CARE ([facility])   MEDICINE       [place]                              EP -                              PRIMARY      [facility]  Next Visit: 09-      CARE ([facility])   MEDICINE       Chanel Roberson                                                            Last  Test          Frequency    Reason                     Performed    Due Date  --------------------------------------------------------------------------------    Lipid Panel.  12 months..  atorvastatin.............  06- 06-    [facility] Embedded Care Due Messages. Reference number: [id number].   9/01/2024 7:43:21 AM CDT

## 2024-09-04 ENCOUNTER — OFFICE VISIT (OUTPATIENT)
Dept: FAMILY MEDICINE | Facility: CLINIC | Age: 64
End: 2024-09-04
Payer: MEDICARE

## 2024-09-04 ENCOUNTER — CLINICAL SUPPORT (OUTPATIENT)
Dept: FAMILY MEDICINE | Facility: CLINIC | Age: 64
End: 2024-09-04
Attending: FAMILY MEDICINE
Payer: MEDICARE

## 2024-09-04 VITALS
OXYGEN SATURATION: 96 % | SYSTOLIC BLOOD PRESSURE: 100 MMHG | DIASTOLIC BLOOD PRESSURE: 60 MMHG | HEIGHT: 69 IN | RESPIRATION RATE: 19 BRPM | WEIGHT: 166.88 LBS | TEMPERATURE: 98 F | BODY MASS INDEX: 24.72 KG/M2 | HEART RATE: 80 BPM

## 2024-09-04 DIAGNOSIS — E53.8 B12 DEFICIENCY: ICD-10-CM

## 2024-09-04 DIAGNOSIS — E46 MALNUTRITION, UNSPECIFIED TYPE: ICD-10-CM

## 2024-09-04 DIAGNOSIS — Z79.4 TYPE 2 DIABETES MELLITUS WITH HYPERGLYCEMIA, WITH LONG-TERM CURRENT USE OF INSULIN: ICD-10-CM

## 2024-09-04 DIAGNOSIS — E11.9 DIABETIC EYE EXAM: ICD-10-CM

## 2024-09-04 DIAGNOSIS — Z01.00 DIABETIC EYE EXAM: ICD-10-CM

## 2024-09-04 DIAGNOSIS — I69.951 HEMIPARESIS OF RIGHT DOMINANT SIDE AS LATE EFFECT OF CEREBROVASCULAR DISEASE, UNSPECIFIED CEREBROVASCULAR DISEASE TYPE: ICD-10-CM

## 2024-09-04 DIAGNOSIS — Z59.9 FINANCIAL DIFFICULTIES: ICD-10-CM

## 2024-09-04 DIAGNOSIS — E11.69 HYPERLIPIDEMIA ASSOCIATED WITH TYPE 2 DIABETES MELLITUS: ICD-10-CM

## 2024-09-04 DIAGNOSIS — E78.5 HYPERLIPIDEMIA ASSOCIATED WITH TYPE 2 DIABETES MELLITUS: ICD-10-CM

## 2024-09-04 DIAGNOSIS — I25.10 CORONARY ARTERY DISEASE INVOLVING NATIVE CORONARY ARTERY OF NATIVE HEART WITHOUT ANGINA PECTORIS: ICD-10-CM

## 2024-09-04 DIAGNOSIS — E11.59 HYPERTENSION ASSOCIATED WITH DIABETES: Primary | ICD-10-CM

## 2024-09-04 DIAGNOSIS — I15.2 HYPERTENSION ASSOCIATED WITH DIABETES: Primary | ICD-10-CM

## 2024-09-04 DIAGNOSIS — E11.65 TYPE 2 DIABETES MELLITUS WITH HYPERGLYCEMIA, WITH LONG-TERM CURRENT USE OF INSULIN: ICD-10-CM

## 2024-09-04 PROCEDURE — 3066F NEPHROPATHY DOC TX: CPT | Mod: CPTII,S$GLB,, | Performed by: FAMILY MEDICINE

## 2024-09-04 PROCEDURE — 99214 OFFICE O/P EST MOD 30 MIN: CPT | Mod: 25,S$GLB,, | Performed by: FAMILY MEDICINE

## 2024-09-04 PROCEDURE — 4010F ACE/ARB THERAPY RXD/TAKEN: CPT | Mod: CPTII,S$GLB,, | Performed by: FAMILY MEDICINE

## 2024-09-04 PROCEDURE — 3061F NEG MICROALBUMINURIA REV: CPT | Mod: CPTII,S$GLB,, | Performed by: FAMILY MEDICINE

## 2024-09-04 PROCEDURE — 1160F RVW MEDS BY RX/DR IN RCRD: CPT | Mod: CPTII,S$GLB,, | Performed by: FAMILY MEDICINE

## 2024-09-04 PROCEDURE — 96372 THER/PROPH/DIAG INJ SC/IM: CPT | Mod: S$GLB,,, | Performed by: FAMILY MEDICINE

## 2024-09-04 PROCEDURE — 99999 PR PBB SHADOW E&M-EST. PATIENT-LVL IV: CPT | Mod: PBBFAC,,, | Performed by: FAMILY MEDICINE

## 2024-09-04 PROCEDURE — 1159F MED LIST DOCD IN RCRD: CPT | Mod: CPTII,S$GLB,, | Performed by: FAMILY MEDICINE

## 2024-09-04 PROCEDURE — 3078F DIAST BP <80 MM HG: CPT | Mod: CPTII,S$GLB,, | Performed by: FAMILY MEDICINE

## 2024-09-04 PROCEDURE — 3008F BODY MASS INDEX DOCD: CPT | Mod: CPTII,S$GLB,, | Performed by: FAMILY MEDICINE

## 2024-09-04 PROCEDURE — 3044F HG A1C LEVEL LT 7.0%: CPT | Mod: CPTII,S$GLB,, | Performed by: FAMILY MEDICINE

## 2024-09-04 PROCEDURE — 3074F SYST BP LT 130 MM HG: CPT | Mod: CPTII,S$GLB,, | Performed by: FAMILY MEDICINE

## 2024-09-04 PROCEDURE — 92228 IMG RTA DETC/MNTR DS PHY/QHP: CPT | Mod: TC,S$GLB,, | Performed by: FAMILY MEDICINE

## 2024-09-04 RX ORDER — CHOLECALCIFEROL (VITAMIN D3) 25 MCG
1000 TABLET,CHEWABLE ORAL DAILY
Qty: 100 LOZENGE | Refills: 3 | Status: SHIPPED | OUTPATIENT
Start: 2024-09-04

## 2024-09-04 RX ORDER — NORTRIPTYLINE HYDROCHLORIDE 25 MG/1
CAPSULE ORAL
Qty: 90 CAPSULE | Refills: 3 | Status: SHIPPED | OUTPATIENT
Start: 2024-09-04

## 2024-09-04 RX ORDER — CYANOCOBALAMIN 1000 UG/ML
1000 INJECTION, SOLUTION INTRAMUSCULAR; SUBCUTANEOUS WEEKLY
Status: SHIPPED | OUTPATIENT
Start: 2024-09-04 | End: 2024-10-02

## 2024-09-04 RX ORDER — NORTRIPTYLINE HYDROCHLORIDE 25 MG/1
CAPSULE ORAL
Qty: 90 CAPSULE | Refills: 3 | OUTPATIENT
Start: 2024-09-04

## 2024-09-04 RX ADMIN — CYANOCOBALAMIN 1000 MCG: 1000 INJECTION, SOLUTION INTRAMUSCULAR; SUBCUTANEOUS at 09:09

## 2024-09-04 SDOH — SOCIAL DETERMINANTS OF HEALTH (SDOH): PROBLEM RELATED TO HOUSING AND ECONOMIC CIRCUMSTANCES, UNSPECIFIED: Z59.9
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
## 2024-09-04 NOTE — Clinical Note
Notify patient:  blood pressure was very low at visit. Recommend decrease lisinopril to 5mg. I sent a new rx in to pharmacy.

## 2024-09-04 NOTE — PROGRESS NOTES
Price Noguera is a 63 y.o. male here for a diabetic eye screening with non-dilated fundus photos per Dr. Roberson.    Patient cooperative?: Yes  Small pupils?: no  Last eye exam: unknown, appointment set up.

## 2024-09-05 PROBLEM — L89.42 PRESSURE INJURY OF CONTIGUOUS REGION INVOLVING BACK AND BUTTOCK, STAGE 2: Status: RESOLVED | Noted: 2021-09-08 | Resolved: 2024-09-05

## 2024-09-05 RX ORDER — LISINOPRIL 5 MG/1
5 TABLET ORAL DAILY
Qty: 90 TABLET | Refills: 3 | Status: SHIPPED | OUTPATIENT
Start: 2024-09-05

## 2024-09-12 ENCOUNTER — TELEPHONE (OUTPATIENT)
Dept: FAMILY MEDICINE | Facility: CLINIC | Age: 64
End: 2024-09-12
Payer: MEDICARE

## 2024-09-12 NOTE — TELEPHONE ENCOUNTER
----- Message from Chanel Roberson MD sent at 9/5/2024  8:35 AM CDT -----  Notify patient:  blood pressure was very low at visit. Recommend decrease lisinopril to 5mg. I sent a new rx in to pharmacy.

## 2024-09-25 ENCOUNTER — TELEPHONE (OUTPATIENT)
Dept: FAMILY MEDICINE | Facility: CLINIC | Age: 64
End: 2024-09-25
Payer: MEDICARE

## 2024-09-25 NOTE — TELEPHONE ENCOUNTER
Spoke with pt. B12 scheduled as requested. Informed pt we do not carry covid vaccines but he can receive at a local pharmacy. Pt OMAR.

## 2024-09-25 NOTE — TELEPHONE ENCOUNTER
----- Message from Herminia Turner sent at 9/25/2024 12:05 PM CDT -----  Regarding: Pt called to reschedule his b-12 injection and Covd 19 Shot  with the nurse and pt would like to be seen on 9/26/24  Type:  Appointment Request    Name of Caller:Pt called to reschedule his b-12 injection and Covd 19 Shot with the nurse and pt would like to be seen on 9/26/24    Best Call Back Number:[phone]

## 2024-09-26 ENCOUNTER — CLINICAL SUPPORT (OUTPATIENT)
Dept: FAMILY MEDICINE | Facility: CLINIC | Age: 64
End: 2024-09-26
Payer: MEDICARE

## 2024-09-26 DIAGNOSIS — E53.8 B12 DEFICIENCY: Primary | ICD-10-CM

## 2024-09-26 PROCEDURE — 96372 THER/PROPH/DIAG INJ SC/IM: CPT | Mod: S$GLB,,, | Performed by: FAMILY MEDICINE

## 2024-09-26 PROCEDURE — 99499 UNLISTED E&M SERVICE: CPT | Mod: S$GLB,,, | Performed by: FAMILY MEDICINE

## 2024-09-26 RX ADMIN — CYANOCOBALAMIN 1000 MCG: 1000 INJECTION, SOLUTION INTRAMUSCULAR; SUBCUTANEOUS at 10:09

## 2024-09-30 ENCOUNTER — TELEPHONE (OUTPATIENT)
Dept: FAMILY MEDICINE | Facility: CLINIC | Age: 64
End: 2024-09-30
Payer: MEDICARE

## 2024-09-30 DIAGNOSIS — I69.951 HEMIPARESIS OF RIGHT DOMINANT SIDE AS LATE EFFECT OF CEREBROVASCULAR DISEASE, UNSPECIFIED CEREBROVASCULAR DISEASE TYPE: Primary | ICD-10-CM

## 2024-09-30 DIAGNOSIS — E11.65 UNCONTROLLED TYPE 2 DIABETES MELLITUS WITH HYPERGLYCEMIA: ICD-10-CM

## 2024-09-30 DIAGNOSIS — E46 MALNUTRITION, UNSPECIFIED TYPE: ICD-10-CM

## 2024-09-30 NOTE — TELEPHONE ENCOUNTER
----- Message from Bhavna sent at 9/30/2024  3:25 PM CDT -----  Contact: Patient  Type:  Needs Medical Advice    Who Called: Patient    Would the patient rather a call back or a response via [facility]? Call    Best Call Back Number: [phone] (home)     Additional Information: Patient is requesting a call from the office regarding qualification to have home health assistance

## 2024-09-30 NOTE — TELEPHONE ENCOUNTER
Spoke to pt who states he is req  assistance once  week. Pt states he has diabetes and previous stroke. States he has trouble with things around the home as well as going to the grocery.

## 2024-10-02 ENCOUNTER — TELEPHONE (OUTPATIENT)
Dept: FAMILY MEDICINE | Facility: CLINIC | Age: 64
End: 2024-10-02
Payer: MEDICARE

## 2024-10-03 ENCOUNTER — PATIENT OUTREACH (OUTPATIENT)
Dept: ADMINISTRATIVE | Facility: OTHER | Age: 64
End: 2024-10-03

## 2024-10-03 ENCOUNTER — CLINICAL SUPPORT (OUTPATIENT)
Dept: FAMILY MEDICINE | Facility: CLINIC | Age: 64
End: 2024-10-03
Payer: MEDICARE

## 2024-10-03 DIAGNOSIS — E53.8 B12 DEFICIENCY: Primary | ICD-10-CM

## 2024-10-03 PROCEDURE — 99499 UNLISTED E&M SERVICE: CPT | Mod: S$GLB,,, | Performed by: FAMILY MEDICINE

## 2024-10-03 PROCEDURE — 96372 THER/PROPH/DIAG INJ SC/IM: CPT | Mod: S$GLB,,, | Performed by: FAMILY MEDICINE

## 2024-10-03 RX ADMIN — CYANOCOBALAMIN 1000 MCG: 1000 INJECTION, SOLUTION INTRAMUSCULAR; SUBCUTANEOUS at 10:10

## 2024-10-03 NOTE — PROGRESS NOTES
CHW - Initial Contact    This Community Health Worker completed OR updated the Social Determinant of Health questionnaire with patient via telephone today.    Pt identified barriers of most importance are: Spoke to pt SDOH questions answered. Pt is in need of transportation assistance, due to being unable get gas for appts.Give the phone nume to [facility]. I will f/u in 2wks.    Referrals were put through [facility] - no: none  Support and Services:   Other information discussed the patient needs / wants help with: Spoke to pt SDOH questions answered. Pt is in need of transportation assistance, due to being unable get gas for appts.Give the phone nume to [facility]. I will f/u in 2wks.    Follow up required:   Initial Outreach - Due: 11/26/2024          CHW - Outreach Attempt    Community Health Worker left a voicemail message for 1st attempt to contact patient regarding: LVM notifying if pt is in need of info on resources to give me a call. 1st attempt.   Community Health Worker to attempt to contact patient on:   10/24/24

## 2024-10-03 NOTE — PROGRESS NOTES
Identified pts name and , B12 injection administered IM, pt tolerated well. Aseptic technique maintained. Pain scale 0 out of 10 on pain scale. Pt instructed to wait in clinic for 15 minutes after injection was given.

## 2024-10-07 ENCOUNTER — OFFICE VISIT (OUTPATIENT)
Dept: FAMILY MEDICINE | Facility: CLINIC | Age: 64
End: 2024-10-07
Payer: MEDICARE

## 2024-10-07 ENCOUNTER — HOSPITAL ENCOUNTER (OUTPATIENT)
Dept: RADIOLOGY | Facility: CLINIC | Age: 64
Discharge: HOME | End: 2024-10-07
Attending: NURSE PRACTITIONER
Payer: MEDICARE

## 2024-10-07 VITALS
SYSTOLIC BLOOD PRESSURE: 130 MMHG | HEART RATE: 70 BPM | OXYGEN SATURATION: 98 % | DIASTOLIC BLOOD PRESSURE: 70 MMHG | WEIGHT: 165.56 LBS | BODY MASS INDEX: 24.52 KG/M2 | HEIGHT: 69 IN
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
## 2024-10-07 DIAGNOSIS — M25.511 ACUTE PAIN OF RIGHT SHOULDER: Primary | ICD-10-CM

## 2024-10-07 DIAGNOSIS — M25.511 ACUTE PAIN OF RIGHT SHOULDER: ICD-10-CM

## 2024-10-07 PROCEDURE — 3288F FALL RISK ASSESSMENT DOCD: CPT | Mod: CPTII,S$GLB,, | Performed by: NURSE PRACTITIONER

## 2024-10-07 PROCEDURE — 72040 X-RAY EXAM NECK SPINE 2-3 VW: CPT | Mod: TC,FY,PO

## 2024-10-07 PROCEDURE — 3044F HG A1C LEVEL LT 7.0%: CPT | Mod: CPTII,S$GLB,, | Performed by: NURSE PRACTITIONER

## 2024-10-07 PROCEDURE — 1100F PTFALLS ASSESS-DOCD GE2>/YR: CPT | Mod: CPTII,S$GLB,, | Performed by: NURSE PRACTITIONER

## 2024-10-07 PROCEDURE — 99214 OFFICE O/P EST MOD 30 MIN: CPT | Mod: S$GLB,,, | Performed by: NURSE PRACTITIONER

## 2024-10-07 PROCEDURE — 1159F MED LIST DOCD IN RCRD: CPT | Mod: CPTII,S$GLB,, | Performed by: NURSE PRACTITIONER

## 2024-10-07 PROCEDURE — 4010F ACE/ARB THERAPY RXD/TAKEN: CPT | Mod: CPTII,S$GLB,, | Performed by: NURSE PRACTITIONER

## 2024-10-07 PROCEDURE — 73030 X-RAY EXAM OF SHOULDER: CPT | Mod: TC,FY,PO,RT

## 2024-10-07 PROCEDURE — 73030 X-RAY EXAM OF SHOULDER: CPT | Mod: 26,RT,, | Performed by: RADIOLOGY

## 2024-10-07 PROCEDURE — 1160F RVW MEDS BY RX/DR IN RCRD: CPT | Mod: CPTII,S$GLB,, | Performed by: NURSE PRACTITIONER

## 2024-10-07 PROCEDURE — 3008F BODY MASS INDEX DOCD: CPT | Mod: CPTII,S$GLB,, | Performed by: NURSE PRACTITIONER

## 2024-10-07 PROCEDURE — 3078F DIAST BP <80 MM HG: CPT | Mod: CPTII,S$GLB,, | Performed by: NURSE PRACTITIONER

## 2024-10-07 PROCEDURE — 72040 X-RAY EXAM NECK SPINE 2-3 VW: CPT | Mod: 26,,, | Performed by: RADIOLOGY

## 2024-10-07 PROCEDURE — 3075F SYST BP GE 130 - 139MM HG: CPT | Mod: CPTII,S$GLB,, | Performed by: NURSE PRACTITIONER

## 2024-10-07 PROCEDURE — 3066F NEPHROPATHY DOC TX: CPT | Mod: CPTII,S$GLB,, | Performed by: NURSE PRACTITIONER

## 2024-10-07 PROCEDURE — 99999 PR PBB SHADOW E&M-EST. PATIENT-LVL V: CPT | Mod: PBBFAC,,, | Performed by: NURSE PRACTITIONER

## 2024-10-07 PROCEDURE — 3061F NEG MICROALBUMINURIA REV: CPT | Mod: CPTII,S$GLB,, | Performed by: NURSE PRACTITIONER

## 2024-10-07 NOTE — PROGRESS NOTES
Subjective:       Patient ID: Price Noguera is a 64 y.o. male.    Chief Complaint: Shoulder Pain     HPI   65 y/o male patient with medical problems listed below presents for right shoulder pain for 2 days since 10/5/2024. States he fell from 8 feet on the wall and landed to the right shoulder. He states was seen in ED in [place] and underwent right shoulder x-ray. He states was explained that he has broken bone in the right shoulder and needs the surgery. He currently takes hydrocodone-acetaminophen 7.5-325 mg 1 tab tid as needed for the pain which controls the pain. He is wearing right arm sling brace. Patient walks with cane.     Patient Active Problem List   Diagnosis    Type 2 diabetes mellitus with neurologic complication, with long-term current use of insulin    Coronary artery disease    History of heart artery stent    Hypertension associated with diabetes    Hyponatremia    Hyperlipidemia associated with type 2 diabetes mellitus    Irritable bowel syndrome with diarrhea    Gastroesophageal reflux disease without esophagitis    Onychomycosis    Benign essential tremor    Dysphagia    History of colon polyps    Small vessel stroke    BPH (benign prostatic hyperplasia)    Cerebrovascular accident (CVA)    Anticoagulant long-term use    History of CVA (cerebrovascular accident) without residual deficits    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)    Hemiparesis of right dominant side as late effect of cerebrovascular disease    Acute cystitis    Pneumonitis due to fumes      Review of patient's allergies indicates:   Allergen Reactions    Protonix [pantoprazole]      Nausea, diarrhea     Past Surgical History:   Procedure Laterality Date    BACK SURGERY      COLONOSCOPY  04/07/2014    done in [place], [place]; polyps removed per pt report    COLONOSCOPY N/A 12/9/2021    Procedure: COLONOSCOPY;  Surgeon: Nino Ross MD;  Location: [facility];  Service: Endoscopy;  Laterality:  "N/A;    CORONARY ANGIOPLASTY WITH STENT PLACEMENT      ESOPHAGOGASTRODUODENOSCOPY N/A 2/21/2019    Procedure: EGD (ESOPHAGOGASTRODUODENOSCOPY);  Surgeon: Nino Ross MD;  Location: [facility] ENDO;  Service: Endoscopy;  Laterality: N/A;    ESOPHAGOGASTRODUODENOSCOPY N/A 12/9/2021    Procedure: EGD (ESOPHAGOGASTRODUODENOSCOPY);  Surgeon: Nino Ross MD;  Location: [facility] ENDO;  Service: Endoscopy;  Laterality: N/A;    TUMOR REMOVAL  05/16/2016    Back in skin    UPPER GASTROINTESTINAL ENDOSCOPY  02/21/2019    Dr. Ross; mild schatizki ring-dilated; gastritis; gastric polyps; bx negative        Current Outpatient Medications:     albuterol (PROVENTIL HFA) 90 mcg/actuation inhaler, Inhale 2 puffs into the lungs every 6 (six) hours as needed for Wheezing. Rescue, Disp: 18 g, Rfl: 0    albuterol-budesonide (AIRSUPRA) 90-80 mcg/actuation, Inhale 2 puffs into the lungs every 4 (four) hours as needed (wheezing, shortness of breath)., Disp: 10.7 g, Rfl: 3    atorvastatin (LIPITOR) 80 MG tablet, TAKE 1 TABLET (80 MG TOTAL) BY MOUTH ONCE DAILY., Disp: 90 tablet, Rfl: 3    blood sugar diagnostic Strp, 1 each by Misc.(Non-Drug; Combo Route) route 4 (four) times daily before meals and nightly., Disp: 200 each, Rfl: 5    blood-glucose meter (TRUE METRIX GLUCOSE METER) Misc, Alejandro metrix or similar covered by insurance, Disp: 1 each, Rfl: 0    clopidogreL (PLAVIX) 75 mg tablet, TAKE 1 TABLET BY MOUTH EVERY DAY, Disp: 90 tablet, Rfl: 3    cyanocobalamin 1,000 mcg/mL injection, Inject 1 mL (1,000 mcg total) into the muscle once a week for 28 days, THEN 1 mL (1,000 mcg total) every 30 days., Disp: 10 mL, Rfl: 0    cyanocobalamin, vitamin B-12, 1,000 mcg Lozg, Place 1,000 mcg under the tongue once daily., Disp: 100 lozenge, Rfl: 3    gabapentin (NEURONTIN) 600 MG tablet, Take 1 tablet (600 mg total) by mouth 3 (three) times daily., Disp: 270 tablet, Rfl: 3    insulin syringe-needle U-100 0.5 mL 31 gauge x 5/16" Syrg, 1 each by " "Misc.(Non-Drug; Combo Route) route once a week for 28 days, THEN 1 each every 30 days., Disp: 10 each, Rfl: 0    JARDIANCE 25 mg tablet, TAKE 1 TABLET BY MOUTH EVERY DAY, Disp: 90 tablet, Rfl: 3    lancets 28 gauge Misc, 1 lancet  by Misc.(Non-Drug; Combo Route) route 4 (four) times daily before meals and nightly., Disp: 400 each, Rfl: 5    lisinopriL (PRINIVIL,ZESTRIL) 5 MG tablet, Take 1 tablet (5 mg total) by mouth once daily. Cancel 20mg, Disp: 90 tablet, Rfl: 3    metFORMIN (GLUCOPHAGE) 850 MG tablet, TAKE 1 TABLET BY MOUTH TWICE A DAY WITH FOOD, Disp: 180 tablet, Rfl: 3    nortriptyline (PAMELOR) 25 MG capsule, TAKE 1 CAPSULE BY MOUTH EVERY DAY IN THE EVENING, Disp: 90 capsule, Rfl: 3    pen needle, diabetic (BD CHRISTIANO 2ND GEN PEN NEEDLE) 32 gauge x 5/32" Ndle, Inject daily, Disp: 100 each, Rfl: 0    syringe, disposable, 1 mL Syrg, 1 each by Misc.(Non-Drug; Combo Route) route once a week for 28 days, THEN 1 each every 30 days., Disp: 10 each, Rfl: 0    traZODone (DESYREL) 50 MG tablet, TAKE 1 TABLET BY MOUTH EVERY DAY IN THE EVENING, Disp: 90 tablet, Rfl: 3    Review of Systems   Constitutional:  Negative for chills and fever.   Respiratory:  Negative for cough and shortness of breath.    Cardiovascular:  Negative for chest pain and palpitations.   Gastrointestinal:  Negative for abdominal pain.   Musculoskeletal:         Right shoulder pain   Neurological:  Negative for dizziness and headaches.       Objective:   /70 (BP Location: Left arm, Patient Position: Sitting)   Pulse 70   Temp (P) 98.4 °F (36.9 °C) (Oral)   Ht 5' 9" (1.753 m)   Wt 75.1 kg (165 lb 9.1 oz)   SpO2 98%   BMI 24.45 kg/m²         Physical Exam  Vitals reviewed.   Constitutional:       General: He is not in acute distress.     Appearance: Normal appearance.      Comments: Patient is wearing right arm sling brace   Cardiovascular:      Rate and Rhythm: Normal rate and regular rhythm.      Pulses: Normal pulses.      Heart sounds: " Normal heart sounds.   Pulmonary:      Effort: Pulmonary effort is normal.      Breath sounds: Normal breath sounds.   Chest:      Chest wall: No deformity, swelling or edema.   Abdominal:      General: Abdomen is flat.      Palpations: Abdomen is soft.   Musculoskeletal:         General: Tenderness present.      Cervical back: Normal range of motion.      Comments: +tenderness in right shoulder   Skin:     Findings: No bruising or erythema.   Neurological:      Mental Status: He is oriented to person, place, and time.         Assessment:       1. Acute pain of right shoulder    2. Fall, initial encounter        Plan:       1. Acute pain of right shoulder (Primary)  - X-ray Shoulder 2 or More Views Right; Future  - Will place urgent referral to orthopedic   - Ambulatory referral/consult to Orthopedics; Future  - X-Ray Cervical Spine AP And Lateral; Future  - Continue with hydrocordone as needed for the pain    2. Fall, initial encounter  - X-ray Shoulder 2 or More Views Right; Future     Patient with be reevaluated in  as scheduled  or sooner dale Dooley NP

## 2024-10-08 ENCOUNTER — OFFICE VISIT (OUTPATIENT)
Dept: ORTHOPEDICS | Facility: CLINIC | Age: 64
End: 2024-10-08
Payer: MEDICARE

## 2024-10-08 VITALS — BODY MASS INDEX: 24.52 KG/M2 | WEIGHT: 165.56 LBS | HEIGHT: 69 IN

## 2024-10-08 DIAGNOSIS — M25.511 ACUTE PAIN OF RIGHT SHOULDER: ICD-10-CM

## 2024-10-08 DIAGNOSIS — S42.031A CLOSED DISPLACED FRACTURE OF ACROMIAL END OF RIGHT CLAVICLE, INITIAL ENCOUNTER: Primary | ICD-10-CM

## 2024-10-08 PROCEDURE — 3008F BODY MASS INDEX DOCD: CPT | Mod: CPTII,S$GLB,, | Performed by: FAMILY MEDICINE

## 2024-10-08 PROCEDURE — 3061F NEG MICROALBUMINURIA REV: CPT | Mod: CPTII,S$GLB,, | Performed by: FAMILY MEDICINE

## 2024-10-08 PROCEDURE — 99204 OFFICE O/P NEW MOD 45 MIN: CPT | Mod: S$GLB,,, | Performed by: FAMILY MEDICINE

## 2024-10-08 PROCEDURE — 4010F ACE/ARB THERAPY RXD/TAKEN: CPT | Mod: CPTII,S$GLB,, | Performed by: FAMILY MEDICINE

## 2024-10-08 PROCEDURE — 99999 PR PBB SHADOW E&M-EST. PATIENT-LVL II: CPT | Mod: PBBFAC,,, | Performed by: FAMILY MEDICINE

## 2024-10-08 PROCEDURE — 3066F NEPHROPATHY DOC TX: CPT | Mod: CPTII,S$GLB,, | Performed by: FAMILY MEDICINE

## 2024-10-08 PROCEDURE — 3044F HG A1C LEVEL LT 7.0%: CPT | Mod: CPTII,S$GLB,, | Performed by: FAMILY MEDICINE

## 2024-10-08 RX ORDER — HYDROCODONE BITARTRATE AND ACETAMINOPHEN 7.5; 325 MG/1; MG/1
1 TABLET ORAL EVERY 6 HOURS PRN
Qty: 20 TABLET | Refills: 0 | Status: SHIPPED | OUTPATIENT
Start: 2024-10-08

## 2024-10-08 NOTE — PROGRESS NOTES
Subjective     Patient ID: Price Noguera is a 64 y.o. male.    Chief Complaint: Pain and Injury of the Right Shoulder    64-year-old man here today for evaluation of a right shoulder injury.  He suffered a fall a few days ago while in [place].  Landed directly on his right side followed by immediate pain in his shoulder region radiating up to his neck.  Was seen in an emergency room local to the area where x-ray was done showing a significant clavicle fracture.  He was told they are that he would need surgery to repair this fracture.    He was sent home with a sling however he does not have it with him today as he states it is dog treated up whenever he got home.  Has been taking Norco for pain relief prescribed by the emergency room    Pain  Pertinent negatives include no chest pain, chills, congestion, coughing, headaches, rash or sore throat.   Injury  Pertinent negatives include no chest pain, chills, congestion, coughing, headaches, rash or sore throat.       Review of Systems   Constitutional: Negative for chills and decreased appetite.   HENT:  Negative for congestion and sore throat.    Eyes:  Negative for blurred vision.   Cardiovascular:  Negative for chest pain, dyspnea on exertion and palpitations.   Respiratory:  Negative for cough and shortness of breath.    Skin:  Negative for rash.   Neurological:  Negative for difficulty with concentration, disturbances in coordination and headaches.   Psychiatric/Behavioral:  Negative for altered mental status, depression, hallucinations, memory loss and suicidal ideas.           Objective     General    Nursing note and vitals reviewed.  Constitutional: He is oriented to person, place, and time. He appears well-developed and well-nourished.   HENT:   Nose: Nose normal.   Eyes: EOM are normal. Pupils are equal, round, and reactive to light.   Neck: Neck supple.   Cardiovascular:  Normal rate.            Pulmonary/Chest: Effort normal.   Abdominal: Soft.    Neurological: He is alert and oriented to person, place, and time. He has normal reflexes.   Psychiatric: He has a normal mood and affect. His behavior is normal. Judgment and thought content normal.         Right Shoulder Exam     Inspection/Observation   Swelling: absent  Bruising: absent  Scars: absent  Deformity: present  Scapular Winging: absent  Scapular Dyskinesia: negative    Tenderness   The patient is tender to palpation of the clavicle.    Range of Motion   Active abduction:  abnormal   Forward Flexion:  abnormal   Adduction: abnormal    Tests & Signs   Rotator Cuff Painful Arc/Range: severe  Anterior Drawer Test: 0   Posterior Drawer Test: 0    Other   Sensation: normal    Left Shoulder Exam   Left shoulder exam is normal.       Vascular Exam     Right Pulses      Radial:                    2+      Physical Exam  Vitals and nursing note reviewed.   Constitutional:       Appearance: He is well-developed and well-nourished.   HENT:      Nose: Nose normal.   Eyes:      Extraocular Movements: EOM normal.      Pupils: Pupils are equal, round, and reactive to light.   Cardiovascular:      Rate and Rhythm: Normal rate.      Pulses:           Radial pulses are 2+ on the right side.   Pulmonary:      Effort: Pulmonary effort is normal.   Abdominal:      Palpations: Abdomen is soft.   Musculoskeletal:      Right shoulder: Deformity present. No swelling.      Cervical back: Normal range of motion and neck supple.   Neurological:      Mental Status: He is alert and oriented to person, place, and time.      Deep Tendon Reflexes: Reflexes are normal and symmetric.   Psychiatric:         Mood and Affect: Mood and affect normal.         Behavior: Behavior normal.         Thought Content: Thought content normal.         Judgment: Judgment normal.     X-ray images ordered obtained interpreted by me.  They show comminuted fracture of the distal clavicle with elevation and separation.          Assessment and Plan      Encounter Diagnoses   Name Primary?    Acute pain of right shoulder     Closed displaced fracture of acromial end of right clavicle, initial encounter Yes         Price was seen today for pain and injury.    Diagnoses and all orders for this visit:    Closed displaced fracture of acromial end of right clavicle, initial encounter  -     HYDROcodone-acetaminophen (NORCO) 7.5-325 mg per tablet; Take 1 tablet by mouth every 6 (six) hours as needed for Pain.  -     IMMOBILIZER FOR HOME USE    Acute pain of right shoulder  -     Ambulatory referral/consult to Orthopedics  -     HYDROcodone-acetaminophen (NORCO) 7.5-325 mg per tablet; Take 1 tablet by mouth every 6 (six) hours as needed for Pain.  -     IMMOBILIZER FOR HOME USE      His fracture will require surgical intervention.  Going to fit him with a new sling today and prescribe him a refill of his Norco for pain control.  We will set him up with Dr. Blair for likely surgical repair

## 2024-10-11 ENCOUNTER — OFFICE VISIT (OUTPATIENT)
Dept: ORTHOPEDICS | Facility: CLINIC | Age: 64
End: 2024-10-11
Payer: MEDICARE

## 2024-10-11 VITALS — WEIGHT: 167.56 LBS | HEIGHT: 69 IN | BODY MASS INDEX: 24.82 KG/M2

## 2024-10-11 DIAGNOSIS — S42.031A CLOSED DISPLACED FRACTURE OF ACROMIAL END OF RIGHT CLAVICLE, INITIAL ENCOUNTER: Primary | ICD-10-CM

## 2024-10-11 PROCEDURE — 99999 PR PBB SHADOW E&M-EST. PATIENT-LVL III: CPT | Mod: PBBFAC,,, | Performed by: ORTHOPAEDIC SURGERY

## 2024-10-11 NOTE — PROGRESS NOTES
Subjective:      Patient ID: Price Noguera is a 64 y.o. male.    Chief Complaint: Clavicle Injury    HPI  64-year-old male one-week history of right shoulder pain status post blunt trauma while visiting friends in [place].  He was told him that he may need surgery.  He was placed in a sling i and referred for further evaluation.  Pain is slowly improving over the last several days.  He is still recovering from a right-sided CVA which left his right arm almost dysfunctional.  He has been come basically left-handed.  ROS      Objective:    Ortho Exam     Constitutional:   Patient is alert  and oriented in no acute distress  HEENT:  normocephalic atraumatic; PERRL EOMI  Neck:  Supple without adenopathy  Cardiovascular:  Normal rate and rhythm  Pulmonary:  Normal respiratory effort normal chest wall expansion  Abdominal:  Nonprotuberant nondistended  Musculoskeletal:  Patient has a moderate swelling over the right lateral clavicle/AC joint  Quite limited use of his right upper extremity status post CVA  Neurological:  No focal defect; cranial nerves 2-12 grossly intact  Psychiatric/behavioral:  Mood and behavior normal      X-Ray Cervical Spine AP And Lateral  EXAMINATION:  XR CERVICAL SPINE AP LATERAL    CLINICAL HISTORY:  Pain in right shoulder    TECHNIQUE:  AP, lateral and open mouth views of the cervical spine were performed.    COMPARISON:  None.    FINDINGS:  The open-mouth view is somewhat rotated.  Vascular calcifications project over the left carotid bifurcation.  No definite acute fracture or subluxation is seen in the cervical region.  There is mild anterior osteophyte formation at C4-5 C5/C6.    Electronically signed by: Brian Stapleton MD  Date:    10/07/2024  Time:    16:09  X-ray Shoulder 2 or More Views Right  Narrative: EXAMINATION:  XR SHOULDER COMPLETE 2 OR MORE VIEWS RIGHT    CLINICAL HISTORY:  Pain in right shoulder    TECHNIQUE:  Two or three views of the right shoulder were  performed.    COMPARISON:  02/20/2019    FINDINGS:  Patient has a comminuted fracture of the distal clavicle in elevation in separation of the distal clavicle from the fracture site.  Impression: See above    Electronically signed by: Jonathon Patterson MD  Date:    10/07/2024  Time:    14:59       My Radiographs Findings:    I have personally reviewed radiographs and concur with above findings    Assessment:       Encounter Diagnosis   Name Primary?    Closed displaced fracture of acromial end of right clavicle, initial encounter Yes         Plan:       I have discussed medical condition treatment options with him at length.  Under the circumstances I see no indication for surgical intervention.  I have discussed ice to the local area medications as prescribed sling use beginning pendulum exercises when he is comfortable and follow up radiographs in 3 weeks I will see him sooner if any questions or problems.        Past Medical History:   Diagnosis Date    Colon polyp     Coronary artery disease     Diabetes mellitus     GERD (gastroesophageal reflux disease)     GERD (gastroesophageal reflux disease)     History of colonic polyps     History of heart artery stent     Hypertension     Myocardial infarction     Neuropathy     Stroke     Tumor     BENIGN TUMOR ON BACK THAT COMPRESSED SPINAL CHORD THAT CAUSED PAIN-BUT REMOVED.      Past Surgical History:   Procedure Laterality Date    BACK SURGERY      COLONOSCOPY  04/07/2014    done in [place], [place]; polyps removed per pt report    COLONOSCOPY N/A 12/9/2021    Procedure: COLONOSCOPY;  Surgeon: Nino Ross MD;  Location: [facility];  Service: Endoscopy;  Laterality: N/A;    CORONARY ANGIOPLASTY WITH STENT PLACEMENT      ESOPHAGOGASTRODUODENOSCOPY N/A 2/21/2019    Procedure: EGD (ESOPHAGOGASTRODUODENOSCOPY);  Surgeon: Nino Ross MD;  Location: [facility];  Service: Endoscopy;  Laterality: N/A;    ESOPHAGOGASTRODUODENOSCOPY N/A 12/9/2021    Procedure: EGD  "(ESOPHAGOGASTRODUODENOSCOPY);  Surgeon: Nino Ross MD;  Location: [facility];  Service: Endoscopy;  Laterality: N/A;    TUMOR REMOVAL  05/16/2016    Back in skin    UPPER GASTROINTESTINAL ENDOSCOPY  02/21/2019    Dr. Ross; mild schatizki ring-dilated; gastritis; gastric polyps; bx negative         Current Outpatient Medications:     albuterol (PROVENTIL HFA) 90 mcg/actuation inhaler, Inhale 2 puffs into the lungs every 6 (six) hours as needed for Wheezing. Rescue, Disp: 18 g, Rfl: 0    albuterol-budesonide (AIRSUPRA) 90-80 mcg/actuation, Inhale 2 puffs into the lungs every 4 (four) hours as needed (wheezing, shortness of breath)., Disp: 10.7 g, Rfl: 3    atorvastatin (LIPITOR) 80 MG tablet, TAKE 1 TABLET (80 MG TOTAL) BY MOUTH ONCE DAILY., Disp: 90 tablet, Rfl: 3    blood sugar diagnostic Strp, 1 each by Misc.(Non-Drug; Combo Route) route 4 (four) times daily before meals and nightly., Disp: 200 each, Rfl: 5    blood-glucose meter (TRUE METRIX GLUCOSE METER) Misc, Alejandro metrix or similar covered by insurance, Disp: 1 each, Rfl: 0    clopidogreL (PLAVIX) 75 mg tablet, TAKE 1 TABLET BY MOUTH EVERY DAY, Disp: 90 tablet, Rfl: 3    cyanocobalamin 1,000 mcg/mL injection, Inject 1 mL (1,000 mcg total) into the muscle once a week for 28 days, THEN 1 mL (1,000 mcg total) every 30 days., Disp: 10 mL, Rfl: 0    cyanocobalamin, vitamin B-12, 1,000 mcg Lozg, Place 1,000 mcg under the tongue once daily., Disp: 100 lozenge, Rfl: 3    gabapentin (NEURONTIN) 600 MG tablet, Take 1 tablet (600 mg total) by mouth 3 (three) times daily., Disp: 270 tablet, Rfl: 3    HYDROcodone-acetaminophen (NORCO) 7.5-325 mg per tablet, Take 1 tablet by mouth every 6 (six) hours as needed for Pain., Disp: 20 tablet, Rfl: 0    insulin syringe-needle U-100 0.5 mL 31 gauge x 5/16" Syrg, 1 each by Misc.(Non-Drug; Combo Route) route once a week for 28 days, THEN 1 each every 30 days., Disp: 10 each, Rfl: 0    JARDIANCE 25 mg tablet, TAKE 1 TABLET BY " "MOUTH EVERY DAY, Disp: 90 tablet, Rfl: 3    lancets 28 gauge Misc, 1 lancet  by Misc.(Non-Drug; Combo Route) route 4 (four) times daily before meals and nightly., Disp: 400 each, Rfl: 5    lisinopriL (PRINIVIL,ZESTRIL) 5 MG tablet, Take 1 tablet (5 mg total) by mouth once daily. Cancel 20mg, Disp: 90 tablet, Rfl: 3    metFORMIN (GLUCOPHAGE) 850 MG tablet, TAKE 1 TABLET BY MOUTH TWICE A DAY WITH FOOD, Disp: 180 tablet, Rfl: 3    nortriptyline (PAMELOR) 25 MG capsule, TAKE 1 CAPSULE BY MOUTH EVERY DAY IN THE EVENING, Disp: 90 capsule, Rfl: 3    pen needle, diabetic (BD CHRISTIANO 2ND GEN PEN NEEDLE) 32 gauge x 5/32" Ndle, Inject daily, Disp: 100 each, Rfl: 0    syringe, disposable, 1 mL Syrg, 1 each by Misc.(Non-Drug; Combo Route) route once a week for 28 days, THEN 1 each every 30 days., Disp: 10 each, Rfl: 0    traZODone (DESYREL) 50 MG tablet, TAKE 1 TABLET BY MOUTH EVERY DAY IN THE EVENING, Disp: 90 tablet, Rfl: 3    Review of patient's allergies indicates:   Allergen Reactions    Protonix [pantoprazole]      Nausea, diarrhea       Family History   Problem Relation Name Age of Onset    Arthritis Mother      Pacemaker/defibrilator Mother      Bursitis Mother      Chronic back pain Mother      Cancer Father      Hypertension Father      No Known Problems Sister      Congenital heart disease Brother      Cancer Paternal Grandmother      Alcohol abuse Brother      No Known Problems Brother      Stomach cancer Paternal Aunt      Colon cancer Paternal Uncle      Glaucoma Neg Hx      Macular degeneration Neg Hx      Retinal detachment Neg Hx       Social History     Occupational History    Not on file   Tobacco Use    Smoking status: Never     Passive exposure: Never    Smokeless tobacco: Never   Substance and Sexual Activity    Alcohol use: No    Drug use: No    Sexual activity: Not Currently       "

## 2024-11-01 RX ORDER — GABAPENTIN 600 MG/1
600 TABLET ORAL 3 TIMES DAILY
Qty: 270 TABLET | Refills: 3 | Status: SHIPPED | OUTPATIENT
Start: 2024-11-01

## 2024-11-06 ENCOUNTER — TELEPHONE (OUTPATIENT)
Dept: FAMILY MEDICINE | Facility: CLINIC | Age: 64
End: 2024-11-06
Payer: MEDICARE

## 2024-11-06 DIAGNOSIS — Z79.4 TYPE 2 DIABETES MELLITUS WITH HYPERGLYCEMIA, WITH LONG-TERM CURRENT USE OF INSULIN: Primary | ICD-10-CM

## 2024-11-06 DIAGNOSIS — E11.65 TYPE 2 DIABETES MELLITUS WITH HYPERGLYCEMIA, WITH LONG-TERM CURRENT USE OF INSULIN: Primary | ICD-10-CM

## 2024-11-06 RX ORDER — GLIPIZIDE 5 MG/1
5 TABLET, FILM COATED, EXTENDED RELEASE ORAL
Qty: 90 TABLET | Refills: 3 | Status: SHIPPED | OUTPATIENT
Start: 2024-11-06 | End: 2025-11-06

## 2024-11-06 NOTE — TELEPHONE ENCOUNTER
Sabrina Reynolds Staff     ----- Message from Sabrina sent at 11/6/2024  9:40 AM CST -----  Regarding: Refill request  Contact: pt  Type:  RX Refill Request    Who Called: pt    RX Name and Strength:JARDIANCE 25 mg tablet    Preferred Pharmacy with phone number:  [facility] #[id number] - [place], [place] - [address] AT [facility]  [address]  [place] [address]  Phone: [phone] Fax: [phone]    Local or Mail Order:local    Ordering Provider:ten    Would the patient rather a call back or a response via [facility]? Call back    Best Call Back Number:[phone]    Additional Information:  pharm sts that jardiance costs $150.  Pt is out of the rx and does not have the $ to buy it.  Pt asking for an alternative rx

## 2024-11-18 NOTE — PROGRESS NOTES
Identified name and . Administered 1000 mcg B12, IM. Patient tolerated well, aseptic technique maintained. Pain scale 0/10 with injection. No residual bleeding at insertion site noted.     Patient's hemorrhage is due to gastrointestinal bleed, patient does not have a propensity for bleeding..   Patients most recent Hgb, Hct, platelets are listed below.  Recent Labs     11/15/24  0300 11/16/24  0315 11/17/24  0252   HGB 9.2* 8.6* 8.2*   HCT 29.6* 26.1* 25.0*    368 [id number]       - trend hemoglobin/hematocrit   - monitor and correct any coagulation defects  - transfuse if Hgb is <7g/dl (<8g/dl in cases of active ACS) or if patient has rapid bleeding leading to hemodynamic instability  - Consult GI  -PPI BID and carafate TID  -patient received 3 U PRBC  -patient was evaluated by GI, EGD/colonoscopy 11/11  -endoscopy findings concerning for an unclear underlying process. Vasculitis-associated screening labs ordered.   -24 hr urine and serology ordered by heme/onc  -colon bx with amyloidosis - consulted Heme/Onc  - patient needs to f/u with amyloidosis provider

## 2024-11-19 ENCOUNTER — TELEPHONE (OUTPATIENT)
Dept: FAMILY MEDICINE | Facility: CLINIC | Age: 64
End: 2024-11-19
Payer: MEDICARE

## 2024-11-19 NOTE — TELEPHONE ENCOUNTER
----- Message from Pipo sent at 11/19/2024 10:19 AM CST -----  Contact: pt  Type: Requesting to speak with nurse        Who Called: PT  Regarding: Would like a copy of his insurance card sent to email [email or web address] so he can send it to his doctor's off and through portal   Would the patient rather a call back or a response via [facility]? Call back  Best Call Back Number: [phone]   Additional Information:pt currently at the dentist office now.

## 2024-11-27 DIAGNOSIS — E53.8 B12 DEFICIENCY: ICD-10-CM

## 2024-11-27 RX ORDER — CYANOCOBALAMIN 1000 UG/ML
INJECTION, SOLUTION INTRAMUSCULAR; SUBCUTANEOUS
Qty: 6 ML | Refills: 1 | Status: SHIPPED | OUTPATIENT
Start: 2024-11-27 | End: 2025-06-23

## 2024-12-05 ENCOUNTER — LAB VISIT (OUTPATIENT)
Dept: LAB | Facility: HOSPITAL | Age: 64
End: 2024-12-05
Attending: FAMILY MEDICINE
Payer: MEDICARE

## 2024-12-05 DIAGNOSIS — E11.65 TYPE 2 DIABETES MELLITUS WITH HYPERGLYCEMIA, WITH LONG-TERM CURRENT USE OF INSULIN: ICD-10-CM

## 2024-12-05 DIAGNOSIS — E78.5 HYPERLIPIDEMIA ASSOCIATED WITH TYPE 2 DIABETES MELLITUS: ICD-10-CM

## 2024-12-05 DIAGNOSIS — Z79.4 TYPE 2 DIABETES MELLITUS WITH HYPERGLYCEMIA, WITH LONG-TERM CURRENT USE OF INSULIN: ICD-10-CM

## 2024-12-05 DIAGNOSIS — E53.8 B12 DEFICIENCY: ICD-10-CM

## 2024-12-05 DIAGNOSIS — E11.69 HYPERLIPIDEMIA ASSOCIATED WITH TYPE 2 DIABETES MELLITUS: ICD-10-CM

## 2024-12-05 LAB
ALBUMIN SERPL BCP-MCNC: 4.4 G/DL (ref 3.5–5.2)
ALP SERPL-CCNC: 128 U/L (ref 40–150)
ALT SERPL W/O P-5'-P-CCNC: 21 U/L (ref 10–44)
ANION GAP SERPL CALC-SCNC: 12 MMOL/L (ref 8–16)
AST SERPL-CCNC: 25 U/L (ref 10–40)
BASOPHILS # BLD AUTO: 0.12 K/UL (ref 0–0.2)
BASOPHILS NFR BLD: 1.6 % (ref 0–1.9)
BILIRUB SERPL-MCNC: 0.4 MG/DL (ref 0.1–1)
BUN SERPL-MCNC: 13 MG/DL (ref 8–23)
CALCIUM SERPL-MCNC: 9.7 MG/DL (ref 8.7–10.5)
CHLORIDE SERPL-SCNC: 104 MMOL/L (ref 95–110)
CHOLEST SERPL-MCNC: 269 MG/DL (ref 120–199)
CHOLEST/HDLC SERPL: 5.3 {RATIO} (ref 2–5)
CO2 SERPL-SCNC: 23 MMOL/L (ref 23–29)
CREAT SERPL-MCNC: 1 MG/DL (ref 0.5–1.4)
DIFFERENTIAL METHOD BLD: NORMAL
EOSINOPHIL # BLD AUTO: 0.4 K/UL (ref 0–0.5)
EOSINOPHIL NFR BLD: 4.8 % (ref 0–8)
ERYTHROCYTE [DISTWIDTH] IN BLOOD BY AUTOMATED COUNT: 13.4 % (ref 11.5–14.5)
EST. GFR  (NO RACE VARIABLE): >60 ML/MIN/1.73 M^2
ESTIMATED AVG GLUCOSE: 151 MG/DL (ref 68–131)
GLUCOSE SERPL-MCNC: 149 MG/DL (ref 70–110)
HBA1C MFR BLD: 6.9 % (ref 4–5.6)
HCT VFR BLD AUTO: 43.4 % (ref 40–54)
HDLC SERPL-MCNC: 51 MG/DL (ref 40–75)
HDLC SERPL: 19 % (ref 20–50)
HGB BLD-MCNC: 14.2 G/DL (ref 14–18)
IMM GRANULOCYTES # BLD AUTO: 0.02 K/UL (ref 0–0.04)
IMM GRANULOCYTES NFR BLD AUTO: 0.3 % (ref 0–0.5)
LDLC SERPL CALC-MCNC: 192.8 MG/DL (ref 63–159)
LYMPHOCYTES # BLD AUTO: 2.5 K/UL (ref 1–4.8)
LYMPHOCYTES NFR BLD: 32.6 % (ref 18–48)
MCH RBC QN AUTO: 30 PG (ref 27–31)
MCHC RBC AUTO-ENTMCNC: 32.7 G/DL (ref 32–36)
MCV RBC AUTO: 92 FL (ref 82–98)
MONOCYTES # BLD AUTO: 0.8 K/UL (ref 0.3–1)
MONOCYTES NFR BLD: 10.1 % (ref 4–15)
NEUTROPHILS # BLD AUTO: 3.9 K/UL (ref 1.8–7.7)
NEUTROPHILS NFR BLD: 50.6 % (ref 38–73)
NONHDLC SERPL-MCNC: 218 MG/DL
NRBC BLD-RTO: 0 /100 WBC
PLATELET # BLD AUTO: 364 K/UL (ref 150–450)
PMV BLD AUTO: 9.8 FL (ref 9.2–12.9)
POTASSIUM SERPL-SCNC: 3.8 MMOL/L (ref 3.5–5.1)
PROT SERPL-MCNC: 8.1 G/DL (ref 6–8.4)
RBC # BLD AUTO: 4.73 M/UL (ref 4.6–6.2)
SODIUM SERPL-SCNC: 139 MMOL/L (ref 136–145)
TRIGL SERPL-MCNC: 126 MG/DL (ref 30–150)
VIT B12 SERPL-MCNC: 160 PG/ML (ref 210–950)
WBC # BLD AUTO: 7.71 K/UL (ref 3.9–12.7)

## 2024-12-05 PROCEDURE — 85025 COMPLETE CBC W/AUTO DIFF WBC: CPT | Performed by: FAMILY MEDICINE

## 2024-12-05 PROCEDURE — 83036 HEMOGLOBIN GLYCOSYLATED A1C: CPT | Performed by: FAMILY MEDICINE

## 2024-12-05 PROCEDURE — 80061 LIPID PANEL: CPT | Performed by: FAMILY MEDICINE

## 2024-12-05 PROCEDURE — 80053 COMPREHEN METABOLIC PANEL: CPT | Performed by: FAMILY MEDICINE

## 2024-12-05 PROCEDURE — 36415 COLL VENOUS BLD VENIPUNCTURE: CPT | Mod: PO | Performed by: FAMILY MEDICINE

## 2024-12-05 PROCEDURE — 82607 VITAMIN B-12: CPT | Performed by: FAMILY MEDICINE

## 2024-12-12 ENCOUNTER — TELEPHONE (OUTPATIENT)
Dept: FAMILY MEDICINE | Facility: CLINIC | Age: 64
End: 2024-12-12
Payer: MEDICARE

## 2024-12-12 ENCOUNTER — OFFICE VISIT (OUTPATIENT)
Dept: FAMILY MEDICINE | Facility: CLINIC | Age: 64
End: 2024-12-12
Payer: MEDICARE

## 2024-12-12 VITALS
TEMPERATURE: 98 F | WEIGHT: 170 LBS | SYSTOLIC BLOOD PRESSURE: 137 MMHG | RESPIRATION RATE: 17 BRPM | OXYGEN SATURATION: 98 % | DIASTOLIC BLOOD PRESSURE: 80 MMHG | HEART RATE: 95 BPM | HEIGHT: 69 IN | BODY MASS INDEX: 25.18 KG/M2

## 2024-12-12 DIAGNOSIS — I25.10 CORONARY ARTERY DISEASE INVOLVING NATIVE CORONARY ARTERY OF NATIVE HEART WITHOUT ANGINA PECTORIS: ICD-10-CM

## 2024-12-12 DIAGNOSIS — E11.69 HYPERLIPIDEMIA ASSOCIATED WITH TYPE 2 DIABETES MELLITUS: ICD-10-CM

## 2024-12-12 DIAGNOSIS — E53.8 B12 DEFICIENCY: ICD-10-CM

## 2024-12-12 DIAGNOSIS — E78.5 HYPERLIPIDEMIA ASSOCIATED WITH TYPE 2 DIABETES MELLITUS: ICD-10-CM

## 2024-12-12 DIAGNOSIS — E11.59 HYPERTENSION ASSOCIATED WITH DIABETES: ICD-10-CM

## 2024-12-12 DIAGNOSIS — I69.951 HEMIPARESIS OF RIGHT DOMINANT SIDE AS LATE EFFECT OF CEREBROVASCULAR DISEASE, UNSPECIFIED CEREBROVASCULAR DISEASE TYPE: ICD-10-CM

## 2024-12-12 DIAGNOSIS — I15.2 HYPERTENSION ASSOCIATED WITH DIABETES: ICD-10-CM

## 2024-12-12 DIAGNOSIS — Z79.4 TYPE 2 DIABETES MELLITUS WITH HYPERGLYCEMIA, WITH LONG-TERM CURRENT USE OF INSULIN: Primary | ICD-10-CM

## 2024-12-12 DIAGNOSIS — E11.65 TYPE 2 DIABETES MELLITUS WITH HYPERGLYCEMIA, WITH LONG-TERM CURRENT USE OF INSULIN: Primary | ICD-10-CM

## 2024-12-12 DIAGNOSIS — Z86.73 HISTORY OF CVA (CEREBROVASCULAR ACCIDENT) WITHOUT RESIDUAL DEFICITS: ICD-10-CM

## 2024-12-12 DIAGNOSIS — K21.9 GASTROESOPHAGEAL REFLUX DISEASE WITHOUT ESOPHAGITIS: ICD-10-CM

## 2024-12-12 DIAGNOSIS — S42.001S CLOSED DISPLACED FRACTURE OF RIGHT CLAVICLE, UNSPECIFIED PART OF CLAVICLE, SEQUELA: ICD-10-CM

## 2024-12-12 PROBLEM — S42.001A CLOSED DISPLACED FRACTURE OF RIGHT CLAVICLE: Status: ACTIVE | Noted: 2024-12-12

## 2024-12-12 PROCEDURE — 99999 PR PBB SHADOW E&M-EST. PATIENT-LVL IV: CPT | Mod: PBBFAC,,, | Performed by: FAMILY MEDICINE

## 2024-12-12 RX ORDER — CYANOCOBALAMIN 1000 UG/ML
1000 INJECTION, SOLUTION INTRAMUSCULAR; SUBCUTANEOUS
Status: COMPLETED | OUTPATIENT
Start: 2024-12-12 | End: 2024-12-12

## 2024-12-12 RX ORDER — CYANOCOBALAMIN 1000 UG/ML
INJECTION, SOLUTION INTRAMUSCULAR; SUBCUTANEOUS
Qty: 6 ML | Refills: 1 | Status: SHIPPED | OUTPATIENT
Start: 2024-12-12 | End: 2025-07-08

## 2024-12-12 RX ADMIN — CYANOCOBALAMIN 1000 MCG: 1000 INJECTION, SOLUTION INTRAMUSCULAR; SUBCUTANEOUS at 02:12

## 2024-12-12 NOTE — PROGRESS NOTES
Ambulatory referral for Home Health fax to [facility] at [phone]. Office number [phone]
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
## 2024-12-12 NOTE — TELEPHONE ENCOUNTER
Ambulatory referral for Home Health fax to [facility] at [phone]. Office number [phone]

## 2024-12-18 ENCOUNTER — OFFICE VISIT (OUTPATIENT)
Dept: OPTOMETRY | Facility: CLINIC | Age: 64
End: 2024-12-18
Payer: MEDICARE

## 2024-12-18 DIAGNOSIS — H01.006 BLEPHARITIS OF BOTH EYES, UNSPECIFIED EYELID, UNSPECIFIED TYPE: ICD-10-CM

## 2024-12-18 DIAGNOSIS — Z01.00 DIABETIC EYE EXAM: ICD-10-CM

## 2024-12-18 DIAGNOSIS — H25.13 NUCLEAR SCLEROSIS, BILATERAL: ICD-10-CM

## 2024-12-18 DIAGNOSIS — E11.9 DIABETIC EYE EXAM: ICD-10-CM

## 2024-12-18 DIAGNOSIS — H04.123 DRY EYE SYNDROME, BILATERAL: ICD-10-CM

## 2024-12-18 DIAGNOSIS — E11.9 DIABETES MELLITUS TYPE 2 WITHOUT RETINOPATHY: Primary | ICD-10-CM

## 2024-12-18 DIAGNOSIS — H52.7 REFRACTIVE ERROR: ICD-10-CM

## 2024-12-18 DIAGNOSIS — H01.003 BLEPHARITIS OF BOTH EYES, UNSPECIFIED EYELID, UNSPECIFIED TYPE: ICD-10-CM

## 2024-12-18 PROCEDURE — 1159F MED LIST DOCD IN RCRD: CPT | Mod: CPTII,S$GLB,, | Performed by: OPTOMETRIST

## 2024-12-18 PROCEDURE — 2023F DILAT RTA XM W/O RTNOPTHY: CPT | Mod: CPTII,S$GLB,, | Performed by: OPTOMETRIST

## 2024-12-18 PROCEDURE — 99999 PR PBB SHADOW E&M-EST. PATIENT-LVL III: CPT | Mod: PBBFAC,,, | Performed by: OPTOMETRIST

## 2024-12-18 PROCEDURE — 3066F NEPHROPATHY DOC TX: CPT | Mod: CPTII,S$GLB,, | Performed by: OPTOMETRIST

## 2024-12-18 PROCEDURE — 3288F FALL RISK ASSESSMENT DOCD: CPT | Mod: CPTII,S$GLB,, | Performed by: OPTOMETRIST

## 2024-12-18 PROCEDURE — 3044F HG A1C LEVEL LT 7.0%: CPT | Mod: CPTII,S$GLB,, | Performed by: OPTOMETRIST

## 2024-12-18 PROCEDURE — 1100F PTFALLS ASSESS-DOCD GE2>/YR: CPT | Mod: CPTII,S$GLB,, | Performed by: OPTOMETRIST

## 2024-12-18 PROCEDURE — 4010F ACE/ARB THERAPY RXD/TAKEN: CPT | Mod: CPTII,S$GLB,, | Performed by: OPTOMETRIST

## 2024-12-18 PROCEDURE — 1160F RVW MEDS BY RX/DR IN RCRD: CPT | Mod: CPTII,S$GLB,, | Performed by: OPTOMETRIST

## 2024-12-18 PROCEDURE — 3061F NEG MICROALBUMINURIA REV: CPT | Mod: CPTII,S$GLB,, | Performed by: OPTOMETRIST

## 2024-12-18 PROCEDURE — 92014 COMPRE OPH EXAM EST PT 1/>: CPT | Mod: S$GLB,,, | Performed by: OPTOMETRIST

## 2024-12-18 NOTE — PROGRESS NOTES
HPI    Pt states can't see tv unless he's really close, trouble reading unless he   pulls it closer. All worse in the mornings    (+)ute feeling in eyes, uses washcloth over eyes when this happens  (-)gtts  (-)headaches  (+)fluctuating vision     Hemoglobin A1C       Date                     Value               Ref Range             Status                12/05/2024               6.9 (H)             4.0 - 5.6 %           Final                    08/28/2024               6.3 (H)             4.5 - 6.2 %           Final                    06/14/2023               6.3 (H)             4.0 - 5.6 %           Final              Last edited by Morales Mcnair, OD on 12/18/2024 11:07 AM.            Assessment /Plan     For exam results, see Encounter Report.    Diabetes mellitus type 2 without retinopathy    Diabetic eye exam  -     Ambulatory referral/consult to Optometry    Nuclear sclerosis, bilateral    Refractive error    Dry eye syndrome, bilateral    Blepharitis of both eyes, unspecified eyelid, unspecified type      1. Diabetes mellitus type 2 without retinopathy (Primary)  2. Diabetic eye exam  Discussed possible ocular affects of uncontrolled blood sugar with patient. Recommended continued strong blood sugar control and continued care with PCP. Monitor yearly.     3. Nuclear sclerosis, bilateral  Mild OU, not VS  Discussed possible ocular affects of cataracts. Acceptable BCVA OU. Discussed treatment options. Surgery not recommended at this time. Monitor yearly.     4. Refractive error  Declines refraction -- has vision insurance, prefers to go to in-network provider    5. Dry eye syndrome, bilateral  Discussed ocular affects of dry eyes. Recommend OTC artificial tears 2-4 times a day in both eyes.   Discussed chronicity of KEITH. RTC if symptoms not alleviated by continued use of artificial tears.     6. Blepharitis upper lids OU  Continue warm compress and lid scrubs daily prn

## 2025-01-07 ENCOUNTER — PATIENT OUTREACH (OUTPATIENT)
Dept: ADMINISTRATIVE | Facility: OTHER | Age: 65
End: 2025-01-07
Payer: MEDICARE

## 2025-01-07 NOTE — PROGRESS NOTES
CHW - Case Closure    This Community Health Worker spoke to patient via telephone today.   Pt/Caregiver reported: Unable to reach pt.  Pt/Caregiver denied any additional needs at this time and agrees with episode closure at this time.  Provided patient with Community Health Worker's contact information and encouraged him/her to contact this Community Health Worker if additional needs arise.

## 2025-02-19 ENCOUNTER — TELEPHONE (OUTPATIENT)
Dept: FAMILY MEDICINE | Facility: CLINIC | Age: 65
End: 2025-02-19
Payer: MEDICARE

## 2025-02-19 NOTE — TELEPHONE ENCOUNTER
Spoke with [facility] [place] at [phone]   No one has information regarding phone call for Home Health

## 2025-02-19 NOTE — TELEPHONE ENCOUNTER
----- Message from Rosemary sent at 2/19/2025  1:59 PM CST -----  Type:  Needs Medical AdviceWho Called: Kevyn with [facility] Would the patient rather a call back or a response via [facility]? Please callBest Call Back Number: [phone] Information: patient needs doctor to iniate paper work for home health   Please call back to advise. Thanks!

## 2025-02-24 DIAGNOSIS — E53.8 B12 DEFICIENCY: ICD-10-CM

## 2025-02-24 RX ORDER — CYANOCOBALAMIN 1000 UG/ML
1000 INJECTION, SOLUTION INTRAMUSCULAR; SUBCUTANEOUS
Qty: 3 ML | Refills: 3 | Status: SHIPPED | OUTPATIENT
Start: 2025-02-24

## 2025-02-26 ENCOUNTER — TELEPHONE (OUTPATIENT)
Dept: FAMILY MEDICINE | Facility: CLINIC | Age: 65
End: 2025-02-26
Payer: MEDICARE

## 2025-02-26 NOTE — TELEPHONE ENCOUNTER
----- Message from Lizet sent at 2/26/2025  2:22 PM CST -----  Regarding: Re; Same Day Appointment  Contact: [phone]  Good afternoon, Pt called stating he thinks he has Pneumonia. He said his lungs hurt, and when he blows his nose, excessive mucus comes out.Pt is requesting to be seen as soon as possible.He is asking clinical staff to contact him to discuss.Thank you,Gianna Lamar Navigator

## 2025-02-27 ENCOUNTER — RESULTS FOLLOW-UP (OUTPATIENT)
Dept: FAMILY MEDICINE | Facility: CLINIC | Age: 65
End: 2025-02-27

## 2025-02-27 ENCOUNTER — HOSPITAL ENCOUNTER (OUTPATIENT)
Dept: RADIOLOGY | Facility: CLINIC | Age: 65
Discharge: HOME | End: 2025-02-27
Payer: MEDICARE

## 2025-02-27 ENCOUNTER — OFFICE VISIT (OUTPATIENT)
Dept: FAMILY MEDICINE | Facility: CLINIC | Age: 65
End: 2025-02-27
Payer: MEDICARE

## 2025-02-27 VITALS
HEART RATE: 68 BPM | WEIGHT: 162.69 LBS | TEMPERATURE: 98 F | DIASTOLIC BLOOD PRESSURE: 84 MMHG | RESPIRATION RATE: 18 BRPM | BODY MASS INDEX: 24.09 KG/M2 | SYSTOLIC BLOOD PRESSURE: 132 MMHG | OXYGEN SATURATION: 98 % | HEIGHT: 69 IN

## 2025-02-27 DIAGNOSIS — E11.59 HYPERTENSION ASSOCIATED WITH DIABETES: ICD-10-CM

## 2025-02-27 DIAGNOSIS — B96.89 ACUTE BACTERIAL RHINOSINUSITIS: ICD-10-CM

## 2025-02-27 DIAGNOSIS — I15.2 HYPERTENSION ASSOCIATED WITH DIABETES: ICD-10-CM

## 2025-02-27 DIAGNOSIS — J01.90 ACUTE BACTERIAL RHINOSINUSITIS: ICD-10-CM

## 2025-02-27 DIAGNOSIS — R05.1 ACUTE COUGH: ICD-10-CM

## 2025-02-27 DIAGNOSIS — J06.9 UPPER RESPIRATORY TRACT INFECTION, UNSPECIFIED TYPE: ICD-10-CM

## 2025-02-27 DIAGNOSIS — U07.1 COVID-19: Primary | ICD-10-CM

## 2025-02-27 DIAGNOSIS — U07.1 COVID-19 VIRUS DETECTED: ICD-10-CM

## 2025-02-27 DIAGNOSIS — U07.1 COVID-19: ICD-10-CM

## 2025-02-27 DIAGNOSIS — I69.951 HEMIPARESIS OF RIGHT DOMINANT SIDE AS LATE EFFECT OF CEREBROVASCULAR DISEASE, UNSPECIFIED CEREBROVASCULAR DISEASE TYPE: ICD-10-CM

## 2025-02-27 LAB
CTP QC/QA: YES
CTP QC/QA: YES
POC MOLECULAR INFLUENZA A AGN: NEGATIVE
POC MOLECULAR INFLUENZA B AGN: NEGATIVE
SARS-COV-2 RDRP RESP QL NAA+PROBE: POSITIVE

## 2025-02-27 PROCEDURE — 71046 X-RAY EXAM CHEST 2 VIEWS: CPT | Mod: TC,FY,PO

## 2025-02-27 PROCEDURE — 71046 X-RAY EXAM CHEST 2 VIEWS: CPT | Mod: 26,,, | Performed by: RADIOLOGY

## 2025-02-27 PROCEDURE — 99999 PR PBB SHADOW E&M-EST. PATIENT-LVL V: CPT | Mod: PBBFAC,,,

## 2025-02-27 RX ORDER — LORATADINE 10 MG/1
10 TABLET ORAL DAILY
Qty: 30 TABLET | Refills: 0 | Status: SHIPPED | OUTPATIENT
Start: 2025-02-27 | End: 2025-03-29

## 2025-02-27 RX ORDER — AMOXICILLIN AND CLAVULANATE POTASSIUM 875; 125 MG/1; MG/1
1 TABLET, FILM COATED ORAL 2 TIMES DAILY
Qty: 10 TABLET | Refills: 0 | Status: SHIPPED | OUTPATIENT
Start: 2025-02-27 | End: 2025-03-04

## 2025-02-27 RX ORDER — FLUTICASONE PROPIONATE 50 MCG
1 SPRAY, SUSPENSION (ML) NASAL 2 TIMES DAILY
Qty: 9.9 ML | Refills: 2 | Status: SHIPPED | OUTPATIENT
Start: 2025-02-27

## 2025-02-27 NOTE — PROGRESS NOTES
[facility]     Subjective:       Patient ID:  [id number]     Chief Complaint: Cough    Price Noguera is a 64 y.o. male with a past medical history significant for T2DM, HTN, HLD, CAD, CVA, and vitamin B12 deficiency who presents to the clinic for upper respiratory symptoms.    The patient presents to the clinic with a 12-day history of upper respiratory symptoms. He reports that the symptoms began with fatigue and generalized body weakness, which then progressed to nasal congestion, rhinorrhea, and a mild cough with some mucus production. His primary concern is the persistent nasal congestion that has not improved. He denies any current fever, chills, sore throat, ear pain, headache, or abdominal symptoms such as abdominal pain, nausea, or vomiting, though he does report some diarrhea. The patient has also experienced shortness of breath and wheezing, which has improved with use of his home inhaler. He has tried Benadryl and Tylenol without significant relief but has avoided trying other medications, as he does not want to mask his symptoms. He has no other complaints today and denies any current shortness of breath or chest pain.    Past Medical History:   Diagnosis Date    Colon polyp     Coronary artery disease     Diabetes mellitus     GERD (gastroesophageal reflux disease)     GERD (gastroesophageal reflux disease)     History of colonic polyps     History of heart artery stent     Hypertension     Myocardial infarction     Neuropathy     Stroke     Tumor     BENIGN TUMOR ON BACK THAT COMPRESSED SPINAL CHORD THAT CAUSED PAIN-BUT REMOVED.       Review of patient's allergies indicates:   Allergen Reactions    Protonix [pantoprazole]      Nausea, diarrhea       Lab Results   Component Value Date    WBC 7.71 12/05/2024    HGB 14.2 12/05/2024    HCT 43.4 12/05/2024     12/05/2024    CHOL 269 (H) 12/05/2024    TRIG 126 12/05/2024    HDL 51 12/05/2024    ALT 21 12/05/2024    AST 25 12/05/2024      12/05/2024    K 3.8 12/05/2024     12/05/2024    CREATININE 1.0 12/05/2024    BUN 13 12/05/2024    CO2 23 12/05/2024    TSH 0.667 12/13/2020    PSA 0.95 07/09/2019    INR 1.0 12/13/2020    HGBA1C 6.9 (H) 12/05/2024       Review of Systems   Constitutional:  Positive for activity change, appetite change and fatigue. Negative for chills and fever.   HENT:  Positive for congestion, rhinorrhea and sinus pressure. Negative for ear pain and sore throat.    Respiratory:  Positive for cough and shortness of breath.    Cardiovascular:  Negative for chest pain.   Neurological:  Positive for weakness.         Objective:      Physical Exam  Constitutional:       General: He is not in acute distress.     Appearance: He is not ill-appearing or toxic-appearing.   HENT:      Head: Normocephalic and atraumatic.      Right Ear: There is impacted cerumen.      Left Ear: Tympanic membrane is not perforated, erythematous, retracted or bulging.      Nose: Congestion present.      Right Sinus: No maxillary sinus tenderness or frontal sinus tenderness.      Left Sinus: No maxillary sinus tenderness or frontal sinus tenderness.      Mouth/Throat:      Pharynx: Postnasal drip present. No posterior oropharyngeal erythema.   Cardiovascular:      Rate and Rhythm: Normal rate and regular rhythm.      Pulses: Normal pulses.      Heart sounds: Normal heart sounds. No murmur heard.     No friction rub.   Pulmonary:      Effort: Pulmonary effort is normal. No respiratory distress.      Breath sounds: Normal breath sounds. No wheezing.   Abdominal:      General: Abdomen is flat. Bowel sounds are normal. There is no distension.      Tenderness: There is no abdominal tenderness. There is no guarding or rebound.   Musculoskeletal:      Right lower leg: No edema.      Left lower leg: No edema.   Skin:     General: Skin is warm.      Capillary Refill: Capillary refill takes less than 2 seconds.   Neurological:      General: No focal  deficit present.      Mental Status: He is alert and oriented to person, place, and time.      Cranial Nerves: No cranial nerve deficit.   Psychiatric:         Mood and Affect: Mood normal.           Assessment:       1. COVID-19    2. Upper respiratory tract infection, unspecified type    3. Acute bacterial rhinosinusitis    4. Acute cough    5. Hypertension associated with diabetes    6. Hemiparesis of right dominant side as late effect of cerebrovascular disease, unspecified cerebrovascular disease type          Plan:       Price was seen today for cough.    Diagnoses and all orders for this visit:    Upper respiratory tract infection, unspecified type  Comments:  POCT COVID-19 positive.  Orders:  -     POCT COVID-19 Rapid Screening  -     POCT Influenza A/B Molecular    COVID-19  Comments:  Onset possibly 12 days ago as symptoms began primarily with fatigue and generalized body weakness.  Recommended against Paxlovid as patient's symptoms have been present for 12 days.   -    For sinus pressure or nasal congestion, recommend Sudafed (Pseudoephedrine) from behind the pharmacy counter    -    Afrin nasal spray as needed for nasal congestion (max of three days) followed by sinus rinse (Patrick med or Neti pot) with distilled water   -    Hot tea with honey or throat lozenges as needed for sore throat/ cough   -    Tylenol 500 mg or Ibuprofen 200 mg 2 tabs every 6 hours as needed for fever, headache, body aches, throat pain, etc    -    Hydrate well to thin mucus  Return precautions discussed with patient.   Further recommendations pending XR results.   Orders:  -     X-Ray Chest PA And Lateral; Future    Acute bacterial rhinosinusitis  Comments:  Likely superimposed sinus infection from COVID 19 infection. Will prescribe antibiotics as symptoms are > 12 days without improvement.  Discussed using OTC decongestants, guaifenesin, flonase, and antihistamines for supportive treatment.   Orders:  -      amoxicillin-clavulanate 875-125mg (AUGMENTIN) 875-125 mg per tablet; Take 1 tablet by mouth 2 (two) times daily. for 5 days  -     fluticasone propionate (FLONASE) 50 mcg/actuation nasal spray; 1 spray (50 mcg total) by Each Nostril route 2 (two) times daily. Point up and slightly outward toward ear when spraying to avoid irritating nasal septum.  -     loratadine (CLARITIN) 10 mg tablet; Take 1 tablet (10 mg total) by mouth once daily.    Acute cough  Comments:  Mild, however given the shortness of breath and previous pneumonia, will obtain CXR.  Orders:  -     X-Ray Chest PA And Lateral; Future    Hypertension associated with diabetes  Comments:  Stable. Contiue medication as prescribed.    Hemiparesis of right dominant side as late effect of cerebrovascular disease, unspecified cerebrovascular disease type  Comments:  Stable. Patient states he has tried contacting home health but they have not recieved a referral from us.  Orders:  -     Ambulatory referral/consult to Home Health; Future        Follow up for if symptoms are not improved.    Rosemary Holley PA-C  Family Medicine Physician Assistant     Future Appointments       Date Provider Specialty Appt Notes    2/27/2025  Radiology cxr    3/12/2025 Chanel Roberson MD Family Medicine 3 mo follow up    6/11/2025 Chanel Roberson MD Family Medicine 6 mo f/u             All of your core healthy metrics are met.       I spent a total of 30 minutes on the day of the visit.This includes face to face time and non-face to face time preparing to see the patient (eg, review of tests), obtaining and/or reviewing separately obtained history, documenting clinical information in the electronic or other health record, independently interpreting results and communicating results to the patient/family/caregiver, or care coordinator.

## 2025-02-27 NOTE — PATIENT INSTRUCTIONS
Mucinex   Nasal rinses   Hot steam - humidifiers   Afrin - decongestant - only use for three days.

## 2025-02-28 ENCOUNTER — TELEPHONE (OUTPATIENT)
Dept: FAMILY MEDICINE | Facility: CLINIC | Age: 65
End: 2025-02-28
Payer: MEDICARE

## 2025-02-28 NOTE — TELEPHONE ENCOUNTER
----- Message from Chanel sent at 2/28/2025  9:44 AM CST -----  Contact: Ilene henning/ [facility]   Type: Home Health (orders, updates, clarifications, etc.)Home Health Agency/Nurse: [facility]: [phone] for call: Received a referral for HH. Pt stated he is supposed to have B-12 injections but that is not on the referral. Pt stated he does not need someone to come once a week only as often as he needs his B-12 injection. Ilene would like to speak to someone about that.  Please call and advise. Thank youComments:

## 2025-02-28 NOTE — TELEPHONE ENCOUNTER
Spoke with Ilene MOORE nurse.   She needs clarification for Vit B 12 injections. Weekly or monthly   Please advise   She is admitting him today

## 2025-02-28 NOTE — TELEPHONE ENCOUNTER
----- Message from Rosemary Holley PA-C sent at 2/27/2025  3:05 PM CST -----  Hi,     No acute abnormalities seen on chest x-ray. Continue treatment as discussed in clinic.     Rosemary Holley PA-C  ----- Message -----  From: Interface, Rad Results In  Sent: 2/27/2025   2:43 PM CST  To: Rosemary Holley PA-C

## 2025-02-28 NOTE — TELEPHONE ENCOUNTER
Sig - Route: Inject 1 mL (1,000 mcg total) into the muscle every 30 days. - Intramuscular   Sent to pharmacy as: cyanocobalamin 1,000 mcg/mL injection       This is the order placed by Dr. Roberson. So, monthly! Thanks.

## 2025-03-01 DIAGNOSIS — I63.9 CEREBROVASCULAR ACCIDENT (CVA), UNSPECIFIED MECHANISM: ICD-10-CM

## 2025-03-01 DIAGNOSIS — G47.00 INSOMNIA, UNSPECIFIED TYPE: ICD-10-CM

## 2025-03-01 DIAGNOSIS — E11.65 TYPE 2 DIABETES MELLITUS WITH HYPERGLYCEMIA, WITH LONG-TERM CURRENT USE OF INSULIN: ICD-10-CM

## 2025-03-01 DIAGNOSIS — Z79.4 TYPE 2 DIABETES MELLITUS WITH HYPERGLYCEMIA, WITH LONG-TERM CURRENT USE OF INSULIN: ICD-10-CM

## 2025-03-01 RX ORDER — ATORVASTATIN CALCIUM 80 MG/1
80 TABLET, FILM COATED ORAL DAILY
Qty: 90 TABLET | Refills: 3 | Status: SHIPPED | OUTPATIENT
Start: 2025-03-01

## 2025-03-01 RX ORDER — TRAZODONE HYDROCHLORIDE 50 MG/1
50 TABLET ORAL NIGHTLY
Qty: 90 TABLET | Refills: 3 | Status: SHIPPED | OUTPATIENT
Start: 2025-03-01

## 2025-03-01 RX ORDER — METFORMIN HYDROCHLORIDE 850 MG/1
850 TABLET ORAL 2 TIMES DAILY WITH MEALS
Qty: 180 TABLET | Refills: 1 | Status: SHIPPED | OUTPATIENT
Start: 2025-03-01

## 2025-03-01 NOTE — TELEPHONE ENCOUNTER
No care due was identified.  [facility] Embedded Care Due Messages. Reference number: [id number].   3/01/2025 7:35:09 AM CST

## 2025-03-01 NOTE — TELEPHONE ENCOUNTER
Refill Decision Note   Price Noguera  is requesting a refill authorization.  Brief Assessment and Rationale for Refill:  Approve     Medication Therapy Plan:       Medication Reconciliation Completed: No   Comments:     No Care Gaps recommended.     Note composed:11:32 AM 03/01/2025

## 2025-03-12 ENCOUNTER — OFFICE VISIT (OUTPATIENT)
Dept: FAMILY MEDICINE | Facility: CLINIC | Age: 65
End: 2025-03-12
Payer: MEDICARE

## 2025-03-12 VITALS
SYSTOLIC BLOOD PRESSURE: 131 MMHG | BODY MASS INDEX: 24.59 KG/M2 | HEIGHT: 69 IN | DIASTOLIC BLOOD PRESSURE: 80 MMHG | WEIGHT: 166 LBS | HEART RATE: 70 BPM | OXYGEN SATURATION: 98 % | TEMPERATURE: 98 F | RESPIRATION RATE: 15 BRPM

## 2025-03-12 DIAGNOSIS — K21.9 GASTROESOPHAGEAL REFLUX DISEASE WITHOUT ESOPHAGITIS: ICD-10-CM

## 2025-03-12 DIAGNOSIS — E78.5 HYPERLIPIDEMIA ASSOCIATED WITH TYPE 2 DIABETES MELLITUS: ICD-10-CM

## 2025-03-12 DIAGNOSIS — Z79.4 TYPE 2 DIABETES MELLITUS WITH HYPERGLYCEMIA, WITH LONG-TERM CURRENT USE OF INSULIN: Primary | ICD-10-CM

## 2025-03-12 DIAGNOSIS — E11.69 HYPERLIPIDEMIA ASSOCIATED WITH TYPE 2 DIABETES MELLITUS: ICD-10-CM

## 2025-03-12 DIAGNOSIS — I69.951 HEMIPARESIS OF RIGHT DOMINANT SIDE AS LATE EFFECT OF CEREBROVASCULAR DISEASE, UNSPECIFIED CEREBROVASCULAR DISEASE TYPE: ICD-10-CM

## 2025-03-12 DIAGNOSIS — I15.2 HYPERTENSION ASSOCIATED WITH DIABETES: ICD-10-CM

## 2025-03-12 DIAGNOSIS — E11.65 TYPE 2 DIABETES MELLITUS WITH HYPERGLYCEMIA, WITH LONG-TERM CURRENT USE OF INSULIN: Primary | ICD-10-CM

## 2025-03-12 DIAGNOSIS — E53.8 B12 DEFICIENCY: ICD-10-CM

## 2025-03-12 DIAGNOSIS — I25.10 CORONARY ARTERY DISEASE INVOLVING NATIVE CORONARY ARTERY OF NATIVE HEART WITHOUT ANGINA PECTORIS: ICD-10-CM

## 2025-03-12 DIAGNOSIS — E11.59 HYPERTENSION ASSOCIATED WITH DIABETES: ICD-10-CM

## 2025-03-12 PROCEDURE — 3008F BODY MASS INDEX DOCD: CPT | Mod: CPTII,S$GLB,, | Performed by: FAMILY MEDICINE

## 2025-03-12 PROCEDURE — 1159F MED LIST DOCD IN RCRD: CPT | Mod: CPTII,S$GLB,, | Performed by: FAMILY MEDICINE

## 2025-03-12 PROCEDURE — 4010F ACE/ARB THERAPY RXD/TAKEN: CPT | Mod: CPTII,S$GLB,, | Performed by: FAMILY MEDICINE

## 2025-03-12 PROCEDURE — 1126F AMNT PAIN NOTED NONE PRSNT: CPT | Mod: CPTII,S$GLB,, | Performed by: FAMILY MEDICINE

## 2025-03-12 PROCEDURE — 3075F SYST BP GE 130 - 139MM HG: CPT | Mod: CPTII,S$GLB,, | Performed by: FAMILY MEDICINE

## 2025-03-12 PROCEDURE — 99999 PR PBB SHADOW E&M-EST. PATIENT-LVL III: CPT | Mod: PBBFAC,,, | Performed by: FAMILY MEDICINE

## 2025-03-12 PROCEDURE — 99214 OFFICE O/P EST MOD 30 MIN: CPT | Mod: S$GLB,,, | Performed by: FAMILY MEDICINE

## 2025-03-12 PROCEDURE — 3288F FALL RISK ASSESSMENT DOCD: CPT | Mod: CPTII,S$GLB,, | Performed by: FAMILY MEDICINE

## 2025-03-12 PROCEDURE — 3072F LOW RISK FOR RETINOPATHY: CPT | Mod: CPTII,S$GLB,, | Performed by: FAMILY MEDICINE

## 2025-03-12 PROCEDURE — 3079F DIAST BP 80-89 MM HG: CPT | Mod: CPTII,S$GLB,, | Performed by: FAMILY MEDICINE

## 2025-03-12 PROCEDURE — 1100F PTFALLS ASSESS-DOCD GE2>/YR: CPT | Mod: CPTII,S$GLB,, | Performed by: FAMILY MEDICINE

## 2025-03-12 PROCEDURE — 1160F RVW MEDS BY RX/DR IN RCRD: CPT | Mod: CPTII,S$GLB,, | Performed by: FAMILY MEDICINE

## 2025-03-12 PROCEDURE — G2211 COMPLEX E/M VISIT ADD ON: HCPCS | Mod: S$GLB,,, | Performed by: FAMILY MEDICINE
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
## 2025-03-21 DIAGNOSIS — J01.90 ACUTE BACTERIAL RHINOSINUSITIS: ICD-10-CM

## 2025-03-21 DIAGNOSIS — B96.89 ACUTE BACTERIAL RHINOSINUSITIS: ICD-10-CM

## 2025-03-21 RX ORDER — LORATADINE 10 MG/1
10 TABLET ORAL
Qty: 90 TABLET | Refills: 1 | Status: SHIPPED | OUTPATIENT
Start: 2025-03-21

## 2025-04-01 ENCOUNTER — TELEPHONE (OUTPATIENT)
Dept: FAMILY MEDICINE | Facility: CLINIC | Age: 65
End: 2025-04-01
Payer: MEDICARE

## 2025-04-01 NOTE — TELEPHONE ENCOUNTER
----- Message from Chanel Roberson MD sent at 3/12/2025  6:31 PM CDT -----  Print subsequent HH orders and fax to [place] [facility]    Thanks,   Dr. Roberson

## 2025-04-02 ENCOUNTER — TELEPHONE (OUTPATIENT)
Dept: FAMILY MEDICINE | Facility: CLINIC | Age: 65
End: 2025-04-02
Payer: MEDICARE
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
## 2025-04-03 ENCOUNTER — OFFICE VISIT (OUTPATIENT)
Dept: FAMILY MEDICINE | Facility: CLINIC | Age: 65
End: 2025-04-03
Payer: MEDICARE

## 2025-04-03 ENCOUNTER — APPOINTMENT (OUTPATIENT)
Dept: LAB | Facility: HOSPITAL | Age: 65
End: 2025-04-03
Attending: NURSE PRACTITIONER
Payer: MEDICARE

## 2025-04-03 VITALS
TEMPERATURE: 98 F | SYSTOLIC BLOOD PRESSURE: 104 MMHG | OXYGEN SATURATION: 97 % | WEIGHT: 166.44 LBS | HEIGHT: 69 IN | DIASTOLIC BLOOD PRESSURE: 60 MMHG | HEART RATE: 79 BPM | BODY MASS INDEX: 24.65 KG/M2

## 2025-04-03 DIAGNOSIS — R41.3 OTHER AMNESIA: ICD-10-CM

## 2025-04-03 DIAGNOSIS — E11.65 TYPE 2 DIABETES MELLITUS WITH HYPERGLYCEMIA, WITH LONG-TERM CURRENT USE OF INSULIN: ICD-10-CM

## 2025-04-03 DIAGNOSIS — R53.81 OTHER MALAISE: ICD-10-CM

## 2025-04-03 DIAGNOSIS — E46 PROTEIN-CALORIE MALNUTRITION, UNSPECIFIED SEVERITY: ICD-10-CM

## 2025-04-03 DIAGNOSIS — R29.6 FREQUENT FALLS: Primary | ICD-10-CM

## 2025-04-03 DIAGNOSIS — R79.9 ABNORMAL FINDING OF BLOOD CHEMISTRY, UNSPECIFIED: ICD-10-CM

## 2025-04-03 DIAGNOSIS — Z79.4 TYPE 2 DIABETES MELLITUS WITH HYPERGLYCEMIA, WITH LONG-TERM CURRENT USE OF INSULIN: ICD-10-CM

## 2025-04-03 LAB — GLUCOSE SERPL-MCNC: 154 MG/DL (ref 70–110)

## 2025-04-03 PROCEDURE — 99999 PR PBB SHADOW E&M-EST. PATIENT-LVL V: CPT | Mod: PBBFAC,,, | Performed by: NURSE PRACTITIONER
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
## 2025-04-04 NOTE — TELEPHONE ENCOUNTER
Spoke to Shelbi informed and verbalizes understanding. Pt was seen 4/3/25 in person, completed labs and scehduled for MRI on Monday

## 2025-04-06 ENCOUNTER — RESULTS FOLLOW-UP (OUTPATIENT)
Dept: FAMILY MEDICINE | Facility: CLINIC | Age: 65
End: 2025-04-06

## 2025-04-06 DIAGNOSIS — E11.69 HYPERLIPIDEMIA ASSOCIATED WITH TYPE 2 DIABETES MELLITUS: ICD-10-CM

## 2025-04-06 DIAGNOSIS — Z79.4 TYPE 2 DIABETES MELLITUS WITH HYPERGLYCEMIA, WITH LONG-TERM CURRENT USE OF INSULIN: Primary | ICD-10-CM

## 2025-04-06 DIAGNOSIS — E78.5 HYPERLIPIDEMIA ASSOCIATED WITH TYPE 2 DIABETES MELLITUS: ICD-10-CM

## 2025-04-06 DIAGNOSIS — E53.8 B12 DEFICIENCY: ICD-10-CM

## 2025-04-06 DIAGNOSIS — E11.65 TYPE 2 DIABETES MELLITUS WITH HYPERGLYCEMIA, WITH LONG-TERM CURRENT USE OF INSULIN: Primary | ICD-10-CM

## 2025-04-07 ENCOUNTER — HOSPITAL ENCOUNTER (OUTPATIENT)
Dept: RADIOLOGY | Facility: HOSPITAL | Age: 65
Discharge: HOME | End: 2025-04-07
Attending: NURSE PRACTITIONER
Payer: MEDICARE

## 2025-04-07 DIAGNOSIS — R41.3 OTHER AMNESIA: ICD-10-CM

## 2025-04-07 PROCEDURE — 70551 MRI BRAIN STEM W/O DYE: CPT | Mod: TC

## 2025-04-07 PROCEDURE — 70551 MRI BRAIN STEM W/O DYE: CPT | Mod: 26,,, | Performed by: RADIOLOGY

## 2025-04-30 ENCOUNTER — DOCUMENT SCAN (OUTPATIENT)
Dept: HOME HEALTH SERVICES | Facility: HOSPITAL | Age: 65
End: 2025-04-30
Payer: MEDICARE

## 2025-05-02 DIAGNOSIS — R41.3 OTHER AMNESIA: ICD-10-CM

## 2025-05-02 DIAGNOSIS — R29.6 FREQUENT FALLS: Primary | ICD-10-CM

## 2025-05-02 RX ORDER — INSULIN PUMP SYRINGE, 3 ML
EACH MISCELLANEOUS
Refills: 0 | OUTPATIENT
Start: 2025-05-02 | End: 2026-05-02

## 2025-05-02 RX ORDER — LANCETS
EACH MISCELLANEOUS
Qty: 100 EACH | Refills: 5 | Status: SHIPPED | OUTPATIENT
Start: 2025-05-02

## 2025-05-02 RX ORDER — INSULIN PUMP SYRINGE, 3 ML
EACH MISCELLANEOUS
Qty: 1 EACH | Refills: 0 | Status: SHIPPED | OUTPATIENT
Start: 2025-05-02 | End: 2026-05-02

## 2025-05-02 NOTE — TELEPHONE ENCOUNTER
No care due was identified.  [facility] Embedded Care Due Messages. Reference number: [id number].   5/02/2025 12:35:30 PM CDT

## 2025-05-02 NOTE — TELEPHONE ENCOUNTER
----- Message from Melissa sent at 5/2/2025 10:08 AM CDT -----  Regarding: Needs return call  Type: Needs Medical AdviceWho Called:  Kathrin Call Back Number: [phone] Information: Pt states that he gets a call from dr caal office today, did not elaborate please advsie he said he has been trying to talk to someone for days regarding his home health orders

## 2025-05-02 NOTE — TELEPHONE ENCOUNTER
Spoke to pt who states [facility] has been trying to reach the office and is not getting any response from Dr. Roberson office. Advised pt I do not see any phone calls made to the office but would reach out to [facility].    Spoke to Leta with [place] [facility] who states note on chart just states pt needs a glucometer, neurology referral for dementia, he is also having increased neck pain. Pt did misplace his Plavix and unable to find it.     Left message for Inocente  nurse to return call to clinic

## 2025-05-03 ENCOUNTER — DOCUMENT SCAN (OUTPATIENT)
Dept: HOME HEALTH SERVICES | Facility: HOSPITAL | Age: 65
End: 2025-05-03
Payer: MEDICARE

## 2025-05-08 ENCOUNTER — TELEPHONE (OUTPATIENT)
Dept: FAMILY MEDICINE | Facility: CLINIC | Age: 65
End: 2025-05-08
Payer: MEDICARE

## 2025-05-08 NOTE — TELEPHONE ENCOUNTER
Spoke to Shelbi and advised Referral placed for neurology and faxed to office. Glucometer was also sent to pharmacy

## 2025-05-08 NOTE — TELEPHONE ENCOUNTER
----- Message from Clotilde sent at 5/8/2025 11:28 AM CDT -----  Regarding: LILLY SUMMERS [[id number]]  Type: Shelbi with [facility] Returning Call  Who Called: Shelbi with [facility]  Who Left Message for Patient: cannot remember   Does the patient know what this is regarding?:referral for neurology/eval for dementia  Would the patient rather a call back or a response via [facility]? Call back   Best Call Back Number:Shelbi with [facility] [phone]  Additional Information:

## 2025-05-13 DIAGNOSIS — I63.9 CEREBROVASCULAR ACCIDENT (CVA), UNSPECIFIED MECHANISM: ICD-10-CM

## 2025-05-13 RX ORDER — CLOPIDOGREL BISULFATE 75 MG/1
75 TABLET ORAL
Qty: 90 TABLET | Refills: 1 | Status: SHIPPED | OUTPATIENT
Start: 2025-05-13

## 2025-05-13 NOTE — TELEPHONE ENCOUNTER
No care due was identified.  [facility] Embedded Care Due Messages. Reference number: [id number].   5/13/2025 12:05:37 AM CDT

## 2025-05-13 NOTE — TELEPHONE ENCOUNTER
Refill Decision Note   Price Noguera  is requesting a refill authorization.  Brief Assessment and Rationale for Refill:  Approve     Medication Therapy Plan:        Comments:     Note composed:6:51 AM 05/13/2025

## 2025-05-19 ENCOUNTER — TELEPHONE (OUTPATIENT)
Dept: NEUROLOGY | Facility: CLINIC | Age: 65
End: 2025-05-19
Payer: MEDICARE
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
## 2025-05-19 NOTE — TELEPHONE ENCOUNTER
----- Message from Kalia sent at 5/19/2025  1:07 PM CDT -----  Regarding: sooner appt  Contact: aisha at [phone]  Type:  Sooner Appointment RequestCaller is requesting a sooner appointment.  Name of Caller:  Edgar is the first available appointment?  Dept bookSymptoms:  pt has memory issues, has referral on chart for falls and amnesia Would the patient rather a call back or a response via [facility]? [facility] Call Back Number:  [phone] Information:

## 2025-05-20 ENCOUNTER — TELEPHONE (OUTPATIENT)
Dept: FAMILY MEDICINE | Facility: CLINIC | Age: 65
End: 2025-05-20
Payer: MEDICARE

## 2025-05-20 DIAGNOSIS — R41.3 OTHER AMNESIA: ICD-10-CM

## 2025-05-20 DIAGNOSIS — R29.6 FREQUENT FALLS: Primary | ICD-10-CM

## 2025-05-20 NOTE — TELEPHONE ENCOUNTER
----- Message from Agustina sent at 5/20/2025  3:32 PM CDT -----  Name of Who is Calling: [facility]( [facility]) [phone]  What is the request in detail: Request that Neuro referral be faxed to Dr. Alford Phone:[phone]  Chanel Roberson Can the clinic reply by [facility]:  What Number to Call Back if not in [facility]:  ----- Message -----  From: Agustina Leavitt  Sent: 5/20/2025   3:34 PM CDT  To: Karol SHIN Staff    Name of Who is Calling: [facility]( [facility])  What is the request in detail: Request that Neuro referral be faxed to Dr. Alford Phone:[phone]  Chanel Roberson Can the clinic reply by [facility]:  What Number to Call Back if not in [facility]:

## 2025-05-20 NOTE — TELEPHONE ENCOUNTER
----- Message from Keanu sent at 5/19/2025  1:59 PM CDT -----  Contact: Pt [phone]  Type:  Needs Medical Advice / Referral requestWho Called: PtWould the patient rather a call back or a response via [facility]? CallBe Call Back Number: [phone] Additional Information: Pt has referral on file for neurology, but adv they told him that the referral has to come from his PCP. Pt req the referral be sent to [facility] Phone [phone] Fax [phone]. Pt req call back. Thank you.

## 2025-05-21 ENCOUNTER — DOCUMENT SCAN (OUTPATIENT)
Dept: HOME HEALTH SERVICES | Facility: HOSPITAL | Age: 65
End: 2025-05-21
Payer: MEDICARE

## 2025-05-25 DIAGNOSIS — J01.90 ACUTE BACTERIAL RHINOSINUSITIS: ICD-10-CM

## 2025-05-25 DIAGNOSIS — B96.89 ACUTE BACTERIAL RHINOSINUSITIS: ICD-10-CM

## 2025-05-25 RX ORDER — FLUTICASONE PROPIONATE 50 MCG
SPRAY, SUSPENSION (ML) NASAL
Qty: 48 ML | Refills: 0 | Status: SHIPPED | OUTPATIENT
Start: 2025-05-25

## 2025-06-11 ENCOUNTER — LAB VISIT (OUTPATIENT)
Dept: LAB | Facility: HOSPITAL | Age: 65
End: 2025-06-11
Attending: FAMILY MEDICINE
Payer: MEDICARE

## 2025-06-11 ENCOUNTER — OFFICE VISIT (OUTPATIENT)
Dept: FAMILY MEDICINE | Facility: CLINIC | Age: 65
End: 2025-06-11
Payer: MEDICARE

## 2025-06-11 VITALS
WEIGHT: 163.81 LBS | DIASTOLIC BLOOD PRESSURE: 84 MMHG | OXYGEN SATURATION: 98 % | HEART RATE: 60 BPM | BODY MASS INDEX: 24.26 KG/M2 | SYSTOLIC BLOOD PRESSURE: 150 MMHG | RESPIRATION RATE: 15 BRPM | TEMPERATURE: 98 F | HEIGHT: 69 IN

## 2025-06-11 DIAGNOSIS — Z79.4 TYPE 2 DIABETES MELLITUS WITH HYPERGLYCEMIA, WITH LONG-TERM CURRENT USE OF INSULIN: ICD-10-CM

## 2025-06-11 DIAGNOSIS — R41.3 OTHER AMNESIA: ICD-10-CM

## 2025-06-11 DIAGNOSIS — E11.65 TYPE 2 DIABETES MELLITUS WITH HYPERGLYCEMIA, WITH LONG-TERM CURRENT USE OF INSULIN: Primary | ICD-10-CM

## 2025-06-11 DIAGNOSIS — I63.9 CEREBROVASCULAR ACCIDENT (CVA), UNSPECIFIED MECHANISM: ICD-10-CM

## 2025-06-11 DIAGNOSIS — Z79.4 TYPE 2 DIABETES MELLITUS WITH HYPERGLYCEMIA, WITH LONG-TERM CURRENT USE OF INSULIN: Primary | ICD-10-CM

## 2025-06-11 DIAGNOSIS — R29.6 FREQUENT FALLS: ICD-10-CM

## 2025-06-11 DIAGNOSIS — E11.65 TYPE 2 DIABETES MELLITUS WITH HYPERGLYCEMIA, WITH LONG-TERM CURRENT USE OF INSULIN: ICD-10-CM

## 2025-06-11 DIAGNOSIS — E11.59 HYPERTENSION ASSOCIATED WITH DIABETES: ICD-10-CM

## 2025-06-11 DIAGNOSIS — I15.2 HYPERTENSION ASSOCIATED WITH DIABETES: ICD-10-CM

## 2025-06-11 LAB
ABSOLUTE EOSINOPHIL (OHS): 1.18 K/UL
ABSOLUTE MONOCYTE (OHS): 0.86 K/UL (ref 0.3–1)
ABSOLUTE NEUTROPHIL COUNT (OHS): 3.81 K/UL (ref 1.8–7.7)
ALBUMIN SERPL BCP-MCNC: 4.3 G/DL (ref 3.5–5.2)
ALP SERPL-CCNC: 127 UNIT/L (ref 40–150)
ALT SERPL W/O P-5'-P-CCNC: 22 UNIT/L (ref 10–44)
ANION GAP (OHS): 13 MMOL/L (ref 8–16)
AST SERPL-CCNC: 21 UNIT/L (ref 11–45)
BASOPHILS # BLD AUTO: 0.15 K/UL
BASOPHILS NFR BLD AUTO: 1.6 %
BILIRUB SERPL-MCNC: 0.4 MG/DL (ref 0.1–1)
BUN SERPL-MCNC: 10 MG/DL (ref 8–23)
CALCIUM SERPL-MCNC: 9.3 MG/DL (ref 8.7–10.5)
CHLORIDE SERPL-SCNC: 101 MMOL/L (ref 95–110)
CO2 SERPL-SCNC: 25 MMOL/L (ref 23–29)
CREAT SERPL-MCNC: 0.9 MG/DL (ref 0.5–1.4)
EAG (OHS): 166 MG/DL (ref 68–131)
ERYTHROCYTE [DISTWIDTH] IN BLOOD BY AUTOMATED COUNT: 13.8 % (ref 11.5–14.5)
GFR SERPLBLD CREATININE-BSD FMLA CKD-EPI: >60 ML/MIN/1.73/M2
GLUCOSE SERPL-MCNC: 152 MG/DL (ref 70–110)
HBA1C MFR BLD: 7.4 % (ref 4–5.6)
HCT VFR BLD AUTO: 44.3 % (ref 40–54)
HGB BLD-MCNC: 14.3 GM/DL (ref 14–18)
IMM GRANULOCYTES # BLD AUTO: 0.03 K/UL (ref 0–0.04)
IMM GRANULOCYTES NFR BLD AUTO: 0.3 % (ref 0–0.5)
LYMPHOCYTES # BLD AUTO: 3.14 K/UL (ref 1–4.8)
MCH RBC QN AUTO: 28.5 PG (ref 27–31)
MCHC RBC AUTO-ENTMCNC: 32.3 G/DL (ref 32–36)
MCV RBC AUTO: 88 FL (ref 82–98)
NUCLEATED RBC (/100WBC) (OHS): 0 /100 WBC
PLATELET # BLD AUTO: 319 K/UL (ref 150–450)
PMV BLD AUTO: 10 FL (ref 9.2–12.9)
POTASSIUM SERPL-SCNC: 4.2 MMOL/L (ref 3.5–5.1)
PROT SERPL-MCNC: 7.9 GM/DL (ref 6–8.4)
RBC # BLD AUTO: 5.01 M/UL (ref 4.6–6.2)
RELATIVE EOSINOPHIL (OHS): 12.9 %
RELATIVE LYMPHOCYTE (OHS): 34.2 % (ref 18–48)
RELATIVE MONOCYTE (OHS): 9.4 % (ref 4–15)
RELATIVE NEUTROPHIL (OHS): 41.6 % (ref 38–73)
SODIUM SERPL-SCNC: 139 MMOL/L (ref 136–145)
WBC # BLD AUTO: 9.17 K/UL (ref 3.9–12.7)

## 2025-06-11 PROCEDURE — 85025 COMPLETE CBC W/AUTO DIFF WBC: CPT

## 2025-06-11 PROCEDURE — 99999 PR PBB SHADOW E&M-EST. PATIENT-LVL IV: CPT | Mod: PBBFAC,,, | Performed by: FAMILY MEDICINE

## 2025-06-11 PROCEDURE — 83036 HEMOGLOBIN GLYCOSYLATED A1C: CPT

## 2025-06-11 PROCEDURE — 80053 COMPREHEN METABOLIC PANEL: CPT

## 2025-06-11 PROCEDURE — 36415 COLL VENOUS BLD VENIPUNCTURE: CPT | Mod: PO

## 2025-06-11 RX ORDER — LISINOPRIL 2.5 MG/1
2.5 TABLET ORAL DAILY
Qty: 90 TABLET | Refills: 3 | Status: SHIPPED | OUTPATIENT
Start: 2025-06-11
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
## 2025-06-13 ENCOUNTER — RESULTS FOLLOW-UP (OUTPATIENT)
Dept: FAMILY MEDICINE | Facility: CLINIC | Age: 65
End: 2025-06-13

## 2025-07-03 ENCOUNTER — TELEPHONE (OUTPATIENT)
Dept: FAMILY MEDICINE | Facility: CLINIC | Age: 65
End: 2025-07-03
Payer: MEDICARE

## 2025-07-17 ENCOUNTER — DOCUMENT SCAN (OUTPATIENT)
Dept: HOME HEALTH SERVICES | Facility: HOSPITAL | Age: 65
End: 2025-07-17
Payer: MEDICARE

## 2025-07-17 ENCOUNTER — HOSPITAL ENCOUNTER (OUTPATIENT)
Facility: HOSPITAL | Age: 65
Discharge: HOME HEALTH | End: 2025-07-19
Attending: STUDENT IN AN ORGANIZED HEALTH CARE EDUCATION/TRAINING PROGRAM | Admitting: HOSPITALIST
Payer: MEDICARE

## 2025-07-17 DIAGNOSIS — R29.818 ACUTE FOCAL NEUROLOGICAL DEFICIT: ICD-10-CM

## 2025-07-17 DIAGNOSIS — I63.9 CEREBROVASCULAR ACCIDENT (CVA), UNSPECIFIED MECHANISM: Primary | ICD-10-CM

## 2025-07-17 DIAGNOSIS — G45.9 TIA (TRANSIENT ISCHEMIC ATTACK): ICD-10-CM

## 2025-07-17 PROBLEM — G62.9 NEUROPATHY: Status: ACTIVE | Noted: 2025-07-17

## 2025-07-17 LAB
ABSOLUTE EOSINOPHIL (SMH): 0.21 K/UL
ABSOLUTE MONOCYTE (SMH): 1.24 K/UL (ref 0.3–1)
ABSOLUTE NEUTROPHIL COUNT (SMH): 3.3 K/UL (ref 1.8–7.7)
ALBUMIN SERPL-MCNC: 4.1 G/DL (ref 3.5–5.2)
ALP SERPL-CCNC: 104 UNIT/L (ref 55–135)
ALT SERPL-CCNC: 24 UNIT/L (ref 10–44)
ANION GAP (SMH): 9 MMOL/L (ref 8–16)
AST SERPL-CCNC: 24 UNIT/L (ref 10–40)
BASOPHILS # BLD AUTO: 0.09 K/UL
BASOPHILS NFR BLD AUTO: 1.1 %
BILIRUB SERPL-MCNC: 0.6 MG/DL (ref 0.1–1)
BILIRUB UR QL STRIP.AUTO: NEGATIVE
BUN SERPL-MCNC: 14 MG/DL (ref 8–23)
CALCIUM SERPL-MCNC: 9 MG/DL (ref 8.7–10.5)
CHLORIDE SERPL-SCNC: 98 MMOL/L (ref 95–110)
CHOLEST SERPL-MCNC: 130 MG/DL (ref 120–199)
CHOLEST/HDLC SERPL: 4.6 {RATIO} (ref 2–5)
CLARITY UR: CLEAR
CO2 SERPL-SCNC: 26 MMOL/L (ref 23–29)
COLOR UR AUTO: YELLOW
CREAT SERPL-MCNC: 1.3 MG/DL (ref 0.5–1.4)
ERYTHROCYTE [DISTWIDTH] IN BLOOD BY AUTOMATED COUNT: 13.1 % (ref 11.5–14.5)
GFR SERPLBLD CREATININE-BSD FMLA CKD-EPI: >60 ML/MIN/1.73/M2
GLUCOSE SERPL-MCNC: 186 MG/DL (ref 70–110)
GLUCOSE UR QL STRIP: ABNORMAL
HCT VFR BLD AUTO: 38.2 % (ref 40–54)
HDLC SERPL-MCNC: 28 MG/DL (ref 40–75)
HDLC SERPL: 21.5 % (ref 20–50)
HGB BLD-MCNC: 13.2 GM/DL (ref 14–18)
HGB UR QL STRIP: NEGATIVE
IMM GRANULOCYTES # BLD AUTO: 0.04 K/UL (ref 0–0.04)
IMM GRANULOCYTES NFR BLD AUTO: 0.5 % (ref 0–0.5)
INR PPP: 1 (ref 0.8–1.2)
KETONES UR QL STRIP: NEGATIVE
LDLC SERPL CALC-MCNC: 77.4 MG/DL (ref 63–159)
LEUKOCYTE ESTERASE UR QL STRIP: NEGATIVE
LYMPHOCYTES # BLD AUTO: 2.95 K/UL (ref 1–4.8)
MCH RBC QN AUTO: 29.7 PG (ref 27–31)
MCHC RBC AUTO-ENTMCNC: 34.6 G/DL (ref 32–36)
MCV RBC AUTO: 86 FL (ref 82–98)
NITRITE UR QL STRIP: NEGATIVE
NONHDLC SERPL-MCNC: 102 MG/DL
NUCLEATED RBC (/100WBC) (SMH): 0 /100 WBC
PH UR STRIP: 6 [PH]
PLATELET # BLD AUTO: 236 K/UL (ref 150–450)
PMV BLD AUTO: 10 FL (ref 9.2–12.9)
POTASSIUM SERPL-SCNC: 3.3 MMOL/L (ref 3.5–5.1)
PROT SERPL-MCNC: 7 GM/DL (ref 6–8.4)
PROT UR QL STRIP: ABNORMAL
PROTHROMBIN TIME: 11.4 SECONDS (ref 9–12.5)
RBC # BLD AUTO: 4.45 M/UL (ref 4.6–6.2)
RELATIVE EOSINOPHIL (SMH): 2.7 % (ref 0–8)
RELATIVE LYMPHOCYTE (SMH): 37.6 % (ref 18–48)
RELATIVE MONOCYTE (SMH): 15.8 % (ref 4–15)
RELATIVE NEUTROPHIL (SMH): 42.3 % (ref 38–73)
SODIUM SERPL-SCNC: 133 MMOL/L (ref 136–145)
SP GR UR STRIP: >=1.03
TRIGL SERPL-MCNC: 123 MG/DL (ref 30–150)
TSH SERPL-ACNC: 1.65 UIU/ML (ref 0.34–5.6)
UROBILINOGEN UR STRIP-ACNC: ABNORMAL EU/DL
WBC # BLD AUTO: 7.84 K/UL (ref 3.9–12.7)

## 2025-07-17 PROCEDURE — 25500020 PHARM REV CODE 255: Performed by: STUDENT IN AN ORGANIZED HEALTH CARE EDUCATION/TRAINING PROGRAM

## 2025-07-17 PROCEDURE — 84443 ASSAY THYROID STIM HORMONE: CPT | Performed by: STUDENT IN AN ORGANIZED HEALTH CARE EDUCATION/TRAINING PROGRAM

## 2025-07-17 PROCEDURE — 85025 COMPLETE CBC W/AUTO DIFF WBC: CPT | Performed by: STUDENT IN AN ORGANIZED HEALTH CARE EDUCATION/TRAINING PROGRAM

## 2025-07-17 PROCEDURE — 87389 HIV-1 AG W/HIV-1&-2 AB AG IA: CPT | Performed by: EMERGENCY MEDICINE

## 2025-07-17 PROCEDURE — G0378 HOSPITAL OBSERVATION PER HR: HCPCS

## 2025-07-17 PROCEDURE — 80053 COMPREHEN METABOLIC PANEL: CPT | Performed by: STUDENT IN AN ORGANIZED HEALTH CARE EDUCATION/TRAINING PROGRAM

## 2025-07-17 PROCEDURE — 25000003 PHARM REV CODE 250: Performed by: STUDENT IN AN ORGANIZED HEALTH CARE EDUCATION/TRAINING PROGRAM

## 2025-07-17 PROCEDURE — 81003 URINALYSIS AUTO W/O SCOPE: CPT | Performed by: HOSPITALIST

## 2025-07-17 PROCEDURE — 86803 HEPATITIS C AB TEST: CPT | Performed by: EMERGENCY MEDICINE

## 2025-07-17 PROCEDURE — 99285 EMERGENCY DEPT VISIT HI MDM: CPT | Mod: 25

## 2025-07-17 PROCEDURE — 80061 LIPID PANEL: CPT | Performed by: STUDENT IN AN ORGANIZED HEALTH CARE EDUCATION/TRAINING PROGRAM

## 2025-07-17 PROCEDURE — 51701 INSERT BLADDER CATHETER: CPT

## 2025-07-17 PROCEDURE — 25000003 PHARM REV CODE 250: Performed by: HOSPITALIST

## 2025-07-17 PROCEDURE — 85610 PROTHROMBIN TIME: CPT | Performed by: STUDENT IN AN ORGANIZED HEALTH CARE EDUCATION/TRAINING PROGRAM

## 2025-07-17 RX ORDER — NORTRIPTYLINE HYDROCHLORIDE 25 MG/1
25 CAPSULE ORAL DAILY
Status: DISCONTINUED | OUTPATIENT
Start: 2025-07-18 | End: 2025-07-19 | Stop reason: HOSPADM

## 2025-07-17 RX ORDER — ALBUTEROL SULFATE 0.83 MG/ML
2.5 SOLUTION RESPIRATORY (INHALATION) EVERY 6 HOURS PRN
Status: DISCONTINUED | OUTPATIENT
Start: 2025-07-17 | End: 2025-07-19 | Stop reason: HOSPADM

## 2025-07-17 RX ORDER — ASPIRIN 325 MG
325 TABLET ORAL
Status: COMPLETED | OUTPATIENT
Start: 2025-07-17 | End: 2025-07-17

## 2025-07-17 RX ORDER — TRAZODONE HYDROCHLORIDE 50 MG/1
50 TABLET ORAL NIGHTLY
Status: DISCONTINUED | OUTPATIENT
Start: 2025-07-17 | End: 2025-07-17

## 2025-07-17 RX ORDER — BISACODYL 10 MG/1
10 SUPPOSITORY RECTAL DAILY PRN
Status: DISCONTINUED | OUTPATIENT
Start: 2025-07-17 | End: 2025-07-19 | Stop reason: HOSPADM

## 2025-07-17 RX ORDER — GABAPENTIN 300 MG/1
600 CAPSULE ORAL 3 TIMES DAILY
Status: DISCONTINUED | OUTPATIENT
Start: 2025-07-18 | End: 2025-07-19 | Stop reason: HOSPADM

## 2025-07-17 RX ORDER — INSULIN ASPART 100 [IU]/ML
0-5 INJECTION, SOLUTION INTRAVENOUS; SUBCUTANEOUS
Status: DISCONTINUED | OUTPATIENT
Start: 2025-07-17 | End: 2025-07-19 | Stop reason: HOSPADM

## 2025-07-17 RX ORDER — SODIUM CHLORIDE 0.9 % (FLUSH) 0.9 %
10 SYRINGE (ML) INJECTION
Status: DISCONTINUED | OUTPATIENT
Start: 2025-07-17 | End: 2025-07-19 | Stop reason: HOSPADM

## 2025-07-17 RX ORDER — IBUPROFEN 200 MG
16 TABLET ORAL
Status: DISCONTINUED | OUTPATIENT
Start: 2025-07-17 | End: 2025-07-19 | Stop reason: HOSPADM

## 2025-07-17 RX ORDER — ATORVASTATIN CALCIUM 40 MG/1
80 TABLET, FILM COATED ORAL DAILY
Status: DISCONTINUED | OUTPATIENT
Start: 2025-07-18 | End: 2025-07-19 | Stop reason: HOSPADM

## 2025-07-17 RX ORDER — NAPROXEN SODIUM 220 MG/1
81 TABLET, FILM COATED ORAL DAILY
Status: DISCONTINUED | OUTPATIENT
Start: 2025-07-18 | End: 2025-07-19 | Stop reason: HOSPADM

## 2025-07-17 RX ORDER — DONEPEZIL HYDROCHLORIDE 5 MG/1
5 TABLET, FILM COATED ORAL NIGHTLY
Status: DISCONTINUED | OUTPATIENT
Start: 2025-07-17 | End: 2025-07-19 | Stop reason: HOSPADM

## 2025-07-17 RX ORDER — GLUCAGON 1 MG
1 KIT INJECTION
Status: DISCONTINUED | OUTPATIENT
Start: 2025-07-17 | End: 2025-07-19 | Stop reason: HOSPADM

## 2025-07-17 RX ORDER — ONDANSETRON HYDROCHLORIDE 4 MG/5ML
4 SOLUTION ORAL EVERY 4 HOURS PRN
Status: DISCONTINUED | OUTPATIENT
Start: 2025-07-17 | End: 2025-07-19 | Stop reason: HOSPADM

## 2025-07-17 RX ORDER — LABETALOL HYDROCHLORIDE 5 MG/ML
10 INJECTION, SOLUTION INTRAVENOUS
Status: DISCONTINUED | OUTPATIENT
Start: 2025-07-17 | End: 2025-07-19 | Stop reason: HOSPADM

## 2025-07-17 RX ORDER — FLUTICASONE PROPIONATE 50 MCG
1 SPRAY, SUSPENSION (ML) NASAL DAILY
Status: DISCONTINUED | OUTPATIENT
Start: 2025-07-18 | End: 2025-07-19 | Stop reason: HOSPADM

## 2025-07-17 RX ORDER — HYDROCODONE BITARTRATE AND ACETAMINOPHEN 7.5; 325 MG/1; MG/1
1 TABLET ORAL EVERY 6 HOURS PRN
Refills: 0 | Status: DISCONTINUED | OUTPATIENT
Start: 2025-07-17 | End: 2025-07-19 | Stop reason: HOSPADM

## 2025-07-17 RX ORDER — DONEPEZIL HYDROCHLORIDE 5 MG/1
5 TABLET, FILM COATED ORAL NIGHTLY
COMMUNITY
Start: 2025-06-17 | End: 2026-06-17

## 2025-07-17 RX ORDER — CLOPIDOGREL BISULFATE 75 MG/1
75 TABLET ORAL DAILY
Status: DISCONTINUED | OUTPATIENT
Start: 2025-07-18 | End: 2025-07-19 | Stop reason: HOSPADM

## 2025-07-17 RX ORDER — IBUPROFEN 200 MG
24 TABLET ORAL
Status: DISCONTINUED | OUTPATIENT
Start: 2025-07-17 | End: 2025-07-19 | Stop reason: HOSPADM

## 2025-07-17 RX ADMIN — DONEPEZIL HYDROCHLORIDE 5 MG: 5 TABLET, FILM COATED ORAL at 10:07

## 2025-07-17 RX ADMIN — IOHEXOL 100 ML: 350 INJECTION, SOLUTION INTRAVENOUS at 08:07

## 2025-07-17 RX ADMIN — ASPIRIN 325 MG: 325 TABLET ORAL at 10:07

## 2025-07-17 NOTE — ED PROVIDER NOTES
Encounter Date: 7/17/2025       History     Chief Complaint   Patient presents with    Extremity Weakness     Right, has deficits from previous stroke, states he has had increased weakness x 5 days, syncopal episode at home, advised by  nurse to come in     HPI    Price Noguera is a 64 y.o. male with a past medical history of previous stroke with residual right-sided deficits that presents to the ED for evaluation of facial weakness and slurred speech.  Patient states that he has had intermittent right-sided weakness ongoing for several months, but he had a ground level fall 5 days ago.  He previously was on Plavix. He did hit his head.  Since that time, he has noticed that he has had slurred speech and right-sided facial droop.  He intermittently has right-sided weakness secondary to previous stroke which he thinks is getting somewhat better since the fall.  Denies numbness, blurry vision, headache, and dizziness.    Review of patient's allergies indicates:   Allergen Reactions    Protonix [pantoprazole]      Nausea, diarrhea     Past Medical History:   Diagnosis Date    Colon polyp     Coronary artery disease     Diabetes mellitus     GERD (gastroesophageal reflux disease)     GERD (gastroesophageal reflux disease)     History of colonic polyps     History of heart artery stent     Hypertension     Myocardial infarction     Neuropathy     Stroke     Tumor     BENIGN TUMOR ON BACK THAT COMPRESSED SPINAL CHORD THAT CAUSED PAIN-BUT REMOVED.      Past Surgical History:   Procedure Laterality Date    BACK SURGERY      COLONOSCOPY  04/07/2014    done in [place], [place]; polyps removed per pt report    COLONOSCOPY N/A 12/9/2021    Procedure: COLONOSCOPY;  Surgeon: Nino Ross MD;  Location: [facility];  Service: Endoscopy;  Laterality: N/A;    CORONARY ANGIOPLASTY WITH STENT PLACEMENT      ESOPHAGOGASTRODUODENOSCOPY N/A 2/21/2019    Procedure: EGD (ESOPHAGOGASTRODUODENOSCOPY);  Surgeon: Nino Ross MD;   Location: [facility];  Service: Endoscopy;  Laterality: N/A;    ESOPHAGOGASTRODUODENOSCOPY N/A 12/9/2021    Procedure: EGD (ESOPHAGOGASTRODUODENOSCOPY);  Surgeon: Nino Ross MD;  Location: [facility];  Service: Endoscopy;  Laterality: N/A;    TUMOR REMOVAL  05/16/2016    Back in skin    UPPER GASTROINTESTINAL ENDOSCOPY  02/21/2019    Dr. Ross; mild schatizki ring-dilated; gastritis; gastric polyps; bx negative     Family History   Problem Relation Name Age of Onset    Arthritis Mother      Pacemaker/defibrilator Mother      Bursitis Mother      Chronic back pain Mother      Cancer Father      Hypertension Father      No Known Problems Sister      Congenital heart disease Brother      Cancer Paternal Grandmother      Alcohol abuse Brother      No Known Problems Brother      Stomach cancer Paternal Aunt      Colon cancer Paternal Uncle      Glaucoma Neg Hx      Macular degeneration Neg Hx      Retinal detachment Neg Hx       Social History[1]  Review of Systems   All other systems reviewed and are negative.      Physical Exam     Initial Vitals [07/17/25 1752]   BP Pulse Resp Temp SpO2   117/76 92 18 98.1 °F (36.7 °C) 97 %      MAP       --         Physical Exam    Nursing note and vitals reviewed.  Constitutional: He appears well-developed and well-nourished.   HENT:   Head: Normocephalic and atraumatic.   Eyes: EOM are normal. Pupils are equal, round, and reactive to light.   Neck:   Normal range of motion.  Cardiovascular:  Normal rate, regular rhythm and normal heart sounds.           Pulmonary/Chest: Breath sounds normal. No respiratory distress.   Abdominal: Abdomen is soft. He exhibits no distension. There is no abdominal tenderness. There is no rebound.   Musculoskeletal:         General: Normal range of motion.      Cervical back: Normal range of motion.     Neurological: He is alert and oriented to person, place, and time. A cranial nerve deficit is present. No sensory deficit. GCS score is 15. GCS  eye subscore is 4. GCS verbal subscore is 5. GCS motor subscore is 6.   Right-sided facial weakness.  Slurred speech.  4/5 strength in the right upper and lower extremities.  Normal sensation throughout.   Skin: Capillary refill takes less than 2 seconds.   Psychiatric: He has a normal mood and affect.         ED Course   Procedures  Labs Reviewed   COMPREHENSIVE METABOLIC PANEL - Abnormal       Result Value    Sodium 133 (*)     Potassium 3.3 (*)     Chloride 98      CO2 26      Glucose 186 (*)     BUN 14      Creatinine 1.3      Calcium 9.0      Protein Total 7.0      Albumin 4.1      Bilirubin Total 0.6            AST 24      ALT 24      Anion Gap 9      eGFR >60     LIPID PANEL - Abnormal    Cholesterol Total 130      Triglyceride 123      HDL Cholesterol 28 (*)     LDL Cholesterol 77.4      HDL/Cholesterol Ratio 21.5      Cholesterol/HDL Ratio 4.6      Non HDL Cholesterol 102     CBC WITH DIFFERENTIAL - Abnormal    WBC 7.84      RBC 4.45 (*)     Hgb 13.2 (*)     Hct 38.2 (*)     MCV 86      MCH 29.7      MCHC 34.6      RDW 13.1      Platelet Count 236      MPV 10.0      Nucleated RBC 0      Neut % 42.3      Lymph % 37.6      Mono % 15.8 (*)     Eos % 2.7      Basophil % 1.1      Imm Grans % 0.5      Neut # 3.3      Lymph # 2.95      Mono # 1.24 (*)     Eos # 0.21      Baso # 0.09      Imm Grans # 0.04     PROTIME-INR - Normal    PT 11.4      INR 1.0     TSH - Normal    TSH 1.646     CBC W/ AUTO DIFFERENTIAL    Narrative:     The following orders were created for panel order CBC W/ AUTO DIFFERENTIAL.  Procedure                               Abnormality         Status                     ---------                               -----------         ------                     CBC with Differential[[id number]]       Abnormal            Final result                 Please view results for these tests on the individual orders.   HEPATITIS C ANTIBODY   HEP C VIRUS HOLD SPECIMEN   HIV 1 / 2 ANTIBODY   HIV VIRUS  CONFIRMATION HOLD SPECIMEN   URINALYSIS, REFLEX TO URINE CULTURE          Imaging Results              CTA Head and Neck (xpd) (Final result)  Result time 07/17/25 22:19:18      Final result by Patricia Stoll MD (07/17/25 22:19:18)                   Impression:      No acute abnormality. No high-grade stenosis or major vessel occlusion.      Electronically signed by: Patricia Stoll  Date:    07/17/2025  Time:    22:19               Narrative:    EXAMINATION:  CTA HEAD AND NECK (XPD)    CLINICAL HISTORY:  Neuro deficit, acute, stroke suspected;    TECHNIQUE:  CT angiogram was performed from the level of the anna to the top of the head following the IV administration of 100 mL of Omnipaque 350.   Sagittal and coronal reconstructions and maximum intensity projection reconstructions were performed. Arterial stenosis percentages are based on NASCET measurement criteria.  Two additional phases of immediate post-contrast CTA images were performed through the head alone.    COMPARISON:  12/13/2020    FINDINGS:  Aorta: No acute findings.    Extracranial carotid circulation: No hemodynamically significant stenosis, aneurysmal dilatation, or dissection.    Extracranial vertebral circulation: No hemodynamically significant stenosis, aneurysmal dilatation, or dissection.    Intracranial Arteries: No focal high-grade stenosis, occlusion, or aneurysm.    Venous structures (limited evaluation): Normal.                                       CT Head Without Contrast (Final result)  Result time 07/17/25 21:24:11      Final result by Patricia Stoll MD (07/17/25 21:24:11)                   Impression:      No acute findings.      Electronically signed by: Patricia Stoll  Date:    07/17/2025  Time:    21:24               Narrative:    EXAMINATION:  CT HEAD WITHOUT CONTRAST    CLINICAL HISTORY:  Neuro deficit, acute, stroke suspected;    TECHNIQUE:  Low dose axial images were obtained through the head.  Coronal  and sagittal reformations were also performed. Contrast was not administered.    COMPARISON:  12/15/2020    FINDINGS:  Intracranial compartment:    Ventricles and sulci are normal in size for age without evidence of hydrocephalus. No extra-axial blood or fluid collections.    Old left basal ganglia infarct.  Background of moderate chronic microvascular ischemia.  No parenchymal mass, hemorrhage, edema or major vascular distribution infarct.    Skull/extracranial contents (limited evaluation): No fracture. Mastoid air cells and paranasal sinuses are essentially clear.                                       Medications   atorvastatin tablet 80 mg (has no administration in time range)   donepeziL tablet 5 mg (5 mg Oral Given 7/17/25 2229)   gabapentin capsule 600 mg (has no administration in time range)   albuterol nebulizer solution 2.5 mg (has no administration in time range)   HYDROcodone-acetaminophen 7.5-325 mg per tablet 1 tablet (has no administration in time range)   clopidogreL tablet 75 mg (has no administration in time range)   fluticasone propionate 50 mcg/actuation nasal spray 50 mcg (has no administration in time range)   nortriptyline capsule 25 mg (has no administration in time range)   glucose chewable tablet 16 g (has no administration in time range)   glucose chewable tablet 24 g (has no administration in time range)   dextrose 50% injection 12.5 g (has no administration in time range)   dextrose 50% injection 25 g (has no administration in time range)   glucagon (human recombinant) injection 1 mg (has no administration in time range)   insulin aspart U-100 pen 0-5 Units (has no administration in time range)   sodium chloride 0.9% flush 10 mL (has no administration in time range)   labetaloL injection 10 mg (has no administration in time range)   bisacodyL suppository 10 mg (has no administration in time range)   aspirin chewable tablet 81 mg (has no administration in time range)   ondansetron 4 mg/5  mL solution 4 mg (has no administration in time range)   iohexoL (OMNIPAQUE 350) injection 100 mL (100 mLs Intravenous Given 7/17/25 2050)   aspirin tablet 325 mg (325 mg Oral Given 7/17/25 2230)     Medical Decision Making  Price Noguera is a 64 y.o. male with a past medical history of previous stroke with residual right-sided deficits that presents to the ED for evaluation of facial weakness and slurred speech.  Vitals were stable.  Nontoxic male.  Right-sided facial droop with slurred speech.  NIHSS of 2.  Otherwise at baseline neuro exam.  Concern for subacute stroke given that symptom onset was 5 days ago.  CT head without ICH.  CT angio without LVO.  Minor hypokalemia seen on CMP.  CBC is unremarkable.  Given aspirin.  Admitted to Hospital Medicine for stroke workup.    Amount and/or Complexity of Data Reviewed  Labs: ordered.  Radiology: ordered.    Risk  OTC drugs.  Prescription drug management.               ED Course as of 07/17/25 2256   Thu Jul 17, 2025 2058 EKG shows normal sinus rhythm with right bundle-branch block.  Right bundle-branch block appears to be old. [OH]      ED Course User Index  [OH] Santos Cleary MD                               Clinical Impression:  Final diagnoses:  [R29.818] Acute focal neurological deficit  [I63.9] Cerebrovascular accident (CVA), unspecified mechanism (Primary)          ED Disposition Condition    Observation                       [1]   Social History  Tobacco Use    Smoking status: Never     Passive exposure: Never    Smokeless tobacco: Never   Substance Use Topics    Alcohol use: No    Drug use: No        Santos Cleary MD  07/17/25 1922

## 2025-07-18 ENCOUNTER — CLINICAL SUPPORT (OUTPATIENT)
Dept: CARDIOLOGY | Facility: HOSPITAL | Age: 65
End: 2025-07-18
Attending: HOSPITALIST
Payer: MEDICARE

## 2025-07-18 PROBLEM — Z79.4 TYPE 2 DIABETES MELLITUS WITH NEUROLOGIC COMPLICATION, WITH LONG-TERM CURRENT USE OF INSULIN: Chronic | Status: ACTIVE | Noted: 2019-01-08

## 2025-07-18 PROBLEM — I15.2 HYPERTENSION ASSOCIATED WITH DIABETES: Chronic | Status: ACTIVE | Noted: 2019-01-08

## 2025-07-18 PROBLEM — I25.10 CORONARY ARTERY DISEASE: Chronic | Status: ACTIVE | Noted: 2019-01-08

## 2025-07-18 PROBLEM — E11.49 TYPE 2 DIABETES MELLITUS WITH NEUROLOGIC COMPLICATION, WITH LONG-TERM CURRENT USE OF INSULIN: Chronic | Status: ACTIVE | Noted: 2019-01-08

## 2025-07-18 PROBLEM — E11.59 HYPERTENSION ASSOCIATED WITH DIABETES: Chronic | Status: ACTIVE | Noted: 2019-01-08

## 2025-07-18 PROBLEM — G45.9 TIA (TRANSIENT ISCHEMIC ATTACK): Chronic | Status: ACTIVE | Noted: 2025-07-17

## 2025-07-18 LAB
ABSOLUTE EOSINOPHIL (SMH): 0.43 K/UL
ABSOLUTE MONOCYTE (SMH): 1.12 K/UL (ref 0.3–1)
ABSOLUTE NEUTROPHIL COUNT (SMH): 2.4 K/UL (ref 1.8–7.7)
ALBUMIN SERPL-MCNC: 4.1 G/DL (ref 3.5–5.2)
ALP SERPL-CCNC: 98 UNIT/L (ref 55–135)
ALT SERPL-CCNC: 23 UNIT/L (ref 10–44)
ANION GAP (SMH): 8 MMOL/L (ref 8–16)
AORTIC SIZE INDEX: 1.9 CM/M2
AORTIC VALVE CUSP SEPERATION: 1.9 CM
APICAL FOUR CHAMBER EJECTION FRACTION: 60 %
APICAL TWO CHAMBER EJECTION FRACTION: 46 %
ASCENDING AORTA: 3.7 CM
AST SERPL-CCNC: 24 UNIT/L (ref 10–40)
AV INDEX (PROSTH): 0.78
AV MEAN GRADIENT: 3 MMHG
AV PEAK GRADIENT: 7 MMHG
AV VALVE AREA BY VELOCITY RATIO: 2.4 CM²
AV VALVE AREA: 2.4 CM²
AV VELOCITY RATIO: 0.77
BASOPHILS # BLD AUTO: 0.07 K/UL
BASOPHILS NFR BLD AUTO: 1 %
BILIRUB SERPL-MCNC: 0.6 MG/DL (ref 0.1–1)
BSA FOR ECHO PROCEDURE: 1.94 M2
BUN SERPL-MCNC: 15 MG/DL (ref 8–23)
CALCIUM SERPL-MCNC: 9.1 MG/DL (ref 8.7–10.5)
CHLORIDE SERPL-SCNC: 99 MMOL/L (ref 95–110)
CO2 SERPL-SCNC: 28 MMOL/L (ref 23–29)
CREAT SERPL-MCNC: 1 MG/DL (ref 0.5–1.4)
CV ECHO LV RWT: 0.51 CM
DOP CALC AO PEAK VEL: 1.3 M/S
DOP CALC AO VTI: 27.2 CM
DOP CALC LVOT AREA: 3.1 CM2
DOP CALC LVOT DIAMETER: 2 CM
DOP CALC LVOT PEAK VEL: 1 M/S
DOP CALC LVOT STROKE VOLUME: 66.6 CM3
DOP CALC MV VTI: 28.6 CM
DOP CALCLVOT PEAK VEL VTI: 21.2 CM
E WAVE DECELERATION TIME: 268 MSEC
E/A RATIO: 1.03
E/E' RATIO: 6 M/S
EAG (SMH): 189 MG/DL (ref 68–131)
ECHO LV POSTERIOR WALL: 1 CM (ref 0.6–1.1)
ERYTHROCYTE [DISTWIDTH] IN BLOOD BY AUTOMATED COUNT: 13.1 % (ref 11.5–14.5)
FRACTIONAL SHORTENING: 30.8 % (ref 28–44)
GFR SERPLBLD CREATININE-BSD FMLA CKD-EPI: >60 ML/MIN/1.73/M2
GLUCOSE SERPL-MCNC: 177 MG/DL (ref 70–110)
HBA1C MFR BLD: 8.2 % (ref 4.5–6.2)
HCT VFR BLD AUTO: 37.6 % (ref 40–54)
HCV AB SERPL QL IA: NORMAL
HGB BLD-MCNC: 13.2 GM/DL (ref 14–18)
HIV 1+2 AB+HIV1 P24 AG SERPL QL IA: NORMAL
IMM GRANULOCYTES # BLD AUTO: 0.02 K/UL (ref 0–0.04)
IMM GRANULOCYTES NFR BLD AUTO: 0.3 % (ref 0–0.5)
INTERVENTRICULAR SEPTUM: 1 CM (ref 0.6–1.1)
IVC DIAMETER: 1.76 CM
LEFT ATRIUM AREA SYSTOLIC (APICAL 2 CHAMBER): 15.8 CM2
LEFT ATRIUM AREA SYSTOLIC (APICAL 4 CHAMBER): 13.9 CM2
LEFT ATRIUM SIZE: 3.1 CM
LEFT ATRIUM VOLUME INDEX MOD: 20 ML/M2
LEFT ATRIUM VOLUME MOD: 38 ML
LEFT INTERNAL DIMENSION IN SYSTOLE: 2.7 CM (ref 2.1–4)
LEFT VENTRICLE DIASTOLIC VOLUME INDEX: 34.72 ML/M2
LEFT VENTRICLE DIASTOLIC VOLUME: 67 ML
LEFT VENTRICLE END DIASTOLIC VOLUME APICAL 2 CHAMBER: 63.2 ML
LEFT VENTRICLE END DIASTOLIC VOLUME APICAL 4 CHAMBER: 81.4 ML
LEFT VENTRICLE END SYSTOLIC VOLUME APICAL 2 CHAMBER: 43.6 ML
LEFT VENTRICLE END SYSTOLIC VOLUME APICAL 4 CHAMBER: 30 ML
LEFT VENTRICLE MASS INDEX: 63.3 G/M2
LEFT VENTRICLE SYSTOLIC VOLUME INDEX: 14.5 ML/M2
LEFT VENTRICLE SYSTOLIC VOLUME: 28 ML
LEFT VENTRICULAR INTERNAL DIMENSION IN DIASTOLE: 3.9 CM (ref 3.5–6)
LEFT VENTRICULAR MASS: 122.1 G
LV LATERAL E/E' RATIO: 5.5 M/S
LV SEPTAL E/E' RATIO: 7.5 M/S
LVED V (TEICH): 67.1 ML
LVES V (TEICH): 27.8 ML
LVOT MG: 2 MMHG
LVOT MV: 0.59 CM/S
LYMPHOCYTES # BLD AUTO: 3.19 K/UL (ref 1–4.8)
Lab: 2.1 CM/M
MCH RBC QN AUTO: 29.8 PG (ref 27–31)
MCHC RBC AUTO-ENTMCNC: 35.1 G/DL (ref 32–36)
MCV RBC AUTO: 85 FL (ref 82–98)
MV MEAN GRADIENT: 1 MMHG
MV PEAK A VEL: 0.58 M/S
MV PEAK E VEL: 0.6 M/S
MV PEAK GRADIENT: 3 MMHG
MV STENOSIS PRESSURE HALF TIME: 139 MS
MV VALVE AREA BY CONTINUITY EQUATION: 2.33 CM2
MV VALVE AREA P 1/2 METHOD: 1.58 CM2
NUCLEATED RBC (/100WBC) (SMH): 0 /100 WBC
OHS CV CPX PATIENT HEIGHT IN: 69
OHS CV RV/LV RATIO: 0.82 CM
OHS LV EJECTION FRACTION SIMPSONS BIPLANE MOD: 57 %
OHS QRS DURATION: 136 MS
OHS QTC CALCULATION: 452 MS
PLATELET # BLD AUTO: 243 K/UL (ref 150–450)
PMV BLD AUTO: 10.3 FL (ref 9.2–12.9)
POCT GLUCOSE: 156 MG/DL (ref 70–110)
POCT GLUCOSE: 167 MG/DL (ref 70–110)
POCT GLUCOSE: 193 MG/DL (ref 70–110)
POCT GLUCOSE: 203 MG/DL (ref 70–110)
POTASSIUM SERPL-SCNC: 3.7 MMOL/L (ref 3.5–5.1)
PROT SERPL-MCNC: 6.8 GM/DL (ref 6–8.4)
PV MV: 0.54 M/S
PV PEAK GRADIENT: 3 MMHG
PV PEAK VELOCITY: 0.81 M/S
RA PRESSURE ESTIMATED: 3 MMHG
RBC # BLD AUTO: 4.43 M/UL (ref 4.6–6.2)
RELATIVE EOSINOPHIL (SMH): 6 % (ref 0–8)
RELATIVE LYMPHOCYTE (SMH): 44.4 % (ref 18–48)
RELATIVE MONOCYTE (SMH): 15.6 % (ref 4–15)
RELATIVE NEUTROPHIL (SMH): 32.7 % (ref 38–73)
RIGHT ATRIUM END SYSTOLIC VOLUME APICAL 4 CHAMBER INDEX BSA: 12.59 ML/M2
RIGHT ATRIUM VOLUME AREA LENGTH APICAL 4 CHAMBER: 24.3 ML
RIGHT VENTRICLE DIASTOLIC BASEL DIMENSION: 3.2 CM
RIGHT VENTRICULAR END-DIASTOLIC DIMENSION: 3.17 CM
SODIUM SERPL-SCNC: 135 MMOL/L (ref 136–145)
TDI LATERAL: 0.11 M/S
TDI SEPTAL: 0.08 M/S
TDI: 0.1 M/S
TRICUSPID ANNULAR PLANE SYSTOLIC EXCURSION: 1.4 CM
WBC # BLD AUTO: 7.18 K/UL (ref 3.9–12.7)
Z-SCORE OF LEFT VENTRICULAR DIMENSION IN END DIASTOLE: -3.37
Z-SCORE OF LEFT VENTRICULAR DIMENSION IN END SYSTOLE: -1.74

## 2025-07-18 PROCEDURE — G0378 HOSPITAL OBSERVATION PER HR: HCPCS

## 2025-07-18 PROCEDURE — G0426 INPT/ED TELECONSULT50: HCPCS | Mod: GT,,, | Performed by: PSYCHIATRY & NEUROLOGY

## 2025-07-18 PROCEDURE — 99900035 HC TECH TIME PER 15 MIN (STAT)

## 2025-07-18 PROCEDURE — 97162 PT EVAL MOD COMPLEX 30 MIN: CPT

## 2025-07-18 PROCEDURE — 94761 N-INVAS EAR/PLS OXIMETRY MLT: CPT

## 2025-07-18 PROCEDURE — 97161 PT EVAL LOW COMPLEX 20 MIN: CPT

## 2025-07-18 PROCEDURE — 25000003 PHARM REV CODE 250: Performed by: HOSPITALIST

## 2025-07-18 PROCEDURE — 97165 OT EVAL LOW COMPLEX 30 MIN: CPT

## 2025-07-18 PROCEDURE — 93306 TTE W/DOPPLER COMPLETE: CPT | Mod: 26,,, | Performed by: INTERNAL MEDICINE

## 2025-07-18 PROCEDURE — 93306 TTE W/DOPPLER COMPLETE: CPT

## 2025-07-18 PROCEDURE — 82962 GLUCOSE BLOOD TEST: CPT | Mod: 91

## 2025-07-18 PROCEDURE — 97535 SELF CARE MNGMENT TRAINING: CPT

## 2025-07-18 PROCEDURE — 83036 HEMOGLOBIN GLYCOSYLATED A1C: CPT | Performed by: HOSPITALIST

## 2025-07-18 PROCEDURE — 84075 ASSAY ALKALINE PHOSPHATASE: CPT | Performed by: HOSPITALIST

## 2025-07-18 PROCEDURE — 36415 COLL VENOUS BLD VENIPUNCTURE: CPT | Performed by: HOSPITALIST

## 2025-07-18 PROCEDURE — 97116 GAIT TRAINING THERAPY: CPT

## 2025-07-18 PROCEDURE — 85025 COMPLETE CBC W/AUTO DIFF WBC: CPT | Performed by: HOSPITALIST

## 2025-07-18 PROCEDURE — 94799 UNLISTED PULMONARY SVC/PX: CPT

## 2025-07-18 RX ADMIN — GABAPENTIN 600 MG: 300 CAPSULE ORAL at 08:07

## 2025-07-18 RX ADMIN — CLOPIDOGREL 75 MG: 75 TABLET ORAL at 08:07

## 2025-07-18 RX ADMIN — ASPIRIN 81 MG: 81 TABLET, CHEWABLE ORAL at 08:07

## 2025-07-18 RX ADMIN — NORTRIPTYLINE HYDROCHLORIDE 25 MG: 25 CAPSULE ORAL at 08:07

## 2025-07-18 RX ADMIN — POTASSIUM BICARBONATE 25 MEQ: 977.5 TABLET, EFFERVESCENT ORAL at 12:07

## 2025-07-18 RX ADMIN — DONEPEZIL HYDROCHLORIDE 5 MG: 5 TABLET, FILM COATED ORAL at 08:07

## 2025-07-18 RX ADMIN — GABAPENTIN 600 MG: 300 CAPSULE ORAL at 03:07

## 2025-07-18 RX ADMIN — ATORVASTATIN CALCIUM 80 MG: 40 TABLET, FILM COATED ORAL at 08:07

## 2025-07-18 RX ADMIN — POTASSIUM BICARBONATE 25 MEQ: 977.5 TABLET, EFFERVESCENT ORAL at 08:07

## 2025-07-18 NOTE — HPI
Mr. Price Noguera is a 64-year-old male with a past medical history significant for ischemic stroke with residual right-sided weakness, type 2 diabetes mellitus, gastroesophageal reflux disease, coronary artery disease with prior stenting, hypertension, and peripheral neuropathy who presents to the emergency department for evaluation of new-onset slurred speech and facial droop.     The patient reports that he experienced a ground-level fall 5 days ago, during which he hit his head. Since the fall, he has noted increased slurred speech and right-sided facial droop. He endorses chronic right-sided weakness related to his prior stroke, but states it has subjectively improved recently. He denies associated symptoms such as numbness, headache, blurry vision, dizziness, or loss of consciousness. He was previously on clopidogrel (Plavix), though current adherence is unclear.     ER WORKUP:     CT Head Without Contrast:  No acute intracranial pathology.  Chronic left basal ganglia infarct.  Moderate chronic microvascular ischemic changes.  No hemorrhage, mass effect, or hydrocephalus.    EKG:  Normal sinus rhythm with chronic-appearing right bundle-branch block.    Labs (07/17/25):  CBC: WBC 7.84, Hgb 13.2, Hct 38.2, Platelets 236  BMP: Na 133 , K 3.3, Cr 1.3, Glucose 186   Lipid panel: HDL 28 (L), LDL 77.4, Triglycerides 123  TSH: 1.646    CT Angiogram of Head and Neck: Pending.     ER DIAGNOSIS:     Suspected TIA vs. evolving CVA in setting of previous stroke     ER TREATMENT:     Aspirin 325 mg PO administered once    Neurology consulted     HOSPITAL COURSE:  Hospital Medicine has been consulted for admission under observation status for telemetry monitoring.   Neurology has also been consulted for further stroke workup and management.

## 2025-07-18 NOTE — PLAN OF CARE
Problem: Stroke, Ischemic (Includes Transient Ischemic Attack)  Goal: Optimal Coping  Outcome: Progressing  Goal: Effective Bowel Elimination  Outcome: Progressing  Goal: Optimal Cerebral Tissue Perfusion  Outcome: Progressing  Goal: Optimal Cognitive Function  Outcome: Progressing  Goal: Improved Communication Skills  Outcome: Progressing  Goal: Optimal Functional Ability  Outcome: Progressing  Goal: Optimal Nutrition Intake  Outcome: Progressing  Goal: Effective Oxygenation and Ventilation  Outcome: Progressing  Goal: Improved Sensorimotor Function  Outcome: Progressing  Goal: Safe and Effective Swallow  Outcome: Progressing  Goal: Effective Urinary Elimination  Outcome: Progressing     Problem: Adult Inpatient Plan of Care  Goal: Plan of Care Review  Outcome: Progressing  Goal: Patient-Specific Goal (Individualized)  Outcome: Progressing  Goal: Absence of Hospital-Acquired Illness or Injury  Outcome: Progressing  Goal: Optimal Comfort and Wellbeing  Outcome: Progressing  Goal: Readiness for Transition of Care  Outcome: Progressing     Problem: Diabetes Comorbidity  Goal: Blood Glucose Level Within Targeted Range  Outcome: Progressing     Problem: Fall Injury Risk  Goal: Absence of Fall and Fall-Related Injury  Outcome: Progressing

## 2025-07-18 NOTE — PROGRESS NOTES
Elizabeth     Mr. Noguera's case has been assigned to Gregg Meléndez @[phone]. Provider may review progress notes by typing pharmacy patient assistance in Epic search box.      What happens next? Assigned advocate will review your patients chart and research available options.  Patient and Provider may be asked to provide specific documentation to facilitate the PAP process. Failure to provide the requested documentation will delay assistance.    Please note: epic chart must reflect a current order for the requested medication written by an [facility] provider to begin PAP process.   Please note: all requests are subject to program availability and patient eligibility verification.   Please note: each program has it's own unique eligibility criteria (e.g., income limits, insurance status medical condition, residency).Therefore eligibility is determined by the specific program being applied to not by the Pharmacy Patient Assistance Team.         Thank you,   [facility]  [address] [facility] [id number]  [place] [address]  Fax: [phone]  Email: [email or web address]

## 2025-07-18 NOTE — TELEMEDICINE CONSULT
[facility]  Tele-Vascular Neurology   Consult Note      Consult Information  Inpatient Consult to Neurology Services (General Neurology)  Consult performed by: Nasreen Swann FNP  Consult ordered by: Santos Cleary MD  Reason for consult: Subacute stroke, slurred speech          Consulting Provider:    Current Providers  No providers found    Patient Location:  [place] [address] [facility] IP Unit    Spoke hospital nurse at bedside with patient assisting consultant.  Patient information was obtained from patient, ER records, and primary team.       Vascular Neurology Documentation       NIH Scale:  1a. Level of Consciousness: 0-->Alert, keenly responsive  1b. LOC Questions: 0-->Answers both questions correctly  1c. LOC Commands: 0-->Performs both tasks correctly  2. Best Gaze: 0-->Normal  3. Visual: 0-->No visual loss  4. Facial Palsy: 0-->Normal symmetrical movements  5a. Motor Arm, Left: 0-->No drift, limb holds 90 (or 45) degrees for full 10 secs  5b. Motor Arm, Right: 0-->No drift, limb holds 90 (or 45) degrees for full 10 secs  6a. Motor Leg, Left: 0-->No drift, leg holds 30 degree position for full 5 secs  6b. Motor Leg, Right: 0-->No drift, leg holds 30 degree position for full 5 secs  7. Limb Ataxia: 0-->Absent  8. Sensory: 1-->Mild-to-moderate sensory loss, patient feels pinprick is less sharp or is dull on the affected side, or there is a loss of superficial pain with pinprick, but patient is aware of being touched  9. Best Language: 0-->No aphasia, normal  10. Dysarthria: 0-->Normal  11. Extinction and Inattention (formerly Neglect): 0-->No abnormality  Total (NIH Stroke Scale): 1      Neurological Exam  LOC: alert  Attention Span: Good   Language: No aphasia  Articulation: No dysarthria  Orientation: Person, Place, Time   Visual Fields: Full  EOM (CN III, IV, VI): Full/intact  Facial Sensation (CN V): normal  Facial Movement (CN VII): Symmetric facial expression    Motor:  Full antigravity; Full  "power noted in all 4 extremities (minimal weakness noted to right lower extremity)  Cerebellum: Normal finger to nose; normal heel to shin  Sensation: Intact to light touch      Modified Livingston: Score: 2  Truro Coma Scale:     ABCD2 Score: 6  [id number] Score:    HAS -BLED Score:    ICH Score:    Hunt & Charles Classification:      Blood pressure 126/73, pulse (!) 57, temperature 97.7 °F (36.5 °C), temperature source Oral, resp. rate 15, height 5' 9" (1.753 m), weight 77.3 kg (170 lb 6.7 oz), SpO2 (!) 92%.    VAN Stroke Assessment: Negative    Medical Decision Making  HPI:  Mr. Price Noguera is a 64-year-old male with a history of ischemic stroke (with residual right-sided weakness), type 2 diabetes mellitus, gastroesophageal reflux disease, coronary artery disease with prior stenting, hypertension, and peripheral neuropathy, presenting to the emergency department for evaluation of new-onset slurred speech and right-sided facial droop. The patient reports a ground-level fall with head trauma five days prior, after which he noted increased slurred speech and right facial droop. He describes chronic right-sided weakness from his prior stroke, which he states has subjectively improved recently. He denies numbness, headache, blurry vision, dizziness, or loss of consciousness. He is taking clopidogrel at home.      Images personally reviewed and interpreted:  Study: CTA Head & Neck CT head  Study Interpretation:   FINDINGS:  Aorta: No acute findings.     Extracranial carotid circulation: No hemodynamically significant stenosis, aneurysmal dilatation, or dissection.     Extracranial vertebral circulation: No hemodynamically significant stenosis, aneurysmal dilatation, or dissection.     Intracranial Arteries: No focal high-grade stenosis, occlusion, or aneurysm.     Venous structures (limited evaluation): Normal.     Impression:     No acute abnormality. No high-grade stenosis or major vessel occlusion.    CT Head " Findings:  FINDINGS:  Intracranial compartment:     Ventricles and sulci are normal in size for age without evidence of hydrocephalus. No extra-axial blood or fluid collections.     Old left basal ganglia infarct.  Background of moderate chronic microvascular ischemia.  No parenchymal mass, hemorrhage, edema or major vascular distribution infarct.     Skull/extracranial contents (limited evaluation): No fracture. Mastoid air cells and paranasal sinuses are essentially clear.     Impression:     No acute findings.    Additional studies reviewed:   Study: Cardiac_Neuro: 2D echo  Study Interpretation: pending    Laboratory studies reviewed:  BMP:   Lab Results   Component Value Date     (L) 07/18/2025    K 3.7 07/18/2025    CL 99 07/18/2025    CO2 28 07/18/2025    BUN 15 07/18/2025    BUN 10 06/11/2025    CREATININE 1.0 07/18/2025    CREATININE 0.9 06/11/2025    CALCIUM 9.1 07/18/2025     CBC:   Lab Results   Component Value Date    WBC 7.18 07/18/2025    WBC 9.17 06/11/2025    RBC 4.43 (L) 07/18/2025    HGB 13.2 (L) 07/18/2025    HCT 37.6 (L) 07/18/2025     07/18/2025    MCV 85 07/18/2025    MCH 29.8 07/18/2025    MCHC 35.1 07/18/2025     Lipid Panel:   Lab Results   Component Value Date    CHOL 130 07/17/2025    LDLCALC 77.4 07/17/2025    LDLCALC 103.2 04/03/2025    HDL 28 (L) 07/17/2025    HDL 50 04/03/2025    TRIG 123 07/17/2025     Hgb A1C:   Lab Results   Component Value Date    HGBA1C 8.2 (H) 07/18/2025       Documentation personally reviewed:  Notes: Notes: ED notes and H&P     Assessment and plan:  64-year-old male with a history of ischemic stroke (with residual right-sided weakness), type 2 diabetes mellitus, coronary artery disease with prior stenting, hypertension, peripheral neuropathy, and gastroesophageal reflux disease, presenting with new-onset slurred speech and right facial droop following a ground-level fall with head trauma five days prior. He reports chronic right-sided weakness from  prior stroke, which has subjectively improved, but now notes increased slurred speech and right facial droop since the fall. He denies headache, visual changes, dizziness, numbness, or loss of consciousness. He is taking clopidogrel at home. MRI brain and echo pending at this time.    Recommendations  ASA 81mg daily with Plavix 75mg daily x 21 days then monotherapy with ASA thereafter, if compliant with home daily Plavix.  Continue home statin - currently at goal LDL  Aggressive stroke risk factor modification: HgA1c 8.2%  Follow therapy recommendations  If pending studies are unremarkable, no further inpatient stroke workup needed and patient can dispo with therapy recommendations once medically ready     Post charge discharge plan:  Clinic follow up: Vascular Neurology    Visit Type: in person or virtual  Timeframe: 4 weeks        Additional Physical Exam, History, & ROS  Review of Systems   Eyes:  Negative for blurred vision and double vision.   Musculoskeletal:  Positive for falls.   Neurological:  Positive for speech change, loss of consciousness, weakness and headaches. Negative for dizziness and seizures.     Physical Exam  Constitutional:       Appearance: Normal appearance.   HENT:      Head: Normocephalic and atraumatic.      Nose: Nose normal.      Mouth/Throat:      Mouth: Mucous membranes are moist.      Pharynx: Oropharynx is clear.   Eyes:      Extraocular Movements: Extraocular movements intact.      Pupils: Pupils are equal, round, and reactive to light.   Pulmonary:      Effort: Pulmonary effort is normal.   Musculoskeletal:         General: Normal range of motion.      Cervical back: Normal range of motion.      Comments: Residual right sided weakness, from previous stroke   Skin:     General: Skin is warm and dry.   Neurological:      Mental Status: He is alert. Mental status is at baseline.      GCS: GCS eye subscore is 4. GCS verbal subscore is 5. GCS motor subscore is 6.      Cranial Nerves:  Cranial nerves 2-12 are intact.      Sensory: Sensation is intact.      Motor: Weakness (minimal residual right lower extremity weakness) present.      Coordination: Coordination is intact.       Past Medical History:   Diagnosis Date    Colon polyp     Coronary artery disease     Diabetes mellitus     GERD (gastroesophageal reflux disease)     GERD (gastroesophageal reflux disease)     History of colonic polyps     History of heart artery stent     Hypertension     Myocardial infarction     Neuropathy     Stroke     Tumor     BENIGN TUMOR ON BACK THAT COMPRESSED SPINAL CHORD THAT CAUSED PAIN-BUT REMOVED.      Past Surgical History:   Procedure Laterality Date    BACK SURGERY      COLONOSCOPY  04/07/2014    done in [place], [place]; polyps removed per pt report    COLONOSCOPY N/A 12/9/2021    Procedure: COLONOSCOPY;  Surgeon: Nino Ross MD;  Location: [facility] ENDO;  Service: Endoscopy;  Laterality: N/A;    CORONARY ANGIOPLASTY WITH STENT PLACEMENT      ESOPHAGOGASTRODUODENOSCOPY N/A 2/21/2019    Procedure: EGD (ESOPHAGOGASTRODUODENOSCOPY);  Surgeon: Nino Ross MD;  Location: [facility] ENDO;  Service: Endoscopy;  Laterality: N/A;    ESOPHAGOGASTRODUODENOSCOPY N/A 12/9/2021    Procedure: EGD (ESOPHAGOGASTRODUODENOSCOPY);  Surgeon: Nino Ross MD;  Location: [facility] ENDO;  Service: Endoscopy;  Laterality: N/A;    TUMOR REMOVAL  05/16/2016    Back in skin    UPPER GASTROINTESTINAL ENDOSCOPY  02/21/2019    Dr. Ross; mild schatizki ring-dilated; gastritis; gastric polyps; bx negative     Family History   Problem Relation Name Age of Onset    Arthritis Mother      Pacemaker/defibrilator Mother      Bursitis Mother      Chronic back pain Mother      Cancer Father      Hypertension Father      No Known Problems Sister      Congenital heart disease Brother      Cancer Paternal Grandmother      Alcohol abuse Brother      No Known Problems Brother      Stomach cancer Paternal Aunt      Colon cancer Paternal Uncle       Glaucoma Neg Hx      Macular degeneration Neg Hx      Retinal detachment Neg Hx         Diagnoses  Problem Noted   Tia (Transient Ischemic Attack) 7/17/2025   Type 2 Diabetes Mellitus With Neurologic Complication, With Long-Term Current Use of Insulin 1/8/2019   Coronary Artery Disease 1/8/2019   Hypertension Associated With Diabetes 1/8/2019       SEAN Sutherland    Discussed case with Dr. Roy, chart reviewed. Any change to plan along with cosign to appear in the EMR.      Neurology consultation requested by spoke provider. Audiovisual encounter with the patient performed using a secure connection.  Results and impressions from the visit are documented on this note and were communicated to the consulting provider/team via direct communication. The note has been shared for addition to the patients electronic medical record.

## 2025-07-18 NOTE — ASSESSMENT & PLAN NOTE
Suspected TIA vs. Evolving CVA  Outside of TPA window; symptoms began approximately 5 days ago.  CT head negative for acute findings; shows chronic left basal ganglia infarct and chronic microvascular ischemia.  Neurology consulted -- pending MRI brain for further evaluation.  Resume dual antiplatelet therapy: aspirin and clopidogrel.  Initiate stroke pathway protocol.  Continue neurochecks per protocol.  Place patient on telemetry monitoring.  Obtain MRI brain, CTA head/neck pending, transthoracic echocardiogram, and lipid panel.  Initiate high-intensity statin therapy.  Monitor for worsening focal deficits or new neurological changes.  Order physical, occupational, speech therapy.  Fall precautions in place.

## 2025-07-18 NOTE — ED NOTES
Pt states he had a previous stroke roughly 10 years ago and states his right side has been weaker since then

## 2025-07-18 NOTE — HOSPITAL COURSE
Patient admitted with new onset slurred speech since a fall 5 days ago. He has been compliant with his plavix and statin from previous CVA resulting in right sided weakness. On admit he has no LVO on CTA head and neck. ECHO and MRI showed no acute findings.  Evaluated by Neurology, symptoms improving.  Neurology recommended aspirin and Plavix for 21 days then Plavix afterwards.  Follow up with Neurology outpatient.  Stable for discharge.    Physical Exam  Cardiovascular:      Rate and Rhythm: Regular rhythm.     Pulses: Normal pulses.   Pulmonary:      Effort: No respiratory distress.      Breath sounds: No wheezing or rales.

## 2025-07-18 NOTE — H&P
[facility] - Emergency Dept  [facility] Medicine  History & Physical    Patient Name: Price Noguera  MRN: [id number]  Patient Class: Emergency  Admission Date: 7/17/2025  Attending Physician: Santos Cleary,*   Primary Care Provider: Chanel Roberson MD         Patient information was obtained from patient and ER records.     Subjective:     Principal Problem:TIA (transient ischemic attack)    Chief Complaint:   Chief Complaint   Patient presents with    Extremity Weakness     Right, has deficits from previous stroke, states he has had increased weakness x 5 days, syncopal episode at home, advised by  nurse to come in        HPI: Mr. Price Noguera is a 64-year-old male with a past medical history significant for ischemic stroke with residual right-sided weakness, type 2 diabetes mellitus, gastroesophageal reflux disease, coronary artery disease with prior stenting, hypertension, and peripheral neuropathy who presents to the emergency department for evaluation of new-onset slurred speech and facial droop.     The patient reports that he experienced a ground-level fall 5 days ago, during which he hit his head. Since the fall, he has noted increased slurred speech and right-sided facial droop. He endorses chronic right-sided weakness related to his prior stroke, but states it has subjectively improved recently. He denies associated symptoms such as numbness, headache, blurry vision, dizziness, or loss of consciousness. He reports taking Plavix at home.     ER WORKUP:     CT Head Without Contrast:  No acute intracranial pathology.  Chronic left basal ganglia infarct.  Moderate chronic microvascular ischemic changes.  No hemorrhage, mass effect, or hydrocephalus.    EKG:  Normal sinus rhythm with chronic-appearing right bundle-branch block.    Labs (07/17/25):  CBC: WBC 7.84, Hgb 13.2, Hct 38.2, Platelets 236  BMP: Na 133 , K 3.3, Cr 1.3, Glucose 186   Lipid panel: HDL 28 (L), LDL 77.4,  Triglycerides 123  TSH: 1.646    CT Angiogram of Head and Neck: Pending.     ER DIAGNOSIS:     Suspected TIA vs. evolving CVA in setting of previous stroke     ER TREATMENT:     Aspirin 325 mg PO administered once    Neurology consulted     HOSPITAL COURSE:  Hospital Medicine has been consulted for admission under observation status for telemetry monitoring.   Neurology has also been consulted for further stroke workup and management.     Past Medical History:   Diagnosis Date    Colon polyp     Coronary artery disease     Diabetes mellitus     GERD (gastroesophageal reflux disease)     GERD (gastroesophageal reflux disease)     History of colonic polyps     History of heart artery stent     Hypertension     Myocardial infarction     Neuropathy     Stroke     Tumor     BENIGN TUMOR ON BACK THAT COMPRESSED SPINAL CHORD THAT CAUSED PAIN-BUT REMOVED.        Past Surgical History:   Procedure Laterality Date    BACK SURGERY      COLONOSCOPY  04/07/2014    done in [place], [place]; polyps removed per pt report    COLONOSCOPY N/A 12/9/2021    Procedure: COLONOSCOPY;  Surgeon: Nino Ross MD;  Location: [facility];  Service: Endoscopy;  Laterality: N/A;    CORONARY ANGIOPLASTY WITH STENT PLACEMENT      ESOPHAGOGASTRODUODENOSCOPY N/A 2/21/2019    Procedure: EGD (ESOPHAGOGASTRODUODENOSCOPY);  Surgeon: Nino Ross MD;  Location: [facility];  Service: Endoscopy;  Laterality: N/A;    ESOPHAGOGASTRODUODENOSCOPY N/A 12/9/2021    Procedure: EGD (ESOPHAGOGASTRODUODENOSCOPY);  Surgeon: Nino Ross MD;  Location: [facility];  Service: Endoscopy;  Laterality: N/A;    TUMOR REMOVAL  05/16/2016    Back in skin    UPPER GASTROINTESTINAL ENDOSCOPY  02/21/2019    Dr. Ross; mild schatizki ring-dilated; gastritis; gastric polyps; bx negative       Review of patient's allergies indicates:   Allergen Reactions    Protonix [pantoprazole]      Nausea, diarrhea       No current facility-administered medications on file prior to  encounter.     Current Outpatient Medications on File Prior to Encounter   Medication Sig    atorvastatin (LIPITOR) 80 MG tablet TAKE 1 TABLET (80 MG TOTAL) BY MOUTH ONCE DAILY.    clopidogreL (PLAVIX) 75 mg tablet TAKE 1 TABLET BY MOUTH EVERY DAY    cyanocobalamin 1,000 mcg/mL injection Inject 1 mL (1,000 mcg total) into the muscle every 30 days.    cyanocobalamin, vitamin B-12, 1,000 mcg Lozg Place 1,000 mcg under the tongue once daily.    donepeziL (ARICEPT) 5 MG tablet Take 5 mg by mouth every evening.    gabapentin (NEURONTIN) 600 MG tablet TAKE 1 TABLET BY MOUTH THREE TIMES A DAY    glipiZIDE 5 MG TR24 Take 1 tablet (5 mg total) by mouth daily with breakfast.    lisinopriL (PRINIVIL,ZESTRIL) 2.5 MG tablet Take 1 tablet (2.5 mg total) by mouth once daily. Cancel 20mg    loratadine (CLARITIN) 10 mg tablet TAKE 1 TABLET BY MOUTH EVERY DAY    metFORMIN (GLUCOPHAGE) 850 MG tablet TAKE 1 TABLET BY MOUTH TWICE A DAY WITH FOOD    nortriptyline (PAMELOR) 25 MG capsule TAKE 1 CAPSULE BY MOUTH EVERY DAY IN THE EVENING    albuterol (PROVENTIL HFA) 90 mcg/actuation inhaler Inhale 2 puffs into the lungs every 6 (six) hours as needed for Wheezing. Rescue    albuterol-budesonide (AIRSUPRA) 90-80 mcg/actuation Inhale 2 puffs into the lungs every 4 (four) hours as needed (wheezing, shortness of breath).    blood sugar diagnostic Strp To check BG 2 times daily, to use with insurance preferred meter    blood-glucose meter kit Use as instructed    empagliflozin (JARDIANCE) 10 mg tablet Take 1 tablet (10 mg total) by mouth once daily.    fluticasone propionate (FLONASE) 50 mcg/actuation nasal spray 1 SPRAY BY EACH NOSTRIL ROUTE 2 TIMES DAILY. POINT UP AND SLIGHTLY OUTWARD TOWARD EAR WHEN SPRAYING TO AVOID IRRITATING NASAL SEPTUM    HYDROcodone-acetaminophen (NORCO) 7.5-325 mg per tablet Take 1 tablet by mouth every 6 (six) hours as needed for Pain.    lancets Misc To check BG 2 times daily, to use with insurance preferred meter     "pen needle, diabetic (BD CHRISTIANO 2ND GEN PEN NEEDLE) 32 gauge x 5/32" Ndle Inject daily    traZODone (DESYREL) 50 MG tablet TAKE 1 TABLET BY MOUTH EVERY DAY IN THE EVENING     Family History       Problem Relation (Age of Onset)    Alcohol abuse Brother    Arthritis Mother    Bursitis Mother    Cancer Father, Paternal Grandmother    Chronic back pain Mother    Colon cancer Paternal Uncle    Congenital heart disease Brother    Hypertension Father    No Known Problems Sister, Brother    Pacemaker/defibrilator Mother    Stomach cancer Paternal Aunt          Tobacco Use    Smoking status: Never     Passive exposure: Never    Smokeless tobacco: Never   Substance and Sexual Activity    Alcohol use: No    Drug use: No    Sexual activity: Not Currently     Review of Systems  General: Denies fever, chills, or recent weight loss  Neurological: Positive for right-sided weakness, facial droop, and slurred speech; denies headache or dizziness  Cardiovascular: Denies chest pain or palpitations  Respiratory: Denies shortness of breath or cough  GI: Denies nausea, vomiting, abdominal pain  : Denies dysuria or hematuria  MSK: Reports chronic weakness, no acute joint pain  Skin: No rashes or lesions  Endocrine: Denies polydipsia/polyuria  Psych: Denies depression, anxiety, or confusion    Objective:     Vital Signs (Most Recent):  Temp: 98.1 °F (36.7 °C) (07/17/25 1752)  Pulse: 62 (07/17/25 2030)  Resp: 18 (07/17/25 1752)  BP: 119/70 (07/17/25 2030)  SpO2: 96 % (07/17/25 2030) Vital Signs (24h Range):  Temp:  [98.1 °F (36.7 °C)] 98.1 °F (36.7 °C)  Pulse:  [62-92] 62  Resp:  [18] 18  SpO2:  [96 %-97 %] 96 %  BP: (117-119)/(70-76) 119/70        There is no height or weight on file to calculate BMI.     Physical Exam  General: Alert, no acute distress  HEENT: No trauma, pupils equal/reactive, facial droop noted  Neck: No JVD or lymphadenopathy  Cardiac: HR Controlled, no murmurs  Lungs: Clear bilaterally  Abdomen: Soft, " non-tender  Extremities: No edema; right-sided motor deficit  Neuro: Slurred speech, right-sided facial weakness, mild right-sided hemiparesis  Skin: Intact  Psych: Alert, appropriate.     Significant Labs: All pertinent labs within the past 24 hours have been reviewed.  CBC:   Recent Labs   Lab 07/17/25 1922   WBC 7.84   HGB 13.2*   HCT 38.2*        CMP:   Recent Labs   Lab 07/17/25 1922   *   K 3.3*   CL 98   CO2 26   *   BUN 14   CREATININE 1.3   CALCIUM 9.0   PROT 7.0   ALBUMIN 4.1   BILITOT 0.6   ALKPHOS 104   AST 24   ALT 24   ANIONGAP 9     Lipid Panel:   Recent Labs   Lab 07/17/25 1922   CHOL 130   HDL 28*   LDLCALC 77.4   TRIG 123   CHOLHDL 21.5       Significant Imaging: I have reviewed all pertinent imaging results/findings within the past 24 hours.  Assessment/Plan:     Assessment & Plan  TIA (transient ischemic attack)  Suspected TIA vs. Evolving CVA  Outside of TPA window; symptoms began approximately 5 days ago.  CT head negative for acute findings; shows chronic left basal ganglia infarct and chronic microvascular ischemia.  Neurology consulted -- pending MRI brain for further evaluation.  Resume dual antiplatelet therapy: aspirin and clopidogrel.  Initiate stroke pathway protocol.  Continue neurochecks per protocol.  Place patient on telemetry monitoring.  Obtain MRI brain, CTA head/neck pending, transthoracic echocardiogram, and lipid panel.  Initiate high-intensity statin therapy.  Monitor for worsening focal deficits or new neurological changes.  Order physical, occupational, speech therapy.  Fall precautions in place.     Coronary artery disease  No acute angina reported. Resume home cardiac medications.  Continue telemetry monitoring for arrhythmias or ischemic changes.  Continue aspirin and statin therapy as above.    Hypertension associated with diabetes  Blood pressure permissively elevated until stroke is ruled out.   Hold antihypertensives unless systolic BP >180  mmHg or symptomatic.  Resume home antihypertensives when clinically appropriate. Monitor BP.    Gastroesophageal reflux disease without esophagitis  Stable. Continue home proton pump inhibitor.     Type 2 diabetes mellitus with hyperglycemia  Hyperglycemia noted in ER. Order HbA1c to assess chronic glycemic control.  Start sliding scale insulin for inpatient correction.  Monitor accuchecks before meals and bedtime.  Reinforce diabetic diet.     Neuropathy  Chronic. Continue home neuropathic medications as tolerated.     VTE Risk Mitigation (From admission, onward)           Ordered     Place sequential compression device  Until discontinued         07/17/25 2203                  Disposition  Admit to observation status with telemetry for further evaluation and management of neurological symptoms.  Neurology consult has been requested.  Maintain neurochecks per stroke protocol.  Discussed with patient and care team.  All in agreement.      Noman Avila MD  Department of Hospital Medicine  Counts include 234 beds at the [facility] - Emergency Dept

## 2025-07-18 NOTE — PT/OT/SLP EVAL
Physical Therapy Evaluation    Patient Name:  Price Noguera   MRN:  [id number]    Recommendations:     Discharge Recommendations: Low Intensity Therapy   Discharge Equipment Recommendations: none   Barriers to discharge: Decreased caregiver support    Assessment:     Price Noguera is a 64 y.o. male admitted with a medical diagnosis of TIA (transient ischemic attack).  He presents with the following impairments/functional limitations: weakness, impaired endurance, gait instability, impaired balance, impaired functional mobility, impaired cardiopulmonary response to activity, impaired coordination.Pt found HOB elevated and agreeable to working with PT. Pt A & O x  4 and has the following co-morbidities: DM, CAD, HTN, Hx CVA with R hemiparesis.  Pt tolerated session well and required only CGA > SBA plus use of a rolling walker for safe mobilization during session today. Pt would benefit from acute PT during hospitalization to increase strength, endurance and safety with mobility and would benefit from Low Intensity Therapy upon discharge home.      Rehab Prognosis: Fair; patient would benefit from acute skilled PT services to address these deficits and reach maximum level of function.    Recent Surgery: * No surgery found *      Plan:     During this hospitalization, patient to be seen 6 x/week to address the identified rehab impairments via gait training, therapeutic activities, therapeutic exercises, neuromuscular re-education and progress toward the following goals:    Plan of Care Expires:  08/15/25    Subjective     Chief Complaint: Pt reported he is very close to his baseline for mobility.  Patient/Family Comments/goals: Return to prior level of functional mobility.    Pain/Comfort:  Pain Rating 1: 0/10  Pain Rating Post-Intervention 1: 0/10    Patients cultural, spiritual, Taoist conflicts given the current situation:      Living Environment:  Pt lives alone in a [facility] with SHYANNE.  Prior to admission, patients  level of function was MI with cance until his episode 1 week ago.  Equipment used at home: walker, rolling, wheelchair, cane, quad.  DME owned (not currently used): rolling walker & wheelchair.  Upon discharge, patient will have assistance from his next door neighbors.    Objective:     Communicated with NOA Kessler prior to session.  Patient found HOB elevated with bed alarm, blood pressure cuff, peripheral IV, telemetry  upon PT entry to room.    General Precautions: Standard, diabetic, fall, aphasia  Orthopedic Precautions:N/A   Braces: N/A  Respiratory Status: Room air    Exams:  Cognitive Exam:  Patient is oriented to Person, Place, Time, and Situation  RLE ROM: WFL  RLE Strength: grossly 4 to 4+/5   LLE ROM: WFL  LLE Strength: grossly 4+ to 5/5    Functional Mobility:  Bed Mobility:     Scooting: modified independence  Supine to Sit: modified independence  Sit to Supine: modified independence  Transfers:     Sit to Stand:  stand by assistance and contact guard assistance with straight cane  Gait: x 60' with SC and minimum assistance to CGA to steady due to instabiity. Pt then agreeable to trial with RW x 60' with CGA > close SBA for safety.       AM-PAC 6 CLICK MOBILITY  Total Score:20       Treatment & Education:  Pt was educated on the following: call light use, importance of OOB activity and functional mobility to negate the negative effects of prolonged bed rest during this hospitalization, safe transfers/ambulation and discharge planning recommendations/options.      Patient left HOB elevated with all lines intact, call button in reach, bed alarm on, and RN notified.    GOALS:   Multidisciplinary Problems       Physical Therapy Goals          Problem: Physical Therapy    Goal Priority Disciplines Outcome Interventions   Physical Therapy Goal     PT, PT/OT     Description: Goals to be met by: 8/15/25     Patient will increase functional independence with mobility by performin. Supine to sit with  Supervision  2. Sit to stand transfer with Supervision  3. Bed to chair transfer with Supervision using Rolling Walker  4. Gait  x 200 feet with Supervision using Rolling Walker.                              DME Justifications:  No DME recommended requiring DME justifications    History:     Past Medical History:   Diagnosis Date    Colon polyp     Coronary artery disease     Diabetes mellitus     GERD (gastroesophageal reflux disease)     GERD (gastroesophageal reflux disease)     History of colonic polyps     History of heart artery stent     Hypertension     Myocardial infarction     Neuropathy     Stroke     Tumor     BENIGN TUMOR ON BACK THAT COMPRESSED SPINAL CHORD THAT CAUSED PAIN-BUT REMOVED.        Past Surgical History:   Procedure Laterality Date    BACK SURGERY      COLONOSCOPY  04/07/2014    done in [place], [place]; polyps removed per pt report    COLONOSCOPY N/A 12/9/2021    Procedure: COLONOSCOPY;  Surgeon: Nino Ross MD;  Location: [facility] ENDO;  Service: Endoscopy;  Laterality: N/A;    CORONARY ANGIOPLASTY WITH STENT PLACEMENT      ESOPHAGOGASTRODUODENOSCOPY N/A 2/21/2019    Procedure: EGD (ESOPHAGOGASTRODUODENOSCOPY);  Surgeon: Nino Ross MD;  Location: [facility];  Service: Endoscopy;  Laterality: N/A;    ESOPHAGOGASTRODUODENOSCOPY N/A 12/9/2021    Procedure: EGD (ESOPHAGOGASTRODUODENOSCOPY);  Surgeon: Nino Ross MD;  Location: [facility] ENDO;  Service: Endoscopy;  Laterality: N/A;    TUMOR REMOVAL  05/16/2016    Back in skin    UPPER GASTROINTESTINAL ENDOSCOPY  02/21/2019    Dr. Ross; mild schatizki ring-dilated; gastritis; gastric polyps; bx negative       Time Tracking:     PT Received On: 07/18/25  PT Start Time: 1053     PT Stop Time: 1123  PT Total Time (min): 30 min     Billable Minutes: Evaluation 10 and Gait Training 20      07/18/2025

## 2025-07-18 NOTE — PLAN OF CARE
Goals to be met by: 8/15/25     Patient will increase functional independence with mobility by performin. Supine to sit with Supervision  2. Sit to stand transfer with Supervision  3. Bed to chair transfer with Supervision using Rolling Walker  4. Gait  x 200 feet with Supervision using Rolling Walker.

## 2025-07-18 NOTE — PT/OT/SLP PROGRESS
Speech Language Pathology      Price Noguera  MRN: [id number]    Orders received. Patient not seen today secondary to  (Unavailable x2 attempts (1st attempt--Neuro at bedside, 2nd attempt--working with PT)). Will follow-up later today or in AM.
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
## 2025-07-18 NOTE — SUBJECTIVE & OBJECTIVE
HPI:  Mr. Price Noguera is a 64-year-old male with a history of ischemic stroke (with residual right-sided weakness), type 2 diabetes mellitus, gastroesophageal reflux disease, coronary artery disease with prior stenting, hypertension, and peripheral neuropathy, presenting to the emergency department for evaluation of new-onset slurred speech and right-sided facial droop. The patient reports a ground-level fall with head trauma five days prior, after which he noted increased slurred speech and right facial droop. He describes chronic right-sided weakness from his prior stroke, which he states has subjectively improved recently. He denies numbness, headache, blurry vision, dizziness, or loss of consciousness. He is taking clopidogrel at home.      Images personally reviewed and interpreted:  Study: CTA Head & Neck CT head  Study Interpretation:   FINDINGS:  Aorta: No acute findings.     Extracranial carotid circulation: No hemodynamically significant stenosis, aneurysmal dilatation, or dissection.     Extracranial vertebral circulation: No hemodynamically significant stenosis, aneurysmal dilatation, or dissection.     Intracranial Arteries: No focal high-grade stenosis, occlusion, or aneurysm.     Venous structures (limited evaluation): Normal.     Impression:     No acute abnormality. No high-grade stenosis or major vessel occlusion.    CT Head Findings:  FINDINGS:  Intracranial compartment:     Ventricles and sulci are normal in size for age without evidence of hydrocephalus. No extra-axial blood or fluid collections.     Old left basal ganglia infarct.  Background of moderate chronic microvascular ischemia.  No parenchymal mass, hemorrhage, edema or major vascular distribution infarct.     Skull/extracranial contents (limited evaluation): No fracture. Mastoid air cells and paranasal sinuses are essentially clear.     Impression:     No acute findings.    Additional studies reviewed:   Study: Cardiac_Neuro: 2D  echo  Study Interpretation: pending    Laboratory studies reviewed:  BMP:   Lab Results   Component Value Date     (L) 07/18/2025    K 3.7 07/18/2025    CL 99 07/18/2025    CO2 28 07/18/2025    BUN 15 07/18/2025    BUN 10 06/11/2025    CREATININE 1.0 07/18/2025    CREATININE 0.9 06/11/2025    CALCIUM 9.1 07/18/2025     CBC:   Lab Results   Component Value Date    WBC 7.18 07/18/2025    WBC 9.17 06/11/2025    RBC 4.43 (L) 07/18/2025    HGB 13.2 (L) 07/18/2025    HCT 37.6 (L) 07/18/2025     07/18/2025    MCV 85 07/18/2025    MCH 29.8 07/18/2025    MCHC 35.1 07/18/2025     Lipid Panel:   Lab Results   Component Value Date    CHOL 130 07/17/2025    LDLCALC 77.4 07/17/2025    LDLCALC 103.2 04/03/2025    HDL 28 (L) 07/17/2025    HDL 50 04/03/2025    TRIG 123 07/17/2025     Hgb A1C:   Lab Results   Component Value Date    HGBA1C 8.2 (H) 07/18/2025       Documentation personally reviewed:  Notes: Notes: ED notes and H&P     Assessment and plan:      Post charge discharge plan:  Clinic follow up: Vascular Neurology    Visit Type: in person or virtual  Timeframe: 4 weeks

## 2025-07-18 NOTE — ASSESSMENT & PLAN NOTE
No acute angina reported. Resume home cardiac medications.  Continue telemetry monitoring for arrhythmias or ischemic changes.  Continue aspirin and statin therapy as above.
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
## 2025-07-18 NOTE — PROGRESS NOTES
[facility]  Progress Note    Patient Name: Price Noguera  MRN: [id number]  Patient Class: OP- Observation   Admission Date: 7/17/2025  Length of Stay: 0 days  Attending Physician: Minal Oshea MD  Primary Care Provider: Chanel Roberson MD        Subjective     Principal Problem:TIA (transient ischemic attack)        HPI:  Mr. Price Noguera is a 64-year-old male with a past medical history significant for ischemic stroke with residual right-sided weakness, type 2 diabetes mellitus, gastroesophageal reflux disease, coronary artery disease with prior stenting, hypertension, and peripheral neuropathy who presents to the emergency department for evaluation of new-onset slurred speech and facial droop.     The patient reports that he experienced a ground-level fall 5 days ago, during which he hit his head. Since the fall, he has noted increased slurred speech and right-sided facial droop. He endorses chronic right-sided weakness related to his prior stroke, but states it has subjectively improved recently. He denies associated symptoms such as numbness, headache, blurry vision, dizziness, or loss of consciousness. He was previously on clopidogrel (Plavix), though current adherence is unclear.     ER WORKUP:     CT Head Without Contrast:  No acute intracranial pathology.  Chronic left basal ganglia infarct.  Moderate chronic microvascular ischemic changes.  No hemorrhage, mass effect, or hydrocephalus.    EKG:  Normal sinus rhythm with chronic-appearing right bundle-branch block.    Labs (07/17/25):  CBC: WBC 7.84, Hgb 13.2, Hct 38.2, Platelets 236  BMP: Na 133 , K 3.3, Cr 1.3, Glucose 186   Lipid panel: HDL 28 (L), LDL 77.4, Triglycerides 123  TSH: 1.646    CT Angiogram of Head and Neck: Pending.     ER DIAGNOSIS:     Suspected TIA vs. evolving CVA in setting of previous stroke     ER TREATMENT:     Aspirin 325 mg PO administered once    Neurology consulted     HOSPITAL  COURSE:  Hospital Medicine has been consulted for admission under observation status for telemetry monitoring.   Neurology has also been consulted for further stroke workup and management.     Overview/Hospital Course:  Patient admitted with new onset slurred speech since a fall 5 days ago. He has been compliant with his DAPT + statin from previous CVA resulting in right sided weakness. On admit he has no LVO on CTA head and neck. ECHO and MRI pending. PT/OT/ST consulted.   Ok to transfer to med-tele.     Interval History: patient has mild slurred speech; awaiting eval from PT/OT. Await MRI and ECHO reads   Ok for med-[facility] bed     Review of Systems   Respiratory:  Positive for shortness of breath.    Cardiovascular: Negative.    Gastrointestinal: Negative.    Neurological:  Positive for speech difficulty, weakness, numbness and headaches.   Psychiatric/Behavioral: Negative.       Objective:     Vital Signs (Most Recent):  Temp: 97.7 °F (36.5 °C) (07/18/25 1110)  Pulse: (!) 57 (07/18/25 1300)  Resp: 15 (07/18/25 1300)  BP: 126/73 (07/18/25 1300)  SpO2: (!) 92 % (07/18/25 1300) Vital Signs (24h Range):  Temp:  [97.5 °F (36.4 °C)-98.1 °F (36.7 °C)] 97.7 °F (36.5 °C)  Pulse:  [56-92] 57  Resp:  [15-20] 15  SpO2:  [92 %-98 %] 92 %  BP: (107-128)/(61-96) 126/73     Weight: 77.3 kg (170 lb 6.7 oz)  Body mass index is 25.17 kg/m².    Intake/Output Summary (Last 24 hours) at 7/18/2025 1429  Last data filed at 7/18/2025 1100  Gross per 24 hour   Intake 500 ml   Output 900 ml   Net -400 ml         Physical Exam  Constitutional:       Appearance: He is not toxic-appearing.   HENT:      Mouth/Throat:      Mouth: Mucous membranes are moist.      Pharynx: Oropharynx is clear.   Eyes:      Extraocular Movements: Extraocular movements intact.      Pupils: Pupils are equal, round, and reactive to light.   Cardiovascular:      Rate and Rhythm: Regular rhythm. Bradycardia present.      Pulses: Normal pulses.   Pulmonary:      Effort:  No respiratory distress.      Breath sounds: No wheezing or rales.   Abdominal:      General: There is no distension.      Tenderness: There is no abdominal tenderness.   Musculoskeletal:      Cervical back: Normal range of motion and neck supple.   Skin:     General: Skin is warm and dry.      Capillary Refill: Capillary refill takes less than 2 seconds.   Neurological:      Mental Status: He is alert and oriented to person, place, and time.      Cranial Nerves: Cranial nerve deficit present.      Motor: Weakness present.      Coordination: Coordination abnormal.      Gait: Gait abnormal.      Deep Tendon Reflexes: Reflexes abnormal.               Significant Labs: All pertinent labs within the past 24 hours have been reviewed.  Recent Lab Results  (Last 5 results in the past 24 hours)        07/18/25  1256   07/18/25  0838   07/18/25  0411   07/17/25  2237   07/17/25  1922        Albumin     4.1     4.1       ALP     98     104       ALT     23     24       Anion Gap     8     9       Appearance, UA       Clear         AST     24     24       Baso #     0.07     0.09       Basophil %     1.0     1.1       BILIRUBIN TOTAL     0.6  Comment: For infants and newborns, interpretation of results should be based   on gestational age, weight and in agreement with clinical   observations.    Premature Infant recommended reference ranges:   0-24 hours:  <8.0 mg/dL   24-48 hours: <12.0 mg/dL   3-5 days:    <15.0 mg/dL   6-29 days:   <15.0 mg/dL     0.6  Comment: For infants and newborns, interpretation of results should be based   on gestational age, weight and in agreement with clinical   observations.    Premature Infant recommended reference ranges:   0-24 hours:  <8.0 mg/dL   24-48 hours: <12.0 mg/dL   3-5 days:    <15.0 mg/dL   6-29 days:   <15.0 mg/dL       Bilirubin, UA       Negative         BUN     15     14       Calcium     9.1     9.0       Chloride     99     98       Cholesterol Total         130  Comment: The  National Cholesterol Education Program (NCEP) has set the  following guidelines (reference ranges) for Cholesterol:  Optimal.....................<200 mg/dL  Borderline High.............200-239 mg/dL  High........................> or = 240 mg/dL       CO2     28     26       Color, UA       Yellow         Creatinine     1.0     1.3       eGFR     >60     >60       Eos #     0.43     0.21       Eos %     6.0     2.7       Estimated Avg Glucose     189           Glucose     177     186       Glucose, UA       1+         HDL         28  Comment: The National Cholesterol Education Program (NCEP) has set the  following guidelines (reference values) for HDL Cholesterol:  Low...............<40 mg/dL  Optimal...........>60 mg/dL       HDL/Cholesterol Ratio         21.5       Hematocrit     37.6     38.2       Hemoglobin     13.2     13.2       Hemoglobin A1C External     8.2  Comment: According to ADA guidelines, hemoglobin A1C <7.0% represents  optimal control in non-pregnant diabetic patients.  Different  metrics may apply to specific populations.      Standards of Medical Care in Diabetes - 2016.    For the purpose of screening for the presence of diabetes:  <5.7%     Consistent with the absence of diabetes  5.7-6.4%  Consistent with increasing risk for diabetes             (prediabetes)  >or=6.5%  Consistent with diabetes    Currently no consensus exists for use of hemoglobin A1C  for diagnosis of diabetes for children.           Immature Grans (Abs)     0.02  Comment: Mild elevation in immature granulocytes is non specific and can be seen in a variety of conditions including stress response, acute inflammation, trauma and pregnancy. Correlation with other laboratory and clinical findings is essential.     0.04  Comment: Mild elevation in immature granulocytes is non specific and can be seen in a variety of conditions including stress response, acute inflammation, trauma and pregnancy. Correlation with other laboratory  and clinical findings is essential.       Immature Granulocytes     0.3     0.5       INR         1.0  Comment: Coumadin Therapy:    2.0 - 3.0 for INR for all indicators except mechanical heart valves    and antiphospholipid syndromes which should use 2.5 - 3.5.       Ketones, UA       Negative         LDL Cholesterol         77.4  Comment: The National Cholesterol Education Program (NCEP) has set the  following guidelines (reference values) for LDL Cholesterol:  Optimal.......................<130 mg/dL  Borderline High...............130-159 mg/dL  High..........................160-189 mg/dL  Very High.....................>190 mg/dL         Leukocyte Esterase, UA       Negative         Lymph #     3.19     2.95       LYMPH %     44.4     37.6       MCH     29.8     29.7       MCHC     35.1     34.6       MCV     85     86       Mono #     1.12     1.24       Mono %     15.6     15.8       MPV     10.3     10.0       Neut #     2.4     3.3       Neut %     32.7     42.3       NITRITE UA       Negative         Non-HDL Cholesterol         102  Comment: Risk category and Non-HDL cholesterol goals:  Coronary heart disease (CHD)or equivalent (10-year risk of CHD >20%):  Non-HDL cholesterol goal     <130 mg/dL  Two or more CHD risk factors and 10-year risk of CHD <= 20%:  Non-HDL cholesterol goal     <160 mg/dL  0 to 1 CHD risk factor:  Non-HDL cholesterol goal     <190 mg/dL       nRBC     0     0       Blood, UA       Negative         pH, UA       6.0         Platelet Count     243     236       POCT Glucose 167   203             Potassium     3.7     3.3       PROTEIN TOTAL     6.8     7.0       Protein, UA       Trace  Comment: Recommend a 24 hr urine protein/creatinine ratio if globulin induced proteinuria is clinically suspected.         PT         11.4       RBC     4.43     4.45       RDW     13.1     13.1       Sodium     135     133       Spec Grav UA       >=1.030         Total Cholesterol/HDL Ratio          4.6       Triglycerides         123  Comment: The National Cholesterol Education Program (NCEP) has set the  following guidelines (reference values) for triglycerides:  Normal......................<150 mg/dL  Borderline High.............150-199 mg/dL  High........................200-499 mg/dL         TSH         1.646       Urobilinogen, UA       2.0-3.0         WBC     7.18     7.84                              Significant Imaging: I have reviewed all pertinent imaging results/findings within the past 24 hours.      Assessment & Plan  TIA (transient ischemic attack)  Suspected TIA vs. Evolving CVA  Outside of TPA window; symptoms began approximately 5 days ago.  CT head negative for acute findings; shows chronic left basal ganglia infarct and chronic microvascular ischemia.  Neurology consulted -- pending MRI brain for further evaluation.  Resume dual antiplatelet therapy: aspirin and clopidogrel.  Initiate stroke pathway protocol.  Continue neurochecks per protocol.  Place patient on telemetry monitoring.  Obtain MRI brain, CTA head/neck pending, transthoracic echocardiogram, and lipid panel.  Initiate high-intensity statin therapy.  Monitor for worsening focal deficits or new neurological changes.  Order physical, occupational, speech therapy.  Fall precautions in place.     Coronary artery disease  No acute angina reported. Resume home cardiac medications.  Continue telemetry monitoring for arrhythmias or ischemic changes.  Continue aspirin and statin therapy as above.    Hypertension associated with diabetes  Blood pressure permissively elevated until stroke is ruled out.   Hold antihypertensives unless systolic BP >180 mmHg or symptomatic.  Resume home antihypertensives when clinically appropriate. Monitor BP.    Gastroesophageal reflux disease without esophagitis  Stable. Continue home proton pump inhibitor.     Type 2 diabetes mellitus with hyperglycemia  Hyperglycemia noted in ER. Order HbA1c to assess  chronic glycemic control.  Start sliding scale insulin for inpatient correction.  Monitor accuchecks before meals and bedtime.  Reinforce diabetic diet.     Neuropathy  Chronic. Continue home neuropathic medications as tolerated.     Type 2 diabetes mellitus with neurologic complication, with long-term current use of insulin    See above   VTE Risk Mitigation (From admission, onward)           Ordered     IP VTE HIGH RISK PATIENT  Once         07/17/25 2235     Place sequential compression device  Until discontinued         07/17/25 2235     Reason for No Pharmacological VTE Prophylaxis  Once        Question:  Reasons:  Answer:  Physician Provided (leave comment)  Comment:  Patient is already on dual antiplatelet    07/17/25 2235     Place sequential compression device  Until discontinued         07/17/25 2203                    Discharge Planning   ANH: 7/20/2025     Code Status: Full Code   Medical Readiness for Discharge Date:   Discharge Plan A: Home Health                  Please place Justification for DME      Minal Oshea MD  Department of Hospital Medicine   [facility]

## 2025-07-18 NOTE — ASSESSMENT & PLAN NOTE
Blood pressure permissively elevated until stroke is ruled out.   Hold antihypertensives unless systolic BP >180 mmHg or symptomatic.  Resume home antihypertensives when clinically appropriate. Monitor BP.

## 2025-07-18 NOTE — SUBJECTIVE & OBJECTIVE
Past Medical History:   Diagnosis Date    Colon polyp     Coronary artery disease     Diabetes mellitus     GERD (gastroesophageal reflux disease)     GERD (gastroesophageal reflux disease)     History of colonic polyps     History of heart artery stent     Hypertension     Myocardial infarction     Neuropathy     Stroke     Tumor     BENIGN TUMOR ON BACK THAT COMPRESSED SPINAL CHORD THAT CAUSED PAIN-BUT REMOVED.        Past Surgical History:   Procedure Laterality Date    BACK SURGERY      COLONOSCOPY  04/07/2014    done in [place]; polyps removed per pt report    COLONOSCOPY N/A 12/9/2021    Procedure: COLONOSCOPY;  Surgeon: Nino Ross MD;  Location: [facility] ENDO;  Service: Endoscopy;  Laterality: N/A;    CORONARY ANGIOPLASTY WITH STENT PLACEMENT      ESOPHAGOGASTRODUODENOSCOPY N/A 2/21/2019    Procedure: EGD (ESOPHAGOGASTRODUODENOSCOPY);  Surgeon: Nino Ross MD;  Location: [facility] ENDO;  Service: Endoscopy;  Laterality: N/A;    ESOPHAGOGASTRODUODENOSCOPY N/A 12/9/2021    Procedure: EGD (ESOPHAGOGASTRODUODENOSCOPY);  Surgeon: Nino Ross MD;  Location: [facility] ENDO;  Service: Endoscopy;  Laterality: N/A;    TUMOR REMOVAL  05/16/2016    Back in skin    UPPER GASTROINTESTINAL ENDOSCOPY  02/21/2019    Dr. Ross; mild schatizki ring-dilated; gastritis; gastric polyps; bx negative       Review of patient's allergies indicates:   Allergen Reactions    Protonix [pantoprazole]      Nausea, diarrhea       No current facility-administered medications on file prior to encounter.     Current Outpatient Medications on File Prior to Encounter   Medication Sig    atorvastatin (LIPITOR) 80 MG tablet TAKE 1 TABLET (80 MG TOTAL) BY MOUTH ONCE DAILY.    clopidogreL (PLAVIX) 75 mg tablet TAKE 1 TABLET BY MOUTH EVERY DAY    cyanocobalamin 1,000 mcg/mL injection Inject 1 mL (1,000 mcg total) into the muscle every 30 days.    cyanocobalamin, vitamin B-12, 1,000 mcg Lozg Place 1,000 mcg under the tongue once daily.     "donepeziL (ARICEPT) 5 MG tablet Take 5 mg by mouth every evening.    gabapentin (NEURONTIN) 600 MG tablet TAKE 1 TABLET BY MOUTH THREE TIMES A DAY    glipiZIDE 5 MG TR24 Take 1 tablet (5 mg total) by mouth daily with breakfast.    lisinopriL (PRINIVIL,ZESTRIL) 2.5 MG tablet Take 1 tablet (2.5 mg total) by mouth once daily. Cancel 20mg    loratadine (CLARITIN) 10 mg tablet TAKE 1 TABLET BY MOUTH EVERY DAY    metFORMIN (GLUCOPHAGE) 850 MG tablet TAKE 1 TABLET BY MOUTH TWICE A DAY WITH FOOD    nortriptyline (PAMELOR) 25 MG capsule TAKE 1 CAPSULE BY MOUTH EVERY DAY IN THE EVENING    albuterol (PROVENTIL HFA) 90 mcg/actuation inhaler Inhale 2 puffs into the lungs every 6 (six) hours as needed for Wheezing. Rescue    albuterol-budesonide (AIRSUPRA) 90-80 mcg/actuation Inhale 2 puffs into the lungs every 4 (four) hours as needed (wheezing, shortness of breath).    blood sugar diagnostic Strp To check BG 2 times daily, to use with insurance preferred meter    blood-glucose meter kit Use as instructed    empagliflozin (JARDIANCE) 10 mg tablet Take 1 tablet (10 mg total) by mouth once daily.    fluticasone propionate (FLONASE) 50 mcg/actuation nasal spray 1 SPRAY BY EACH NOSTRIL ROUTE 2 TIMES DAILY. POINT UP AND SLIGHTLY OUTWARD TOWARD EAR WHEN SPRAYING TO AVOID IRRITATING NASAL SEPTUM    HYDROcodone-acetaminophen (NORCO) 7.5-325 mg per tablet Take 1 tablet by mouth every 6 (six) hours as needed for Pain.    lancets Misc To check BG 2 times daily, to use with insurance preferred meter    pen needle, diabetic (BD CHRISTIANO 2ND GEN PEN NEEDLE) 32 gauge x 5/32" Ndle Inject daily    traZODone (DESYREL) 50 MG tablet TAKE 1 TABLET BY MOUTH EVERY DAY IN THE EVENING     Family History       Problem Relation (Age of Onset)    Alcohol abuse Brother    Arthritis Mother    Bursitis Mother    Cancer Father, Paternal Grandmother    Chronic back pain Mother    Colon cancer Paternal Uncle    Congenital heart disease Brother    Hypertension Father "    No Known Problems Sister, Brother    Pacemaker/defibrilator Mother    Stomach cancer Paternal Aunt          Tobacco Use    Smoking status: Never     Passive exposure: Never    Smokeless tobacco: Never   Substance and Sexual Activity    Alcohol use: No    Drug use: No    Sexual activity: Not Currently     Review of Systems  General: Denies fever, chills, or recent weight loss  Neurological: Positive for right-sided weakness, facial droop, and slurred speech; denies headache or dizziness  Cardiovascular: Denies chest pain or palpitations  Respiratory: Denies shortness of breath or cough  GI: Denies nausea, vomiting, abdominal pain  : Denies dysuria or hematuria  MSK: Reports chronic weakness, no acute joint pain  Skin: No rashes or lesions  Endocrine: Denies polydipsia/polyuria  Psych: Denies depression, anxiety, or confusion    Objective:     Vital Signs (Most Recent):  Temp: 98.1 °F (36.7 °C) (07/17/25 1752)  Pulse: 62 (07/17/25 2030)  Resp: 18 (07/17/25 1752)  BP: 119/70 (07/17/25 2030)  SpO2: 96 % (07/17/25 2030) Vital Signs (24h Range):  Temp:  [98.1 °F (36.7 °C)] 98.1 °F (36.7 °C)  Pulse:  [62-92] 62  Resp:  [18] 18  SpO2:  [96 %-97 %] 96 %  BP: (117-119)/(70-76) 119/70        There is no height or weight on file to calculate BMI.     Physical Exam  General: Alert, no acute distress  HEENT: No trauma, pupils equal/reactive, facial droop noted  Neck: No JVD or lymphadenopathy  Cardiac: HR Controlled, no murmurs  Lungs: Clear bilaterally  Abdomen: Soft, non-tender  Extremities: No edema; right-sided motor deficit  Neuro: Slurred speech, right-sided facial weakness, mild right-sided hemiparesis  Skin: Intact  Psych: Alert, appropriate.     Significant Labs: All pertinent labs within the past 24 hours have been reviewed.  CBC:   Recent Labs   Lab 07/17/25 1922   WBC 7.84   HGB 13.2*   HCT 38.2*        CMP:   Recent Labs   Lab 07/17/25 1922   *   K 3.3*   CL 98   CO2 26   *   BUN 14    CREATININE 1.3   CALCIUM 9.0   PROT 7.0   ALBUMIN 4.1   BILITOT 0.6   ALKPHOS 104   AST 24   ALT 24   ANIONGAP 9     Lipid Panel:   Recent Labs   Lab 07/17/25  1922   CHOL 130   HDL 28*   LDLCALC 77.4   TRIG 123   CHOLHDL 21.5       Significant Imaging: I have reviewed all pertinent imaging results/findings within the past 24 hours.

## 2025-07-18 NOTE — PLAN OF CARE
07/18/25 1048   WHITE Message   Medicare Outpatient and Observation Notification regarding financial responsibility Explained to patient/caregiver;Signed/date by patient/caregiver   Date WHITE was signed 07/18/25   Time WHITE was signed 0830

## 2025-07-18 NOTE — SUBJECTIVE & OBJECTIVE
Interval History: patient has mild slurred speech; awaiting eval from PT/OT. Await MRI and ECHO reads   Ok for med-tele bed     Review of Systems   Respiratory:  Positive for shortness of breath.    Cardiovascular: Negative.    Gastrointestinal: Negative.    Neurological:  Positive for speech difficulty, weakness, numbness and headaches.   Psychiatric/Behavioral: Negative.       Objective:     Vital Signs (Most Recent):  Temp: 97.7 °F (36.5 °C) (07/18/25 1110)  Pulse: (!) 57 (07/18/25 1300)  Resp: 15 (07/18/25 1300)  BP: 126/73 (07/18/25 1300)  SpO2: (!) 92 % (07/18/25 1300) Vital Signs (24h Range):  Temp:  [97.5 °F (36.4 °C)-98.1 °F (36.7 °C)] 97.7 °F (36.5 °C)  Pulse:  [56-92] 57  Resp:  [15-20] 15  SpO2:  [92 %-98 %] 92 %  BP: (107-128)/(61-96) 126/73     Weight: 77.3 kg (170 lb 6.7 oz)  Body mass index is 25.17 kg/m².    Intake/Output Summary (Last 24 hours) at 7/18/2025 1429  Last data filed at 7/18/2025 1100  Gross per 24 hour   Intake 500 ml   Output 900 ml   Net -400 ml         Physical Exam  Constitutional:       Appearance: He is not toxic-appearing.   HENT:      Mouth/Throat:      Mouth: Mucous membranes are moist.      Pharynx: Oropharynx is clear.   Eyes:      Extraocular Movements: Extraocular movements intact.      Pupils: Pupils are equal, round, and reactive to light.   Cardiovascular:      Rate and Rhythm: Regular rhythm. Bradycardia present.      Pulses: Normal pulses.   Pulmonary:      Effort: No respiratory distress.      Breath sounds: No wheezing or rales.   Abdominal:      General: There is no distension.      Tenderness: There is no abdominal tenderness.   Musculoskeletal:      Cervical back: Normal range of motion and neck supple.   Skin:     General: Skin is warm and dry.      Capillary Refill: Capillary refill takes less than 2 seconds.   Neurological:      Mental Status: He is alert and oriented to person, place, and time.      Cranial Nerves: Cranial nerve deficit present.      Motor:  Weakness present.      Coordination: Coordination abnormal.      Gait: Gait abnormal.      Deep Tendon Reflexes: Reflexes abnormal.               Significant Labs: All pertinent labs within the past 24 hours have been reviewed.  Recent Lab Results  (Last 5 results in the past 24 hours)        07/18/25  1256   07/18/25  0838   07/18/25  0411   07/17/25  2237   07/17/25  1922        Albumin     4.1     4.1       ALP     98     104       ALT     23     24       Anion Gap     8     9       Appearance, UA       Clear         AST     24     24       Baso #     0.07     0.09       Basophil %     1.0     1.1       BILIRUBIN TOTAL     0.6  Comment: For infants and newborns, interpretation of results should be based   on gestational age, weight and in agreement with clinical   observations.    Premature Infant recommended reference ranges:   0-24 hours:  <8.0 mg/dL   24-48 hours: <12.0 mg/dL   3-5 days:    <15.0 mg/dL   6-29 days:   <15.0 mg/dL     0.6  Comment: For infants and newborns, interpretation of results should be based   on gestational age, weight and in agreement with clinical   observations.    Premature Infant recommended reference ranges:   0-24 hours:  <8.0 mg/dL   24-48 hours: <12.0 mg/dL   3-5 days:    <15.0 mg/dL   6-29 days:   <15.0 mg/dL       Bilirubin, UA       Negative         BUN     15     14       Calcium     9.1     9.0       Chloride     99     98       Cholesterol Total         130  Comment: The National Cholesterol Education Program (NCEP) has set the  following guidelines (reference ranges) for Cholesterol:  Optimal.....................<200 mg/dL  Borderline High.............200-239 mg/dL  High........................> or = 240 mg/dL       CO2     28     26       Color, UA       Yellow         Creatinine     1.0     1.3       eGFR     >60     >60       Eos #     0.43     0.21       Eos %     6.0     2.7       Estimated Avg Glucose     189           Glucose     177     186       Glucose, UA        1+         HDL         28  Comment: The National Cholesterol Education Program (NCEP) has set the  following guidelines (reference values) for HDL Cholesterol:  Low...............<40 mg/dL  Optimal...........>60 mg/dL       HDL/Cholesterol Ratio         21.5       Hematocrit     37.6     38.2       Hemoglobin     13.2     13.2       Hemoglobin A1C External     8.2  Comment: According to ADA guidelines, hemoglobin A1C <7.0% represents  optimal control in non-pregnant diabetic patients.  Different  metrics may apply to specific populations.      Standards of Medical Care in Diabetes - 2016.    For the purpose of screening for the presence of diabetes:  <5.7%     Consistent with the absence of diabetes  5.7-6.4%  Consistent with increasing risk for diabetes             (prediabetes)  >or=6.5%  Consistent with diabetes    Currently no consensus exists for use of hemoglobin A1C  for diagnosis of diabetes for children.           Immature Grans (Abs)     0.02  Comment: Mild elevation in immature granulocytes is non specific and can be seen in a variety of conditions including stress response, acute inflammation, trauma and pregnancy. Correlation with other laboratory and clinical findings is essential.     0.04  Comment: Mild elevation in immature granulocytes is non specific and can be seen in a variety of conditions including stress response, acute inflammation, trauma and pregnancy. Correlation with other laboratory and clinical findings is essential.       Immature Granulocytes     0.3     0.5       INR         1.0  Comment: Coumadin Therapy:    2.0 - 3.0 for INR for all indicators except mechanical heart valves    and antiphospholipid syndromes which should use 2.5 - 3.5.       Ketones, UA       Negative         LDL Cholesterol         77.4  Comment: The National Cholesterol Education Program (NCEP) has set the  following guidelines (reference values) for LDL Cholesterol:  Optimal.......................<130  mg/dL  Borderline High...............130-159 mg/dL  High..........................160-189 mg/dL  Very High.....................>190 mg/dL         Leukocyte Esterase, UA       Negative         Lymph #     3.19     2.95       LYMPH %     44.4     37.6       MCH     29.8     29.7       MCHC     35.1     34.6       MCV     85     86       Mono #     1.12     1.24       Mono %     15.6     15.8       MPV     10.3     10.0       Neut #     2.4     3.3       Neut %     32.7     42.3       NITRITE UA       Negative         Non-HDL Cholesterol         102  Comment: Risk category and Non-HDL cholesterol goals:  Coronary heart disease (CHD)or equivalent (10-year risk of CHD >20%):  Non-HDL cholesterol goal     <130 mg/dL  Two or more CHD risk factors and 10-year risk of CHD <= 20%:  Non-HDL cholesterol goal     <160 mg/dL  0 to 1 CHD risk factor:  Non-HDL cholesterol goal     <190 mg/dL       nRBC     0     0       Blood, UA       Negative         pH, UA       6.0         Platelet Count     243     236       POCT Glucose 167   203             Potassium     3.7     3.3       PROTEIN TOTAL     6.8     7.0       Protein, UA       Trace  Comment: Recommend a 24 hr urine protein/creatinine ratio if globulin induced proteinuria is clinically suspected.         PT         11.4       RBC     4.43     4.45       RDW     13.1     13.1       Sodium     135     133       Spec Grav UA       >=1.030         Total Cholesterol/HDL Ratio         4.6       Triglycerides         123  Comment: The National Cholesterol Education Program (NCEP) has set the  following guidelines (reference values) for triglycerides:  Normal......................<150 mg/dL  Borderline High.............150-199 mg/dL  High........................200-499 mg/dL         TSH         1.646       Urobilinogen, UA       2.0-3.0         WBC     7.18     7.84                              Significant Imaging: I have reviewed all pertinent imaging results/findings within the past  24 hours.

## 2025-07-18 NOTE — NURSING
Pt transferred to Room 2526. Bedside report given to NOA Stone charge. NIH completed. Bed in lowest position, call light in reach, side rails up x2, bed alarm on. Pt denies any further needs. Pt denied the need to notify family of the transfer. Insulin pen, personal belongings, and chart sent with the pt.

## 2025-07-18 NOTE — ASSESSMENT & PLAN NOTE
Hyperglycemia noted in ER. Order HbA1c to assess chronic glycemic control.  Start sliding scale insulin for inpatient correction.  Monitor accuchecks before meals and bedtime.  Reinforce diabetic diet.

## 2025-07-18 NOTE — PT/OT/SLP EVAL
Occupational Therapy   Evaluation    Name: Pirce Noguera  MRN: [id number]  Admitting Diagnosis: TIA (transient ischemic attack)  Recent Surgery: * No surgery found *      Recommendations:     Discharge Recommendations: Low Intensity Therapy  Discharge Equipment Recommendations:  none  Barriers to discharge:  Decreased caregiver support    Assessment:     Price Noguera is a 64 y.o. male with a medical diagnosis of TIA (transient ischemic attack).  He presents agreeable for OT eval this AM. Performance deficits affecting function: weakness, impaired endurance, impaired self care skills, gait instability, impaired cognition, decreased safety awareness, impaired cardiopulmonary response to activity.      Rehab Prognosis: Fair; patient would benefit from acute skilled OT services to address these deficits and reach maximum level of function.       Plan:     Patient to be seen 5 x/week to address the above listed problems via self-care/home management, therapeutic activities, therapeutic exercises  Plan of Care Expires: 08/18/25  Plan of Care Reviewed with: patient    Subjective     Chief Complaint: none  Patient/Family Comments/goals: to return home    Occupational Profile:  Living Environment: pt lives alone in a [facility] with 1 PATRICIA, pt has a tub shower combo with SC  Previous level of function: pt reported mod I with cane and other days with RW. 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 Per pt, neighbors     Pain/Comfort:   None     Patients cultural, spiritual, Hoahaoism conflicts given the current situation: no    Objective:     Communicated with: nurse prior to session.  Patient found supine with telemetry, peripheral IV upon OT entry to room.    General Precautions: Standard, fall  Orthopedic Precautions: N/A  Braces: N/A  Respiratory Status: Room  air    Occupational Performance:    Bed Mobility:    Patient completed Rolling/Turning to Right with contact guard assistance  Patient completed Scooting/Bridging with contact guard assistance  Patient completed Supine to Sit with contact guard assistance  Patient completed Sit to Supine with contact guard assistance    Functional Mobility/Transfers:  Patient completed Sit <> Stand Transfer with contact guard assistance  with  rolling walker   Functional Mobility: pt took 3 side steps    Activities of Daily Living:  Feeding:  independence and modified independence /set up  Grooming: stand by assistance    Lower Body Dressing: stand by assistance sitting EOB    Cognitive/Visual Perceptual:  Cognitive/Psychosocial Skills:     -       Oriented to: Person, Place, and Situation   -       Follows Commands/attention:Follows two-step commands  -       Communication: dysarthria  -       Safety awareness/insight to disability: intact   -       Mood/Affect/Coping skills/emotional control: Appropriate to situation, Cooperative, and Pleasant  Visual/Perceptual:      -Intact visual scanning and tracking    Physical Exam:  Balance:    -       good unsupported sitting balance and standing balance  Upper Extremity Range of Motion:     -       Right Upper Extremity: WFL  -       Left Upper Extremity: WFL  Upper Extremity Strength:    -       Right Upper Extremity: WFL  -       Left Upper Extremity: WFL   Strength:    -       Right Upper Extremity: WFL  -       Left Upper Extremity: WFL  Gross motor coordination:   WFL    AMPAC 6 Click ADL:  AMPAC Total Score: 21    Treatment & Education:  Pt educated on role of OT/POC, importance of OOB/EOB activity, use of call bell, and safety during ADLs, transfers, and functional mobility.     Patient left supine with all lines intact, call button in reach, and bed alarm on    GOALS:   Multidisciplinary Problems       Occupational Therapy Goals          Problem: Occupational Therapy    Goal  Priority Disciplines Outcome Interventions   Occupational Therapy Goal     OT, PT/OT     Description: Goals to be met by: 818/2025     Patient will increase functional independence with ADLs by performing:    UE Dressing with Modified [place].  LE Dressing with Modified [place].  Grooming while standing at sink with Modified [place].  Toileting from toilet with Modified [place] for hygiene and clothing management.                          DME Justifications:  No DME recommended requiring DME justifications    History:     Past Medical History:   Diagnosis Date    Colon polyp     Coronary artery disease     Diabetes mellitus     GERD (gastroesophageal reflux disease)     GERD (gastroesophageal reflux disease)     History of colonic polyps     History of heart artery stent     Hypertension     Myocardial infarction     Neuropathy     Stroke     Tumor     BENIGN TUMOR ON BACK THAT COMPRESSED SPINAL CHORD THAT CAUSED PAIN-BUT REMOVED.          Past Surgical History:   Procedure Laterality Date    BACK SURGERY      COLONOSCOPY  04/07/2014    done in [place], [place]; polyps removed per pt report    COLONOSCOPY N/A 12/9/2021    Procedure: COLONOSCOPY;  Surgeon: Nino Ross MD;  Location: [facility] ENDO;  Service: Endoscopy;  Laterality: N/A;    CORONARY ANGIOPLASTY WITH STENT PLACEMENT      ESOPHAGOGASTRODUODENOSCOPY N/A 2/21/2019    Procedure: EGD (ESOPHAGOGASTRODUODENOSCOPY);  Surgeon: Nino Ross MD;  Location: [facility];  Service: Endoscopy;  Laterality: N/A;    ESOPHAGOGASTRODUODENOSCOPY N/A 12/9/2021    Procedure: EGD (ESOPHAGOGASTRODUODENOSCOPY);  Surgeon: Nino Ross MD;  Location: [facility] ENDO;  Service: Endoscopy;  Laterality: N/A;    TUMOR REMOVAL  05/16/2016    Back in skin    UPPER GASTROINTESTINAL ENDOSCOPY  02/21/2019    Dr. Ross; mild schatizki ring-dilated; gastritis; gastric polyps; bx negative       Time Tracking:     OT Date of Treatment: 07/18/25  OT Start Time: 0909  OT  Stop Time: 0930  OT Total Time (min): 21 min    Billable Minutes:Evaluation 8  Self Care/Home Management 13    7/18/2025

## 2025-07-19 VITALS
HEIGHT: 69 IN | SYSTOLIC BLOOD PRESSURE: 121 MMHG | DIASTOLIC BLOOD PRESSURE: 77 MMHG | WEIGHT: 170.44 LBS | RESPIRATION RATE: 16 BRPM | BODY MASS INDEX: 25.24 KG/M2 | HEART RATE: 60 BPM | OXYGEN SATURATION: 93 % | TEMPERATURE: 97 F

## 2025-07-19 PROBLEM — G45.9 TIA (TRANSIENT ISCHEMIC ATTACK): Status: RESOLVED | Noted: 2025-07-17 | Resolved: 2025-07-19

## 2025-07-19 LAB — POCT GLUCOSE: 158 MG/DL (ref 70–110)

## 2025-07-19 PROCEDURE — 97116 GAIT TRAINING THERAPY: CPT | Mod: CQ

## 2025-07-19 PROCEDURE — 25000003 PHARM REV CODE 250: Performed by: HOSPITALIST

## 2025-07-19 PROCEDURE — 94761 N-INVAS EAR/PLS OXIMETRY MLT: CPT

## 2025-07-19 PROCEDURE — 99900035 HC TECH TIME PER 15 MIN (STAT)

## 2025-07-19 PROCEDURE — G0378 HOSPITAL OBSERVATION PER HR: HCPCS

## 2025-07-19 PROCEDURE — 99900031 HC PATIENT EDUCATION (STAT)

## 2025-07-19 RX ORDER — NAPROXEN SODIUM 220 MG/1
81 TABLET, FILM COATED ORAL DAILY
Qty: 20 TABLET | Refills: 0 | Status: SHIPPED | OUTPATIENT
Start: 2025-07-20 | End: 2025-08-09

## 2025-07-19 RX ORDER — HYDROCORTISONE 25 MG/G
CREAM TOPICAL 2 TIMES DAILY
Status: DISCONTINUED | OUTPATIENT
Start: 2025-07-19 | End: 2025-07-19 | Stop reason: HOSPADM

## 2025-07-19 RX ADMIN — GABAPENTIN 600 MG: 300 CAPSULE ORAL at 08:07

## 2025-07-19 RX ADMIN — NORTRIPTYLINE HYDROCHLORIDE 25 MG: 25 CAPSULE ORAL at 08:07

## 2025-07-19 RX ADMIN — ASPIRIN 81 MG: 81 TABLET, CHEWABLE ORAL at 08:07

## 2025-07-19 RX ADMIN — ATORVASTATIN CALCIUM 80 MG: 40 TABLET, FILM COATED ORAL at 08:07

## 2025-07-19 RX ADMIN — CLOPIDOGREL 75 MG: 75 TABLET ORAL at 08:07

## 2025-07-19 NOTE — PLAN OF CARE
07/19/25 1105   Final Note   Assessment Type Final Discharge Note   Anticipated Discharge Disposition Home-Health   Post-Acute Status   Discharge Delays None known at this time     Discharge orders have been fax to [facility] and notified for discharge.    Patient cleared for discharge from case management standpoint.    Chart and discharge orders reviewed.  Patient discharged with no further case management needs.    Pt to be transported by richie Valenciay.

## 2025-07-19 NOTE — DISCHARGE SUMMARY
[facility]  Discharge Summary      Patient Name: Price Noguera  MRN: [id number]  [facility]: [id number]  Patient Class: OP- Observation  Admission Date: 7/17/2025  Hospital Length of Stay: 0 days  Discharge Date and Time: 7/19/2025  1:58 PM  Attending Physician: No att. providers found   Discharging Provider: Mami Denson MD  Primary Care Provider: Chanel Roberson MD    Primary Care Team: Networked reference to record PCT     HPI:   Mr. Price Noguera is a 64-year-old male with a past medical history significant for ischemic stroke with residual right-sided weakness, type 2 diabetes mellitus, gastroesophageal reflux disease, coronary artery disease with prior stenting, hypertension, and peripheral neuropathy who presents to the emergency department for evaluation of new-onset slurred speech and facial droop.     The patient reports that he experienced a ground-level fall 5 days ago, during which he hit his head. Since the fall, he has noted increased slurred speech and right-sided facial droop. He endorses chronic right-sided weakness related to his prior stroke, but states it has subjectively improved recently. He denies associated symptoms such as numbness, headache, blurry vision, dizziness, or loss of consciousness. He was previously on clopidogrel (Plavix), though current adherence is unclear.     ER WORKUP:     CT Head Without Contrast:  No acute intracranial pathology.  Chronic left basal ganglia infarct.  Moderate chronic microvascular ischemic changes.  No hemorrhage, mass effect, or hydrocephalus.    EKG:  Normal sinus rhythm with chronic-appearing right bundle-branch block.    Labs (07/17/25):  CBC: WBC 7.84, Hgb 13.2, Hct 38.2, Platelets 236  BMP: Na 133 , K 3.3, Cr 1.3, Glucose 186   Lipid panel: HDL 28 (L), LDL 77.4, Triglycerides 123  TSH: 1.646    CT Angiogram of Head and Neck: Pending.     ER DIAGNOSIS:     Suspected TIA vs. evolving CVA in setting of previous stroke      ER TREATMENT:     Aspirin 325 mg PO administered once    Neurology consulted     HOSPITAL COURSE:  Hospital Medicine has been consulted for admission under observation status for telemetry monitoring.   Neurology has also been consulted for further stroke workup and management.     * No surgery found *      Hospital Course:   Patient admitted with new onset slurred speech since a fall 5 days ago. He has been compliant with his plavix and statin from previous CVA resulting in right sided weakness. On admit he has no LVO on CTA head and neck. ECHO and MRI showed no acute findings.  Evaluated by Neurology, symptoms improving.  Neurology recommended aspirin and Plavix for 21 days then Plavix afterwards.  Follow up with Neurology outpatient.  Stable for discharge.    Physical Exam  Cardiovascular:      Rate and Rhythm: Regular rhythm.     Pulses: Normal pulses.   Pulmonary:      Effort: No respiratory distress.      Breath sounds: No wheezing or rales.      Goals of Care Treatment Preferences:  Code Status: Full Code         Consults:   Consults (From admission, onward)          Status Ordering Provider     IP consult to case management/social work  Once        Provider:  (Not yet assigned)    BJORN Piper     Inpatient Consult to Neurology Services (General Neurology)  Once        Provider:  (Not yet assigned)    Completed COLETTE CARRANZA            Assessment & Plan  Coronary artery disease  No acute angina reported. Resume home cardiac medications.  Continue telemetry monitoring for arrhythmias or ischemic changes.  Continue aspirin and statin therapy as above.    Hypertension associated with diabetes  Blood pressure permissively elevated until stroke is ruled out.   Hold antihypertensives unless systolic BP >180 mmHg or symptomatic.  Resume home antihypertensives when clinically appropriate. Monitor BP.    Gastroesophageal reflux disease without esophagitis  Stable. Continue home proton pump  inhibitor.     Type 2 diabetes mellitus with hyperglycemia  Hyperglycemia noted in ER. Order HbA1c to assess chronic glycemic control.  Start sliding scale insulin for inpatient correction.  Monitor accuchecks before meals and bedtime.  Reinforce diabetic diet.     Neuropathy  Chronic. Continue home neuropathic medications as tolerated.     Type 2 diabetes mellitus with neurologic complication, with long-term current use of insulin    See above   Final Active Diagnoses:    Diagnosis Date Noted POA    Neuropathy [G62.9] 07/17/2025 Yes    Type 2 diabetes mellitus with hyperglycemia [E11.65] 12/12/2024 Yes    Gastroesophageal reflux disease without esophagitis [K21.9] 01/18/2019 Yes    Coronary artery disease [I25.10] 01/08/2019 Yes    Hypertension associated with diabetes [E11.59, I15.2] 01/08/2019 Yes    Type 2 diabetes mellitus with neurologic complication, with long-term current use of insulin [E11.49, Z79.4] 01/08/2019 Not Applicable      Problems Resolved During this Admission:    Diagnosis Date Noted Date Resolved POA    PRINCIPAL PROBLEM:  TIA (transient ischemic attack) [G45.9] 07/17/2025 07/19/2025 Yes       Discharged Condition: stable    [place] [facility]    Follow Up:   Follow-up Information       Chanel Roberson MD Follow up.    Specialty: Family Medicine  Contact information:  [address] [id number] [phone]                           Patient Instructions:      Ambulatory referral/consult to [facility]   Standing Status: Future   Referral Priority: Routine Referral Type: Consultation   Referral Reason: Specialty Services Required   Number of Visits Requested: 1     Ambulatory referral/consult to Home Health   Standing Status: Future   Referral Priority: Routine Referral Type: Home Health   Referral Reason: Specialty Services Required   Requested Specialty: Home Health Services   Number of Visits Requested: 1     Ambulatory referral/consult to Pharmacy  Assistance   Standing Status: Future   Referral Priority: Routine Referral Type: Consultation   Referral Reason: Specialty Services Required   Number of Visits Requested: 1     Ambulatory referral/consult to Outpatient Case Management   Referral Priority: Routine Referral Type: Consultation   Referral Reason: Specialty Services Required   Number of Visits Requested: 1     Ambulatory referral/consult to Neurology   Standing Status: Future   Referral Priority: Routine Referral Type: Consultation   Referral Reason: Specialty Services Required   Requested Specialty: Neurology   Number of Visits Requested: 1     Diet diabetic     Notify your health care provider if you experience any of the following:  temperature >100.4     Notify your health care provider if you experience any of the following:  persistent nausea and vomiting or diarrhea     Notify your health care provider if you experience any of the following:  severe uncontrolled pain     Notify your health care provider if you experience any of the following:  redness, tenderness, or signs of infection (pain, swelling, redness, odor or green/yellow discharge around incision site)     Activity as tolerated       Significant Diagnostic Studies: Labs: CMP   Recent Labs   Lab 07/17/25 1922 07/18/25 0411   * 135*   K 3.3* 3.7   CL 98 99   CO2 26 28   * 177*   BUN 14 15   CREATININE 1.3 1.0   CALCIUM 9.0 9.1   PROT 7.0 6.8   ALBUMIN 4.1 4.1   BILITOT 0.6 0.6   ALKPHOS 104 98   AST 24 24   ALT 24 23   ANIONGAP 9 8    and CBC   Recent Labs   Lab 07/17/25 1922 07/18/25 0411   WBC 7.84 7.18   HGB 13.2* 13.2*   HCT 38.2* 37.6*    243       Pending Diagnostic Studies:       Procedure Component Value Units Date/Time    HCV Virus Hold Specimen [[id number]] Collected: 07/17/25 1922    Order Status: Sent Lab Status: In process Updated: 07/17/25 1938    Specimen: Blood     HIV Virus Confirmation Hold Specimen [[id number]] Collected: 07/17/25 1922    Order  Status: Sent Lab Status: In process Updated: 07/17/25 1938    Specimen: Blood            Medications:  Reconciled Home Medications:      Medication List        START taking these medications      aspirin 81 MG Chew  Take 1 tablet (81 mg total) by mouth once daily. for 20 days  Start taking on: July 20, 2025            CHANGE how you take these medications      cyanocobalamin 1,000 mcg/mL injection  Inject 1 mL (1,000 mcg total) into the muscle every 30 days.  What changed: Another medication with the same name was removed. Continue taking this medication, and follow the directions you see here.            CONTINUE taking these medications      albuterol 90 mcg/actuation inhaler  Commonly known as: PROVENTIL HFA  Inhale 2 puffs into the lungs every 6 (six) hours as needed for Wheezing. Rescue     albuterol-budesonide 90-80 mcg/actuation  Commonly known as: Airsupra  Inhale 2 puffs into the lungs every 4 (four) hours as needed (wheezing, shortness of breath).     atorvastatin 80 MG tablet  Commonly known as: LIPITOR  TAKE 1 TABLET (80 MG TOTAL) BY MOUTH ONCE DAILY.     blood sugar diagnostic Strp  To check BG 2 times daily, to use with insurance preferred meter     blood-glucose meter kit  Use as instructed     clopidogreL 75 mg tablet  Commonly known as: PLAVIX  TAKE 1 TABLET BY MOUTH EVERY DAY     donepeziL 5 MG tablet  Commonly known as: ARICEPT  Take 5 mg by mouth every evening.     empagliflozin 10 mg tablet  Commonly known as: JARDIANCE  Take 1 tablet (10 mg total) by mouth once daily.     fluticasone propionate 50 mcg/actuation nasal spray  Commonly known as: FLONASE  1 SPRAY BY EACH NOSTRIL ROUTE 2 TIMES DAILY. POINT UP AND SLIGHTLY OUTWARD TOWARD EAR WHEN SPRAYING TO AVOID IRRITATING NASAL SEPTUM     gabapentin 600 MG tablet  Commonly known as: NEURONTIN  TAKE 1 TABLET BY MOUTH THREE TIMES A DAY     glipiZIDE 5 MG Tr24  Take 1 tablet (5 mg total) by mouth daily with breakfast.     HYDROcodone-acetaminophen  "7.5-325 mg per tablet  Commonly known as: NORCO  Take 1 tablet by mouth every 6 (six) hours as needed for Pain.     lancets Misc  To check BG 2 times daily, to use with insurance preferred meter     loratadine 10 mg tablet  Commonly known as: CLARITIN  TAKE 1 TABLET BY MOUTH EVERY DAY     metFORMIN 850 MG tablet  Commonly known as: GLUCOPHAGE  TAKE 1 TABLET BY MOUTH TWICE A DAY WITH FOOD     nortriptyline 25 MG capsule  Commonly known as: PAMELOR  TAKE 1 CAPSULE BY MOUTH EVERY DAY IN THE EVENING     pen needle, diabetic 32 gauge x 5/32" Ndle  Commonly known as: BD CHRISTIANO 2ND GEN PEN NEEDLE  Inject daily     traZODone 50 MG tablet  Commonly known as: DESYREL  TAKE 1 TABLET BY MOUTH EVERY DAY IN THE EVENING            STOP taking these medications      lisinopriL 2.5 MG tablet  Commonly known as: PRINIVIL,ZESTRIL              Indwelling Lines/Drains at time of discharge:   Lines/Drains/Airways       None                       Time spent on the discharge of patient: 35 minutes         Mami Denson MD  Department of Hospital Medicine  [facility]  "

## 2025-07-19 NOTE — RESPIRATORY THERAPY
07/19/25 0810   Patient Assessment/Suction   Level of Consciousness (AVPU) alert   Respiratory Effort Normal;Unlabored   Expansion/Accessory Muscles/Retractions no use of accessory muscles;no retractions;expansion symmetric   All Lung Fields Breath Sounds clear   Rhythm/Pattern, Respiratory unlabored;pattern regular;depth regular   Cough Frequency no cough   PRE-TX-O2   Device (Oxygen Therapy) room air   SpO2 (!) 93 %   Pulse Oximetry Type Intermittent   $ Pulse Oximetry - Multiple Charge Pulse Oximetry - Multiple   Pulse 61   Resp 16   Aerosol Therapy   $ Aerosol Therapy Charges PRN treatment not required   Education   $ Education 15 min;Bronchodilator

## 2025-07-19 NOTE — PLAN OF CARE
Problem: Stroke, Ischemic (Includes Transient Ischemic Attack)  Goal: Optimal Coping  Outcome: Met  Goal: Effective Bowel Elimination  Outcome: Met  Goal: Optimal Cerebral Tissue Perfusion  Outcome: Met  Goal: Optimal Cognitive Function  Outcome: Met  Goal: Improved Communication Skills  Outcome: Met  Goal: Optimal Functional Ability  Outcome: Met  Goal: Optimal Nutrition Intake  Outcome: Met  Goal: Effective Oxygenation and Ventilation  Outcome: Met  Goal: Improved Sensorimotor Function  Outcome: Met  Goal: Safe and Effective Swallow  Outcome: Met  Goal: Effective Urinary Elimination  Outcome: Met     Problem: Adult Inpatient Plan of Care  Goal: Plan of Care Review  Outcome: Met  Goal: Patient-Specific Goal (Individualized)  Outcome: Met  Goal: Absence of Hospital-Acquired Illness or Injury  Outcome: Met  Goal: Optimal Comfort and Wellbeing  Outcome: Met  Goal: Readiness for Transition of Care  Outcome: Met     Problem: Diabetes Comorbidity  Goal: Blood Glucose Level Within Targeted Range  Outcome: Met     Problem: Fall Injury Risk  Goal: Absence of Fall and Fall-Related Injury  Outcome: Met     Problem: Physical Therapy  Goal: Physical Therapy Goal  Description: Goals to be met by: 8/15/25     Patient will increase functional independence with mobility by performin. Supine to sit with Supervision  2. Sit to stand transfer with Supervision  3. Bed to chair transfer with Supervision using Rolling Walker  4. Gait  x 200 feet with Supervision using Rolling Walker.         Outcome: Met     Problem: Occupational Therapy  Goal: Occupational Therapy Goal  Description: Goals to be met by:      Patient will increase functional independence with ADLs by performing:    UE Dressing with Modified [place].  LE Dressing with Modified De[place].  Grooming while standing at sink with Modified De[place].  Toileting from toilet with Modified Deuel for hygiene and clothing management.     Outcome:  Met   Ok for discharge

## 2025-07-19 NOTE — PT/OT/SLP PROGRESS
Physical Therapy Treatment    Patient Name:  Price Noguera   MRN:  [id number]    Recommendations:     Discharge Recommendations: Low Intensity Therapy  Discharge Equipment Recommendations: none  Barriers to discharge: None    Assessment:     Price Noguera is a 64 y.o. male admitted with a medical diagnosis of TIA (transient ischemic attack).  He presents with the following impairments/functional limitations: weakness, impaired endurance, impaired self care skills, impaired functional mobility, gait instability, impaired balance, decreased upper extremity function, decreased lower extremity function, impaired cardiopulmonary response to activity . Pt completed gait trial exhibiting a few instances of LOB only requiring minimal assistance to correct one. He reports this is pretty much his baseline at this point and wants to continue to work with HHPT to improve his mobility.     Rehab Prognosis: Fair; patient would benefit from acute skilled PT services to address these deficits and reach maximum level of function.    Recent Surgery: * No surgery found *      Plan:     During this hospitalization, patient to be seen 6 x/week to address the identified rehab impairments via gait training, therapeutic activities, therapeutic exercises, neuromuscular re-education and progress toward the following goals:    Plan of Care Expires:  08/15/25    Subjective     Chief Complaint: None stated  Patient/Family Comments/goals: To return home  Pain/Comfort:  Pain Rating 1: 0/10      Objective:     Communicated with RN prior to session.  Patient found HOB elevated with telemetry, peripheral IV upon PT entry to room.     General Precautions: Standard, diabetic, fall, aphasia  Orthopedic Precautions: N/A  Braces: N/A  Respiratory Status: Room air     Functional Mobility:  Bed Mobility:     Supine to Sit: modified independence  Sit to Supine: modified independence  Transfers:     Sit to Stand:  contact guard assistance with straight  cane  Gait: 210 feet CGA with straight cane      AM-PAC 6 CLICK MOBILITY          Treatment & Education:  Pt was educated on the following: call light use, importance of OOB activity and functional mobility to negate the negative effects of prolonged bed rest during this hospitalization, safe transfers/ambulation and discharge planning recommendations/options.     Patient left HOB elevated with all lines intact, call button in reach, and bed alarm on..    GOALS:   Multidisciplinary Problems       Physical Therapy Goals       Not on file              Multidisciplinary Problems (Resolved)          Problem: Physical Therapy    Goal Priority Disciplines Outcome Interventions   Physical Therapy Goal   (Resolved)     PT, PT/OT Met    Description: Goals to be met by: 8/15/25     Patient will increase functional independence with mobility by performin. Supine to sit with Supervision  2. Sit to stand transfer with Supervision  3. Bed to chair transfer with Supervision using Rolling Walker  4. Gait  x 200 feet with Supervision using Rolling Walker.                                Time Tracking:     PT Received On: 25  PT Start Time: 0900     PT Stop Time: 09  PT Total Time (min): 23 min     Billable Minutes: Gait Training 23    Treatment Type: Treatment  PT/PTA: PTA     Number of PTA visits since last PT visit: 2025

## 2025-07-19 NOTE — CARE UPDATE
07/18/25 2024   Patient Assessment/Suction   Level of Consciousness (AVPU) alert   Respiratory Effort Normal;Unlabored   Expansion/Accessory Muscles/Retractions expansion symmetric;no use of accessory muscles;no retractions   All Lung Fields Breath Sounds Anterior:;Lateral:;clear   Rhythm/Pattern, Respiratory unlabored;pattern regular   Cough Frequency no cough   PRE-TX-O2   Device (Oxygen Therapy) room air   SpO2 97 %   Pulse Oximetry Type Intermittent   $ Pulse Oximetry - Multiple Charge Pulse Oximetry - Multiple   Pulse 70   Resp 18   Aerosol Therapy   $ Aerosol Therapy Charges PRN treatment not required

## 2025-07-20 LAB
HOLD SPECIMEN: NORMAL
HOLD SPECIMEN: NORMAL

## 2025-07-21 ENCOUNTER — TELEPHONE (OUTPATIENT)
Dept: PHARMACY | Facility: CLINIC | Age: 65
End: 2025-07-21
Payer: MEDICARE

## 2025-07-21 NOTE — TELEPHONE ENCOUNTER
----- Message from Meryl Morel sent at 7/18/2025  9:43 AM CDT -----  Regarding: Order for LILLY NOGUERA,     Mr. Noguera's case has been assigned to Gregg Meléndez @[phone]. Provider may review progress notes by typing pharmacy patient assistance in Epic search box.      What happens next? Assigned advocate will review your patients chart and research available options.  Patient and Provider may be asked to provide specific documentation to facilitate the PAP process. Failure to provide the requested documentation will delay assistance.    Please note: epic chart must reflect a current order for the requested medication written by an [facility] provider to begin PAP process.   Please note: all requests are subject to program availability and patient eligibility verification.   Please note: each program has it's own unique eligibility criteria (e.g., income limits, insurance status medical condition, residency).Therefore eligibility is determined by the specific program   being applied to not by the Pharmacy Patient Assistance Team.         Thank you,   [facility]  [id number]  [place] [address]  Fax: [phone]  Email: [email or web address]      ----- Message -----  From: Archana Rogers RN  Sent: 7/18/2025   9:40 AM CDT  To: Pharmacy Patient Assistance Team  Subject: Order for LILLY NOGUERA

## 2025-07-21 NOTE — LETTER
July 21, 2025    Price oNguera  [address] [place] [address]           Dear Mr. Noguera,    My name is Gregg Meléndez , and I am reaching out on behalf of [facility]s Pharmacy Patient Assistance Team regarding your request for medication assistance. Our goal is to help qualified [facility] patients obtain financial assistance for prescribed medications.    Please note that enrollment into available support may require the following documents:    Completed [facility] authorization forms, Copy of all insurance cards (front and back), and Proof of household income (such as social security award letter, pension statement or 3 consecutive pay stubs)    If you still need assistance with your medications, please reach out to the phone number listed below. If we do not hear back from you, a second contact attempt will be made via mail or your [facility] portal in 5 to 10 business days.    Thank you for giving us the opportunity to assist you with your healthcare needs. We look forward to working with you.      Sincerely  Gregg Medhat @[phone]  Pharmacy Patient Assistance Team  [address] [place] [address]  [id number] [place] [address]  Fax: [phone]  Email: [email or web address]

## 2025-07-21 NOTE — TELEPHONE ENCOUNTER
First contact attempt has been made via letter and portal message  . Welcome letter and [facility] Enrollment Packet has been sent via letter and portal message . Follow-up will be made in approximately 5 to 10 business days.      Gregg [place]   Pharmacy Patient Assistance Team

## 2025-07-22 ENCOUNTER — PATIENT MESSAGE (OUTPATIENT)
Dept: ADMINISTRATIVE | Facility: CLINIC | Age: 65
End: 2025-07-22
Payer: MEDICARE

## 2025-07-23 DIAGNOSIS — E11.65 UNCONTROLLED TYPE 2 DIABETES MELLITUS WITH HYPERGLYCEMIA: ICD-10-CM

## 2025-07-23 RX ORDER — EMPAGLIFLOZIN 25 MG/1
25 TABLET, FILM COATED ORAL
Qty: 90 TABLET | Refills: 3 | OUTPATIENT
Start: 2025-07-23

## 2025-07-23 NOTE — TELEPHONE ENCOUNTER
No care due was identified.  [facility] Embedded Care Due Messages. Reference number: [id number].   7/23/2025 3:47:05 PM CDT

## 2025-07-23 NOTE — TELEPHONE ENCOUNTER
Requesting refill for  Jardiance     Last visit 6/11/2025  Next visit  8/1/2025  Changing to local pharmacy

## 2025-07-24 ENCOUNTER — TELEPHONE (OUTPATIENT)
Dept: FAMILY MEDICINE | Facility: CLINIC | Age: 65
End: 2025-07-24
Payer: MEDICARE

## 2025-07-24 DIAGNOSIS — Z79.4 TYPE 2 DIABETES MELLITUS WITH HYPERGLYCEMIA, WITH LONG-TERM CURRENT USE OF INSULIN: ICD-10-CM

## 2025-07-24 DIAGNOSIS — E11.65 TYPE 2 DIABETES MELLITUS WITH HYPERGLYCEMIA, WITH LONG-TERM CURRENT USE OF INSULIN: ICD-10-CM

## 2025-07-24 NOTE — TELEPHONE ENCOUNTER
No care due was identified.  [facility] Embedded Care Due Messages. Reference number: [id number].   7/24/2025 11:39:00 AM CDT

## 2025-07-24 NOTE — TELEPHONE ENCOUNTER
Refill Decision Note   Price Noguera  is requesting a refill authorization.  Brief Assessment and Rationale for Refill:  Quick Discontinue     Medication Therapy Plan:        Comments:     Note composed:9:26 PM 07/23/2025

## 2025-07-29 ENCOUNTER — TELEPHONE (OUTPATIENT)
Dept: FAMILY MEDICINE | Facility: CLINIC | Age: 65
End: 2025-07-29
Payer: MEDICARE

## 2025-07-29 DIAGNOSIS — R29.6 FREQUENT FALLS: ICD-10-CM

## 2025-07-29 DIAGNOSIS — E11.65 UNCONTROLLED TYPE 2 DIABETES MELLITUS WITH HYPERGLYCEMIA: ICD-10-CM

## 2025-07-29 DIAGNOSIS — I63.9 CEREBROVASCULAR ACCIDENT (CVA), UNSPECIFIED MECHANISM: ICD-10-CM

## 2025-07-29 DIAGNOSIS — Z79.4 TYPE 2 DIABETES MELLITUS WITH HYPERGLYCEMIA, WITH LONG-TERM CURRENT USE OF INSULIN: Primary | ICD-10-CM

## 2025-07-29 DIAGNOSIS — E11.65 TYPE 2 DIABETES MELLITUS WITH HYPERGLYCEMIA, WITH LONG-TERM CURRENT USE OF INSULIN: Primary | ICD-10-CM

## 2025-07-29 NOTE — TELEPHONE ENCOUNTER
Copied from CRM #[id number]. Topic: General Inquiry - Patient Advice  >> Jul 29, 2025 11:00 AM Nenita wrote:  Type:  Needs Medical Advice    Who Called: [place] [facility] health Nurse Leta Lau     Would the patient rather a call back or a response via [facility]? Call     Best Call Back Number: [phone] ext [phone]  [phone] (Fax)    Additional Information: pt is in need of extended home health care orders. Pt was in the hospital 07/22/2025 & home who like to observe pt a little longer at home

## 2025-07-29 NOTE — TELEPHONE ENCOUNTER
Copied from CRM #[id number]. Topic: General Inquiry - Patient Advice  >> Jul 29, 2025 10:12 AM Alice wrote:  Type: Needs Medical Advice  Who Called:  pt    Best Call Back Number: [phone]    Additional Information: pt requesting call back in regards to appt please advise

## 2025-07-29 NOTE — TELEPHONE ENCOUNTER
Spoke with patient.  He states he has been having several seizures with blacking out for 8 and 12 hours.   He was found on the floor by his HH nurse.   He is being followed by Dr Alford.   He will keep his scheduled appt with Sabrina Cosme on 8/1/2025.   Instructed to call back if he needs to be seen sooner.  Verbalizes understanding

## 2025-07-29 NOTE — TELEPHONE ENCOUNTER
Copied from CRM #[id number]. Topic: General Inquiry - Return Call  >> Jul 29, 2025 10:33 AM Erika wrote:  Type:  Patient Returning Call    Who Called:Pt   Who Left Message for Patient:Pam   Does the patient know what this is regarding?:NA  Would the patient rather a call back or a response via [facility]? Call Back  Best Call Back Number: [phone]   Additional Information: Pt returning phone from office

## 2025-07-30 DIAGNOSIS — Z79.4 TYPE 2 DIABETES MELLITUS WITH HYPERGLYCEMIA, WITH LONG-TERM CURRENT USE OF INSULIN: ICD-10-CM

## 2025-07-30 DIAGNOSIS — I15.2 HYPERTENSION ASSOCIATED WITH DIABETES: ICD-10-CM

## 2025-07-30 DIAGNOSIS — E11.59 HYPERTENSION ASSOCIATED WITH DIABETES: ICD-10-CM

## 2025-07-30 DIAGNOSIS — E11.65 UNCONTROLLED TYPE 2 DIABETES MELLITUS WITH HYPERGLYCEMIA: ICD-10-CM

## 2025-07-30 DIAGNOSIS — E11.65 TYPE 2 DIABETES MELLITUS WITH HYPERGLYCEMIA, WITH LONG-TERM CURRENT USE OF INSULIN: ICD-10-CM

## 2025-07-30 RX ORDER — NORTRIPTYLINE HYDROCHLORIDE 25 MG/1
25 CAPSULE ORAL NIGHTLY
Qty: 90 CAPSULE | Refills: 3 | Status: SHIPPED | OUTPATIENT
Start: 2025-07-30

## 2025-07-30 RX ORDER — GLIPIZIDE 5 MG/1
5 TABLET, FILM COATED, EXTENDED RELEASE ORAL
Qty: 90 TABLET | Refills: 3 | Status: SHIPPED | OUTPATIENT
Start: 2025-07-30

## 2025-07-30 RX ORDER — LISINOPRIL 5 MG/1
5 TABLET ORAL DAILY
Qty: 90 TABLET | Refills: 3 | Status: SHIPPED | OUTPATIENT
Start: 2025-07-30

## 2025-07-30 RX ORDER — EMPAGLIFLOZIN 25 MG/1
25 TABLET, FILM COATED ORAL
Qty: 90 TABLET | Refills: 3 | OUTPATIENT
Start: 2025-07-30

## 2025-07-30 NOTE — TELEPHONE ENCOUNTER
Copied from CRM #[id number]. Topic: Medications - New Medication Request  >> Jul 30, 2025  2:35 PM Fran wrote:  Type:  Patient Returning Call    Who Called:patient  Who Left Message for Patient:nurse  Does the patient know what this is regarding?:Jardiance patient called [facility] and the cost 584.00 still to high New order was sent (per patient) for medication to be sent to      [facility] #[id number] - [place], [place] - [address] [facility]  [address]  [place] [address]  Phone: [phone] Fax: [phone]  Would the patient rather a call back or a response via [facility]? Patient stated he has requested this 3 times to please respond today  Best Call Back Number:[phone]  Additional Information: [facility] will not give patient a price unless a order is there    \

## 2025-07-30 NOTE — TELEPHONE ENCOUNTER
Copied from CRM #[id number]. Topic: Medications - New Medication Request  >> Jul 30, 2025 11:22 AM Columba wrote:  Type:  Needs Medical Advice    Who Called: pt  via [facility]? call  Best Call Back Number: [phone]    Additional Information: pt states he needs a new rx for rectal dysfunction       [id number] - [place], [place] - [address] AT [facility]  [address]  [place] [address]  Phone: [phone] Fax: [phone]

## 2025-07-30 NOTE — TELEPHONE ENCOUNTER
Pt req refill on erectile dysfunction med. Advised med was discontinued and will need appt. Pt is scheduled 8/1/25 and will discuss at visit.

## 2025-07-30 NOTE — TELEPHONE ENCOUNTER
No care due was identified.  [facility] [place] Embedded Care Due Messages. Reference number: [id number].   7/30/2025 11:14:44 AM CDT

## 2025-07-31 ENCOUNTER — PATIENT OUTREACH (OUTPATIENT)
Dept: ADMINISTRATIVE | Facility: OTHER | Age: 65
End: 2025-07-31
Payer: MEDICARE

## 2025-07-31 NOTE — PROGRESS NOTES
CHW - Initial Contact    This Community Health Worker completed OR updated the Social Determinant of Health questionnaire with patient via telephone today.    Pt identified barriers of most importance are: Spoke with the patient, he states that he has memory issues to schedule transportation for his doctor appt. I will speak to my supervisor and see what can be done.      Referrals were put through [facility] - no: none  Support and Services:   Other information discussed the patient needs / wants help with: Spoke with the patient, he states that he has memory issues to schedule transportation for his doctor appt. I will speak to my supervisor and see what can be done.    Follow up required:

## 2025-08-01 ENCOUNTER — OFFICE VISIT (OUTPATIENT)
Dept: FAMILY MEDICINE | Facility: CLINIC | Age: 65
End: 2025-08-01
Payer: MEDICARE

## 2025-08-01 VITALS
BODY MASS INDEX: 25.08 KG/M2 | HEIGHT: 69 IN | TEMPERATURE: 99 F | OXYGEN SATURATION: 97 % | HEART RATE: 67 BPM | WEIGHT: 169.31 LBS

## 2025-08-01 DIAGNOSIS — E11.59 HYPERTENSION ASSOCIATED WITH DIABETES: ICD-10-CM

## 2025-08-01 DIAGNOSIS — Z09 HOSPITAL DISCHARGE FOLLOW-UP: ICD-10-CM

## 2025-08-01 DIAGNOSIS — Z79.4 TYPE 2 DIABETES MELLITUS WITH HYPERGLYCEMIA, WITH LONG-TERM CURRENT USE OF INSULIN: Primary | ICD-10-CM

## 2025-08-01 DIAGNOSIS — Z86.73 HISTORY OF CVA (CEREBROVASCULAR ACCIDENT) WITHOUT RESIDUAL DEFICITS: ICD-10-CM

## 2025-08-01 DIAGNOSIS — E11.69 HYPERLIPIDEMIA ASSOCIATED WITH TYPE 2 DIABETES MELLITUS: ICD-10-CM

## 2025-08-01 DIAGNOSIS — E78.5 HYPERLIPIDEMIA ASSOCIATED WITH TYPE 2 DIABETES MELLITUS: ICD-10-CM

## 2025-08-01 DIAGNOSIS — E11.65 TYPE 2 DIABETES MELLITUS WITH HYPERGLYCEMIA, WITH LONG-TERM CURRENT USE OF INSULIN: Primary | ICD-10-CM

## 2025-08-01 DIAGNOSIS — R40.4 TRANSIENT ALTERATION OF AWARENESS: ICD-10-CM

## 2025-08-01 DIAGNOSIS — I15.2 HYPERTENSION ASSOCIATED WITH DIABETES: ICD-10-CM

## 2025-08-01 PROCEDURE — 99999 PR PBB SHADOW E&M-EST. PATIENT-LVL V: CPT | Mod: PBBFAC,,,

## 2025-08-01 NOTE — PATIENT INSTRUCTIONS

## 2025-08-01 NOTE — PROGRESS NOTES
"To Subjective:       Patient ID: Price Noguera is a 64 y.o. male.    Chief Complaint: Follow-up    Patient presents to the clinic for a hospital follow up.     Transitional Care Note    Family and/or Caretaker present at visit?  No.  Diagnostic tests reviewed/disposition: No diagnosic tests pending after this hospitalization.  Disease/illness education: Transient alteration of awareness, DM, HTN  Home health/community services discussion/referrals: Patient has home health established at [facility].   Establishment or re-establishment of referral orders for community resources: No other necessary community resources.   Discussion with other health care providers: No discussion with other health care providers necessary.     HOSPITAL NOTES:  HPI: Patient is a very pleasant 64-year-old male with past medical history as listed below who was recently admitted to [facility] from 7/17/2025 - 7/19/2025 for CVA workup, however, that did not yield an etiology. Presents to our emergency department today, patient complains of weakness, was on the ground for what he estimates approximately 1 hour, was unable to get up due to profound weakness. Patient also unsure if he passes out or not. Also noteworthy, once he passes out, once he comes back around, states it takes some time to regroup and be able to "find his bearings" that description is worrisome for seizure activity. Dr. Abdi, ER physician, spoke to Dr. Alford, neurologist, arranged outpatient EEG and follow-up, however family, hung up the phone and did not call back, upon the nurses phoning them to pick him up. Patient will be admitted to [facility] with telemetry for further evaluation and management     HOSPITAL COURSE: Patient admitted for observation secondary to TIA. Placed on telemetry monitoring and received serial neurochecks. Patient was discussed with Dr. Alford who recommended outpatient EEG and follow-up.She was seen and examined at the bedside by Dr. Bynum, who " determined patient was medically stable discharge. DC instructions, including red flag symptoms that would warrant further evaluation were discussed. Patient voiced understanding agrees with plan of care. It is recommended patient continue prehospital medication regiment as well as follow-up primary care provider in 1 to 2 weeks post discharge. Plan of care and anticipated discharge was discussed in detail with the attending. Patient was seen and examined on the day of discharge and is medically stable.          He reports today for a hospital follow up.   States he has upcoming appt for EEG next week.     Reports trouble affording Jardiance- will print out financial assistance for Jardiance.   Denies any more spells since being discharged.   He does live alone.   Reports good appetite.   Denies any further falls since being discharged. Uses cane and walker to ambulate.     Has no other complaints or concerns today.     Patient educated on plan of care, verbalized understanding.             Review of Systems   Constitutional:  Negative for activity change, appetite change, chills, diaphoresis and fever.   HENT:  Negative for congestion, ear pain, postnasal drip, sinus pressure, sneezing and sore throat.    Eyes:  Negative for pain, discharge, redness and itching.   Respiratory:  Negative for apnea, cough, chest tightness, shortness of breath and wheezing.    Cardiovascular:  Negative for chest pain and leg swelling.   Gastrointestinal:  Negative for abdominal distention, abdominal pain, constipation, diarrhea, nausea and vomiting.   Genitourinary:  Negative for difficulty urinating, dysuria, flank pain and frequency.   Musculoskeletal:  Positive for gait problem.   Skin:  Negative for color change, rash and wound.   Neurological:  Negative for dizziness.   All other systems reviewed and are negative.      Problem List[1]    Objective:      Physical Exam  Vitals and nursing note reviewed.   Constitutional:        "Appearance: Normal appearance. He is not ill-appearing.   HENT:      Head: Normocephalic and atraumatic.      Nose: Nose normal.   Eyes:      General: Lids are normal.   Cardiovascular:      Rate and Rhythm: Normal rate and regular rhythm.      Pulses: Normal pulses.      Heart sounds: Normal heart sounds.   Pulmonary:      Effort: Pulmonary effort is normal. No tachypnea or respiratory distress.      Breath sounds: Normal breath sounds. No wheezing.   Abdominal:      General: Bowel sounds are normal. There is no distension.      Palpations: Abdomen is soft.      Tenderness: There is no abdominal tenderness.   Musculoskeletal:         General: Normal range of motion.      Cervical back: Full passive range of motion without pain and normal range of motion.      Left lower leg: No edema.   Skin:     General: Skin is warm and dry.   Neurological:      Mental Status: He is alert and oriented to person, place, and time.   Psychiatric:         Mood and Affect: Mood normal.         Behavior: Behavior normal.         Lab Results   Component Value Date    WBC 7.18 07/18/2025    HGB 13.2 (L) 07/18/2025    HCT 37.6 (L) 07/18/2025     07/18/2025    CHOL 130 07/17/2025    TRIG 123 07/17/2025    HDL 28 (L) 07/17/2025    ALT 23 07/18/2025    AST 24 07/18/2025     (L) 07/18/2025    K 3.7 07/18/2025    CL 99 07/18/2025    CREATININE 1.0 07/18/2025    BUN 15 07/18/2025    CO2 28 07/18/2025    TSH 1.646 07/17/2025    PSA 0.95 07/09/2019    INR 1.0 07/17/2025    HGBA1C 8.2 (H) 07/18/2025     The ASCVD Risk score (Kia DAWSON, et al., 2019) failed to calculate for the following reasons:    Risk score cannot be calculated because patient has a medical history suggesting prior/existing ASCVD  Visit Vitals  BP (P) 126/70 (BP Location: Right arm, Patient Position: Sitting)   Pulse 67   Temp 98.7 °F (37.1 °C) (Oral)   Ht 5' 9" (1.753 m)   Wt 76.8 kg (169 lb 5 oz)   SpO2 97%   BMI 25.00 kg/m²      Assessment:       1. Type 2 " diabetes mellitus with hyperglycemia, with long-term current use of insulin    2. Hypertension associated with diabetes    3. Hyperlipidemia associated with type 2 diabetes mellitus    4. Transient alteration of awareness    5. History of CVA (cerebrovascular accident) without residual deficits    6. Hospital discharge follow-up        Plan:       1. Type 2 diabetes mellitus with hyperglycemia, with long-term current use of insulin   - Stable-Continue Jardiance, Glipizide, Metformin   - Diabetic Diet   - Continue current plan of care    2. Hypertension associated with diabetes   - Stable-Continue Lisinopril   - Continue current plan of care    3. Hyperlipidemia associated with type 2 diabetes mellitus   - Stable-Continue Lipitor   - Low fat, low cholesterol diet   - Continue current plan of care    4. Transient alteration of awareness/ History of CVA (cerebrovascular accident) without residual deficits   - Stable   - Follow up with Neurology   - EEG scheduled    5. Hospital discharge follow-up       Follow up if symptoms worsen or fail to improve.      Future Appointments       Date Provider Specialty Appt Notes    10/3/2025 Sabrina Cosme NP Family Medicine 2 month follow up    12/11/2025 Chanel Roberson MD Family Medicine 6mth f/u                  [1]   Patient Active Problem List  Diagnosis    Type 2 diabetes mellitus with neurologic complication, with long-term current use of insulin    Coronary artery disease    History of heart artery stent    Hypertension associated with diabetes    Hyponatremia    Hyperlipidemia associated with type 2 diabetes mellitus    Irritable bowel syndrome with diarrhea    Gastroesophageal reflux disease without esophagitis    Onychomycosis    Benign essential tremor    Dysphagia    History of colon polyps    Small vessel stroke    BPH (benign prostatic hyperplasia)    Cerebrovascular accident (CVA)    Anticoagulant long-term use    History of CVA (cerebrovascular accident) without  residual deficits    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

## 2025-08-04 ENCOUNTER — PATIENT OUTREACH (OUTPATIENT)
Dept: ADMINISTRATIVE | Facility: OTHER | Age: 65
End: 2025-08-04
Payer: MEDICARE

## 2025-08-04 NOTE — PROGRESS NOTES
CHW - Case Closure    This Community Health Worker spoke to patient via telephone today.   Pt/Caregiver reported: Spoke with the patient and notified him that there isn't a transportation service that will cross state [place] to bring him to his doctors appts. He expressed understanding. Case closure.   Pt/Caregiver denied any additional needs at this time and agrees with episode closure at this time.  Provided patient with Community Health Worker's contact information and encouraged him/her to contact this Community Health Worker if additional needs arise.

## 2025-08-06 DIAGNOSIS — E11.65 TYPE 2 DIABETES MELLITUS WITH HYPERGLYCEMIA, WITH LONG-TERM CURRENT USE OF INSULIN: Primary | ICD-10-CM

## 2025-08-06 DIAGNOSIS — Z79.4 TYPE 2 DIABETES MELLITUS WITH HYPERGLYCEMIA, WITH LONG-TERM CURRENT USE OF INSULIN: Primary | ICD-10-CM

## 2025-08-07 DIAGNOSIS — Z79.4 TYPE 2 DIABETES MELLITUS WITH HYPERGLYCEMIA, WITH LONG-TERM CURRENT USE OF INSULIN: ICD-10-CM

## 2025-08-07 DIAGNOSIS — E11.65 TYPE 2 DIABETES MELLITUS WITH HYPERGLYCEMIA, WITH LONG-TERM CURRENT USE OF INSULIN: ICD-10-CM

## 2025-08-07 RX ORDER — DEXTROSE 4 G
1 TABLET,CHEWABLE ORAL 2 TIMES DAILY
Qty: 1 EACH | Refills: 0 | Status: SHIPPED | OUTPATIENT
Start: 2025-08-07 | End: 2025-08-07 | Stop reason: SDUPTHER

## 2025-08-07 RX ORDER — DEXTROSE 4 G
1 TABLET,CHEWABLE ORAL 2 TIMES DAILY
Qty: 1 EACH | Refills: 0 | Status: SHIPPED | OUTPATIENT
Start: 2025-08-07

## 2025-09-04 ENCOUNTER — TELEPHONE (OUTPATIENT)
Dept: FAMILY MEDICINE | Facility: CLINIC | Age: 65
End: 2025-09-04
Payer: MEDICARE